# Patient Record
Sex: MALE | Race: WHITE | NOT HISPANIC OR LATINO | Employment: OTHER | URBAN - METROPOLITAN AREA
[De-identification: names, ages, dates, MRNs, and addresses within clinical notes are randomized per-mention and may not be internally consistent; named-entity substitution may affect disease eponyms.]

---

## 2017-06-20 ENCOUNTER — PROCEDURE VISIT (OUTPATIENT)
Dept: CARDIOLOGY | Facility: HOSPITAL | Age: 77
End: 2017-06-20

## 2017-06-20 ENCOUNTER — OFFICE VISIT (OUTPATIENT)
Dept: CARDIOLOGY | Facility: HOSPITAL | Age: 77
End: 2017-06-20

## 2017-06-20 VITALS
TEMPERATURE: 97.6 F | OXYGEN SATURATION: 98 % | SYSTOLIC BLOOD PRESSURE: 142 MMHG | BODY MASS INDEX: 30.62 KG/M2 | WEIGHT: 231 LBS | HEART RATE: 57 BPM | DIASTOLIC BLOOD PRESSURE: 78 MMHG | HEIGHT: 73 IN | RESPIRATION RATE: 23 BRPM

## 2017-06-20 DIAGNOSIS — I48.0 PAF (PAROXYSMAL ATRIAL FIBRILLATION) (HCC): Primary | ICD-10-CM

## 2017-06-20 DIAGNOSIS — E78.5 DYSLIPIDEMIA: ICD-10-CM

## 2017-06-20 DIAGNOSIS — I48.0 PAF (PAROXYSMAL ATRIAL FIBRILLATION) (HCC): ICD-10-CM

## 2017-06-20 DIAGNOSIS — Z79.01 INADEQUATE ANTICOAGULATION: ICD-10-CM

## 2017-06-20 DIAGNOSIS — Z51.81 INADEQUATE ANTICOAGULATION: ICD-10-CM

## 2017-06-20 PROCEDURE — 99204 OFFICE O/P NEW MOD 45 MIN: CPT | Performed by: NURSE PRACTITIONER

## 2017-06-20 PROCEDURE — 93005 ELECTROCARDIOGRAM TRACING: CPT

## 2017-06-20 PROCEDURE — 93010 ELECTROCARDIOGRAM REPORT: CPT | Performed by: INTERNAL MEDICINE

## 2017-06-20 RX ORDER — METFORMIN HYDROCHLORIDE 500 MG/1
1000 TABLET, EXTENDED RELEASE ORAL NIGHTLY
COMMUNITY
Start: 2017-06-05

## 2017-06-20 RX ORDER — TAMSULOSIN HYDROCHLORIDE 0.4 MG/1
0.4 CAPSULE ORAL DAILY
COMMUNITY
Start: 2017-05-30 | End: 2018-08-23

## 2017-06-20 NOTE — PROGRESS NOTES
Encounter Date:06/20/2017      Patient ID: Stefan Pedraza is a 77 y.o. male.        Subjective:     Chief Complaint: Establish Care and Atrial Fibrillation     History of Present Illness patient presents to the [facility] today for ongoing evaluation of his atrial fibrillation.  Patient underwent a pulmonary vein ablation per Dr. Solis in October 2011 and maintained normal sinus rhythm until February 2012.  Patient underwent a cardioversion in February 2012 which restored normal sinus rhythm.  Patient reports remaining in normal sinus rhythm from February 2012 until recently.  He notes roughly one week ago he started experiencing significant fatigue and an irregular heartbeat.  Patient was evaluated by his primary care provider last week and was noted to be in atrial fibrillation.  Patient is currently rate controlled and is on aspirin only.  Patient is a chads VASC of 2 due to age. Patient denies chest pain, chest pressure, tachycardia, presyncope, syncope, orthopnea, PND, abdominal fullness, early satiety, claudication, cough or edema.    Patient Active Problem List   Diagnosis   • Atrial fibrillation  a. History of atrial fibrillation, initial diagnosis 2007.   b. Status-post successful external cardioversion, Dr. Dyer, 2007.  c. Initiation of amiodarone therapy with recent tapering dose, Dr. Lowe.   d. Echocardiogram, 02/02/2011, revealing normal LV function, diastolic dysfunction noted, mild MR, no pericardial effusion.   e. Holter monitor, 01/25/2011, revealing sinus rhythm with PAC/PVC/episodes of sinus bradycardia.   f. CHADS score = 1.   g. 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   h. Pulmonary vein isolation procedure, 10/05/2011.  i. BRISSA with left ventricular ejection fraction of 55%, mild TR and MR.   j. Event recorder with intermittent episodes of flutter but the majority of the time in  sinus rhythm.   k. External cardioversion to normal sinus rhythm, 02/17/2012.  l. Atypical chest pain.  m. History of left heart catheterization, 1990s in [place], reported as normal coronaries.   n. Stress test, 08/23/2007, revealing no significant ischemia, EF estimated at 46%.   • History of cardioversion   • Diabetes mellitus   • GERD (gastroesophageal reflux disease)   • Dyslipidemia   • Osteoarthritis       Past Surgical History:   Procedure Laterality Date   • CHOLECYSTECTOMY     • KNEE ARTHROSCOPY W/ PARTIAL MEDIAL MENISCECTOMY Left    • ORCHIECTOMY Right    • SHOULDER ARTHROSCOPY W/ LABRAL REPAIR Right        Allergies   Allergen Reactions   • Penicillins Rash         Current Outpatient Prescriptions:   •  atorvastatin (LIPITOR) 10 MG tablet, Take 10 mg by mouth Daily., Disp: , Rfl:   •  metFORMIN ER (GLUCOPHAGE-XR) 500 MG 24 hr tablet, Take 500 mg by mouth Daily., Disp: , Rfl:   •  naproxen (NAPROSYN) 500 MG tablet, Take 500 mg by mouth 2 (Two) Times a Day With Meals., Disp: , Rfl:   •  omeprazole (priLOSEC) 40 MG capsule, Take 40 mg by mouth Daily., Disp: , Rfl:   •  tamsulosin (FLOMAX) 0.4 MG capsule 24 hr capsule, Take 0.4 mg by mouth Daily., Disp: , Rfl:   •  ASA 81 mg qd    The following portions of the chart were reviewed and updated as appropriate: Allergies, current medications, past family history, social history, past medical history.     Review of Systems   Constitution: Positive for malaise/fatigue. Negative for chills, decreased appetite, diaphoresis, fever, weakness, night sweats, weight gain and weight loss.   HENT: Negative for congestion, headaches, hearing loss, hoarse voice and nosebleeds.    Eyes: Negative for blurred vision, visual disturbance and visual halos.   Cardiovascular: Positive for irregular heartbeat. Negative for chest pain, claudication, cyanosis, dyspnea on exertion, leg swelling, near-syncope, orthopnea, palpitations, paroxysmal nocturnal dyspnea and syncope.   Respiratory:  "Positive for snoring. Negative for cough, hemoptysis, shortness of breath, sleep disturbances due to breathing, sputum production and wheezing.    Hematologic/Lymphatic: Negative for bleeding problem. Does not bruise/bleed easily.   Skin: Negative for dry skin, itching and rash.   Musculoskeletal: Negative for arthritis, joint pain, joint swelling and myalgias.   Gastrointestinal: Positive for heartburn. Negative for bloating, abdominal pain, constipation, diarrhea, flatus, hematemesis, hematochezia, melena, nausea and vomiting.   Genitourinary: Negative for dysuria, frequency, hematuria, nocturia and urgency.   Neurological: Positive for excessive daytime sleepiness. Negative for dizziness, light-headedness and loss of balance.   Psychiatric/Behavioral: Negative for depression. The patient does not have insomnia and is not nervous/anxious.            Objective:     Vitals:    06/20/17 1541 06/20/17 1542 06/20/17 1543   BP: 136/89 142/91 142/78   BP Location: Right arm Left arm Left arm   Patient Position: Sitting Sitting Standing   Cuff Size: Adult     Pulse: 79  57   Resp: 23     Temp: 97.6 °F (36.4 °C)     TempSrc: Temporal Artery      SpO2: 98%     Weight: 231 lb (105 kg)     Height: 73\" (185.4 cm)           Physical Exam   Constitutional: He is oriented to person, place, and time. He appears well-developed and well-nourished. He is active and cooperative. No distress.   HENT:   Head: Normocephalic and atraumatic.   Mouth/Throat: Oropharynx is clear and moist.   Eyes: Conjunctivae and EOM are normal. Pupils are equal, round, and reactive to light.   Neck: Normal range of motion. Neck supple. No JVD present. No tracheal deviation present. No thyromegaly present.   Cardiovascular: Normal rate, normal heart sounds and intact distal pulses.  An irregularly irregular rhythm present.   Pulmonary/Chest: Effort normal and breath sounds normal.   Abdominal: Soft. Bowel sounds are normal. He exhibits no distension. There " is no tenderness.   Musculoskeletal: Normal range of motion.   Neurological: He is alert and oriented to person, place, and time.   Skin: Skin is warm, dry and intact.   Psychiatric: He has a normal mood and affect. His behavior is normal.   Nursing note and vitals reviewed.      Lab and Diagnostic Review:    EKG: Atrial fibrillation with premature ventricular contractions at 82 bpm  Assessment and Plan:         1. PAF (paroxysmal atrial fibrillation)  Pulmonary vein isolation procedure per Dr. Solis 2011; external cardioversion 2012  Patient is currently rate controlled  Patient to begin Eliquis 5 mg 1 by mouth twice a day; patient was given a free month trial card  Discussed with Dr. Solis: per his recommendation patient to be anticoagulated for 3-4 weeks and then undergo external cardioversion  - ECG 12 Lead; Future  - Cardioversion External in Cardiology Department; Future  AFIB education provided today including: s/s, use of anticoagulation, treatment of atrial fibrillation, Chads Vasc and the role of the afib center. Discussed stroke prevention and causes of afib, triggers of afib and medication management.   2. Inadequate anticoagulation  Patient to begin Eliquis 5 mg twice a day    3. Dyslipidemia  Currently on statin therapy    It has been a pleasure to participate in the care of this patient.  Patient was instructed to call the [facility] with any questions, concerns, or worsening symptoms.      * Please note that portions of this note were completed with a voice recognition program. Efforts were made to edit the dictation but occasionally words are transcribed.

## 2017-06-29 ENCOUNTER — PREP FOR SURGERY (OUTPATIENT)
Dept: OTHER | Facility: HOSPITAL | Age: 77
End: 2017-06-29

## 2017-06-29 DIAGNOSIS — I48.0 PAF (PAROXYSMAL ATRIAL FIBRILLATION) (HCC): Primary | ICD-10-CM

## 2017-06-29 DIAGNOSIS — I48.19 PERSISTENT ATRIAL FIBRILLATION (HCC): ICD-10-CM

## 2017-06-29 RX ORDER — ACETAMINOPHEN 325 MG/1
650 TABLET ORAL EVERY 4 HOURS PRN
Status: CANCELLED | OUTPATIENT
Start: 2017-06-29

## 2017-06-29 RX ORDER — PROMETHAZINE HYDROCHLORIDE 25 MG/ML
12.5 INJECTION, SOLUTION INTRAMUSCULAR; INTRAVENOUS EVERY 4 HOURS PRN
Status: CANCELLED | OUTPATIENT
Start: 2017-06-29

## 2017-06-29 RX ORDER — NITROGLYCERIN 0.4 MG/1
0.4 TABLET SUBLINGUAL
Status: CANCELLED | OUTPATIENT
Start: 2017-06-29

## 2017-07-13 ENCOUNTER — HOSPITAL ENCOUNTER (OUTPATIENT)
Dept: CARDIOLOGY | Facility: HOSPITAL | Age: 77
Discharge: HOME | End: 2017-07-13
Attending: INTERNAL MEDICINE | Admitting: INTERNAL MEDICINE

## 2017-07-13 VITALS
OXYGEN SATURATION: 96 % | SYSTOLIC BLOOD PRESSURE: 138 MMHG | WEIGHT: 229.06 LBS | HEART RATE: 62 BPM | BODY MASS INDEX: 30.36 KG/M2 | HEIGHT: 73 IN | RESPIRATION RATE: 16 BRPM | TEMPERATURE: 97.4 F | DIASTOLIC BLOOD PRESSURE: 89 MMHG

## 2017-07-13 DIAGNOSIS — I48.0 PAF (PAROXYSMAL ATRIAL FIBRILLATION) (HCC): ICD-10-CM

## 2017-07-13 LAB
ANION GAP SERPL CALCULATED.3IONS-SCNC: 7 MMOL/L (ref 3–11)
BASOPHILS # BLD AUTO: 0.03 10*3/MM3 (ref 0–0.2)
BASOPHILS NFR BLD AUTO: 0.5 % (ref 0–1)
BUN BLD-MCNC: 20 MG/DL (ref 9–23)
BUN/CREAT SERPL: 22.2 (ref 7–25)
CALCIUM SPEC-SCNC: 10 MG/DL (ref 8.7–10.4)
CHLORIDE SERPL-SCNC: 102 MMOL/L (ref 99–109)
CO2 SERPL-SCNC: 25 MMOL/L (ref 20–31)
CREAT BLD-MCNC: 0.9 MG/DL (ref 0.6–1.3)
DEPRECATED RDW RBC AUTO: 45.6 FL (ref 37–54)
EOSINOPHIL # BLD AUTO: 0.19 10*3/MM3 (ref 0–0.3)
EOSINOPHIL NFR BLD AUTO: 3.4 % (ref 0–3)
ERYTHROCYTE [DISTWIDTH] IN BLOOD BY AUTOMATED COUNT: 13 % (ref 11.3–14.5)
GFR SERPL CREATININE-BSD FRML MDRD: 82 ML/MIN/1.73
GLUCOSE BLD-MCNC: 100 MG/DL (ref 70–100)
GLUCOSE BLDC GLUCOMTR-MCNC: 83 MG/DL (ref 70–130)
HBA1C MFR BLD: 5.9 % (ref 4.8–5.6)
HCT VFR BLD AUTO: 46.6 % (ref 38.9–50.9)
HGB BLD-MCNC: 15.6 G/DL (ref 13.1–17.5)
IMM GRANULOCYTES # BLD: 0.01 10*3/MM3 (ref 0–0.03)
IMM GRANULOCYTES NFR BLD: 0.2 % (ref 0–0.6)
INR PPP: 1.06
LYMPHOCYTES # BLD AUTO: 1.62 10*3/MM3 (ref 0.6–4.8)
LYMPHOCYTES NFR BLD AUTO: 28.6 % (ref 24–44)
MCH RBC QN AUTO: 32 PG (ref 27–31)
MCHC RBC AUTO-ENTMCNC: 33.5 G/DL (ref 32–36)
MCV RBC AUTO: 95.5 FL (ref 80–99)
MONOCYTES # BLD AUTO: 0.59 10*3/MM3 (ref 0–1)
MONOCYTES NFR BLD AUTO: 10.4 % (ref 0–12)
NEUTROPHILS # BLD AUTO: 3.22 10*3/MM3 (ref 1.5–8.3)
NEUTROPHILS NFR BLD AUTO: 56.9 % (ref 41–71)
PLATELET # BLD AUTO: 169 10*3/MM3 (ref 150–450)
PMV BLD AUTO: 10.5 FL (ref 6–12)
POTASSIUM BLD-SCNC: 3.8 MMOL/L (ref 3.5–5.5)
PROTHROMBIN TIME: 11.6 SECONDS (ref 9.6–11.5)
RBC # BLD AUTO: 4.88 10*6/MM3 (ref 4.2–5.76)
SODIUM BLD-SCNC: 134 MMOL/L (ref 132–146)
WBC NRBC COR # BLD: 5.66 10*3/MM3 (ref 3.5–10.8)

## 2017-07-13 PROCEDURE — 83036 HEMOGLOBIN GLYCOSYLATED A1C: CPT | Performed by: PHYSICIAN ASSISTANT

## 2017-07-13 PROCEDURE — 80048 BASIC METABOLIC PNL TOTAL CA: CPT | Performed by: INTERNAL MEDICINE

## 2017-07-13 PROCEDURE — 85025 COMPLETE CBC W/AUTO DIFF WBC: CPT | Performed by: INTERNAL MEDICINE

## 2017-07-13 PROCEDURE — 82962 GLUCOSE BLOOD TEST: CPT

## 2017-07-13 PROCEDURE — 93005 ELECTROCARDIOGRAM TRACING: CPT | Performed by: INTERNAL MEDICINE

## 2017-07-13 PROCEDURE — 92960 CARDIOVERSION ELECTRIC EXT: CPT

## 2017-07-13 PROCEDURE — 85610 PROTHROMBIN TIME: CPT | Performed by: INTERNAL MEDICINE

## 2017-07-13 PROCEDURE — 92960 CARDIOVERSION ELECTRIC EXT: CPT | Performed by: INTERNAL MEDICINE

## 2017-07-13 PROCEDURE — 36415 COLL VENOUS BLD VENIPUNCTURE: CPT

## 2017-07-13 PROCEDURE — 25010000002 MIDAZOLAM PER 1 MG: Performed by: INTERNAL MEDICINE

## 2017-07-13 PROCEDURE — 99152 MOD SED SAME PHYS/QHP 5/>YRS: CPT

## 2017-07-13 RX ORDER — NALOXONE HYDROCHLORIDE 0.4 MG/ML
INJECTION, SOLUTION INTRAMUSCULAR; INTRAVENOUS; SUBCUTANEOUS
Status: DISCONTINUED
Start: 2017-07-13 | End: 2017-07-13 | Stop reason: WASHOUT

## 2017-07-13 RX ORDER — MIDAZOLAM HYDROCHLORIDE 1 MG/ML
INJECTION INTRAMUSCULAR; INTRAVENOUS
Status: DISCONTINUED
Start: 2017-07-13 | End: 2017-07-13 | Stop reason: HOSPADM

## 2017-07-13 RX ORDER — PROMETHAZINE HYDROCHLORIDE 25 MG/ML
12.5 INJECTION, SOLUTION INTRAMUSCULAR; INTRAVENOUS EVERY 4 HOURS PRN
Status: DISCONTINUED | OUTPATIENT
Start: 2017-07-13 | End: 2017-07-13 | Stop reason: HOSPADM

## 2017-07-13 RX ORDER — MIDAZOLAM HYDROCHLORIDE 1 MG/ML
INJECTION INTRAMUSCULAR; INTRAVENOUS
Status: COMPLETED | OUTPATIENT
Start: 2017-07-13 | End: 2017-07-13

## 2017-07-13 RX ORDER — NITROGLYCERIN 0.4 MG/1
0.4 TABLET SUBLINGUAL
Status: DISCONTINUED | OUTPATIENT
Start: 2017-07-13 | End: 2017-07-13 | Stop reason: HOSPADM

## 2017-07-13 RX ORDER — CHLORAL HYDRATE 500 MG
1000 CAPSULE ORAL
COMMUNITY

## 2017-07-13 RX ORDER — ACETAMINOPHEN 325 MG/1
650 TABLET ORAL EVERY 4 HOURS PRN
Status: DISCONTINUED | OUTPATIENT
Start: 2017-07-13 | End: 2017-07-13 | Stop reason: HOSPADM

## 2017-07-13 RX ORDER — FLUMAZENIL 0.1 MG/ML
INJECTION INTRAVENOUS
Status: DISCONTINUED
Start: 2017-07-13 | End: 2017-07-13 | Stop reason: WASHOUT

## 2017-07-13 RX ORDER — FERROUS SULFATE TAB EC 324 MG (65 MG FE EQUIVALENT) 324 (65 FE) MG
324 TABLET DELAYED RESPONSE ORAL
COMMUNITY

## 2017-07-13 RX ADMIN — METHOHEXITAL SODIUM 20 MG: 500 INJECTION, POWDER, LYOPHILIZED, FOR SOLUTION INTRAMUSCULAR; INTRAVENOUS; RECTAL at 09:37

## 2017-07-13 RX ADMIN — METHOHEXITAL SODIUM 20 MG: 500 INJECTION, POWDER, LYOPHILIZED, FOR SOLUTION INTRAMUSCULAR; INTRAVENOUS; RECTAL at 09:35

## 2017-07-13 RX ADMIN — MIDAZOLAM HYDROCHLORIDE 1 MG: 1 INJECTION, SOLUTION INTRAMUSCULAR; INTRAVENOUS at 09:35

## 2017-07-13 NOTE — H&P
Cardiology H&P    Stefan Pedraza  1940  [phone]      07/13/17    DATE OF ADMISSION: 7/13/2017  [facility]    Gamaliel Eaton MD  [address] [facility] [place] [address]    CC: Atrial Fibrillation        Problem List:   1. Atrial fibrillation:   a. History of atrial fibrillation, initial diagnosis 2007.   b. Status-post successful external cardioversion, Dr. Dyer, 2007.  c. Initiation of amiodarone therapy with recent tapering dose, Dr. Lowe.   d. Echocardiogram, 02/02/2011, revealing normal LV function, diastolic dysfunction noted, mild MR, no pericardial effusion.   e. Holter monitor, 01/25/2011, revealing sinus rhythm with PAC/PVC/episodes of sinus bradycardia.   f. CHADS score = 1.   g. 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   h. Pulmonary vein isolation procedure, 10/05/2011.  i. BRISSA with left ventricular ejection fraction of 55%, mild TR and MR.   j. Event recorder with intermittent episodes of flutter but the majority of the time in sinus rhythm.   k. External cardioversion to normal sinus rhythm, 02/17/2012.  l. Atypical chest pain.  m. History of left heart catheterization, 1990s in [place], reported as normal coronaries.   n. Stress test, 08/23/2007, revealing no significant ischemia, EF estimated at 46%.  2. Pre-Diabetes mellitus.   3. Gastroesophageal reflux disease.  4. Dyslipidemia.  5. Osteoarthritis.  6. Remote surgical history:  a. Cholecystectomy.   b. Right orchiectomy.  c. Right shoulder labral repair.  d. Left knee arthroscopy for partial medial meniscectomy        History of Present Illness:   Mr Pedraza presents today for ECV of his atrial fibrillation. Patient underwent a pulmonary vein ablation per Dr. Solis in October 2011 and maintained normal sinus rhythm until February 2012. Patient underwent a cardioversion in February 2012 which restored  normal sinus rhythm. Patient reports remaining in normal sinus rhythm from February 2012 until recently. About a month ago, patient was evaluated by his primary care provider  was noted to be in atrial fibrillation. He was not aware that he was in NSR, but he does note that looking bad, he felt more fatigued than normal. He states that before this happened, he had two colds and took Dayquil and Nyquil.   He followed up with the AFIB clinic and was started on Eliquis and planned for ECV 3-4 weeks later.  Patient is currently rate controlled.  Patient denies chest pain, chest pressure, tachycardia, presyncope, syncope, orthopnea, PND, abdominal fullness, early satiety, claudication, cough or edema.    Allergies   Allergen Reactions   • Penicillins Rash       Prior to Admission Medications     Prescriptions Last Dose Informant Patient Reported? Taking?    apixaban (ELIQUIS) 5 MG tablet tablet 7/13/2017  No Yes    Take 1 tablet by mouth 2 (Two) Times a Day.    atorvastatin (LIPITOR) 10 MG tablet 7/12/2017  Yes Yes    Take 10 mg by mouth Daily.    B Complex Vitamins (VITAMIN B COMPLEX PO) 7/13/2017  Yes Yes    Take 1 tablet by mouth Daily.    ferrous sulfate 324 (65 FE) MG tablet delayed-release EC tablet 7/13/2017  Yes Yes    Take 324 mg by mouth Daily With Breakfast.    metFORMIN ER (GLUCOPHAGE-XR) 500 MG 24 hr tablet 7/12/2017  Yes Yes    Take 500 mg by mouth Daily.    naproxen (NAPROSYN) 500 MG tablet 7/13/2017  Yes Yes    Take 500 mg by mouth 2 (Two) Times a Day With Meals.    Omega-3 Fatty Acids (FISH OIL) 1000 MG capsule capsule 7/13/2017  Yes Yes    Take  by mouth Daily With Breakfast.    omeprazole (priLOSEC) 40 MG capsule 7/13/2017  Yes Yes    Take 40 mg by mouth Daily.    tamsulosin (FLOMAX) 0.4 MG capsule 24 hr capsule 7/12/2017  Yes Yes    Take 0.4 mg by mouth Daily.            Current Facility-Administered Medications:   •  acetaminophen (TYLENOL) tablet 650 mg, 650 mg, Oral, Q4H PRN, SHIRA Berry  •   flumazenil (ROMAZICON) 0.5 MG/5ML injection  - ADS Override Pull, , , ,   •  midazolam (VERSED) 2 MG/2ML injection  - ADS Override Pull, , , ,   •  naloxone (NARCAN) 0.4 MG/ML injection  - ADS Override Pull, , , ,   •  nitroglycerin (NITROSTAT) SL tablet 0.4 mg, 0.4 mg, Sublingual, Q5 Min PRN, SHIRA Berry  •  promethazine (PHENERGAN) injection 12.5 mg, 12.5 mg, Intravenous, Q4H PRN, SHIRA Berry    Social History     Social History   • Marital status:      Spouse name: N/A   • Number of children: N/A   • Years of education: N/A     Occupational History   • Retired      Social History Main Topics   • Smoking status: Never Smoker   • Smokeless tobacco: Never Used   • Alcohol use No   • Drug use: No   • Sexual activity: Defer     Other Topics Concern   • None     Social History Narrative    Caffeine: 3-4 servings per day    Patient lives at home with his wife       Family History   Problem Relation Age of Onset   • Arthritis Mother    • Pneumonia Mother    • Atrial fibrillation Father    • Hypertension Father    • Diabetes Father    • No Known Problems Sister    • No Known Problems Maternal Grandmother    • Diabetes Maternal Grandfather    • No Known Problems Paternal Grandmother    • No Known Problems Paternal Grandfather        REVIEW OF SYSTEMS:   CONST:  No weight loss, fever, chills, weakness + fatigue.   HEENT:  No visual loss, blurred vision, double vision, yellow sclerae.                   No hearing loss, congestion, sore throat.   SKIN:      No rashes, urticaria, ulcers, sores.     RESP:     No shortness of breath, hemoptysis, cough, sputum.   GI:           No anorexia, nausea, vomiting, diarrhea. No abdominal pain, melena.   :         No burning on urination, hematuria or increased frequency.  ENDO:    No diaphoresis, cold or heat intolerance. No polyuria or polydipsia.   NEURO:  No headache, dizziness, syncope, paralysis, ataxia, or parasthesias.                  No change in bowel or  "bladder control. No history of CVA/TIA  MUSC:    No muscle, back pain, joint pain or stiffness.   HEME:    No anemia, bleeding, bruising. No history of DVT/PE.  PSYCH:  No history of depression, anxiety    Vitals:    07/13/17 0830 07/13/17 0834   BP: 124/93 131/97   BP Location: Right arm Left arm   Patient Position: Lying Lying   Pulse: 89    Resp: 16    Temp: 97.4 °F (36.3 °C)    TempSrc: Temporal Artery     SpO2: 94%    Weight: 229 lb 0.9 oz (104 kg)    Height: 73\" (185.4 cm)          Vital Sign Min/Max for last 24 hours  Temp  Min: 97.4 °F (36.3 °C)  Max: 97.4 °F (36.3 °C)   BP  Min: 124/93  Max: 131/97   Pulse  Min: 89  Max: 89   Resp  Min: 16  Max: 16   SpO2  Min: 94 %  Max: 94 %   No Data Recorded    No intake or output data in the 24 hours ending 07/13/17 0905          Physical Exam:  GEN: Well nourished, Well- developed  No acute distress  HEENT: Normocephalic, Atraumatic, PERRLA, moist mucous membranes  NECK: supple, NO JVD, no thyromegaly, no lymphadenopathy  CARD: S1S2  irr irr no murmur, gallop, rub  LUNGS: Clear to auscultataion, normal respiratory effort  ABDOMEN: Soft, nontender, normal bowel sounds  EXTREMITIES:No gross deformities,  No clubbing, cyanosis, or edema  SKIN: Warm, dry  NEURO: No focal deficits  PSYCHIATRIC: Normal affect and mood      Data:     Results from last 7 days  Lab Units 07/13/17  0839   WBC 10*3/mm3 5.66   HEMOGLOBIN g/dL 15.6   HEMATOCRIT % 46.6   PLATELETS 10*3/mm3 169                                      Telemetry: atrial fibrillation    Assessment and Plan:   1. Persistent recurrent atrial fibrillation- probably related to taking cold medications. The patient has been on Eliquis for 3-4 weeks now and will undergo ECV today with Dr. Solis. The risks, benefits, and alternatives of the procedure have been reviewed and the patient wishes to proceed.   CHADSVasc = 3 on Eliquis. Was previously on ASA. Will probably need to continue Eliquis as he was not aware he was in " afib. If he has recurrent atrial fibrillation, he will probably need antiarrythmic.         Scribed for Prabhakar Solis MD by Trinidad Cano PA-C. 7/13/2017  9:05 AM      I, Prabhakar Solis MD, personally performed the services face to face as described and documented by the above named individual. I have made any necessary edits and it is both accurate and complete 7/13/2017  9:41 AM

## 2017-07-13 NOTE — DISCHARGE INSTR - LAB
DR. NAQVI WANTS YOU TO HAVE AN EKG ON Monday, July 17TH, 2017. YOU HAVE BEEN GIVEN AN ORDER FOR THE EKG. YOU MAY GO TO DR. NAQVI'S OFFICE OR YOUR PRIMARY CARE PROVIDER TO HAVE THE EKG DONE.       DR. NAQVI WANTS TO SEE YOU FOR A FOLLOW UP IN 6 WEEKS. YOU HAVE BEEN PLACED ON A WAIT LIST AND THE OFFICE WILL CALL YOU WHEN AN APPOINTMENT BECOMES AVAILABLE.

## 2017-07-13 NOTE — PLAN OF CARE
Problem: Patient Care Overview (Adult)  Goal: Plan of Care Review  Outcome: Ongoing (interventions implemented as appropriate)    07/13/17 0830   Patient Care Overview   Progress no change   Outcome Evaluation   Outcome Summary/Follow up Plan PT ARRIVED IN A FIB, VERBALIZES UNDERSTANDING OF PROCEDURE. NO COMPLAINTS OR ISSUES AT THIS TIME.    Coping/Psychosocial Response Interventions   Plan Of Care Reviewed With patient;spouse

## 2017-07-13 NOTE — PLAN OF CARE
Problem: Arrhythmia/Dysrhythmia (Symptomatic) (Adult)  Goal: Signs and Symptoms of Listed Potential Problems Will be Absent or Manageable (Arrhythmia/Dysrhythmia)  Outcome: Outcome(s) achieved Date Met:  07/13/17 07/13/17 1106   Arrhythmia/Dysrhythmia (Symptomatic)   Problems Assessed (Arrhythmia/Dysrhythmia) all   Problems Present (Arrhythmia/Dysrhythmia) electrophysiological conduction defect     PT ARRIVED IN A FIB

## 2017-07-17 ENCOUNTER — CLINICAL SUPPORT (OUTPATIENT)
Dept: CARDIOLOGY | Facility: CLINIC | Age: 77
End: 2017-07-17

## 2017-07-17 DIAGNOSIS — I48.0 PAF (PAROXYSMAL ATRIAL FIBRILLATION) (HCC): ICD-10-CM

## 2017-07-17 DIAGNOSIS — I48.91 ATRIAL FIBRILLATION, UNSPECIFIED TYPE (HCC): Primary | ICD-10-CM

## 2017-07-17 PROCEDURE — 93000 ELECTROCARDIOGRAM COMPLETE: CPT | Performed by: INTERNAL MEDICINE

## 2017-07-21 ENCOUNTER — TELEPHONE (OUTPATIENT)
Dept: CARDIOLOGY | Facility: CLINIC | Age: 77
End: 2017-07-21

## 2017-07-21 DIAGNOSIS — I48.0 PAROXYSMAL ATRIAL FIBRILLATION (HCC): Primary | ICD-10-CM

## 2017-07-28 ENCOUNTER — HOSPITAL ENCOUNTER (OUTPATIENT)
Dept: CARDIOLOGY | Facility: HOSPITAL | Age: 77
Discharge: HOME | End: 2017-07-28
Attending: INTERNAL MEDICINE | Admitting: INTERNAL MEDICINE

## 2017-07-28 LAB
BH CV ECHO MEAS - AI DEC SLOPE: 95.9 CM/SEC^2
BH CV ECHO MEAS - AI MAX PG: 83.2 MMHG
BH CV ECHO MEAS - AI MAX VEL: 456.1 CM/SEC
BH CV ECHO MEAS - AI P1/2T: 1393 MSEC
BH CV ECHO MEAS - AO ROOT AREA (BSA CORRECTED): 1.5
BH CV ECHO MEAS - AO ROOT AREA: 9.7 CM^2
BH CV ECHO MEAS - AO ROOT DIAM: 3.5 CM
BH CV ECHO MEAS - BSA(HAYCOCK): 2.3 M^2
BH CV ECHO MEAS - BSA: 2.3 M^2
BH CV ECHO MEAS - BZI_BMI: 30.2 KILOGRAMS/M^2
BH CV ECHO MEAS - BZI_METRIC_HEIGHT: 185.4 CM
BH CV ECHO MEAS - BZI_METRIC_WEIGHT: 103.9 KG
BH CV ECHO MEAS - CONTRAST EF (2CH): 63.7 ML/M^2
BH CV ECHO MEAS - CONTRAST EF 4CH: 51.9 ML/M^2
BH CV ECHO MEAS - EDV(CUBED): 97.3 ML
BH CV ECHO MEAS - EDV(MOD-SP2): 135 ML
BH CV ECHO MEAS - EDV(MOD-SP4): 133 ML
BH CV ECHO MEAS - EDV(TEICH): 97.3 ML
BH CV ECHO MEAS - EF(CUBED): 68.2 %
BH CV ECHO MEAS - EF(MOD-SP2): 63.7 %
BH CV ECHO MEAS - EF(MOD-SP4): 51.9 %
BH CV ECHO MEAS - EF(TEICH): 59.8 %
BH CV ECHO MEAS - ESV(CUBED): 31 ML
BH CV ECHO MEAS - ESV(MOD-SP2): 49 ML
BH CV ECHO MEAS - ESV(MOD-SP4): 64 ML
BH CV ECHO MEAS - ESV(TEICH): 39.1 ML
BH CV ECHO MEAS - FS: 31.7 %
BH CV ECHO MEAS - IVS/LVPW: 1
BH CV ECHO MEAS - IVSD: 1.3 CM
BH CV ECHO MEAS - LA DIMENSION: 5.3 CM
BH CV ECHO MEAS - LA/AO: 1.5
BH CV ECHO MEAS - LV DIASTOLIC VOL/BSA (35-75): 58.4 ML/M^2
BH CV ECHO MEAS - LV IVRT: 0.09 SEC
BH CV ECHO MEAS - LV MASS(C)D: 219.9 GRAMS
BH CV ECHO MEAS - LV MASS(C)DI: 96.5 GRAMS/M^2
BH CV ECHO MEAS - LV SYSTOLIC VOL/BSA (12-30): 28.1 ML/M^2
BH CV ECHO MEAS - LVIDD: 4.6 CM
BH CV ECHO MEAS - LVIDS: 3.1 CM
BH CV ECHO MEAS - LVLD AP2: 8.7 CM
BH CV ECHO MEAS - LVLD AP4: 8.4 CM
BH CV ECHO MEAS - LVLS AP2: 7.1 CM
BH CV ECHO MEAS - LVLS AP4: 7.6 CM
BH CV ECHO MEAS - LVOT AREA (M): 3.1 CM^2
BH CV ECHO MEAS - LVOT AREA: 3.2 CM^2
BH CV ECHO MEAS - LVOT DIAM: 2 CM
BH CV ECHO MEAS - LVPWD: 1.3 CM
BH CV ECHO MEAS - MV DEC TIME: 0.27 SEC
BH CV ECHO MEAS - MV E MAX VEL: 126.1 CM/SEC
BH CV ECHO MEAS - PA ACC SLOPE: 759.7 CM/SEC^2
BH CV ECHO MEAS - PA ACC TIME: 0.11 SEC
BH CV ECHO MEAS - PA PR(ACCEL): 27.5 MMHG
BH CV ECHO MEAS - PI END-D VEL: 98.1 CM/SEC
BH CV ECHO MEAS - RAP SYSTOLE: 3 MMHG
BH CV ECHO MEAS - RVSP: 20 MMHG
BH CV ECHO MEAS - SI(CUBED): 29.1 ML/M^2
BH CV ECHO MEAS - SI(MOD-SP2): 37.7 ML/M^2
BH CV ECHO MEAS - SI(MOD-SP4): 30.3 ML/M^2
BH CV ECHO MEAS - SI(TEICH): 25.5 ML/M^2
BH CV ECHO MEAS - SV(CUBED): 66.4 ML
BH CV ECHO MEAS - SV(MOD-SP2): 86 ML
BH CV ECHO MEAS - SV(MOD-SP4): 69 ML
BH CV ECHO MEAS - SV(TEICH): 58.2 ML
BH CV ECHO MEAS - TAPSE (>1.6): 1.9 CM2
BH CV ECHO MEAS - TR MAX VEL: 204 CM/SEC
BH CV VAS BP LEFT ARM: NORMAL MMHG
BH CV XLRA - RV BASE: 5.8 CM
BH CV XLRA - RV LENGTH: 8.3 CM
BH CV XLRA - RV MID: 4.3 CM
BH CV XLRA - TDI S': 10.7 CM/SEC
LEFT ATRIUM VOLUME INDEX: 54.4 ML/M2
LV EF 2D ECHO EST: 57 %

## 2017-07-28 PROCEDURE — 93306 TTE W/DOPPLER COMPLETE: CPT

## 2017-07-28 PROCEDURE — 93306 TTE W/DOPPLER COMPLETE: CPT | Performed by: INTERNAL MEDICINE

## 2017-08-23 ENCOUNTER — TELEPHONE (OUTPATIENT)
Dept: CARDIOLOGY | Facility: CLINIC | Age: 77
End: 2017-08-23

## 2017-08-23 DIAGNOSIS — I48.19 PERSISTENT ATRIAL FIBRILLATION (HCC): Primary | ICD-10-CM

## 2017-08-23 RX ORDER — FLECAINIDE ACETATE 100 MG/1
100 TABLET ORAL 2 TIMES DAILY
Qty: 60 TABLET | Refills: 6 | Status: SHIPPED | OUTPATIENT
Start: 2017-08-23 | End: 2017-09-12 | Stop reason: SDUPTHER

## 2017-08-23 NOTE — TELEPHONE ENCOUNTER
----- Message from Prabhakar Solis MD sent at 8/22/2017  7:27 PM EDT -----  I talked w pt. He needs flecainide 100 mg po BID started 48 hrs prior to external CV. Please arrange. Thanks    GT  ----- Message -----     From: Alcides Azevedo III, MD     Sent: 7/28/2017   3:24 PM       To: Prabhakar oSlis MD        ECV ordered. Prescription for Flecainide sent to [facility].

## 2017-08-29 ENCOUNTER — PREP FOR SURGERY (OUTPATIENT)
Dept: OTHER | Facility: HOSPITAL | Age: 77
End: 2017-08-29

## 2017-08-29 DIAGNOSIS — I48.19 PERSISTENT ATRIAL FIBRILLATION (HCC): Primary | ICD-10-CM

## 2017-08-29 RX ORDER — SODIUM CHLORIDE 0.9 % (FLUSH) 0.9 %
1-10 SYRINGE (ML) INJECTION AS NEEDED
Status: CANCELLED | OUTPATIENT
Start: 2017-08-29

## 2017-08-30 ENCOUNTER — OFFICE VISIT (OUTPATIENT)
Dept: CARDIOLOGY | Facility: CLINIC | Age: 77
End: 2017-08-30

## 2017-08-30 VITALS
SYSTOLIC BLOOD PRESSURE: 110 MMHG | WEIGHT: 233.2 LBS | HEART RATE: 77 BPM | HEIGHT: 73 IN | BODY MASS INDEX: 30.91 KG/M2 | DIASTOLIC BLOOD PRESSURE: 72 MMHG

## 2017-08-30 DIAGNOSIS — I48.19 PERSISTENT ATRIAL FIBRILLATION (HCC): Primary | ICD-10-CM

## 2017-08-30 PROCEDURE — 99213 OFFICE O/P EST LOW 20 MIN: CPT | Performed by: INTERNAL MEDICINE

## 2017-08-30 PROCEDURE — 93000 ELECTROCARDIOGRAM COMPLETE: CPT | Performed by: INTERNAL MEDICINE

## 2017-08-30 NOTE — PROGRESS NOTES
Stefan Pedraza  1940  [phone]      08/30/2017    [facility] CARDIOLOGY     Gamaliel Eaton MD  [address] [place] [address]    Chief Complaint   Patient presents with   • Atrial Fibrillation       Problem List:      Problem List:   1. Atrial fibrillation:   a. History of atrial fibrillation, initial diagnosis 2007.   b. Status-post successful external cardioversion, Dr. Dyer, 2007.  c. Initiation of amiodarone therapy with recent tapering dose, Dr. Lowe.   d. Echocardiogram, 02/02/2011, revealing normal LV function, diastolic dysfunction noted, mild MR, no pericardial effusion.   e. Holter monitor, 01/25/2011, revealing sinus rhythm with PAC/PVC/episodes of sinus bradycardia.   f. CHADS score = 1.   g. 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   h. Pulmonary vein isolation procedure, 10/05/2011.  i. BRISSA with left ventricular ejection fraction of 55%, mild TR and MR.   j. Event recorder with intermittent episodes of flutter but the majority of the time in sinus rhythm.   k. External cardioversion to normal sinus rhythm, 02/17/2012.  l. Atypical chest pain.  m. History of left heart catheterization, 1990s in [place], reported as normal coronaries.   n. Stress test, 08/23/2007, revealing no significant ischemia, EF estimated at 46%.  o. Echo 7/28/17 LV Estimated EF = 57%., mild concentric hypertrophy, Left atrial cavity size is severely dilated. Normal estimated pulmonary artery systolic pressure  p. External CV 7/13/17; NSR with recurrent AF three days later  2. Pre-Diabetes mellitus.   3. Gastroesophageal reflux disease.  4. Dyslipidemia.  5. Osteoarthritis.  6. Remote surgical history:  a. Cholecystectomy.   b. Right orchiectomy.  c. Right shoulder labral repair.  d. Left knee arthroscopy for partial medial meniscectomy.  Allergies  Allergies   Allergen  "Reactions   • Penicillins Rash       Current Medications    Current Outpatient Prescriptions:   •  apixaban (ELIQUIS) 5 MG tablet tablet, Take 1 tablet by mouth 2 (Two) Times a Day., Disp: 180 tablet, Rfl: 3  •  atorvastatin (LIPITOR) 10 MG tablet, Take 10 mg by mouth Daily., Disp: , Rfl:   •  B Complex Vitamins (VITAMIN B COMPLEX PO), Take 1 tablet by mouth Daily., Disp: , Rfl:   •  ferrous sulfate 324 (65 FE) MG tablet delayed-release EC tablet, Take 324 mg by mouth Daily With Breakfast., Disp: , Rfl:   •  flecainide (TAMBOCOR) 100 MG tablet, Take 1 tablet by mouth 2 (Two) Times a Day., Disp: 60 tablet, Rfl: 6  •  metFORMIN ER (GLUCOPHAGE-XR) 500 MG 24 hr tablet, Take 500 mg by mouth Daily., Disp: , Rfl:   •  naproxen (NAPROSYN) 500 MG tablet, Take 500 mg by mouth 2 (Two) Times a Day With Meals., Disp: , Rfl:   •  Omega-3 Fatty Acids (FISH OIL) 1000 MG capsule capsule, Take  by mouth Daily With Breakfast., Disp: , Rfl:   •  omeprazole (priLOSEC) 40 MG capsule, Take 40 mg by mouth Daily., Disp: , Rfl:   •  tamsulosin (FLOMAX) 0.4 MG capsule 24 hr capsule, Take 0.4 mg by mouth Daily., Disp: , Rfl:     History of Present Illness   HPI    Pt presents for follow up of AF. Since we last saw the pt, pt with recurrent AF approx three days following external CV, No energy and easily fatigued.  Denies any hospitalizations, ER visits, bleeding, or TIA/CVA symptoms. Overall feels tired. Has been scheduled for external CV tomorrow. Has started tambacor yesterday in preparation for external CV.    ROS:  General: + fatigue,  No weight gain or loss  Cardiovascular:  Denies CP, PND, syncope, near syncope, edema or palpitations.  Pulmonary:  + CROW,  No cough, or wheezing      Vitals:    08/30/17 1607   BP: 110/72   BP Location: Left arm   Patient Position: Sitting   Pulse: 77   Weight: 233 lb 3.2 oz (106 kg)   Height: 73\" (185.4 cm)     PE:  General: NAD  Neck: no JVD, no carotid bruits, no TM  Heart irreg irreg NL S1, S2, no rubs, " murmurs  Lungs: CTA, no wheezes, rhonchi, or rales  Abd: soft, non-tender, NL BS  Ext: No musculoskeletal deformities, no edema, cyanosis, or clubbing  Psych: normal mood and affect    Diagnostic Data:        ECG 12 Lead  Date/Time: 8/30/2017 4:23 PM  Performed by: HONG NAQVI  Authorized by: HONG NAQVI   Rhythm: atrial fibrillation  BPM: 77              1. Persistent atrial fibrillation          Plan:  1) Persistent AF: for external CV tomorrow on flecainide  Continue present medications.   2) Anticoagulation  Continue NOAC      F/up in 6 months

## 2017-08-31 ENCOUNTER — HOSPITAL ENCOUNTER (OUTPATIENT)
Dept: CARDIOLOGY | Facility: HOSPITAL | Age: 77
Discharge: HOME | End: 2017-08-31
Attending: INTERNAL MEDICINE | Admitting: INTERNAL MEDICINE

## 2017-08-31 VITALS
HEIGHT: 73 IN | WEIGHT: 231.92 LBS | RESPIRATION RATE: 16 BRPM | SYSTOLIC BLOOD PRESSURE: 150 MMHG | TEMPERATURE: 98 F | BODY MASS INDEX: 30.74 KG/M2 | OXYGEN SATURATION: 96 % | DIASTOLIC BLOOD PRESSURE: 100 MMHG | HEART RATE: 66 BPM

## 2017-08-31 DIAGNOSIS — I48.19 PERSISTENT ATRIAL FIBRILLATION (HCC): ICD-10-CM

## 2017-08-31 LAB
ANION GAP SERPL CALCULATED.3IONS-SCNC: 5 MMOL/L (ref 3–11)
BASOPHILS # BLD AUTO: 0.03 10*3/MM3 (ref 0–0.2)
BASOPHILS NFR BLD AUTO: 0.5 % (ref 0–1)
BUN BLD-MCNC: 19 MG/DL (ref 9–23)
BUN/CREAT SERPL: 23.8 (ref 7–25)
CALCIUM SPEC-SCNC: 9.1 MG/DL (ref 8.7–10.4)
CHLORIDE SERPL-SCNC: 105 MMOL/L (ref 99–109)
CO2 SERPL-SCNC: 26 MMOL/L (ref 20–31)
CREAT BLD-MCNC: 0.8 MG/DL (ref 0.6–1.3)
DEPRECATED RDW RBC AUTO: 44.6 FL (ref 37–54)
EOSINOPHIL # BLD AUTO: 0.21 10*3/MM3 (ref 0–0.3)
EOSINOPHIL NFR BLD AUTO: 3.3 % (ref 0–3)
ERYTHROCYTE [DISTWIDTH] IN BLOOD BY AUTOMATED COUNT: 13 % (ref 11.3–14.5)
GFR SERPL CREATININE-BSD FRML MDRD: 94 ML/MIN/1.73
GLUCOSE BLD-MCNC: 104 MG/DL (ref 70–100)
HCT VFR BLD AUTO: 44.9 % (ref 38.9–50.9)
HGB BLD-MCNC: 15.3 G/DL (ref 13.1–17.5)
IMM GRANULOCYTES # BLD: 0.02 10*3/MM3 (ref 0–0.03)
IMM GRANULOCYTES NFR BLD: 0.3 % (ref 0–0.6)
INR PPP: 1.09
LYMPHOCYTES # BLD AUTO: 1.87 10*3/MM3 (ref 0.6–4.8)
LYMPHOCYTES NFR BLD AUTO: 29.6 % (ref 24–44)
MCH RBC QN AUTO: 31.9 PG (ref 27–31)
MCHC RBC AUTO-ENTMCNC: 34.1 G/DL (ref 32–36)
MCV RBC AUTO: 93.5 FL (ref 80–99)
MONOCYTES # BLD AUTO: 0.5 10*3/MM3 (ref 0–1)
MONOCYTES NFR BLD AUTO: 7.9 % (ref 0–12)
NEUTROPHILS # BLD AUTO: 3.69 10*3/MM3 (ref 1.5–8.3)
NEUTROPHILS NFR BLD AUTO: 58.4 % (ref 41–71)
PLATELET # BLD AUTO: 163 10*3/MM3 (ref 150–450)
PMV BLD AUTO: 10.2 FL (ref 6–12)
POTASSIUM BLD-SCNC: 4 MMOL/L (ref 3.5–5.5)
PROTHROMBIN TIME: 11.9 SECONDS (ref 9.6–11.5)
RBC # BLD AUTO: 4.8 10*6/MM3 (ref 4.2–5.76)
SODIUM BLD-SCNC: 136 MMOL/L (ref 132–146)
WBC NRBC COR # BLD: 6.32 10*3/MM3 (ref 3.5–10.8)

## 2017-08-31 PROCEDURE — 80048 BASIC METABOLIC PNL TOTAL CA: CPT | Performed by: INTERNAL MEDICINE

## 2017-08-31 PROCEDURE — 85610 PROTHROMBIN TIME: CPT | Performed by: INTERNAL MEDICINE

## 2017-08-31 PROCEDURE — 93005 ELECTROCARDIOGRAM TRACING: CPT | Performed by: PHYSICIAN ASSISTANT

## 2017-08-31 PROCEDURE — 25010000002 MIDAZOLAM PER 1 MG: Performed by: INTERNAL MEDICINE

## 2017-08-31 PROCEDURE — 92960 CARDIOVERSION ELECTRIC EXT: CPT

## 2017-08-31 PROCEDURE — 93010 ELECTROCARDIOGRAM REPORT: CPT | Performed by: INTERNAL MEDICINE

## 2017-08-31 PROCEDURE — 36415 COLL VENOUS BLD VENIPUNCTURE: CPT

## 2017-08-31 PROCEDURE — 92960 CARDIOVERSION ELECTRIC EXT: CPT | Performed by: INTERNAL MEDICINE

## 2017-08-31 PROCEDURE — 93005 ELECTROCARDIOGRAM TRACING: CPT | Performed by: INTERNAL MEDICINE

## 2017-08-31 PROCEDURE — 85025 COMPLETE CBC W/AUTO DIFF WBC: CPT | Performed by: INTERNAL MEDICINE

## 2017-08-31 RX ORDER — MIDAZOLAM HYDROCHLORIDE 1 MG/ML
INJECTION INTRAMUSCULAR; INTRAVENOUS
Status: DISCONTINUED
Start: 2017-08-31 | End: 2017-08-31 | Stop reason: HOSPADM

## 2017-08-31 RX ORDER — NALOXONE HYDROCHLORIDE 0.4 MG/ML
INJECTION, SOLUTION INTRAMUSCULAR; INTRAVENOUS; SUBCUTANEOUS
Status: DISCONTINUED
Start: 2017-08-31 | End: 2017-08-31 | Stop reason: WASHOUT

## 2017-08-31 RX ORDER — SODIUM CHLORIDE 0.9 % (FLUSH) 0.9 %
1-10 SYRINGE (ML) INJECTION AS NEEDED
Status: DISCONTINUED | OUTPATIENT
Start: 2017-08-31 | End: 2017-08-31 | Stop reason: HOSPADM

## 2017-08-31 RX ORDER — FLUMAZENIL 0.1 MG/ML
INJECTION INTRAVENOUS
Status: DISCONTINUED
Start: 2017-08-31 | End: 2017-08-31 | Stop reason: WASHOUT

## 2017-08-31 RX ORDER — MIDAZOLAM HYDROCHLORIDE 1 MG/ML
INJECTION INTRAMUSCULAR; INTRAVENOUS
Status: COMPLETED | OUTPATIENT
Start: 2017-08-31 | End: 2017-08-31

## 2017-08-31 RX ADMIN — MIDAZOLAM HYDROCHLORIDE 1 MG: 1 INJECTION, SOLUTION INTRAMUSCULAR; INTRAVENOUS at 10:06

## 2017-08-31 RX ADMIN — METHOHEXITAL SODIUM 20 MG: 500 INJECTION, POWDER, LYOPHILIZED, FOR SOLUTION INTRAMUSCULAR; INTRAVENOUS; RECTAL at 10:06

## 2017-09-12 RX ORDER — FLECAINIDE ACETATE 100 MG/1
100 TABLET ORAL 2 TIMES DAILY
Qty: 180 TABLET | Refills: 2 | Status: SHIPPED | OUTPATIENT
Start: 2017-09-12 | End: 2018-05-22 | Stop reason: SDUPTHER

## 2018-02-09 ENCOUNTER — TELEPHONE (OUTPATIENT)
Dept: CARDIOLOGY | Facility: HOSPITAL | Age: 78
End: 2018-02-09

## 2018-02-09 NOTE — TELEPHONE ENCOUNTER
Mr. Pedraza left a voicemail requesting a prescription for Lomotil be sent to [facility] on [email or web address]. Pt stated that he had previously discussed this with Shabana yesterday 2/8. Discussed with Shabana Morales and she did not speak with Mr. Pedraza yesterday and has no idea about prescription? Forwarding on in hopes he spoke with someone in your office? Thank you.      Victoriano Correa, PharmD  Pharmacy Resident  2/9/2018  10:24 AM

## 2018-02-12 NOTE — TELEPHONE ENCOUNTER
Patient called and asked what he could take for diarrhea since he takes Flecainide. I told him that he could not take Imodium d/t it's possible interactions with Flecainide. I instructed him to call his PCP and see what is causing his diarrhea. I also let them know that Lomotil is an option. I told him that he would need to get this from his PCP.

## 2018-03-07 ENCOUNTER — OFFICE VISIT (OUTPATIENT)
Dept: CARDIOLOGY | Facility: CLINIC | Age: 78
End: 2018-03-07

## 2018-03-07 VITALS
HEIGHT: 73 IN | HEART RATE: 57 BPM | SYSTOLIC BLOOD PRESSURE: 150 MMHG | DIASTOLIC BLOOD PRESSURE: 84 MMHG | WEIGHT: 237 LBS | BODY MASS INDEX: 31.41 KG/M2

## 2018-03-07 DIAGNOSIS — I48.19 PERSISTENT ATRIAL FIBRILLATION (HCC): Primary | ICD-10-CM

## 2018-03-07 PROCEDURE — 99213 OFFICE O/P EST LOW 20 MIN: CPT | Performed by: NURSE PRACTITIONER

## 2018-03-07 PROCEDURE — 93000 ELECTROCARDIOGRAM COMPLETE: CPT | Performed by: NURSE PRACTITIONER

## 2018-03-07 NOTE — PROGRESS NOTES
Stefan Pedraza  1940  [phone]      03/07/2018    [facility] CARDIOLOGY     Gamaliel Eaton MD  [address] [place] [address]    Chief Complaint   Patient presents with   • Atrial Fibrillation       Problem List:   1. Atrial fibrillation:   a. History of atrial fibrillation, initial diagnosis 2007.   b. Status-post successful external cardioversion, Dr. Dyer, 2007.  c. Initiation of amiodarone therapy with recent tapering dose, Dr. Lowe.   d. Echocardiogram, 02/02/2011, revealing normal LV function, diastolic dysfunction noted, mild MR, no pericardial effusion.   e. Holter monitor, 01/25/2011, revealing sinus rhythm with PAC/PVC/episodes of sinus bradycardia.   f. CHADS score = 1.   g. 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   h. Pulmonary vein isolation procedure, 10/05/2011.  i. BRISSA with left ventricular ejection fraction of 55%, mild TR and MR.   j. Event recorder with intermittent episodes of flutter but the majority of the time in sinus rhythm.   k. External cardioversion to normal sinus rhythm, 02/17/2012.  l. Atypical chest pain.  m. History of left heart catheterization, 1990s in [place], reported as normal coronaries.   n. Stress test, 08/23/2007, revealing no significant ischemia, EF estimated at 46%.  o. Echo 7/28/17 LV Estimated EF = 57%., mild concentric hypertrophy, Left atrial cavity size is severely dilated. Normal estimated pulmonary artery systolic pressure  p. External CV 7/13/17; NSR with recurrent AF three days later  q. Echocardiogram 7/28/17: EF 57%, mild LVH, LA severely dilated  r. ECV to NSR 8/31/17  2. Pre-Diabetes mellitus.   3. Gastroesophageal reflux disease.  4. Dyslipidemia.  5. Osteoarthritis.  6. Remote surgical history:  a. Cholecystectomy.   b. Right orchiectomy.  c. Right shoulder labral repair.  d. Left knee arthroscopy for  partial medial meniscectomy    Allergies  Allergies   Allergen Reactions   • Penicillins Rash       Current Medications    Current Outpatient Prescriptions:   •  apixaban (ELIQUIS) 5 MG tablet tablet, Take 1 tablet by mouth 2 (Two) Times a Day., Disp: 180 tablet, Rfl: 3  •  atorvastatin (LIPITOR) 10 MG tablet, Take 10 mg by mouth Daily., Disp: , Rfl:   •  B Complex Vitamins (VITAMIN B COMPLEX PO), Take 1 tablet by mouth Daily., Disp: , Rfl:   •  ferrous sulfate 324 (65 FE) MG tablet delayed-release EC tablet, Take 324 mg by mouth Daily With Breakfast., Disp: , Rfl:   •  flecainide (TAMBOCOR) 100 MG tablet, Take 1 tablet by mouth 2 (Two) Times a Day., Disp: 180 tablet, Rfl: 2  •  metFORMIN ER (GLUCOPHAGE-XR) 500 MG 24 hr tablet, Take 500 mg by mouth Daily., Disp: , Rfl:   •  naproxen (NAPROSYN) 500 MG tablet, Take 500 mg by mouth 2 (Two) Times a Day With Meals., Disp: , Rfl:   •  Omega-3 Fatty Acids (FISH OIL) 1000 MG capsule capsule, Take  by mouth Daily With Breakfast., Disp: , Rfl:   •  omeprazole (priLOSEC) 40 MG capsule, Take 40 mg by mouth Daily., Disp: , Rfl:   •  tamsulosin (FLOMAX) 0.4 MG capsule 24 hr capsule, Take 0.4 mg by mouth Daily., Disp: , Rfl:     History of Present Illness   HPI    Pt presents for follow up of persistent atrial fibrillation.  He is s/p successful ECV back in August due to recurrent atrial fibrillation.  He denies any recurrence of his atrial fibrillation that he knows of but states he is generally asymptomatic.  He only notes shortness of breath with exertion if he is out of rhythm which he has not had.  He denies any c/o CP, LH, and dizziness.  Denies any hospitalizations, ER visits, bleeding issues on Eliquis, or TIA/CVA symptoms. Overall feels well.  Blood pressures running 130's-140's/80's at home.      ROS:  General:  Denies fatigue, weight gain or loss  Cardiovascular:  Denies CP, PND, syncope, near syncope, edema or palpitations.  Pulmonary:  Denies CROW, cough, or  "wheezing      Vitals:    03/07/18 0835   BP: 150/84   BP Location: Right arm   Patient Position: Sitting   Pulse: 57   Weight: 108 kg (237 lb)   Height: 185.4 cm (73\")     PE:  General: NAD  Neck: no JVD, no carotid bruits, no TM  Heart RRR, NL S1, S2, S4 present, no rubs, murmurs  Lungs: CTA, no wheezes, rhonchi, or rales  Abd: soft, non-tender, NL BS  Ext: No musculoskeletal deformities, no edema, cyanosis, or clubbing  Psych: normal mood and affect    Diagnostic Data:        ECG 12 Lead  Date/Time: 3/7/2018 8:47 AM  Performed by: NATALIE GONZALEZ  Authorized by: NATALIE GONZALEZ   Comparison: compared with previous ECG from 8/31/2017  Comparison to previous ECG: First degree AVB no longer present  Rhythm: sinus bradycardia  BPM: 57              1. Persistent atrial fibrillation          Plan:  1) Persistent atrial fibrillation:  - S/p ECV to NSR back in August, in NSR today  - Continue flecainide 100 mg BID, Eliquis 5 mg BID    F/up in 6 months    [facility]  3/7/2018  8:56 AM              "

## 2018-05-22 RX ORDER — FLECAINIDE ACETATE 100 MG/1
TABLET ORAL
Qty: 180 TABLET | Refills: 2 | Status: SHIPPED | OUTPATIENT
Start: 2018-05-22 | End: 2019-02-18 | Stop reason: SDUPTHER

## 2018-06-04 RX ORDER — APIXABAN 5 MG/1
TABLET, FILM COATED ORAL
Qty: 180 TABLET | Refills: 3 | OUTPATIENT
Start: 2018-06-04

## 2018-07-23 ENCOUNTER — TELEPHONE (OUTPATIENT)
Dept: RADIATION ONCOLOGY | Facility: HOSPITAL | Age: 78
End: 2018-07-23

## 2018-07-23 NOTE — TELEPHONE ENCOUNTER
Pt left message asking about labs for consult on 8/23.  I returned call.  No answer.   Left message explaining that Dr. hCao's office would send over all records that are necessary for consult.

## 2018-08-21 ENCOUNTER — TELEPHONE (OUTPATIENT)
Dept: RADIATION ONCOLOGY | Facility: HOSPITAL | Age: 78
End: 2018-08-21

## 2018-08-21 NOTE — TELEPHONE ENCOUNTER
Pt called and asking if cyberknife treatment was appropriate treatment for cancer outside the prostate.  I explained that I could not answer that question having never met him or knowing his medical history and that is the purpose of the consultation so that he and Dr. Bunn can discuss which treatment is best for him.  Pt wanted to reschedule his consultation appointment because he has a bladder scope on Tuesday.  I explained he was welcome to reschedule his appt but we are very busy and it may be several weeks before he can get another appt.  Pt chose to keep his appt on Thurs 8/23/18.

## 2018-08-23 ENCOUNTER — OFFICE VISIT (OUTPATIENT)
Dept: RADIATION ONCOLOGY | Facility: HOSPITAL | Age: 78
End: 2018-08-23

## 2018-08-23 ENCOUNTER — HOSPITAL ENCOUNTER (OUTPATIENT)
Dept: RADIATION ONCOLOGY | Facility: HOSPITAL | Age: 78
Setting detail: RADIATION/ONCOLOGY SERIES
Discharge: HOME | End: 2018-08-23

## 2018-08-23 VITALS
TEMPERATURE: 98 F | WEIGHT: 232.3 LBS | OXYGEN SATURATION: 96 % | SYSTOLIC BLOOD PRESSURE: 159 MMHG | DIASTOLIC BLOOD PRESSURE: 83 MMHG | HEART RATE: 55 BPM | RESPIRATION RATE: 20 BRPM | BODY MASS INDEX: 30.65 KG/M2

## 2018-08-23 DIAGNOSIS — C61 PROSTATE CANCER (HCC): Primary | ICD-10-CM

## 2018-08-23 PROCEDURE — G0463 HOSPITAL OUTPT CLINIC VISIT: HCPCS

## 2018-08-23 RX ORDER — CYCLOBENZAPRINE HCL 10 MG
10 TABLET ORAL AS NEEDED
COMMUNITY

## 2018-08-23 RX ORDER — LATANOPROST 50 UG/ML
SOLUTION/ DROPS OPHTHALMIC
COMMUNITY
Start: 2018-07-07 | End: 2018-08-23

## 2018-08-23 NOTE — PROGRESS NOTES
CONSULTATION NOTE      :                                                          1940  DATE OF CONSULTATION:                       2018   REQUESTING PHYSICIAN:                   Dereck Chao MD  REASON FOR CONSULTATION:          Prostate Cancer  Cancer Staging  Stage IIC (cT2b, cN0, cM0, PSA: 5.3, Grade Group: 3)       BRIEF HISTORY:  The patient is a very pleasant 78 y.o. male  with recently diagnosed prostate cancer.  He presented with elevated PSA value of 5.3 ng/ml on 2017.  Examination revealed a slightly enlarged prostate with a nodule on the left.  Gland measured 36 cc volume.  Biopsy performed 2018 revealed prostatic adenocarcinoma, [place]'s score 4+3= 7 involving 3 out of 4 cores from the left lobe with maximum involved tumor measuring 5 mm in greatest linear extent.  5 cores from the right lobe were benign.  He's having no significant change in severity of urinary symptoms.   Nuclear medicine bone scan showed degenerative changes, confirmed with conventional radiographs of the thoracic and lumbar spine.  No evidence of bony metastatic disease.  MRI pelvis performed at [facility] 2018 confirmed a nodular moderately enlarged, 49 g prostate.  A 16 mm suspicious area was seen at the right lateral apex which abuts the capsule but does not clearly extend beyond the margins of the capsule.  The lesion appeared to abut the urethra and there appeared to be invasion into muscularis propria of the urethra at about 7 o'clock position.  Seminal vesicles were asymmetric and slightly more atrophic on the left.  There was no evidence of skeletal or lymphatic metastasis.  Patient is interested in definitive treatment and is seeking information regarding therapy treatment options with special interest in stereotactic body radiotherapy/CyberKnife treatment.    Allergies   Allergen Reactions   • Penicillins Rash       Social History     Social History   • Marital status:       Occupational History   • Retired      Social History Main Topics   • Smoking status: Never Smoker   • Smokeless tobacco: Never Used   • Alcohol use No   • Drug use: No   • Sexual activity: Defer     Other Topics Concern   • Not on file     Social History Narrative    Caffeine: 3-4 servings per day    Patient lives at home with his wife       Past Medical History:   Diagnosis Date   • Arthritis    • Atrial fibrillation (CMS/HCC)    • Diabetes mellitus (CMS/HCC)    • Dyslipidemia    • GERD (gastroesophageal reflux disease)    • H/O cardiovascular stress test 08/23/2007    Revealing no significant ischemia, EF estimated at 46%   • H/O echocardiogram 02/02/2011    Revealing normal LV function, diastolic dysfunction noted, mild MR, nopericardial effusion.   • H/O transesophageal echocardiography (BRISSA) for monitoring     left ventricular ejection fraction of 55%, mild TR and MR   • History of cardioversion 2007    STATUS-POST SUCCESSFUL EXTERNAL CARDIOVERSION   • History of cardioversion 02/17/2012    external cardioversion to normal sinus rhythm   • History of Holter monitoring 01/25/2011    Revealing sinus rhythm with PAC/PVC/episodes of sinus bradycardia.   • Hyperlipidemia    • Osteoarthritis    • Pre-diabetes    • Prostate cancer (CMS/HCC)        family history includes Arthritis in his mother; Atrial fibrillation in his father; Diabetes in his father and maternal grandfather; Heart disease in his father; Hypertension in his father; No Known Problems in his maternal grandmother, paternal grandfather, paternal grandmother, and sister; Pneumonia in his mother.     Past Surgical History:   Procedure Laterality Date   • CARDIAC ABLATION  2011   • CATARACT EXTRACTION     • CHOLECYSTECTOMY     • KNEE ARTHROSCOPY W/ PARTIAL MEDIAL MENISCECTOMY Left    • ORCHIECTOMY Right    • SHOULDER ARTHROSCOPY W/ LABRAL REPAIR Right         Review of Systems   Constitutional: Negative.    HENT:   Positive for hearing loss.     Eyes: Negative.    Respiratory: Negative.    Cardiovascular: Positive for leg swelling.   Gastrointestinal: Negative.    Endocrine: Negative.    Genitourinary: Positive for nocturia.    Musculoskeletal: Positive for back pain.   Skin: Negative.    Neurological: Positive for dizziness and light-headedness.   Hematological: Negative.    Psychiatric/Behavioral: Negative.               IPSS Questionnaire (AUA-7):  Over the past month…    1)  Incomplete Emptying  How often have you had a sensation of not emptying your bladder?  0 - Not at all   2)  Frequency  How often have you had to urinate less than every two hours? 3 - About half the time   3)  Intermittency  How often have you found you stopped and started again several times when you urinated?  5 - Almost always   4) Urgency  How often have you found it difficult to postpone urination?  3 - About half the time   5) Weak Stream  How often have you had a weak urinary stream?  4 - More than half the time   6) Straining  How often have you had to push or strain to begin urination?  0 - Not at all   7) Nocturia  How many times did you typically get up at night to urinate?  3 - 3 times   Total Score:  18       Quality of life due to urinary symptoms:  If you were to spend the rest of your life with your urinary condition the way it is now, how would you feel about that? 2-Mostly Satisfied   Urine Leakage (Incontinence) 1-Mild (A few drops a day, no pad use)     Sexual Health Inventory  Current Status    1)  How do you rate your confidence that you could achieve and keep an erection? 1-Very Low   2) When you had erections with sexual stimulation, how often were your erections hard enough for penetration (entering your partner)? 1-Almost never or never   3)  During sexual intercourse, how often were you able to maintain your erection after you had penetrated (entered) into your partner? 0-Did not attempt intercourse   4) During sexual intercourse, how difficult was it to  maintain your erection to completion of intercourse? 0-Did not attempt intercourse   5) When you attempted sexual intercourse, how often was it satisfactory to you? 0-No sexual activity   Total Score: 2       Bowel Health Inventory  Current Status: 0-No problems, no rectal bleeding, no discharge, less then 5 bowel movements a day       Objective   VITAL SIGNS:   Vitals:    08/23/18 0839   BP: 159/83   Pulse: 55   Resp: 20   Temp: 98 °F (36.7 °C)   TempSrc: Temporal Artery    SpO2: 96%   Weight: 105 kg (232 lb 4.8 oz)   PainSc: 0-No pain        Karnofsky score: 90      Physical Exam   Constitutional: He is oriented to person, place, and time. He appears well-developed and well-nourished.   HENT:   Head: Normocephalic and atraumatic.   Neck: Normal range of motion. Neck supple.   Cardiovascular: Normal rate, regular rhythm and normal heart sounds.    No murmur heard.  Pulmonary/Chest: Effort normal and breath sounds normal. He has no wheezes. He has no rales.   Abdominal: Soft. Bowel sounds are normal. He exhibits no distension. There is no hepatosplenomegaly. There is no tenderness.   Musculoskeletal: Normal range of motion. He exhibits no edema or tenderness.   Lymphadenopathy:     He has no cervical adenopathy.     He has no axillary adenopathy.        Right: No supraclavicular adenopathy present.        Left: No supraclavicular adenopathy present.   Neurological: He is alert and oriented to person, place, and time. He has normal strength. No sensory deficit.   Skin: Skin is warm and dry.   Psychiatric: He has a normal mood and affect. His behavior is normal. Judgment and thought content normal.   Nursing note and vitals reviewed.           The following portions of the patient's history were reviewed and updated as appropriate: allergies, current medications, past family history, past medical history, past social history, past surgical history and problem list.    Assessment      Prostate cancer, [place] score  4+3=7, clinical stage IIC (T2b, N0, M0) with PSA 5.3 ng/ml.  3 core samples from the left lobe contained neoplasm.  He is scheduled for cystoscopy next week to evaluate MRI findings of possible right apical periurethral abnormality on MRI suspicious for neoplasm.  This is contralateral to the reported nodule and positive biopsy cores.  Disease still appears to be confined to the capsule prostate gland.  He appears to have localized but intermediate risk disease.  Prognosis should still be excellent.  He has decided to pursue definitive treatment, which would be reasonable given his overall good health, tumor grade, and number of involved cores.  Today we reviewed the radiation treatment options of IMRT (with or without androgen ablation), brachytherapy and stereotactic body radiotherapy.  He is most interested in stereotactic body radiotherapy.  The CyberKnife treatment procedure has been reviewed in detail with patient today and questions were answered.    RECOMMENDATIONS:  After cystoscopy he plans to have consultation in [place], [place] to learn more about HIFU as a treatment option for his prostate cancer.  If he chooses to undergo stereotactic body radiotherapy I would be happy to see him back for re-evaluation, OBDULIO and consent.  He would bring in discs with all prior imaging including nuclear medicine bone scan and MRI.    I would also like results of cystoscopy for any additional clinical information.  He would require placement of 4-6 gold seed fiducials followed by treatment planning CT and MRI pelvis for SBRT planning.  Prostate gland and proximal seminal vesicles with 3-5 mm margin would receive 5 fractions of 7 Gray each on the CyberKnife.     Return If he chooses to return for treatment, for Office Visit.  Stefan was seen today for prostate cancer.    Diagnoses and all orders for this visit:    Prostate cancer (CMS/[facility])         Shon Bunn MD

## 2018-08-29 ENCOUNTER — TELEPHONE (OUTPATIENT)
Dept: RADIATION ONCOLOGY | Facility: HOSPITAL | Age: 78
End: 2018-08-29

## 2018-08-30 ENCOUNTER — TELEPHONE (OUTPATIENT)
Dept: RADIATION ONCOLOGY | Facility: HOSPITAL | Age: 78
End: 2018-08-30

## 2018-09-04 DIAGNOSIS — C61 PROSTATE CANCER (HCC): Primary | ICD-10-CM

## 2018-09-05 ENCOUNTER — TELEPHONE (OUTPATIENT)
Dept: RADIATION ONCOLOGY | Facility: HOSPITAL | Age: 78
End: 2018-09-05

## 2018-09-05 NOTE — TELEPHONE ENCOUNTER
Pt called asking if marker placement could be moved up.  I explained that I would give him Dr. Chao's number and he is welcome to get markers placed sooner but that did not mean that Dr. Bunn's schedule would allow for him to be moved up.  I explained that we had several pts scheduled ahead of him and all things considered from him deciding on 8/29/18 that he wanted to proceed with cyberknife that he has been scheduled quickly.  I assured pt that if there are any cancellations that I will contact him.  Pt verbalized understanding.

## 2018-09-06 ENCOUNTER — TELEPHONE (OUTPATIENT)
Dept: CARDIOLOGY | Facility: CLINIC | Age: 78
End: 2018-09-06

## 2018-09-06 NOTE — TELEPHONE ENCOUNTER
Pt having cyberknife for prostate cancer and they need to place markers and want him to hold his blood thinner Eliquis. How long do you recommend holding?

## 2018-09-11 ENCOUNTER — TELEPHONE (OUTPATIENT)
Dept: RADIATION ONCOLOGY | Facility: HOSPITAL | Age: 78
End: 2018-09-11

## 2018-09-12 ENCOUNTER — OFFICE VISIT (OUTPATIENT)
Dept: CARDIOLOGY | Facility: CLINIC | Age: 78
End: 2018-09-12

## 2018-09-12 VITALS
SYSTOLIC BLOOD PRESSURE: 134 MMHG | HEIGHT: 73 IN | DIASTOLIC BLOOD PRESSURE: 74 MMHG | WEIGHT: 236 LBS | HEART RATE: 55 BPM | BODY MASS INDEX: 31.28 KG/M2

## 2018-09-12 DIAGNOSIS — I48.19 PERSISTENT ATRIAL FIBRILLATION (HCC): Primary | ICD-10-CM

## 2018-09-12 PROCEDURE — 99213 OFFICE O/P EST LOW 20 MIN: CPT | Performed by: INTERNAL MEDICINE

## 2018-09-12 PROCEDURE — 93000 ELECTROCARDIOGRAM COMPLETE: CPT | Performed by: INTERNAL MEDICINE

## 2018-09-12 NOTE — PROGRESS NOTES
Stefan BUSTILLO Keila  1940    There is no work phone number on file.    09/12/2018    [facility] CARDIOLOGY     Gamaliel Eaton MD  [address] [place] [address]    Chief Complaint   Patient presents with   • Atrial Fibrillation       Problem List:   1. Atrial fibrillation:   a. History of atrial fibrillation, initial diagnosis 2007.   b. Status-post successful external cardioversion, Dr. Dyer, 2007.  c. Initiation of amiodarone therapy with recent tapering dose, Dr. Lowe.   d. Echocardiogram, 02/02/2011, revealing normal LV function, diastolic dysfunction noted, mild MR, no pericardial effusion.   e. Holter monitor, 01/25/2011, revealing sinus rhythm with PAC/PVC/episodes of sinus bradycardia.   f. CHADS score = 1.   g. 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   h. Pulmonary vein isolation procedure, 10/05/2011.  i. BRISSA with left ventricular ejection fraction of 55%, mild TR and MR.   j. Event recorder with intermittent episodes of flutter but the majority of the time in sinus rhythm.   k. External cardioversion to normal sinus rhythm, 02/17/2012.  l. Atypical chest pain.  m. History of left heart catheterization, 1990s in [place], reported as normal coronaries.   n. Stress test, 08/23/2007, revealing no significant ischemia, EF estimated at 46%.  o. Echo 7/28/17 LV Estimated EF = 57%., mild concentric hypertrophy, Left atrial cavity size is severely dilated. Normal estimated pulmonary artery systolic pressure  p. External CV 7/13/17; NSR with recurrent AF three days later  q. Echocardiogram 7/28/17: EF 57%, mild LVH, LA severely dilated  r. ECV to NSR 8/31/17  2. Diabetes mellitus.   3. Gastroesophageal reflux disease.  4. Dyslipidemia.  5. Osteoarthritis.  6. Remote surgical history:  a. Cholecystectomy.   b. Right orchiectomy.  c. Right shoulder labral  "repair.  d. Left knee arthroscopy for partial medial meniscectomy  Allergies  Allergies   Allergen Reactions   • Penicillins Rash       Current Medications    Current Outpatient Prescriptions:   •  apixaban (ELIQUIS) 5 MG tablet tablet, Take 1 tablet by mouth Every 12 (Twelve) Hours., Disp: 180 tablet, Rfl: 3  •  atorvastatin (LIPITOR) 10 MG tablet, Take 10 mg by mouth Daily., Disp: , Rfl:   •  B Complex Vitamins (VITAMIN B COMPLEX PO), Take 1 tablet by mouth Daily., Disp: , Rfl:   •  cyclobenzaprine (FLEXERIL) 10 MG tablet, cyclobenzaprine 10 mg tablet  one po Every night at bedtime prn, Disp: , Rfl:   •  ferrous sulfate 324 (65 FE) MG tablet delayed-release EC tablet, Take 324 mg by mouth Daily With Breakfast., Disp: , Rfl:   •  flecainide (TAMBOCOR) 100 MG tablet, TAKE 1 TABLET TWICE A DAY, Disp: 180 tablet, Rfl: 2  •  metFORMIN ER (GLUCOPHAGE-XR) 500 MG 24 hr tablet, Take 500 mg by mouth Daily., Disp: , Rfl:   •  naproxen (NAPROSYN) 500 MG tablet, Take 500 mg by mouth 2 (Two) Times a Day With Meals., Disp: , Rfl:   •  Omega-3 Fatty Acids (FISH OIL) 1000 MG capsule capsule, Take  by mouth Daily With Breakfast., Disp: , Rfl:   •  omeprazole (priLOSEC) 40 MG capsule, Take 40 mg by mouth Daily., Disp: , Rfl:     History of Present Illness   HPI    Pt presents for follow up of AF. Since we last saw the pt, pt denies any AF episodes, SOB, CP, LH, and dizziness. Denies any hospitalizations, ER visits, bleeding, or TIA/CVA symptoms. Overall feels well. Recently diagnosed with prostate cancer and undergoing cyberknife therapy    ROS:  General:  + fatigue, No weight gain or loss  Cardiovascular:  Denies CP, PND, syncope, near syncope, edema or palpitations.  Pulmonary:  Denies CROW, cough, or wheezing      Vitals:    09/12/18 1432   BP: 134/74   BP Location: Right arm   Patient Position: Sitting   Pulse: 55   Weight: 107 kg (236 lb)   Height: 185.4 cm (73\")     Body mass index is 31.14 kg/m².  PE:  General: NAD  Neck: no " JVD, no carotid bruits, no TM  Heart RRR, NL S1, S2, S4 present, no rubs, murmurs  Lungs: CTA, no wheezes, rhonchi, or rales  Abd: soft, non-tender, NL BS  Ext: No musculoskeletal deformities, no edema, cyanosis, or clubbing  Psych: normal mood and affect    Diagnostic Data:        ECG 12 Lead  Date/Time: 9/12/2018 2:46 PM  Performed by: HONG NAQVI  Authorized by: HONG NAQVI   Comparison: compared with previous ECG from 3/7/2018  Similar to previous ECG  Rhythm: sinus rhythm  BPM: 55              1. Persistent atrial fibrillation (CMS/HCC)          Plan:  1) Persistent AF in nsr on flecainide: EKG stable  Continue present medications.   2) Anticoagulation CHADVASC 3 Pt would like to come off of eliquis due to fall risk from DJD  Continue NOAC      F/up in 6 months

## 2018-09-14 ENCOUNTER — TELEPHONE (OUTPATIENT)
Dept: RADIATION ONCOLOGY | Facility: HOSPITAL | Age: 78
End: 2018-09-14

## 2018-09-14 NOTE — TELEPHONE ENCOUNTER
Notified pt that insurance has denied coverage for cyberknife radiation treatment for prostate cancer.  I gave him Brenda Flores's number.  I explained that he doesn't have to cancel anything yet.  I will contact him when I know any more information regarding insurance coverage.  Pt verbalized understanding.

## 2018-09-17 ENCOUNTER — OFFICE VISIT (OUTPATIENT)
Dept: RADIATION ONCOLOGY | Facility: HOSPITAL | Age: 78
End: 2018-09-17

## 2018-09-17 ENCOUNTER — HOSPITAL ENCOUNTER (OUTPATIENT)
Dept: RADIATION ONCOLOGY | Facility: HOSPITAL | Age: 78
Setting detail: RADIATION/ONCOLOGY SERIES
Discharge: HOME | End: 2018-09-17

## 2018-09-17 ENCOUNTER — TELEPHONE (OUTPATIENT)
Dept: NUTRITION | Facility: HOSPITAL | Age: 78
End: 2018-09-17

## 2018-09-17 VITALS
BODY MASS INDEX: 31.33 KG/M2 | WEIGHT: 237.5 LBS | HEART RATE: 57 BPM | TEMPERATURE: 97.5 F | OXYGEN SATURATION: 97 % | RESPIRATION RATE: 20 BRPM | SYSTOLIC BLOOD PRESSURE: 148 MMHG | DIASTOLIC BLOOD PRESSURE: 74 MMHG

## 2018-09-17 DIAGNOSIS — C61 PROSTATE CANCER (HCC): Primary | ICD-10-CM

## 2018-09-17 PROCEDURE — G0463 HOSPITAL OUTPT CLINIC VISIT: HCPCS

## 2018-09-17 NOTE — PROGRESS NOTES
RE-EVALUATION    PATIENT:                                                      Stefan Pedraza  :                                                          1940  DATE:                          2018   DIAGNOSIS:     Prostate Cancer  Cancer Staging  Stage IIC (cT2b, cN0, cM0, PSA: 5.3, Grade Group: 3)       BRIEF HISTORY:  The patient is a very pleasant 78 y.o. male  with intermediate risk prostate cancer diagnosed in 2018.  Since he was last seen in consultation and underwent cystoscopy which showed no urethral abnormalities and therefore no correlation with MRI which showed suspicious change at the right apex periurethral tissue.  He brought in his Prolaris score which was high, 8.4%.  He has decided to pursue definitive treatment with stereotactic body radiotherapy.  Initial insurance decision was not favorable; however, appeal is pending.  He is scheduled for placement of gold seed fiducials later this week.  Treatment planning CT and MRI are scheduled to be performed the following week.    Allergies   Allergen Reactions   • Penicillins Rash       Review of Systems   Constitutional: Negative.    HENT:  Negative.    Eyes: Negative.    Respiratory: Negative.    Cardiovascular: Positive for leg swelling.   Gastrointestinal: Negative.    Endocrine: Negative.    Genitourinary: Negative.     Musculoskeletal: Positive for back pain.   Skin: Negative.    Neurological: Negative.    Hematological: Negative.    Psychiatric/Behavioral: Negative.                IPSS Questionnaire (AUA-7):  Over the past month…     1)  Incomplete Emptying  How often have you had a sensation of not emptying your bladder?  0 - Not at all   2)  Frequency  How often have you had to urinate less than every two hours? 3 - About half the time   3)  Intermittency  How often have you found you stopped and started again several times when you urinated?  5 - Almost always   4) Urgency  How often have you found it difficult to postpone  urination?  3 - About half the time   5) Weak Stream  How often have you had a weak urinary stream?  4 - More than half the time   6) Straining  How often have you had to push or strain to begin urination?  0 - Not at all   7) Nocturia  How many times did you typically get up at night to urinate?  3 - 3 times   Total Score:  18         Quality of life due to urinary symptoms:  If you were to spend the rest of your life with your urinary condition the way it is now, how would you feel about that? 2-Mostly Satisfied   Urine Leakage (Incontinence) 1-Mild (A few drops a day, no pad use)      Sexual Health Inventory  Current Status     1)  How do you rate your confidence that you could achieve and keep an erection? 1-Very Low   2) When you had erections with sexual stimulation, how often were your erections hard enough for penetration (entering your partner)? 1-Almost never or never   3)  During sexual intercourse, how often were you able to maintain your erection after you had penetrated (entered) into your partner? 0-Did not attempt intercourse   4) During sexual intercourse, how difficult was it to maintain your erection to completion of intercourse? 0-Did not attempt intercourse   5) When you attempted sexual intercourse, how often was it satisfactory to you? 0-No sexual activity   Total Score: 2         Bowel Health Inventory  Current Status: 0-No problems, no rectal bleeding, no discharge, less then 5 bowel movements a day               Objective   VITAL SIGNS:   Vitals:    09/17/18 0804   BP: 148/74   Pulse: 57   Resp: 20   Temp: 97.5 °F (36.4 °C)   TempSrc: Temporal Artery    SpO2: 97%   Weight: 108 kg (237 lb 8 oz)   PainSc: 0-No pain        KPS 90%    Physical Exam         The following portions of the patient's history were reviewed and updated as appropriate: allergies, current medications, past family history, past medical history, past social history, past surgical history and problem list.    Diagnoses and  all orders for this visit:    Prostate cancer (CMS/[facility])      IMPRESSION:  Prostate cancer, [place] score 4+3=7, clinical stage IIC (T2b, N0, M0) with PSA 5.3 ng/ml.  He has decided to pursue definitive treatment with stereotactic body radiotherapy.  We again reviewed the CyberKnife treatment procedure with patient and wife present.  Insurance authorization is pending, but decision should be favorable since SBRT is a very acceptable treatment option for this gentleman and supported by NCCN guidelines.  We have considerable long-term experience with this modality and prognosis should be excellent.    RECOMMENDATIONS:  He is currently scheduled for gold seed fiducial placement the end of this week.  If final insurance decision is not made prior to that he, will postpone fiducial placement.  This would delay treatment some and he is quite anxious to proceed with definitive treatment.       Shon Bunn MD

## 2018-09-18 NOTE — PROGRESS NOTES
Onc Nutrition     Patient Name:  Stefan Pedraza  YOB: 1940    Diagnosis: Prostate cancer  Treatment: CyberKnife stereotactic body radiotherapy - start date TBD due to insurance auth    Weight: 237#; no recent weight changes noted  Nutrition Symptoms: none    Received phone call from patient with questions about the diet prep for his planning and treatment process for his prostate cancer.  Patient received written diet material in the mail regarding a gas elimination diet with nutritional tips and foods to avoid.  Reviewed the material with the patient (his wife was also present at the time of the call) and provided options of fruits and vegetables that he can choose while following these diet guidelines.  Answered their questions and both voiced understanding of information discussed.  Encouraged him to call RD if further questions arise.  Will follow up as indicated.    Electronically signed by:  Maria C Pickett RD  09/18/18 2:27 PM

## 2018-09-19 ENCOUNTER — TELEPHONE (OUTPATIENT)
Dept: RADIATION ONCOLOGY | Facility: HOSPITAL | Age: 78
End: 2018-09-19

## 2018-09-19 ENCOUNTER — TELEPHONE (OUTPATIENT)
Dept: CARDIOLOGY | Facility: CLINIC | Age: 78
End: 2018-09-19

## 2018-09-19 NOTE — TELEPHONE ENCOUNTER
Pt called stating that he had spoken to Brenda Flores and she has expedited an appeal for ck prostate treatment and may get approval by 9/21/18.  Discussed with Dr. Bunn and called pt back.  Instructed him to not cancel anything yet and I asked if he had talked to Dr. Chao's office and they state he doesn't have to cancel until Thursday at 5pm.  I explained if I heard anything I would contact him.  Pt verbalized understanding.

## 2018-09-21 ENCOUNTER — TELEPHONE (OUTPATIENT)
Dept: RADIATION ONCOLOGY | Facility: HOSPITAL | Age: 78
End: 2018-09-21

## 2018-09-21 NOTE — TELEPHONE ENCOUNTER
Pt called on 9/20/18 and notified me that insurance had approved cyberknife treatment for his prostate cancer.

## 2018-09-26 DIAGNOSIS — C61 PROSTATE CANCER (HCC): Primary | ICD-10-CM

## 2018-09-26 RX ORDER — TAMSULOSIN HYDROCHLORIDE 0.4 MG/1
1 CAPSULE ORAL NIGHTLY
Qty: 30 CAPSULE | Refills: 11 | Status: SHIPPED | OUTPATIENT
Start: 2018-09-26 | End: 2019-11-27

## 2018-09-28 ENCOUNTER — OFFICE VISIT (OUTPATIENT)
Dept: RADIATION ONCOLOGY | Facility: HOSPITAL | Age: 78
End: 2018-09-28

## 2018-09-28 ENCOUNTER — HOSPITAL ENCOUNTER (OUTPATIENT)
Dept: RADIATION ONCOLOGY | Facility: HOSPITAL | Age: 78
Discharge: HOME | End: 2018-09-28

## 2018-09-28 ENCOUNTER — HOSPITAL ENCOUNTER (OUTPATIENT)
Dept: MRI IMAGING | Facility: HOSPITAL | Age: 78
Discharge: HOME | End: 2018-09-28
Attending: RADIOLOGY | Admitting: RADIOLOGY

## 2018-09-28 VITALS
SYSTOLIC BLOOD PRESSURE: 146 MMHG | HEIGHT: 73 IN | HEART RATE: 53 BPM | WEIGHT: 233.7 LBS | RESPIRATION RATE: 16 BRPM | BODY MASS INDEX: 30.97 KG/M2 | DIASTOLIC BLOOD PRESSURE: 79 MMHG | TEMPERATURE: 97.7 F

## 2018-09-28 DIAGNOSIS — C61 PROSTATE CANCER (HCC): Primary | ICD-10-CM

## 2018-09-28 DIAGNOSIS — C61 PROSTATE CANCER (HCC): ICD-10-CM

## 2018-09-28 PROCEDURE — 77470 SPECIAL RADIATION TREATMENT: CPT | Performed by: RADIOLOGY

## 2018-09-28 PROCEDURE — 77290 THER RAD SIMULAJ FIELD CPLX: CPT | Performed by: RADIOLOGY

## 2018-09-28 PROCEDURE — G0463 HOSPITAL OUTPT CLINIC VISIT: HCPCS

## 2018-09-28 PROCEDURE — 72195 MRI PELVIS W/O DYE: CPT

## 2018-09-28 NOTE — PROGRESS NOTES
RE-EVALUATION NOTE      :                                                          1940  DATE OF RE-EVALUATION:                       2018   REQUESTING PHYSICIAN:                   Dereck Chao MD  REASON FOR RE-EVALUATION:             1. Prostate cancer (CMS/HCC)     Cancer Staging  Stage IIC (cT2b, cN0, cM0, PSA: 5.3, Grade Group: 3)         BRIEF HISTORY:  The patient is a very pleasant 78 y.o. male  with intermediate risk prostate cancer.  He chose to undergo definitive treatment with stereotactic body radiotherapy.  He tolerated fiducial placement with no difficulties.  He's had a subsequent PSA drawn in Dr. Chao's office last week with value 7.89 ng/ml.  He's here today for simulation.    Allergies   Allergen Reactions   • Penicillins Rash       Social History     Social History   • Marital status:      Occupational History   • Retired      Social History Main Topics   • Smoking status: Never Smoker   • Smokeless tobacco: Never Used   • Alcohol use No   • Drug use: No   • Sexual activity: Defer     Other Topics Concern   • Not on file     Social History Narrative    Caffeine: 3-4 servings per day    Patient lives at home with his wife       Past Medical History:   Diagnosis Date   • Arthritis    • Atrial fibrillation (CMS/HCC)    • Diabetes mellitus (CMS/HCC)    • Dyslipidemia    • GERD (gastroesophageal reflux disease)    • H/O cardiovascular stress test 2007    Revealing no significant ischemia, EF estimated at 46%   • H/O echocardiogram 2011    Revealing normal LV function, diastolic dysfunction noted, mild MR, nopericardial effusion.   • H/O transesophageal echocardiography (BRISSA) for monitoring     left ventricular ejection fraction of 55%, mild TR and MR   • History of cardioversion     STATUS-POST SUCCESSFUL EXTERNAL CARDIOVERSION   • History of cardioversion 2012    external cardioversion to normal sinus rhythm   • History of Holter monitoring 2011  "   Revealing sinus rhythm with PAC/PVC/episodes of sinus bradycardia.   • Hyperlipidemia    • Osteoarthritis    • Pre-diabetes    • Prostate cancer (CMS/HCC)    • Status post cystoscopy 08/28/2018       family history includes Arthritis in his mother; Atrial fibrillation in his father; Diabetes in his father and maternal grandfather; Heart disease in his father; Hypertension in his father; No Known Problems in his maternal grandmother, paternal grandfather, paternal grandmother, and sister; Pneumonia in his mother.     Past Surgical History:   Procedure Laterality Date   • CARDIAC ABLATION  2011   • CATARACT EXTRACTION     • CHOLECYSTECTOMY     • COLONOSCOPY     • KNEE ARTHROSCOPY W/ PARTIAL MEDIAL MENISCECTOMY Left    • ORCHIECTOMY Right    • SHOULDER ARTHROSCOPY W/ LABRAL REPAIR Right         Review of Systems   All other systems reviewed and are negative.          Objective   VITAL SIGNS:   Vitals:    09/28/18 0928   BP: 146/79   Pulse: 53   Resp: 16   Temp: 97.7 °F (36.5 °C)   Weight: 106 kg (233 lb 11.2 oz)   Height: 185.4 cm (73\")   PainSc: 0-No pain        Karnofsky score: 90      Physical Exam      IPSS Questionnaire (AUA-7):  Over the past month…     1)  Incomplete Emptying  How often have you had a sensation of not emptying your bladder?  0 - Not at all   2)  Frequency  How often have you had to urinate less than every two hours? 3 - About half the time   3)  Intermittency  How often have you found you stopped and started again several times when you urinated?  5 - Almost always   4) Urgency  How often have you found it difficult to postpone urination?  3 - About half the time   5) Weak Stream  How often have you had a weak urinary stream?  4 - More than half the time   6) Straining  How often have you had to push or strain to begin urination?  0 - Not at all   7) Nocturia  How many times did you typically get up at night to urinate?  3 - 3 times   Total Score:  18         Quality of life due to urinary " symptoms:  If you were to spend the rest of your life with your urinary condition the way it is now, how would you feel about that? 2-Mostly Satisfied   Urine Leakage (Incontinence) 1-Mild (A few drops a day, no pad use)      Sexual Health Inventory  Current Status     1)  How do you rate your confidence that you could achieve and keep an erection? 1-Very Low   2) When you had erections with sexual stimulation, how often were your erections hard enough for penetration (entering your partner)? 1-Almost never or never   3)  During sexual intercourse, how often were you able to maintain your erection after you had penetrated (entered) into your partner? 0-Did not attempt intercourse   4) During sexual intercourse, how difficult was it to maintain your erection to completion of intercourse? 0-Did not attempt intercourse   5) When you attempted sexual intercourse, how often was it satisfactory to you? 0-No sexual activity   Total Score: 2         Bowel Health Inventory  Current Status: 0-No problems, no rectal bleeding, no discharge, less then 5 bowel movements a day                  The following portions of the patient's history were reviewed and updated as appropriate: allergies, current medications, past family history, past medical history, past social history, past surgical history and problem list.    Assessment      Prostate cancer, [place] score 4+3=7, clinical stage IIC (T2b, N0, M0) with PSA 7.89 ng/ml.  He remains a good candidate for SBRT.    RECOMMENDATIONS:  Treatment planning CT and MRI pelvis will be performed today.  The prostate gland and proximal seminal vesicles will receive 5 fractions of 7 Gray each on the CyberKnife.  He will likely be treated the week of October 8, 2018.    Return in about 10 days (around 10/8/2018) for CyberKnife treatment.  Stefan was seen today for prostate cancer.    Diagnoses and all orders for this visit:    Prostate cancer (CMS/[facility])         Shon Bunn MD

## 2018-10-01 ENCOUNTER — HOSPITAL ENCOUNTER (OUTPATIENT)
Dept: RADIATION ONCOLOGY | Facility: HOSPITAL | Age: 78
Setting detail: RADIATION/ONCOLOGY SERIES
Discharge: HOME | End: 2018-10-01

## 2018-10-03 PROCEDURE — 77334 RADIATION TREATMENT AID(S): CPT | Performed by: RADIOLOGY

## 2018-10-03 PROCEDURE — 77370 RADIATION PHYSICS CONSULT: CPT | Performed by: RADIOLOGY

## 2018-10-03 PROCEDURE — 77295 3-D RADIOTHERAPY PLAN: CPT | Performed by: RADIOLOGY

## 2018-10-03 PROCEDURE — 77300 RADIATION THERAPY DOSE PLAN: CPT | Performed by: RADIOLOGY

## 2018-10-22 ENCOUNTER — HOSPITAL ENCOUNTER (OUTPATIENT)
Dept: RADIATION ONCOLOGY | Facility: HOSPITAL | Age: 78
Discharge: HOME | End: 2018-10-22

## 2018-10-22 PROCEDURE — 77280 THER RAD SIMULAJ FIELD SMPL: CPT | Performed by: RADIOLOGY

## 2018-10-22 PROCEDURE — 77373 STRTCTC BDY RAD THER TX DLVR: CPT | Performed by: RADIOLOGY

## 2018-10-23 ENCOUNTER — HOSPITAL ENCOUNTER (OUTPATIENT)
Dept: RADIATION ONCOLOGY | Facility: HOSPITAL | Age: 78
Discharge: HOME | End: 2018-10-23

## 2018-10-23 PROCEDURE — 77373 STRTCTC BDY RAD THER TX DLVR: CPT | Performed by: RADIOLOGY

## 2018-10-24 ENCOUNTER — HOSPITAL ENCOUNTER (OUTPATIENT)
Dept: RADIATION ONCOLOGY | Facility: HOSPITAL | Age: 78
Discharge: HOME | End: 2018-10-24

## 2018-10-24 PROCEDURE — 77373 STRTCTC BDY RAD THER TX DLVR: CPT | Performed by: RADIOLOGY

## 2018-10-25 ENCOUNTER — HOSPITAL ENCOUNTER (OUTPATIENT)
Dept: RADIATION ONCOLOGY | Facility: HOSPITAL | Age: 78
Discharge: HOME | End: 2018-10-25

## 2018-10-25 PROCEDURE — 77373 STRTCTC BDY RAD THER TX DLVR: CPT | Performed by: RADIOLOGY

## 2018-10-26 ENCOUNTER — HOSPITAL ENCOUNTER (OUTPATIENT)
Dept: RADIATION ONCOLOGY | Facility: HOSPITAL | Age: 78
Discharge: HOME | End: 2018-10-26

## 2018-10-26 PROCEDURE — 77373 STRTCTC BDY RAD THER TX DLVR: CPT | Performed by: RADIOLOGY

## 2018-10-26 PROCEDURE — 77336 RADIATION PHYSICS CONSULT: CPT | Performed by: RADIOLOGY

## 2018-11-05 ENCOUNTER — TELEPHONE (OUTPATIENT)
Dept: RADIATION ONCOLOGY | Facility: HOSPITAL | Age: 78
End: 2018-11-05

## 2018-11-05 NOTE — TELEPHONE ENCOUNTER
Pt called with c/o diarrhea with mucous.  Pt denies any urinary symptoms.  Instructed pt to take half imodium tab in couple of hours if still have diarrhea to take one tab as directed on box.  Instructed pt to eat bland diet, toast, potatoes, bananas, until bowels calm down.  Instructed to call back tomorrow if not improved.  Pt verbalized understanding.

## 2018-11-06 ENCOUNTER — TELEPHONE (OUTPATIENT)
Dept: RADIATION ONCOLOGY | Facility: HOSPITAL | Age: 78
End: 2018-11-06

## 2018-11-06 DIAGNOSIS — C61 PROSTATE CANCER (HCC): Primary | ICD-10-CM

## 2018-11-06 NOTE — TELEPHONE ENCOUNTER
Called to check on pt.  He states that he is still having diarrhea with mucous, he states less than 10 times but more than 5 times.  He states that he has only taken imodium 2 times today. Discussed with Dr. Bunn and called pt back.  Instructed to increase imodium as directed on the package and I explained that I have sent Rx to pharmacy for Hydrocortisone cream for his rectum and hemorrhoids.  Pt verbalized understanding.

## 2018-11-27 ENCOUNTER — TELEPHONE (OUTPATIENT)
Dept: CARDIOLOGY | Facility: CLINIC | Age: 78
End: 2018-11-27

## 2018-11-27 NOTE — TELEPHONE ENCOUNTER
Spoke with patient to follow up on office conversation concerning left atrial appendage closure device.  States he is still interested in pursuing device, either Watchman or Amulet, as he has just finished his prostate cancer treatment.  His wife needs to have hip replacement surgery, which is scheduled for Jan.  Would like to put off UMU closure until after January.

## 2019-01-31 ENCOUNTER — HOSPITAL ENCOUNTER (OUTPATIENT)
Dept: RADIATION ONCOLOGY | Facility: HOSPITAL | Age: 79
Setting detail: RADIATION/ONCOLOGY SERIES
Discharge: HOME | End: 2019-01-31

## 2019-01-31 ENCOUNTER — OFFICE VISIT (OUTPATIENT)
Dept: RADIATION ONCOLOGY | Facility: HOSPITAL | Age: 79
End: 2019-01-31

## 2019-01-31 VITALS
DIASTOLIC BLOOD PRESSURE: 76 MMHG | SYSTOLIC BLOOD PRESSURE: 149 MMHG | HEART RATE: 56 BPM | WEIGHT: 229.3 LBS | RESPIRATION RATE: 20 BRPM | OXYGEN SATURATION: 95 % | TEMPERATURE: 97.9 F | BODY MASS INDEX: 30.25 KG/M2

## 2019-01-31 DIAGNOSIS — C61 PROSTATE CANCER (HCC): Primary | ICD-10-CM

## 2019-01-31 PROCEDURE — G0463 HOSPITAL OUTPT CLINIC VISIT: HCPCS

## 2019-01-31 NOTE — PROGRESS NOTES
FOLLOW UP NOTE    PATIENT:                                                      Stefan Pedraza  MEDICAL RECORD #:                        [id number]  :                                                          1940  COMPLETION DATE:   10/26/19  DIAGNOSIS:     Prostate cancer (CMS/[facility])  Staging form: Prostate, AJCC 8th Edition  - Clinical stage from 2018: Stage IIC (cT2b, cN0, cM0, PSA: 5.3, Grade Group: 3) - Signed by Shon Bunn MD on 2018      BRIEF HISTORY:    Initial follow-up visit after CyberKnife SBRT for intermediate risk prostate cancer.  PSA drawn approximately 1 month ago in Dr. Chao's office already decreased to a value 2.27 ng/ml.  He experienced minimal fatigue.  Acute bowel disturbance with diarrhea lasted approximately 3 weeks and now has improved.  He still has slightly irregular bowel movements.  Experienced urinary frequency without dysuria.  Nocturia is now 2-3 times nightly, improved compared to pretreatment baseline.      MEDICATIONS: Medication reconciliation for the patient was reviewed and confirmed in the electronic medical record.    Review of Systems   Constitutional: Negative.    HENT:  Negative.    Eyes: Negative.    Respiratory: Negative.    Cardiovascular: Negative.    Gastrointestinal: Negative.    Endocrine: Negative.    Genitourinary: Positive for nocturia.    Musculoskeletal: Negative.    Skin: Negative.    Neurological: Positive for dizziness and light-headedness.   Hematological: Negative.    Psychiatric/Behavioral: Negative.          IPSS Questionnaire (AUA-7):  Over the past month…     1)  Incomplete Emptying  How often have you had a sensation of not emptying your bladder?  0 - Not at all   2)  Frequency  How often have you had to urinate less than every two hours? 3 - About half the time   3)  Intermittency  How often have you found you stopped and started again several times when you urinated?  5 - Almost always   4) Urgency  How often have you found  it difficult to postpone urination?  3 - About half the time   5) Weak Stream  How often have you had a weak urinary stream?  4 - More than half the time   6) Straining  How often have you had to push or strain to begin urination?  0 - Not at all   7) Nocturia  How many times did you typically get up at night to urinate?  3 - 3 times   Total Score:  18         Quality of life due to urinary symptoms:  If you were to spend the rest of your life with your urinary condition the way it is now, how would you feel about that? 2-Mostly Satisfied   Urine Leakage (Incontinence) 1-Mild (A few drops a day, no pad use)      Sexual Health Inventory  Current Status     1)  How do you rate your confidence that you could achieve and keep an erection? 1-Very Low   2) When you had erections with sexual stimulation, how often were your erections hard enough for penetration (entering your partner)? 1-Almost never or never   3)  During sexual intercourse, how often were you able to maintain your erection after you had penetrated (entered) into your partner? 0-Did not attempt intercourse   4) During sexual intercourse, how difficult was it to maintain your erection to completion of intercourse? 0-Did not attempt intercourse   5) When you attempted sexual intercourse, how often was it satisfactory to you? 0-No sexual activity   Total Score: 2         Bowel Health Inventory  Current Status: 0-No problems, no rectal bleeding, no discharge, less then 5 bowel movements a day                 KPS 90%    Physical Exam   Constitutional: He is oriented to person, place, and time. He appears well-developed and well-nourished.   HENT:   Head: Normocephalic and atraumatic.   Neck: Normal range of motion. Neck supple.   Cardiovascular: Normal rate, regular rhythm and normal heart sounds.   No murmur heard.  Pulmonary/Chest: Effort normal and breath sounds normal. He has no wheezes. He has no rales.   Abdominal: Soft. Bowel sounds are normal. He  exhibits no distension. There is no hepatosplenomegaly. There is no tenderness.   Musculoskeletal: Normal range of motion. He exhibits no edema or tenderness.   Lymphadenopathy:     He has no cervical adenopathy.     He has no axillary adenopathy.        Right: No supraclavicular adenopathy present.        Left: No supraclavicular adenopathy present.   Neurological: He is alert and oriented to person, place, and time. He has normal strength. No sensory deficit.   Skin: Skin is warm and dry.   Psychiatric: He has a normal mood and affect. His behavior is normal. Judgment and thought content normal.   Nursing note and vitals reviewed.      VITAL SIGNS:   Vitals:    01/31/19 1421   BP: 149/76   Pulse: 56   Resp: 20   Temp: 97.9 °F (36.6 °C)   TempSrc: Temporal   SpO2: 95%   Weight: 104 kg (229 lb 4.8 oz)   PainSc: 0-No pain       The following portions of the patient's history were reviewed and updated as appropriate: allergies, current medications, past family history, past medical history, past social history, past surgical history and problem list.         Stefan was seen today for prostate cancer.    Diagnoses and all orders for this visit:    Prostate cancer (CMS/[facility])         IMPRESSION:  Prostate cancer, [place] score 4+3=7, clinical stage IIC (T2b, N0, M0) with PSA 7.89 ng/ml.  He tolerated SBRT very well.  He has already had an appropriate early PSA response with value 2.27 ng/ml.  Prognosis remains very good.    RECOMMENDATIONS:  He plans to continue urologic surveillance under the care of Dr. Chao.    Return in about 6 months (around 7/31/2019) for Office Visit.    Shon Bunn MD    Errors in dictation may reflect use of voice recognition software and not all errors in transcription may have been detected prior to signing.

## 2019-02-18 RX ORDER — FLECAINIDE ACETATE 100 MG/1
TABLET ORAL
Qty: 180 TABLET | Refills: 3 | Status: SHIPPED | OUTPATIENT
Start: 2019-02-18 | End: 2020-02-13

## 2019-04-16 ENCOUNTER — TRANSCRIBE ORDERS (OUTPATIENT)
Dept: DIABETES SERVICES | Facility: HOSPITAL | Age: 79
End: 2019-04-16

## 2019-04-16 DIAGNOSIS — R73.03 PREDIABETES: Primary | ICD-10-CM

## 2019-04-24 ENCOUNTER — OFFICE VISIT (OUTPATIENT)
Dept: CARDIOLOGY | Facility: CLINIC | Age: 79
End: 2019-04-24

## 2019-04-24 VITALS
DIASTOLIC BLOOD PRESSURE: 70 MMHG | BODY MASS INDEX: 30.35 KG/M2 | WEIGHT: 229 LBS | HEIGHT: 73 IN | HEART RATE: 48 BPM | SYSTOLIC BLOOD PRESSURE: 138 MMHG | OXYGEN SATURATION: 99 %

## 2019-04-24 DIAGNOSIS — I48.19 PERSISTENT ATRIAL FIBRILLATION (HCC): Primary | ICD-10-CM

## 2019-04-24 PROCEDURE — 99213 OFFICE O/P EST LOW 20 MIN: CPT | Performed by: PHYSICIAN ASSISTANT

## 2019-04-24 PROCEDURE — 93000 ELECTROCARDIOGRAM COMPLETE: CPT | Performed by: PHYSICIAN ASSISTANT

## 2019-05-07 ENCOUNTER — HOSPITAL ENCOUNTER (OUTPATIENT)
Dept: DIABETES SERVICES | Facility: HOSPITAL | Age: 79
Setting detail: RECURRING SERIES
Discharge: HOME | End: 2019-05-07

## 2019-06-03 RX ORDER — APIXABAN 5 MG/1
TABLET, FILM COATED ORAL
Qty: 180 TABLET | Refills: 3 | Status: SHIPPED | OUTPATIENT
Start: 2019-06-03 | End: 2019-07-15

## 2019-06-05 ENCOUNTER — TELEPHONE (OUTPATIENT)
Dept: CARDIOLOGY | Facility: CLINIC | Age: 79
End: 2019-06-05

## 2019-06-05 NOTE — TELEPHONE ENCOUNTER
Called pt to follow up on possible [employer] enrollment. He would like me to call back week on June 24 th following his SDM visit.   Possible July implant.

## 2019-06-13 ENCOUNTER — HOSPITAL ENCOUNTER (OUTPATIENT)
Dept: DIABETES SERVICES | Facility: HOSPITAL | Age: 79
Setting detail: RECURRING SERIES
End: 2019-06-13

## 2019-06-27 DIAGNOSIS — I48.19 PERSISTENT ATRIAL FIBRILLATION (HCC): Primary | ICD-10-CM

## 2019-06-28 PROBLEM — I48.19 PERSISTENT ATRIAL FIBRILLATION (HCC): Status: ACTIVE | Noted: 2019-06-28

## 2019-07-01 ENCOUNTER — PREP FOR SURGERY (OUTPATIENT)
Dept: OTHER | Facility: HOSPITAL | Age: 79
End: 2019-07-01

## 2019-07-01 DIAGNOSIS — I48.19 ATRIAL FIBRILLATION, PERSISTENT (HCC): Primary | ICD-10-CM

## 2019-07-01 RX ORDER — SODIUM CHLORIDE 0.9 % (FLUSH) 0.9 %
3 SYRINGE (ML) INJECTION EVERY 12 HOURS SCHEDULED
Status: CANCELLED | OUTPATIENT
Start: 2019-07-01

## 2019-07-01 RX ORDER — SODIUM CHLORIDE 0.9 % (FLUSH) 0.9 %
1-10 SYRINGE (ML) INJECTION AS NEEDED
Status: CANCELLED | OUTPATIENT
Start: 2019-07-01

## 2019-07-01 RX ORDER — NITROGLYCERIN 0.4 MG/1
0.4 TABLET SUBLINGUAL
Status: CANCELLED | OUTPATIENT
Start: 2019-07-01

## 2019-07-01 RX ORDER — PROMETHAZINE HYDROCHLORIDE 25 MG/ML
12.5 INJECTION, SOLUTION INTRAMUSCULAR; INTRAVENOUS EVERY 4 HOURS PRN
Status: CANCELLED | OUTPATIENT
Start: 2019-07-01

## 2019-07-01 RX ORDER — SODIUM CHLORIDE 9 MG/ML
1 INJECTION, SOLUTION INTRAVENOUS CONTINUOUS
Status: CANCELLED | OUTPATIENT
Start: 2019-07-01 | End: 2019-07-01

## 2019-07-01 RX ORDER — ONDANSETRON 2 MG/ML
4 INJECTION INTRAMUSCULAR; INTRAVENOUS EVERY 6 HOURS PRN
Status: CANCELLED | OUTPATIENT
Start: 2019-07-01

## 2019-07-01 RX ORDER — ACETAMINOPHEN 325 MG/1
650 TABLET ORAL EVERY 4 HOURS PRN
Status: CANCELLED | OUTPATIENT
Start: 2019-07-01

## 2019-07-02 ENCOUNTER — TELEPHONE (OUTPATIENT)
Dept: CARDIOLOGY | Facility: CLINIC | Age: 79
End: 2019-07-02

## 2019-07-02 ENCOUNTER — HOSPITAL ENCOUNTER (OUTPATIENT)
Dept: CARDIOLOGY | Facility: HOSPITAL | Age: 79
Discharge: HOME | End: 2019-07-02
Admitting: INTERNAL MEDICINE

## 2019-07-02 VITALS
OXYGEN SATURATION: 94 % | WEIGHT: 229 LBS | HEART RATE: 48 BPM | HEIGHT: 73 IN | BODY MASS INDEX: 30.35 KG/M2 | SYSTOLIC BLOOD PRESSURE: 133 MMHG | DIASTOLIC BLOOD PRESSURE: 81 MMHG

## 2019-07-02 DIAGNOSIS — I48.19 PERSISTENT ATRIAL FIBRILLATION (HCC): ICD-10-CM

## 2019-07-02 LAB — LV EF 2D ECHO EST: 60 %

## 2019-07-02 PROCEDURE — 93325 DOPPLER ECHO COLOR FLOW MAPG: CPT | Performed by: INTERNAL MEDICINE

## 2019-07-02 PROCEDURE — 93312 ECHO TRANSESOPHAGEAL: CPT

## 2019-07-02 PROCEDURE — 93321 DOPPLER ECHO F-UP/LMTD STD: CPT

## 2019-07-02 PROCEDURE — 99152 MOD SED SAME PHYS/QHP 5/>YRS: CPT | Performed by: INTERNAL MEDICINE

## 2019-07-02 PROCEDURE — 25010000002 FENTANYL CITRATE (PF) 100 MCG/2ML SOLUTION: Performed by: INTERNAL MEDICINE

## 2019-07-02 PROCEDURE — 93312 ECHO TRANSESOPHAGEAL: CPT | Performed by: INTERNAL MEDICINE

## 2019-07-02 PROCEDURE — 25010000002 MIDAZOLAM PER 1 MG: Performed by: INTERNAL MEDICINE

## 2019-07-02 PROCEDURE — 93321 DOPPLER ECHO F-UP/LMTD STD: CPT | Performed by: INTERNAL MEDICINE

## 2019-07-02 PROCEDURE — 99152 MOD SED SAME PHYS/QHP 5/>YRS: CPT

## 2019-07-02 PROCEDURE — 99153 MOD SED SAME PHYS/QHP EA: CPT

## 2019-07-02 PROCEDURE — 93325 DOPPLER ECHO COLOR FLOW MAPG: CPT

## 2019-07-02 RX ORDER — MIDAZOLAM HYDROCHLORIDE 1 MG/ML
INJECTION INTRAMUSCULAR; INTRAVENOUS
Status: COMPLETED | OUTPATIENT
Start: 2019-07-02 | End: 2019-07-02

## 2019-07-02 RX ORDER — FENTANYL CITRATE 50 UG/ML
INJECTION, SOLUTION INTRAMUSCULAR; INTRAVENOUS
Status: COMPLETED | OUTPATIENT
Start: 2019-07-02 | End: 2019-07-02

## 2019-07-02 RX ADMIN — MIDAZOLAM HYDROCHLORIDE 2 MG: 1 INJECTION, SOLUTION INTRAMUSCULAR; INTRAVENOUS at 08:16

## 2019-07-02 RX ADMIN — FENTANYL CITRATE 50 MCG: 50 INJECTION, SOLUTION INTRAMUSCULAR; INTRAVENOUS at 08:16

## 2019-07-02 RX ADMIN — FENTANYL CITRATE 50 MCG: 50 INJECTION, SOLUTION INTRAMUSCULAR; INTRAVENOUS at 08:20

## 2019-07-02 RX ADMIN — MIDAZOLAM HYDROCHLORIDE 2 MG: 1 INJECTION, SOLUTION INTRAMUSCULAR; INTRAVENOUS at 08:20

## 2019-07-02 NOTE — H&P
[facility] Consult Note      Referring Provider: Prabhakar Solis MD  Primary Provider:  Gamaliel Eaton MD  Reason for Consultation: Atrial fibrillation/ Wavecrest candidate    Patient Care Team:  Gamaliel Eaton MD as PCP - [facility]  Dereck Chao MD as Referring Physician (Urology)  Prabhakar Solis MD as Consulting Physician (Cardiac Electrophysiology)  Rakesh Ortiz MD as Consulting Physician (Urology)  Shon Bunn MD as Consulting Physician (Radiation Oncology)    Chief complaint:  Atrial fibrillation/  Wavecrest candidate    Identification:  79 year old male    Problem list:    1. Atrial fibrillation:   a. History of atrial fibrillation, initial diagnosis 2007.   b. Status-post successful external cardioversion, Dr. Dyer, 2007.  c. Initiation of amiodarone therapy with recent tapering dose, Dr. Lwoe.   d. Echocardiogram, 02/02/2011, revealing normal LV function, diastolic dysfunction noted, mild MR, no pericardial effusion.   e. Holter monitor, 01/25/2011, revealing sinus rhythm with PAC/PVC/episodes of sinus bradycardia.   f. Chadsvasc=3.   g. 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   h. Pulmonary vein isolation procedure, 10/05/2011.  i. BRISSA with left ventricular ejection fraction of 55%, mild TR and MR.   j. Event recorder with intermittent episodes of flutter but the majority of the time in sinus rhythm.   k. External cardioversion to normal sinus rhythm, 02/17/2012.  l. Atypical chest pain.  m. History of left heart catheterization, 1990s in [place], reported as normal coronaries.   n. Stress test, 08/23/2007, revealing no significant ischemia, EF estimated at 46%.  o. Echo 7/28/17 LV Estimated EF = 57%., mild concentric hypertrophy, Left atrial cavity size is severely dilated. Normal estimated pulmonary artery systolic pressure  p. External CV 7/13/17; NSR with  recurrent AF three days later  q. Echocardiogram 7/28/17: EF 57%, mild LVH, LA severely dilated  r. ECV to NSR 8/31/17  2. Diabetes mellitus.   3. Gastroesophageal reflux disease.  4. Dyslipidemia.  5. Osteoarthritis.  6. Remote surgical history:  a. Cholecystectomy.   b. Right orchiectomy.  c. Right shoulder labral repair.  d. Left knee arthroscopy for partial medial meniscectomy         Allergies:  Penicillins    Home/Current Medications:    •  apixaban (ELIQUIS) 5 MG tablet tablet, Take 1 tablet by mouth Every 12 (Twelve) Hours., Disp: 180 tablet, Rfl: 3  •  atorvastatin (LIPITOR) 10 MG tablet, Take 10 mg by mouth Daily., Disp: , Rfl:   •  B Complex Vitamins (VITAMIN B COMPLEX PO), Take 1 tablet by mouth Daily., Disp: , Rfl:   •  cyclobenzaprine (FLEXERIL) 10 MG tablet, cyclobenzaprine 10 mg tablet  one po Every night at bedtime prn, Disp: , Rfl:   •  ferrous sulfate 324 (65 FE) MG tablet delayed-release EC tablet, Take 324 mg by mouth Daily With Breakfast., Disp: , Rfl:   •  flecainide (TAMBOCOR) 100 MG tablet, TAKE 1 TABLET TWICE A DAY, Disp: 180 tablet, Rfl: 3  •  metFORMIN ER (GLUCOPHAGE-XR) 500 MG 24 hr tablet, Take 500 mg by mouth Daily., Disp: , Rfl:   •  naproxen (NAPROSYN) 500 MG tablet, Take 500 mg by mouth 2 (Two) Times a Day With Meals., Disp: , Rfl:   •  Omega-3 Fatty Acids (FISH OIL) 1000 MG capsule capsule, Take  by mouth Daily With Breakfast., Disp: , Rfl:   •  omeprazole (priLOSEC) 40 MG capsule, Take 40 mg by mouth Daily., Disp: , Rfl:   •  tamsulosin (FLOMAX) 0.4 MG capsule 24 hr capsule, Take 1 capsule by mouth Every Night., Disp: 30 capsule, Rfl: 11        History of present illness:    Patient is a pleasant 79-year-old male with the above-noted medical history presents today for BRISSA for further evaluation of left atrial appendage occlusive device.  He is being enrolled in the Wavecrest study.  He has had a long-standing history of atrial fibrillation that is controlled with Tambocor therapy.  He is always had a bradycardic rate but states that he is starting to run lower than usual.  He feels that this is a source of his fatigue.  He admits to remaining active and does not have any problems with elevating his heart rate during activity.  He denies having any chest pain or shortness of breath.  He also states that he denies having any palpitations that caused him to be symptomatic.  He wishes to be off anticoagulation therapy.          Cardiac Risk Factors:  Hyperlipidemia, diabetes      Social History:  Social History     Socioeconomic History   • Marital status:      Spouse name: Not on file   • Number of children: Not on file   • Years of education: Not on file   • Highest education level: Not on file   Occupational History   • Occupation: Retired   Tobacco Use   • Smoking status: Never Smoker   • Smokeless tobacco: Never Used   Substance and Sexual Activity   • Alcohol use: No   • Drug use: No   • Sexual activity: Defer   Social History Narrative    Caffeine: 3-4 servings per day    Patient lives at home with his wife     Family History:  Family History   Problem Relation Age of Onset   • Arthritis Mother    • Pneumonia Mother    • Atrial fibrillation Father    • Hypertension Father    • Diabetes Father    • Heart disease Father    • No Known Problems Sister    • No Known Problems Maternal Grandmother    • Diabetes Maternal Grandfather    • No Known Problems Paternal Grandmother    • No Known Problems Paternal Grandfather      Review of Systems  Pertinent positives are listed in the HPI.  All other systems reviewed are negative.         Objective     Vital Sign Min/Max for last 24 hours  No Data Recorded   BP  Min: 145/87  Max: 145/87   Pulse  Min: 48  Max: 48   No Data Recorded   SpO2  Min: 95 %  Max: 95 %   No Data Recorded   No Data Recorded         Physical Exam:    GENERAL: well-developed, well-nourished; in no acute distress.   HEENT:  normocephalic and atraumatic  NECK:  Carotid  upstrokes are 2+ and  symmetrical without bruits.   LUNGS: Clear to auscultation bilaterally without wheezing, rhonchi, or rales noted.   CARDIOVASCULAR: The heart has a bradycardic regular rate with a normal S1 and S2. There is no murmur, gallop, rub, or click appreciated. The PMI is nondisplaced.   ABDOMEN: Soft and nontender  NEUROLOGICAL: Nonfocal; Alert and oriented  PERIPHERAL VASCULAR:  Posterior tibial pulses are 2+ and symmetrical. There is no peripheral edema.   SKIN:  Warm and dry  PSYCHIATRIC: normal mood and affect; behavior appropriate       EKG:  Sinus rafaela @ 48    Echo:  · Left ventricular systolic function is normal. Estimated EF = 57%.  · Left ventricular wall thickness is consistent with mild concentric hypertrophy.  · Left atrial cavity size is severely dilated.  · Normal estimated pulmonary artery systolic pressure    Labs:          Invalid input(s): LABALBU, PROT  @LABRCNTIP(wbc:3,hgb:3,hct:3,PLT:3,NEUTOPHILPCT:3;LYMPHOPCT:3,MONOPCT  )  Lab Results (last 24 hours)     ** No results found for the last 24 hours. **             Lab Results   Component Value Date    INR 1.09 08/31/2017    INR 1.06 07/13/2017    PROTIME 11.9 (H) 08/31/2017    PROTIME 11.6 (H) 07/13/2017       Ejection Fraction:  57% per echo 2017      Results Review:  I reviewed the patients new clinical results.      Assessment:  1. Persistent atrial fibrillation  1. Status post remote PVA 2011  2. ECV 2017  3. Tambocor therapy  4. CHADS VASC 3; on Eliquis  2. Bradycardia; chronic  3. Hyperlipidemia on statin therapy  4. Diabetes mellitus type 2      Plan:  BRISSA today with Dr. Calli Ritchie.  The risk, benefits, potential complications of all been discussed with the patient and he is agreeable to proceed.  Research is present for Wavecrest study    I discussed the patients findings and my recommendations with patient.    Scribed for Calli Ritchie MD by AYAAN Corcoran on 07/02/2019 at 7:50 AM      Carolin Sanchez  APRN  07/02/19  8:07 AM      I Calli Ritchie MD personally performed the services described in this documentation as scribed by the above individual in my presence, and it is both accurate and complete.    Calli Ritchie MD, FACC

## 2019-07-02 NOTE — TELEPHONE ENCOUNTER
I spoke with patients's wife regarding his Wavecrest- UMU closure device randomization. She was instructed to stop Eliquis on July 14 th and start 81 mg ASA on July 15 th . A letter was mailed also to the pt. today with these instructions.

## 2019-07-15 ENCOUNTER — APPOINTMENT (OUTPATIENT)
Dept: PREADMISSION TESTING | Facility: HOSPITAL | Age: 79
End: 2019-07-15

## 2019-07-15 DIAGNOSIS — I48.19 ATRIAL FIBRILLATION, PERSISTENT (HCC): ICD-10-CM

## 2019-07-15 LAB
ANION GAP SERPL CALCULATED.3IONS-SCNC: 10 MMOL/L (ref 5–15)
BUN BLD-MCNC: 15 MG/DL (ref 8–23)
BUN/CREAT SERPL: 13.6 (ref 7–25)
CALCIUM SPEC-SCNC: 9.3 MG/DL (ref 8.6–10.5)
CHLORIDE SERPL-SCNC: 103 MMOL/L (ref 98–107)
CO2 SERPL-SCNC: 27 MMOL/L (ref 22–29)
CREAT BLD-MCNC: 1.1 MG/DL (ref 0.76–1.27)
DEPRECATED RDW RBC AUTO: 44.4 FL (ref 37–54)
ERYTHROCYTE [DISTWIDTH] IN BLOOD BY AUTOMATED COUNT: 12.7 % (ref 12.3–15.4)
GFR SERPL CREATININE-BSD FRML MDRD: 65 ML/MIN/1.73
GLUCOSE BLD-MCNC: 97 MG/DL (ref 65–99)
HBA1C MFR BLD: 5.5 % (ref 4.8–5.6)
HCT VFR BLD AUTO: 41.1 % (ref 37.5–51)
HGB BLD-MCNC: 13.9 G/DL (ref 13–17.7)
INR PPP: 1.1 (ref 0.85–1.16)
MCH RBC QN AUTO: 32 PG (ref 26.6–33)
MCHC RBC AUTO-ENTMCNC: 33.8 G/DL (ref 31.5–35.7)
MCV RBC AUTO: 94.7 FL (ref 79–97)
PLATELET # BLD AUTO: 171 10*3/MM3 (ref 140–450)
PMV BLD AUTO: 10.3 FL (ref 6–12)
POTASSIUM BLD-SCNC: 4.2 MMOL/L (ref 3.5–5.2)
PROTHROMBIN TIME: 13.7 SECONDS (ref 11.2–14.3)
RBC # BLD AUTO: 4.34 10*6/MM3 (ref 4.14–5.8)
SODIUM BLD-SCNC: 140 MMOL/L (ref 136–145)
WBC NRBC COR # BLD: 5.97 10*3/MM3 (ref 3.4–10.8)

## 2019-07-15 PROCEDURE — 85027 COMPLETE CBC AUTOMATED: CPT | Performed by: PHYSICIAN ASSISTANT

## 2019-07-15 PROCEDURE — 85610 PROTHROMBIN TIME: CPT | Performed by: PHYSICIAN ASSISTANT

## 2019-07-15 PROCEDURE — 36415 COLL VENOUS BLD VENIPUNCTURE: CPT

## 2019-07-15 PROCEDURE — 83036 HEMOGLOBIN GLYCOSYLATED A1C: CPT | Performed by: PHYSICIAN ASSISTANT

## 2019-07-15 PROCEDURE — 80048 BASIC METABOLIC PNL TOTAL CA: CPT | Performed by: PHYSICIAN ASSISTANT

## 2019-07-15 RX ORDER — ASPIRIN 81 MG/1
81 TABLET ORAL DAILY
Status: ON HOLD | COMMUNITY
End: 2019-11-16 | Stop reason: SDUPTHER

## 2019-07-15 NOTE — DISCHARGE INSTRUCTIONS
"Dear Patient,    Do NOT eat, drink, or smoke after midnight the night before your procedure.   Take your medications as instructed by your doctor.    Glasses and jewelry may be worn, but dentures must be removed prior to your procedure.    Leave any items you consider valuable at home.      MORNING of your Procedure, please bring the following:     -Photo ID and insurance card(s)    -ALL medications in their ORIGINAL CONTAINERS    -Co-pay and/or deductible required by your insurance   -Copy of living will or power of  document (if not brought to    Pre-Admission Testing department)   -CPAP mask and tubing, not your machine (if applicable)    -Relaxation aids (music, books, magazines)   -Skin Prep Instruction Sheet (if applicable)   -Relaxation Aids    Check in on the 2nd floor in the [facility].  Your procedure will be performed in the cath lab or EP lab.  During your procedure, your family will wait in the cath lab waiting area where you checked in.      Need to make arrangements for transportation prior to discharge.    A handout regarding \"Heart Healthy Eating\" was provided today to encourage healthy eating habits.    Booklet published by [employer] was given in Pre-Admission testing.  This booklet is for informational purposes only.  If you have any questions about your procedure, please speak with your physician.      Please note:  If you are scheduled to have one of the following procedures: Pulmonary Vein Ablation, Lead Extraction, MitraClip, Cerebral Coilings or Embolization, please let your family know that after your procedure you will be going to recovery unit on the 2nd floor of the [facility].  When the physician is finished speaking with your family after your procedure is completed, your family will be directed or escorted to the surgery waiting area in the [facility].  This is where your family will wait until you are given a room assignment and then your family will be directed to the " appropriate unit.

## 2019-07-16 ENCOUNTER — HOSPITAL ENCOUNTER (INPATIENT)
Facility: HOSPITAL | Age: 79
LOS: 1 days | Discharge: HOME | End: 2019-07-17
Attending: INTERNAL MEDICINE | Admitting: INTERNAL MEDICINE

## 2019-07-16 ENCOUNTER — HOSPITAL ENCOUNTER (OUTPATIENT)
Dept: CARDIOLOGY | Facility: HOSPITAL | Age: 79
Discharge: HOME | End: 2019-07-16

## 2019-07-16 DIAGNOSIS — I48.19 PERSISTENT ATRIAL FIBRILLATION (HCC): ICD-10-CM

## 2019-07-16 LAB
GLUCOSE BLDC GLUCOMTR-MCNC: 103 MG/DL (ref 70–130)
GLUCOSE BLDC GLUCOMTR-MCNC: 122 MG/DL (ref 70–130)

## 2019-07-16 PROCEDURE — 02L73DK OCCLUSION OF LEFT ATRIAL APPENDAGE WITH INTRALUMINAL DEVICE, PERCUTANEOUS APPROACH: ICD-10-PCS | Performed by: INTERNAL MEDICINE

## 2019-07-16 PROCEDURE — 25010000003 LIDOCAINE 1 % SOLUTION: Performed by: INTERNAL MEDICINE

## 2019-07-16 PROCEDURE — 0 IOPAMIDOL PER 1 ML: Performed by: INTERNAL MEDICINE

## 2019-07-16 PROCEDURE — C1889 IMPLANT/INSERT DEVICE, NOC: HCPCS | Performed by: INTERNAL MEDICINE

## 2019-07-16 PROCEDURE — 85347 COAGULATION TIME ACTIVATED: CPT

## 2019-07-16 PROCEDURE — C1894 INTRO/SHEATH, NON-LASER: HCPCS | Performed by: INTERNAL MEDICINE

## 2019-07-16 PROCEDURE — C1769 GUIDE WIRE: HCPCS | Performed by: INTERNAL MEDICINE

## 2019-07-16 PROCEDURE — 33340 PERQ CLSR TCAT L ATR APNDGE: CPT | Performed by: INTERNAL MEDICINE

## 2019-07-16 PROCEDURE — 93355 ECHO TRANSESOPHAGEAL (TEE): CPT

## 2019-07-16 PROCEDURE — 94660 CPAP INITIATION&MGMT: CPT

## 2019-07-16 PROCEDURE — 94799 UNLISTED PULMONARY SVC/PX: CPT

## 2019-07-16 PROCEDURE — 25010000002 FENTANYL CITRATE (PF) 100 MCG/2ML SOLUTION: Performed by: INTERNAL MEDICINE

## 2019-07-16 PROCEDURE — 99153 MOD SED SAME PHYS/QHP EA: CPT | Performed by: INTERNAL MEDICINE

## 2019-07-16 PROCEDURE — C1893 INTRO/SHEATH, FIXED,NON-PEEL: HCPCS | Performed by: INTERNAL MEDICINE

## 2019-07-16 PROCEDURE — 99152 MOD SED SAME PHYS/QHP 5/>YRS: CPT | Performed by: INTERNAL MEDICINE

## 2019-07-16 PROCEDURE — 25010000002 PROTAMINE SULFATE PER 10 MG: Performed by: INTERNAL MEDICINE

## 2019-07-16 PROCEDURE — C1759 CATH, INTRA ECHOCARDIOGRAPHY: HCPCS | Performed by: INTERNAL MEDICINE

## 2019-07-16 PROCEDURE — 25010000002 ONDANSETRON PER 1 MG: Performed by: INTERNAL MEDICINE

## 2019-07-16 PROCEDURE — 93799 UNLISTED CV SVC/PROCEDURE: CPT | Performed by: INTERNAL MEDICINE

## 2019-07-16 PROCEDURE — 25010000002 VANCOMYCIN: Performed by: PHYSICIAN ASSISTANT

## 2019-07-16 PROCEDURE — 93355 ECHO TRANSESOPHAGEAL (TEE): CPT | Performed by: INTERNAL MEDICINE

## 2019-07-16 PROCEDURE — 25010000002 HEPARIN (PORCINE) PER 1000 UNITS: Performed by: INTERNAL MEDICINE

## 2019-07-16 PROCEDURE — 25010000002 MIDAZOLAM PER 1 MG: Performed by: INTERNAL MEDICINE

## 2019-07-16 PROCEDURE — 82962 GLUCOSE BLOOD TEST: CPT

## 2019-07-16 DEVICE — IMPLANTABLE DEVICE: Type: IMPLANTABLE DEVICE | Status: FUNCTIONAL

## 2019-07-16 RX ORDER — ASPIRIN 81 MG/1
81 TABLET ORAL DAILY
Status: DISCONTINUED | OUTPATIENT
Start: 2019-07-16 | End: 2019-07-17 | Stop reason: HOSPADM

## 2019-07-16 RX ORDER — PROTAMINE SULFATE 10 MG/ML
INJECTION, SOLUTION INTRAVENOUS AS NEEDED
Status: DISCONTINUED | OUTPATIENT
Start: 2019-07-16 | End: 2019-07-16 | Stop reason: HOSPADM

## 2019-07-16 RX ORDER — ONDANSETRON 2 MG/ML
4 INJECTION INTRAMUSCULAR; INTRAVENOUS EVERY 6 HOURS PRN
Status: DISCONTINUED | OUTPATIENT
Start: 2019-07-16 | End: 2019-07-17 | Stop reason: HOSPADM

## 2019-07-16 RX ORDER — HYDROCODONE BITARTRATE AND ACETAMINOPHEN 5; 325 MG/1; MG/1
1 TABLET ORAL EVERY 4 HOURS PRN
Status: DISCONTINUED | OUTPATIENT
Start: 2019-07-16 | End: 2019-07-17 | Stop reason: HOSPADM

## 2019-07-16 RX ORDER — ACETAMINOPHEN 325 MG/1
650 TABLET ORAL EVERY 4 HOURS PRN
Status: DISCONTINUED | OUTPATIENT
Start: 2019-07-16 | End: 2019-07-16 | Stop reason: HOSPADM

## 2019-07-16 RX ORDER — FLECAINIDE ACETATE 50 MG/1
100 TABLET ORAL 2 TIMES DAILY
Status: DISCONTINUED | OUTPATIENT
Start: 2019-07-16 | End: 2019-07-17 | Stop reason: HOSPADM

## 2019-07-16 RX ORDER — NAPROXEN 250 MG/1
500 TABLET ORAL 2 TIMES DAILY WITH MEALS
Status: DISCONTINUED | OUTPATIENT
Start: 2019-07-16 | End: 2019-07-17 | Stop reason: HOSPADM

## 2019-07-16 RX ORDER — SODIUM CHLORIDE 0.9 % (FLUSH) 0.9 %
1-10 SYRINGE (ML) INJECTION AS NEEDED
Status: DISCONTINUED | OUTPATIENT
Start: 2019-07-16 | End: 2019-07-16 | Stop reason: HOSPADM

## 2019-07-16 RX ORDER — SODIUM CHLORIDE 9 MG/ML
1 INJECTION, SOLUTION INTRAVENOUS CONTINUOUS
Status: ACTIVE | OUTPATIENT
Start: 2019-07-16 | End: 2019-07-16

## 2019-07-16 RX ORDER — PROMETHAZINE HYDROCHLORIDE 25 MG/ML
12.5 INJECTION, SOLUTION INTRAMUSCULAR; INTRAVENOUS EVERY 4 HOURS PRN
Status: DISCONTINUED | OUTPATIENT
Start: 2019-07-16 | End: 2019-07-16 | Stop reason: HOSPADM

## 2019-07-16 RX ORDER — ONDANSETRON 2 MG/ML
4 INJECTION INTRAMUSCULAR; INTRAVENOUS EVERY 6 HOURS PRN
Status: DISCONTINUED | OUTPATIENT
Start: 2019-07-16 | End: 2019-07-16 | Stop reason: HOSPADM

## 2019-07-16 RX ORDER — ONDANSETRON 2 MG/ML
INJECTION INTRAMUSCULAR; INTRAVENOUS AS NEEDED
Status: DISCONTINUED | OUTPATIENT
Start: 2019-07-16 | End: 2019-07-16 | Stop reason: HOSPADM

## 2019-07-16 RX ORDER — MIDAZOLAM HYDROCHLORIDE 1 MG/ML
INJECTION INTRAMUSCULAR; INTRAVENOUS AS NEEDED
Status: DISCONTINUED | OUTPATIENT
Start: 2019-07-16 | End: 2019-07-16 | Stop reason: HOSPADM

## 2019-07-16 RX ORDER — ATORVASTATIN CALCIUM 10 MG/1
10 TABLET, FILM COATED ORAL NIGHTLY
Status: DISCONTINUED | OUTPATIENT
Start: 2019-07-16 | End: 2019-07-17 | Stop reason: HOSPADM

## 2019-07-16 RX ORDER — HEPARIN SODIUM 1000 [USP'U]/ML
INJECTION, SOLUTION INTRAVENOUS; SUBCUTANEOUS AS NEEDED
Status: DISCONTINUED | OUTPATIENT
Start: 2019-07-16 | End: 2019-07-16 | Stop reason: HOSPADM

## 2019-07-16 RX ORDER — SODIUM CHLORIDE 0.9 % (FLUSH) 0.9 %
3 SYRINGE (ML) INJECTION EVERY 12 HOURS SCHEDULED
Status: DISCONTINUED | OUTPATIENT
Start: 2019-07-16 | End: 2019-07-16 | Stop reason: HOSPADM

## 2019-07-16 RX ORDER — NITROGLYCERIN 0.4 MG/1
0.4 TABLET SUBLINGUAL
Status: DISCONTINUED | OUTPATIENT
Start: 2019-07-16 | End: 2019-07-16 | Stop reason: HOSPADM

## 2019-07-16 RX ORDER — FERROUS SULFATE 325(65) MG
325 TABLET ORAL
Status: DISCONTINUED | OUTPATIENT
Start: 2019-07-17 | End: 2019-07-17 | Stop reason: HOSPADM

## 2019-07-16 RX ORDER — TAMSULOSIN HYDROCHLORIDE 0.4 MG/1
0.4 CAPSULE ORAL NIGHTLY
Status: DISCONTINUED | OUTPATIENT
Start: 2019-07-16 | End: 2019-07-17 | Stop reason: HOSPADM

## 2019-07-16 RX ORDER — PANTOPRAZOLE SODIUM 40 MG/1
40 TABLET, DELAYED RELEASE ORAL EVERY MORNING
Status: DISCONTINUED | OUTPATIENT
Start: 2019-07-17 | End: 2019-07-17 | Stop reason: HOSPADM

## 2019-07-16 RX ORDER — CLOPIDOGREL BISULFATE 75 MG/1
75 TABLET ORAL DAILY
Status: DISCONTINUED | OUTPATIENT
Start: 2019-07-17 | End: 2019-07-17 | Stop reason: HOSPADM

## 2019-07-16 RX ORDER — FENTANYL CITRATE 50 UG/ML
INJECTION, SOLUTION INTRAMUSCULAR; INTRAVENOUS AS NEEDED
Status: DISCONTINUED | OUTPATIENT
Start: 2019-07-16 | End: 2019-07-16 | Stop reason: HOSPADM

## 2019-07-16 RX ORDER — LIDOCAINE HYDROCHLORIDE 10 MG/ML
INJECTION, SOLUTION INFILTRATION; PERINEURAL AS NEEDED
Status: DISCONTINUED | OUTPATIENT
Start: 2019-07-16 | End: 2019-07-16 | Stop reason: HOSPADM

## 2019-07-16 RX ADMIN — ATORVASTATIN CALCIUM 10 MG: 10 TABLET, FILM COATED ORAL at 21:05

## 2019-07-16 RX ADMIN — VANCOMYCIN HYDROCHLORIDE 2000 MG: 10 INJECTION, POWDER, LYOPHILIZED, FOR SOLUTION INTRAVENOUS at 08:39

## 2019-07-16 RX ADMIN — FLECAINIDE ACETATE 100 MG: 50 TABLET ORAL at 21:09

## 2019-07-16 RX ADMIN — NAPROXEN 500 MG: 500 TABLET ORAL at 21:05

## 2019-07-16 RX ADMIN — TAMSULOSIN HYDROCHLORIDE 0.4 MG: 0.4 CAPSULE ORAL at 21:05

## 2019-07-16 RX ADMIN — SODIUM CHLORIDE 1 ML/KG/HR: 9 INJECTION, SOLUTION INTRAVENOUS at 07:02

## 2019-07-16 NOTE — PLAN OF CARE
Problem: Patient Care Overview  Goal: Plan of Care Review  Outcome: Ongoing (interventions implemented as appropriate)   07/16/19 0414   Coping/Psychosocial   Plan of Care Reviewed With patient;spouse   OTHER   Outcome Summary Patient to CVOU 17 from EP lab s/p watchmen device implantation. VSS, patient is drowsy but arouses to voice, oriented x 3. Right fem site clean/dry/intact with purse string suture intact. Neurovasc check on affected extremity WDL. Bedrest x 4 hours. Patient oxygen level <90% on 6L NC, respiratory called and Bipap applied as ordered at 28% to maintain sat. Will continue to monitor.

## 2019-07-16 NOTE — PLAN OF CARE
Problem: Patient Care Overview  Goal: Individualization and Mutuality  Outcome: Ongoing (interventions implemented as appropriate)  Patient arrived to the floor approximately 1600, groin site intact and dry. Patient ambulating within room and has no complaints. Brett on the monitor.   Goal: Discharge Needs Assessment  Outcome: Ongoing (interventions implemented as appropriate)    Goal: Interprofessional Rounds/Family Conf  Outcome: Ongoing (interventions implemented as appropriate)      Problem: Cardiac Surgery (Adult)  Goal: Signs and Symptoms of Listed Potential Problems Will be Absent, Minimized or Managed (Cardiac Surgery)  Outcome: Ongoing (interventions implemented as appropriate)    Goal: Anesthesia/Sedation Recovery  Outcome: Ongoing (interventions implemented as appropriate)

## 2019-07-16 NOTE — NURSING NOTE
Report called to 6th floor nurse. Patient to be transported via stretcher to new room in 620. VSS and afebrile. Right fem site clean, dry, intact with purse string suture in place. No hematoma or bleeding appreciated. Patient recovered well but did require CPAP during sleep due to desaturation. Wife states she brought patient's own device and we do have permission to use that so 6A nurse advised of this as well. No further c/o's voiced. No distress noted. Nurse escort to new room.

## 2019-07-16 NOTE — H&P
[facility] at [facility]   H&P     Stefan Pedraza  1940    There is no work phone number on file.      07/16/19    DATE OF ADMISSION: 7/16/2019  [facility] Gamaliel Wheat MD  [address] / [facility] [id number]    Chief Complaint: Persistent atrial fibrillation    Problem List:  1. Atrial fibrillation:   a. History of atrial fibrillation, initial diagnosis 2007.   b. Status-post successful external cardioversion, Dr. Dyer, 2007.  c. Initiation of amiodarone therapy with recent tapering dose, Dr. Lowe.   d. Echocardiogram, 02/02/2011, revealing normal LV function, diastolic dysfunction noted, mild MR, no pericardial effusion.   e. Holter monitor, 01/25/2011, revealing sinus rhythm with PAC/PVC/episodes of sinus bradycardia.   f. Chadsvasc=3.   g. 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   h. Pulmonary vein isolation procedure, 10/05/2011.  i. BRISSA with left ventricular ejection fraction of 55%, mild TR and MR.   j. Event recorder with intermittent episodes of flutter but the majority of the time in sinus rhythm.   k. External cardioversion to normal sinus rhythm, 02/17/2012.  l. Atypical chest pain.  m. History of left heart catheterization, 1990s in [place], reported as normal coronaries.   n. Stress test, 08/23/2007, revealing no significant ischemia, EF estimated at 46%.  o. Echo 7/28/17 LV Estimated EF = 57%., mild concentric hypertrophy, Left atrial cavity size is severely dilated. Normal estimated pulmonary artery systolic pressure  p. External CV 7/13/17; NSR with recurrent AF three days later  q. Echocardiogram 7/28/17: EF 57%, mild LVH, LA severely dilated  r. ECV to NSR 8/31/17  s. BRISSA 7/2/2019: EF 60%, mild to moderate MR, mild TR  2. Diabetes mellitus.   3. Gastroesophageal reflux disease.  4. Dyslipidemia.  5. Osteoarthritis.  6. Remote  surgical history:  a. Cholecystectomy.   b. Right orchiectomy.  c. Right shoulder labral repair.  d. Left knee arthroscopy for partial medial meniscectomy    History of Present Illness:   Patient is a 79-year-old  male with above-noted past medical history who presents today to undergo left atrial appendage occlusion device with Dr. Solis.  He has a history of persistent atrial fibrillation with a history of a PVA in 2011.  He is currently maintaining normal sinus rhythm on medical therapy with flecainide.  He has a CHADSVASc score of 3 and  has been anticoagulated with Eliquis.  He was felt not to be a good long-term candidate for anticoagulation due to high falls risk secondary to degenerative joint disease but has been approved for short-term anticoagulation.  He was enrolled in the wave crest study and has randomized to the wave crest device.  He was started on aspirin yesterday and has been off Eliquis for the last 48 hours.  BRISSA completed on 7/2/2019 showing normal EF and mild to moderate MR, mild TR. overall, he feels well from a cardiac standpoint and continues to stay active playing [employer] daily and exercises daily.  He does note some fatigue which she attributes to his bradycardia.  Notes occasional dizziness with position changes but no near syncope or syncope.  No shortness of breath or chest pain.    Allergies   Allergen Reactions   • Penicillins Rash       Prior to Admission Medications     Prescriptions Last Dose Informant Patient Reported? Taking?    aspirin 81 MG EC tablet 7/16/2019 Self Yes Yes    Take 81 mg by mouth Daily.    atorvastatin (LIPITOR) 10 MG tablet 7/15/2019 Self Yes Yes    Take 10 mg by mouth Every Night.    B Complex Vitamins (VITAMIN B COMPLEX PO) 7/16/2019 Self Yes Yes    Take 1 tablet by mouth Daily.    cyclobenzaprine (FLEXERIL) 10 MG tablet  Self Yes Yes    cyclobenzaprine 10 mg tablet   one po Every night at bedtime prn    ferrous sulfate 324 (65 FE) MG  tablet delayed-release EC tablet 7/16/2019 Self Yes Yes    Take 324 mg by mouth Daily With Breakfast.    flecainide (TAMBOCOR) 100 MG tablet 7/16/2019 Self No Yes    TAKE 1 TABLET TWICE A DAY    metFORMIN ER (GLUCOPHAGE-XR) 500 MG 24 hr tablet 7/15/2019 Self Yes Yes    Take 500 mg by mouth Every Night.    naproxen (NAPROSYN) 500 MG tablet 7/16/2019 Self Yes Yes    Take 500 mg by mouth 2 (Two) Times a Day With Meals.    Omega-3 Fatty Acids (FISH OIL) 1000 MG capsule capsule 7/16/2019 Self Yes Yes    Take  by mouth Daily With Breakfast.    omeprazole (priLOSEC) 40 MG capsule 7/16/2019 Self Yes Yes    Take 40 mg by mouth Daily.    tamsulosin (FLOMAX) 0.4 MG capsule 24 hr capsule 7/15/2019  No Yes    Take 1 capsule by mouth Every Night.            Current Facility-Administered Medications:   •  acetaminophen (TYLENOL) tablet 650 mg, 650 mg, Oral, Q4H PRN, Trinidad Cano PA  •  nitroglycerin (NITROSTAT) SL tablet 0.4 mg, 0.4 mg, Sublingual, Q5 Min PRN, Trinidad Cano PA  •  ondansetron (ZOFRAN) injection 4 mg, 4 mg, Intravenous, Q6H PRN, Trinidad Cano PA  •  promethazine (PHENERGAN) injection 12.5 mg, 12.5 mg, Intravenous, Q4H PRN, Trinidad Cano PA  •  sodium chloride 0.9 % flush 1-10 mL, 1-10 mL, Intravenous, PRN, Trinidad Cano PA  •  sodium chloride 0.9 % flush 3 mL, 3 mL, Intravenous, Q12H, Trinidad Cano PA  •  sodium chloride 0.9 % infusion, 1 mL/kg/hr (Order-Specific), Intravenous, Continuous, Trinidad Cano PA, Last Rate: 104 mL/hr at 07/16/19 0702, 1 mL/kg/hr at 07/16/19 0702  •  vancomycin 2000 mg/500 mL 0.9% NS IVPB ([facility]), 20 mg/kg (Order-Specific), Intravenous, Once, Trinidad Cano PA    Social History     Socioeconomic History   • Marital status:      Spouse name: Not on file   • Number of children: Not on file   • Years of education: Not on file   • Highest education level: Not on file   Occupational History   • Occupation: Retired   Tobacco Use   • Smoking status: Never Smoker   •  "Smokeless tobacco: Never Used   Substance and Sexual Activity   • Alcohol use: No   • Drug use: No   • Sexual activity: Defer   Social History Narrative    Caffeine: 3-4 servings per day    Patient lives at home with his wife       Family History   Problem Relation Age of Onset   • Arthritis Mother    • Pneumonia Mother    • Atrial fibrillation Father    • Hypertension Father    • Diabetes Father    • Heart disease Father    • No Known Problems Sister    • No Known Problems Maternal Grandmother    • Diabetes Maternal Grandfather    • No Known Problems Paternal Grandmother    • No Known Problems Paternal Grandfather        REVIEW OF SYSTEMS:   CONST:  No weight loss, fever, chills, weakness + fatigue.   HEENT:  No visual loss, blurred vision, double vision, yellow sclerae.                   No hearing loss, congestion, sore throat.   SKIN:      No rashes, urticaria, ulcers, sores.     RESP:     No shortness of breath, hemoptysis, cough, sputum.   GI:           No anorexia, nausea, vomiting, diarrhea. No abdominal pain, melena.   :         No burning on urination, hematuria or increased frequency.  ENDO:    No diaphoresis, cold or heat intolerance. No polyuria or polydipsia.   NEURO:  No headache, + dizziness, no syncope, paralysis, ataxia, or parasthesias.                  No change in bowel or bladder control. No history of CVA/TIA  MUSC:    No muscle, back pain, joint pain or stiffness.   HEME:    No anemia, bleeding, bruising. No history of DVT/PE.  PSYCH:  No history of depression, anxiety    OBJECTIVE:    Vitals:    07/16/19 0647 07/16/19 0649   BP: 141/78 145/80   BP Location: Left arm Right arm   Patient Position: Lying Lying   Pulse: (!) 49    Temp: 97.5 °F (36.4 °C)    TempSrc: Temporal    SpO2: 96%    Weight: 101 kg (222 lb 7.1 oz)    Height: 185.4 cm (73\")          Vital Sign Min/Max for last 24 hours  Temp  Min: 97.5 °F (36.4 °C)  Max: 97.5 °F (36.4 °C)   BP  Min: 141/78  Max: 145/80   Pulse  Min: 49  " Max: 49   No Data Recorded   SpO2  Min: 96 %  Max: 96 %   No Data Recorded    No intake or output data in the 24 hours ending 07/16/19 0728          Physical Exam:  GEN: Well nourished, well-developed, no acute distress  HEENT: Normocephalic, atraumatic, PERRLA, moist mucous membranes  NECK: Supple, No JVD, no thyromegaly, no lymphadenopathy  CARD: S1S2, regular rhythm, bradycardic, no murmur, gallop, or rub  LUNGS: Clear to auscultation, normal respiratory effort  ABDOMEN: Soft, nontender, normal bowel sounds  EXTREMITIES: No gross deformities, no clubbing, cyanosis, or edema  SKIN: Warm, dry  NEURO: No focal deficits, alert and oriented x 3  PSYCHIATRIC: Normal affect and mood      Data:   Results from last 7 days   Lab Units 07/15/19  1441   WBC 10*3/mm3 5.97   HEMOGLOBIN g/dL 13.9   HEMATOCRIT % 41.1   PLATELETS 10*3/mm3 171     Results from last 7 days   Lab Units 07/15/19  1441   SODIUM mmol/L 140   POTASSIUM mmol/L 4.2   CHLORIDE mmol/L 103   CO2 mmol/L 27.0   BUN mg/dL 15   CREATININE mg/dL 1.10   GLUCOSE mg/dL 97      Results from last 7 days   Lab Units 07/15/19  1441   HEMOGLOBIN A1C % 5.50             Results from last 7 days   Lab Units 07/15/19  1441   PROTIME Seconds 13.7   INR  1.10                 No intake or output data in the 24 hours ending 07/16/19 0728    Chest X-Ray:  Imaging Results (last 24 hours)     ** No results found for the last 24 hours. **          Telemetry: Sinus bradycardia, HR 49 bpm    Assessment and Plan:   1.  Persistent atrial fibrillation:  -Patient with a long-standing history of persistent atrial fibrillation s/p PVA in the past, now currently maintaining normal sinus rhythm on Flecainide therapy.  He has a CHADSVASc score of 3 and is felt not to be a good long-term candidate for anticoagulation secondary to high falls risk secondary to degenerative joint disease.  He has been approved for short-term anticoagulation.  He has randomized to the Wavecrest device.  We will  proceed today with left atrial appendage occlusion device with Dr. Solis.  The risks, benefits, and alternatives of the procedure have been reviewed and the patient wishes to proceed.   -Continue 81 mg aspirin daily.  Start Plavix 75 mg daily tomorrow morning per Wavecrest protocol.      Electronically signed by AYAAN Croft, 07/16/19, 7:28 AM.      I, Prabhakar Solis MD, personally performed the services face to face as described and documented by the above named individual. I have made any necessary edits and it is both accurate and complete 7/16/2019  10:18 AM

## 2019-07-17 VITALS
RESPIRATION RATE: 18 BRPM | TEMPERATURE: 98.2 F | OXYGEN SATURATION: 97 % | DIASTOLIC BLOOD PRESSURE: 63 MMHG | SYSTOLIC BLOOD PRESSURE: 121 MMHG | WEIGHT: 222.44 LBS | HEART RATE: 52 BPM | HEIGHT: 73 IN | BODY MASS INDEX: 29.48 KG/M2

## 2019-07-17 LAB
ANION GAP SERPL CALCULATED.3IONS-SCNC: 9 MMOL/L (ref 5–15)
BH CV VAS BP LEFT ARM: NORMAL MMHG
BUN BLD-MCNC: 16 MG/DL (ref 8–23)
BUN/CREAT SERPL: 17.4 (ref 7–25)
CALCIUM SPEC-SCNC: 8.1 MG/DL (ref 8.6–10.5)
CHLORIDE SERPL-SCNC: 105 MMOL/L (ref 98–107)
CO2 SERPL-SCNC: 26 MMOL/L (ref 22–29)
CREAT BLD-MCNC: 0.92 MG/DL (ref 0.76–1.27)
GFR SERPL CREATININE-BSD FRML MDRD: 79 ML/MIN/1.73
GLUCOSE BLD-MCNC: 120 MG/DL (ref 65–99)
LV EF 2D ECHO EST: 55 %
POTASSIUM BLD-SCNC: 4.1 MMOL/L (ref 3.5–5.2)
SODIUM BLD-SCNC: 140 MMOL/L (ref 136–145)

## 2019-07-17 PROCEDURE — 99232 SBSQ HOSP IP/OBS MODERATE 35: CPT | Performed by: INTERNAL MEDICINE

## 2019-07-17 PROCEDURE — 80048 BASIC METABOLIC PNL TOTAL CA: CPT | Performed by: INTERNAL MEDICINE

## 2019-07-17 RX ORDER — CLOPIDOGREL BISULFATE 75 MG/1
75 TABLET ORAL DAILY
Qty: 30 TABLET | Refills: 6 | Status: SHIPPED | OUTPATIENT
Start: 2019-07-18 | End: 2019-11-05

## 2019-07-17 RX ADMIN — NAPROXEN 500 MG: 500 TABLET ORAL at 08:26

## 2019-07-17 RX ADMIN — ASPIRIN 81 MG: 81 TABLET, COATED ORAL at 08:26

## 2019-07-17 RX ADMIN — PANTOPRAZOLE SODIUM 40 MG: 40 TABLET, DELAYED RELEASE ORAL at 06:20

## 2019-07-17 RX ADMIN — FLECAINIDE ACETATE 100 MG: 50 TABLET ORAL at 08:26

## 2019-07-17 RX ADMIN — FERROUS SULFATE TAB 325 MG (65 MG ELEMENTAL FE) 325 MG: 325 (65 FE) TAB at 08:26

## 2019-07-17 RX ADMIN — CLOPIDOGREL BISULFATE 75 MG: 75 TABLET ORAL at 08:26

## 2019-07-17 NOTE — PROGRESS NOTES
Discharge Planning Assessment  [facility]     Patient Name: Stefan Pedraza  MRN: [id number]  Today's Date: 7/17/2019    Admit Date: 7/16/2019    Discharge Needs Assessment     Row Name 07/17/19 0911       Living Environment    Lives With  spouse    Current Living Arrangements  home/apartment/condo    Primary Care Provided by  self    Provides Primary Care For  no one    Family Caregiver if Needed  spouse    Quality of Family Relationships  involved;helpful    Able to Return to Prior Arrangements  yes       Transition Planning    Patient/Family Anticipates Transition to  home    Patient/Family Anticipated Services at Transition  none    Transportation Anticipated  family or friend will provide       Discharge Needs Assessment    Readmission Within the Last 30 Days  no previous admission in last 30 days    Concerns to be Addressed  no discharge needs identified;denies needs/concerns at this time    Equipment Currently Used at Home  none    Anticipated Changes Related to Illness  none    Equipment Needed After Discharge  none        Discharge Plan     Row Name 07/17/19 0912       Plan    Plan  home    Patient/Family in Agreement with Plan  yes    Plan Comments  Pt lives with his wife in [facility]. He reports he is independent with all ADLs and denies use of any DME/HH. Pt is followed by his PCP and his medications are covered by insurance. He reports his plan for discharge is to return home and he denies all discharge needs.    Final Discharge Disposition Code  01 - home or self-care    Final Note  home; no discharge needs        Destination      No service coordination in this encounter.      Durable Medical Equipment      No service coordination in this encounter.      Dialysis/Infusion      No service coordination in this encounter.      Home Medical Care      No service coordination in this encounter.      Therapy      No service coordination in this encounter.      Community Resources      No service  coordination in this encounter.        Expected Discharge Date and Time     Expected Discharge Date Expected Discharge Time    Jul 17, 2019         Demographic Summary     Row Name 07/17/19 0911       General Information    Admission Type  inpatient    Referral Source  physician    Reason for Consult  discharge planning    General Information Comments  PCP Gamaliel Eaton        Contact Information    Permission Granted to Share Info With  ;family/designee    Contact Information Comments  Kendrick Pedraza [phone]        Functional Status     Row Name 07/17/19 0911       Functional Status, IADL    Medications  independent    Meal Preparation  independent    Housekeeping  independent    Laundry  independent    Shopping  independent       Mental Status    General Appearance WDL  WDL       Mental Status Summary    Recent Changes in Mental Status/Cognitive Functioning  no changes        Psychosocial    No documentation.       Abuse/Neglect    No documentation.       Legal    No documentation.       Substance Abuse    No documentation.       Patient Forms    No documentation.           Zara Vieyra RN

## 2019-07-17 NOTE — PROGRESS NOTES
[facility]  Progress Note       LOS: 1 day   Patient Care Team:  Gamaliel Eaton MD as PCP - General  Dereck Chao MD as Referring Physician (Urology)  Prabhakar Solis MD as Consulting Physician (Cardiac Electrophysiology)  Rakesh Ortiz MD as Consulting Physician (Urology)  Shon Bunn MD as Consulting Physician (Radiation Oncology)    Chief Complaint:  Follow up afib     Subjective     Patient has done well overnight.  No complaints of chest pain or shortness of breath.  Feels well today.  No new complaints.  Wants to go home.        Review of Systems:   Pertinent positives in HPI, all others reviewed and negative.      Objective       Current Facility-Administered Medications:   •  aspirin EC tablet 81 mg, 81 mg, Oral, Daily, Nathaly Soto, APRN  •  atorvastatin (LIPITOR) tablet 10 mg, 10 mg, Oral, Nightly, BrittanyNathaly paez, APRN, 10 mg at 07/16/19 2105  •  clopidogrel (PLAVIX) tablet 75 mg, 75 mg, Oral, Daily, BrittanyNathaly paezle, APRN  •  ferrous sulfate tablet 325 mg, 325 mg, Oral, Daily With Breakfast, BrittanyNathaly paez, APRN  •  flecainide (TAMBOCOR) tablet 100 mg, 100 mg, Oral, BID, BrittanyNathaly paezle, APRN, 100 mg at 07/16/19 2109  •  HYDROcodone-acetaminophen (NORCO) 5-325 MG per tablet 1 tablet, 1 tablet, Oral, Q4H PRN, Prabhakar Solis MD  •  naproxen (NAPROSYN) tablet 500 mg, 500 mg, Oral, BID With Meals, Nathaly Soto, APRN, 500 mg at 07/16/19 2105  •  ondansetron (ZOFRAN) injection 4 mg, 4 mg, Intravenous, Q6H PRN, Prabhakar Solis MD  •  pantoprazole (PROTONIX) EC tablet 40 mg, 40 mg, Oral, QAM, Nathaly Soto, APRN, 40 mg at 07/17/19 0620  •  tamsulosin (FLOMAX) 24 hr capsule 0.4 mg, 0.4 mg, Oral, Nightly, Nathaly Soto, AYAAN, 0.4 mg at 07/16/19 2105    Vital Sign Min/Max for last 24 hours  Temp  Min: 97.6 °F (36.4 °C)  Max: 98.2 °F (36.8 °C)   BP  Min:  "108/56  Max: 169/86   Pulse  Min: 45  Max: 52   Resp  Min: 14  Max: 18   SpO2  Min: 87 %  Max: 98 %   Flow (L/min)  Min: 4  Max: 6   No Data Recorded     Flowsheet Rows      First Filed Value   Admission Height  185.4 cm (73\") Documented at 07/16/2019 0647   Admission Weight  101 kg (222 lb 7.1 oz) Documented at 07/16/2019 0647            Intake/Output Summary (Last 24 hours) at 7/17/2019 0810  Last data filed at 7/16/2019 1430  Gross per 24 hour   Intake 660 ml   Output 300 ml   Net 360 ml       Physical Exam:     General Appearance:    Alert, cooperative, in no acute distress   Lungs:    Clear to auscultation bilaterally.  Respiratory effort normal.    Heart:   Regular rate rhythm normal S1-S2.  There is an S4.  PMI normal.   Chest Wall:    No abnormalities observed   Abdomen:     Normal bowel sounds, no masses, no organomegaly, soft        non-tender, non-distended, no guarding, no rebound                tenderness   Extremities:   Moves all extremities well, no edema, no cyanosis, no             Redness.  Right groin stitch removed without complication.  No presence of oozing, hematoma, ecchymosis.  Normal venous access site.   Pulses:   Pulses palpable and equal bilaterally   Skin:   No bleeding, bruising or rash        Results Review:   Results from last 7 days   Lab Units 07/15/19  1441   WBC 10*3/mm3 5.97   HEMOGLOBIN g/dL 13.9   HEMATOCRIT % 41.1   PLATELETS 10*3/mm3 171     Results from last 7 days   Lab Units 07/17/19  0416 07/15/19  1441   SODIUM mmol/L 140 140   POTASSIUM mmol/L 4.1 4.2   CHLORIDE mmol/L 105 103   CO2 mmol/L 26.0 27.0   BUN mg/dL 16 15   CREATININE mg/dL 0.92 1.10   GLUCOSE mg/dL 120* 97      Results from last 7 days   Lab Units 07/15/19  1441   HEMOGLOBIN A1C % 5.50                 Results from last 7 days   Lab Units 07/15/19  1441   PROTIME Seconds 13.7   INR  1.10           Intake/Output Summary (Last 24 hours) at 7/17/2019 0810  Last data filed at 7/16/2019 1430  Gross per 24 hour "   Intake 660 ml   Output 300 ml   Net 360 ml       I personally viewed and interpreted the patient's EKG/Telemetry data    EKG: none for review     Telemetry: NSR 35 to 52 bpm, no atrial fibrillation.    Ejection Fraction  No results found for: EF    Echo EF Estimated  Lab Results   Component Value Date    ECHOEFEST 60 07/02/2019         Present on Admission:  **None**    Assessment/Plan   1. Persistent Atrial Fibrillation: Currently stable.  - Patient with a long-standing history of persistent atrial fibrillation s/p PVA in the past, now currently maintaining normal sinus rhythm on Flecainide therapy.  He has a CHADSVASc score of 3 and is felt not to be a good long-term candidate for anticoagulation secondary to high falls risk secondary to degenerative joint disease  - now s/p Wavecrest Device 7/16/19. On ASA 81 mg daily and Plavix 75 mg daily.  Doing well.  Postprocedure restrictions discussed with patient and wife in detail.  - BRISSA in 45 days to be scheduled.  In normal sinus rhythm and doing well.        Plan for disposition: The patient is stable and ready for discharge to home.  He will have a BRISSA in 45 days.  Follow-up with Dr. Solis in 10 to 12 weeks.    Electronically signed by SHIRA Gaona, 07/17/19, 8:10 AM.      I, Prabhakar Solis MD, personally performed the services face to face as described and documented by the above named individual. I have made any necessary edits and it is both accurate and complete 7/17/2019  8:53 AM

## 2019-07-18 LAB
ACT BLD: 114 SECONDS (ref 82–152)
ACT BLD: 257 SECONDS (ref 82–152)
ACT BLD: 274 SECONDS (ref 82–152)

## 2019-08-02 ENCOUNTER — HOSPITAL ENCOUNTER (OUTPATIENT)
Dept: RADIATION ONCOLOGY | Facility: HOSPITAL | Age: 79
Setting detail: RADIATION/ONCOLOGY SERIES
Discharge: HOME | End: 2019-08-02

## 2019-08-02 ENCOUNTER — OFFICE VISIT (OUTPATIENT)
Dept: RADIATION ONCOLOGY | Facility: HOSPITAL | Age: 79
End: 2019-08-02

## 2019-08-02 VITALS
HEART RATE: 51 BPM | RESPIRATION RATE: 15 BRPM | HEIGHT: 73 IN | WEIGHT: 223.7 LBS | TEMPERATURE: 98.2 F | DIASTOLIC BLOOD PRESSURE: 75 MMHG | BODY MASS INDEX: 29.65 KG/M2 | OXYGEN SATURATION: 97 % | SYSTOLIC BLOOD PRESSURE: 158 MMHG

## 2019-08-02 DIAGNOSIS — C61 PROSTATE CANCER (HCC): Primary | ICD-10-CM

## 2019-08-02 PROCEDURE — G0463 HOSPITAL OUTPT CLINIC VISIT: HCPCS

## 2019-08-02 RX ORDER — RANITIDINE HCL 75 MG
75 TABLET ORAL 2 TIMES DAILY
COMMUNITY
End: 2019-10-14

## 2019-08-02 NOTE — PROGRESS NOTES
FOLLOW UP NOTE    PATIENT:                                                      Stefan Pedraza  MEDICAL RECORD #:                        [id number]  :                                                          1940  COMPLETION DATE:   10/26/18  DIAGNOSIS:     Prostate cancer (CMS/[facility])  Staging form: Prostate, AJCC 8th Edition  - Clinical stage from 2018: Stage IIC (cT2b, cN0, cM0, PSA: 5.3, Grade Group: 3) - Signed by Shon Bunn MD on 2018    BRIEF HISTORY:    Routine follow-up visit.  He has a history of intermediate risk prostate cancer treated with CyberKnife SBRT.  Urinary function is stable with fairly good stream but he is still experiencing nocturia x3.  No dysuria.  No hematuria.  No incontinence.  Bowel function is irregular but stable.  He has hemorrhoidal problems for which he occasionally uses steroid cream but does not experience hematochezia.   He is on a clinical trial, status post left atrial appendage procedure to try to get him off anticoagulation.  He is otherwise doing well.  PSA has declined further to a value 1.13 ng/ml on 2019.    MEDICATIONS: Medication reconciliation for the patient was reviewed and confirmed in the electronic medical record.    Review of Systems   Constitutional: Negative.    HENT:  Negative.    Eyes: Negative.    Respiratory: Negative.    Cardiovascular: Positive for leg swelling.   Gastrointestinal: Positive for diarrhea.   Endocrine: Negative.    Genitourinary: Positive for nocturia.    Musculoskeletal: Positive for neck pain and neck stiffness.   Skin: Negative.    Neurological: Positive for dizziness and light-headedness.   Hematological: Bruises/bleeds easily.   Psychiatric/Behavioral: Negative.        IPSS Questionnaire (AUA-7):  Over the past month…     1)  Incomplete Emptying  How often have you had a sensation of not emptying your bladder?  0 - Not at all   2)  Frequency  How often have you had to urinate less than every two hours? 3  - About half the time   3)  Intermittency  How often have you found you stopped and started again several times when you urinated?  5 - Almost always   4) Urgency  How often have you found it difficult to postpone urination?  3 - About half the time   5) Weak Stream  How often have you had a weak urinary stream?  4 - More than half the time   6) Straining  How often have you had to push or strain to begin urination?  0 - Not at all   7) Nocturia  How many times did you typically get up at night to urinate?  3 - 3 times   Total Score:  18         Quality of life due to urinary symptoms:  If you were to spend the rest of your life with your urinary condition the way it is now, how would you feel about that? 2-Mostly Satisfied   Urine Leakage (Incontinence) 1-Mild (A few drops a day, no pad use)      Sexual Health Inventory  Current Status     1)  How do you rate your confidence that you could achieve and keep an erection? 1-Very Low   2) When you had erections with sexual stimulation, how often were your erections hard enough for penetration (entering your partner)? 1-Almost never or never   3)  During sexual intercourse, how often were you able to maintain your erection after you had penetrated (entered) into your partner? 0-Did not attempt intercourse   4) During sexual intercourse, how difficult was it to maintain your erection to completion of intercourse? 0-Did not attempt intercourse   5) When you attempted sexual intercourse, how often was it satisfactory to you? 0-No sexual activity   Total Score: 2         Bowel Health Inventory  Current Status: 0-No problems, no rectal bleeding, no discharge, less then 5 bowel movements a day                        KPS 80%    Physical Exam   Constitutional: He is oriented to person, place, and time. He appears well-developed and well-nourished.   HENT:   Head: Normocephalic and atraumatic.   Neck: Normal range of motion. Neck supple.   Cardiovascular: Normal rate, regular  "rhythm and normal heart sounds.   No murmur heard.  Pulmonary/Chest: Effort normal and breath sounds normal. He has no wheezes. He has no rales.   Abdominal: Soft. Bowel sounds are normal. He exhibits no distension. There is no hepatosplenomegaly. There is no tenderness.   Genitourinary: Rectal exam shows external hemorrhoid. Rectal exam shows no internal hemorrhoid, no fissure, no mass, no tenderness and anal tone normal. Prostate is not enlarged ( Smooth, round but diffusely indurated, no nodules, estimated 20 cc volume.  Seminal vesicles are nonpalpable.) and not tender.   Musculoskeletal: Normal range of motion. He exhibits no edema or tenderness.   Lymphadenopathy:     He has no cervical adenopathy.     He has no axillary adenopathy.        Right: No supraclavicular adenopathy present.        Left: No supraclavicular adenopathy present.   Neurological: He is alert and oriented to person, place, and time. He has normal strength. No sensory deficit.   Skin: Skin is warm and dry.   Psychiatric: He has a normal mood and affect. His behavior is normal. Judgment and thought content normal.   Nursing note and vitals reviewed.      VITAL SIGNS:   Vitals:    08/02/19 1000   BP: 158/75   Pulse: 51   Resp: 15   Temp: 98.2 °F (36.8 °C)   TempSrc: Temporal   SpO2: 97%  Comment: RA   Weight: 101 kg (223 lb 11.2 oz)   Height: 185.4 cm (73\")   PainSc: 0-No pain       The following portions of the patient's history were reviewed and updated as appropriate: allergies, current medications, past family history, past medical history, past social history, past surgical history and problem list.         Stefan was seen today for prostate cancer.    Diagnoses and all orders for this visit:    Prostate cancer (CMS/[facility])         IMPRESSION:  Prostate cancer, Alvaro score 4+3=7, clinical stage IIC (T2b, N0, M0) with PSA 7.89 ng/ml.  He has so far had a very good early biochemical/clinical response following stereotactic body radiotherapy 9 " months ago.  Prognosis remains very good.  Residual irritative bowel and bladder symptoms should also continue to improve.    RECOMMENDATIONS: He will continue urologic surveillance under the care of Dr. Chao.  He will try to taper/discontinue tamsulosin, as tolerated.  He will try adding probiotics to see if this helps regulate his bowels.    Return in about 1 year (around 8/2/2020) for Office Visit.    Shon Bunn MD    Errors in dictation may reflect use of voice recognition software and not all errors in transcription may have been detected prior to signing.

## 2019-08-26 NOTE — TELEPHONE ENCOUNTER
Met with patient in Dr. Solis's office on 9/12/18 to discuss watchman implant versus participation in Amulet trial.  Message left to follow up on conversation.

## 2019-08-30 ENCOUNTER — HOSPITAL ENCOUNTER (OUTPATIENT)
Dept: CARDIOLOGY | Facility: HOSPITAL | Age: 79
Discharge: HOME | End: 2019-08-30
Admitting: PHYSICIAN ASSISTANT

## 2019-08-30 VITALS
OXYGEN SATURATION: 91 % | HEIGHT: 73 IN | BODY MASS INDEX: 29.55 KG/M2 | RESPIRATION RATE: 14 BRPM | WEIGHT: 223 LBS | SYSTOLIC BLOOD PRESSURE: 128 MMHG | HEART RATE: 44 BPM | DIASTOLIC BLOOD PRESSURE: 74 MMHG

## 2019-08-30 DIAGNOSIS — I48.19 PERSISTENT ATRIAL FIBRILLATION (HCC): ICD-10-CM

## 2019-08-30 LAB
BH CV ECHO MEAS - BSA(HAYCOCK): 2.3 M^2
BH CV ECHO MEAS - BSA: 2.3 M^2
BH CV ECHO MEAS - BZI_BMI: 29.6 KILOGRAMS/M^2
BH CV ECHO MEAS - BZI_METRIC_HEIGHT: 185 CM
BH CV ECHO MEAS - BZI_METRIC_WEIGHT: 101.2 KG
BH CV VAS BP RIGHT ARM: NORMAL MMHG
LV EF 2D ECHO EST: 60 %

## 2019-08-30 PROCEDURE — 93321 DOPPLER ECHO F-UP/LMTD STD: CPT

## 2019-08-30 PROCEDURE — 93325 DOPPLER ECHO COLOR FLOW MAPG: CPT | Performed by: INTERNAL MEDICINE

## 2019-08-30 PROCEDURE — 93312 ECHO TRANSESOPHAGEAL: CPT

## 2019-08-30 PROCEDURE — 25010000002 MIDAZOLAM PER 1 MG: Performed by: INTERNAL MEDICINE

## 2019-08-30 PROCEDURE — 93320 DOPPLER ECHO COMPLETE: CPT

## 2019-08-30 PROCEDURE — 99153 MOD SED SAME PHYS/QHP EA: CPT

## 2019-08-30 PROCEDURE — 99152 MOD SED SAME PHYS/QHP 5/>YRS: CPT

## 2019-08-30 PROCEDURE — 93312 ECHO TRANSESOPHAGEAL: CPT | Performed by: INTERNAL MEDICINE

## 2019-08-30 PROCEDURE — 93320 DOPPLER ECHO COMPLETE: CPT | Performed by: INTERNAL MEDICINE

## 2019-08-30 PROCEDURE — 25010000002 FENTANYL CITRATE (PF) 100 MCG/2ML SOLUTION: Performed by: INTERNAL MEDICINE

## 2019-08-30 PROCEDURE — 93325 DOPPLER ECHO COLOR FLOW MAPG: CPT

## 2019-08-30 PROCEDURE — 99152 MOD SED SAME PHYS/QHP 5/>YRS: CPT | Performed by: INTERNAL MEDICINE

## 2019-08-30 RX ORDER — FENTANYL CITRATE 50 UG/ML
INJECTION, SOLUTION INTRAMUSCULAR; INTRAVENOUS
Status: COMPLETED | OUTPATIENT
Start: 2019-08-30 | End: 2019-08-30

## 2019-08-30 RX ORDER — MIDAZOLAM HYDROCHLORIDE 1 MG/ML
INJECTION INTRAMUSCULAR; INTRAVENOUS
Status: COMPLETED | OUTPATIENT
Start: 2019-08-30 | End: 2019-08-30

## 2019-08-30 RX ADMIN — METHOHEXITAL SODIUM 20 MG: 500 INJECTION, POWDER, LYOPHILIZED, FOR SOLUTION INTRAMUSCULAR; INTRAVENOUS; RECTAL at 10:23

## 2019-08-30 RX ADMIN — FENTANYL CITRATE 50 MCG: 50 INJECTION, SOLUTION INTRAMUSCULAR; INTRAVENOUS at 10:18

## 2019-08-30 RX ADMIN — MIDAZOLAM HYDROCHLORIDE 2 MG: 1 INJECTION, SOLUTION INTRAMUSCULAR; INTRAVENOUS at 10:17

## 2019-08-30 RX ADMIN — FENTANYL CITRATE 50 MCG: 50 INJECTION, SOLUTION INTRAMUSCULAR; INTRAVENOUS at 10:14

## 2019-08-30 RX ADMIN — METHOHEXITAL SODIUM 20 MG: 500 INJECTION, POWDER, LYOPHILIZED, FOR SOLUTION INTRAMUSCULAR; INTRAVENOUS; RECTAL at 10:20

## 2019-08-30 RX ADMIN — MIDAZOLAM HYDROCHLORIDE 2 MG: 1 INJECTION, SOLUTION INTRAMUSCULAR; INTRAVENOUS at 10:13

## 2019-08-30 NOTE — H&P
[facility]  Cardiology Consult Note  [facility] CARDIOLOGY          Patient Identification:  Stefan Pedraza      [id number]  79 y.o.        male  1940       Date of Consultation:  08/30/19    Reason for Consultation:  Persistent Atrial fibrillation, s/p Wavecrest.     PCP: Gamaliel Eaton MD  Primary cardiologist:  Dr. Prabhakar Solis    Problem List:  1. Atrial fibrillation:   a. History of atrial fibrillation, initial diagnosis 2007.   b. Status-post successful external cardioversion, Dr. Dyer, 2007.  c. Initiation of amiodarone therapy with recent tapering dose, Dr. Lowe.   d. Echocardiogram, 02/02/2011, revealing normal LV function, diastolic dysfunction noted, mild MR, no pericardial effusion.   e. Holter monitor, 01/25/2011, revealing sinus rhythm with PAC/PVC/episodes of sinus bradycardia.   f. Chadsvasc=3.   g. 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   h. Pulmonary vein isolation procedure, 10/05/2011.  i. BRISSA with left ventricular ejection fraction of 55%, mild TR and MR.   j. Event recorder with intermittent episodes of flutter but the majority of the time in sinus rhythm.   k. External cardioversion to normal sinus rhythm, 02/17/2012.  l. Atypical chest pain.  m. History of left heart catheterization, 1990s in [place], reported as normal coronaries.   n. Stress test, 08/23/2007, revealing no significant ischemia, EF estimated at 46%.  o. Echo 7/28/17 LV Estimated EF = 57%., mild concentric hypertrophy, Left atrial cavity size is severely dilated. Normal estimated pulmonary artery systolic pressure  p. External CV 7/13/17; NSR with recurrent AF three days later  q. Echocardiogram 7/28/17: EF 57%, mild LVH, LA severely dilated  r. ECV to NSR 8/31/17  s. BRISSA 7/2/2019: EF 60%, mild to moderate MR, mild TR  t. 7/16/19: s/p Wavecrest  insertion. GFT.  2. Diabetes mellitus.   3. Gastroesophageal reflux disease.  4. Dyslipidemia.  5. Osteoarthritis.  6. Remote surgical history:  a. Cholecystectomy.   b. Right orchiectomy.  c. Right shoulder labral repair.  d. Left knee arthroscopy for partial medial meniscectomy      History of Present Illness:     Mr. Pedraza is a 80 y/o male with a pmhx of persistent atrial fibrillation and as above. Regarding Afib, he is s/p PVA in the past and has been maintaining NSR on Flecainide therapy. He has a CHADSVASC score of 3 but was felt to not be a good long-term candidate for anticoagulation s/t being high falls risk with DJD. He therefore underwent placement of Wavecrest Device on 7/16/19 with Dr. Solis. He was placed on aspirin and Plavix post procedure. He presents today for 45-day post BRISSA. He denies any recent issues. Continues to maintain NSR on Flecainide. He has had several moderate sized bruises on Plavix. No bleeding issues.     Past History:  Past Medical History:   Diagnosis Date   • Arthritis    • Atrial fibrillation (CMS/HCC)    • Cataract     both   • Diabetes mellitus (CMS/HCC)     prediabetic   • Dyslipidemia    • GERD (gastroesophageal reflux disease)    • H/O cardiovascular stress test 08/23/2007    Revealing no significant ischemia, EF estimated at 46%   • H/O echocardiogram 02/02/2011    Revealing normal LV function, diastolic dysfunction noted, mild MR, nopericardial effusion.   • H/O transesophageal echocardiography (BRISSA) for monitoring     left ventricular ejection fraction of 55%, mild TR and MR   • History of cardioversion 2007    STATUS-POST SUCCESSFUL EXTERNAL CARDIOVERSION   • History of cardioversion 02/17/2012    external cardioversion to normal sinus rhythm   • History of Holter monitoring 01/25/2011    Revealing sinus rhythm with PAC/PVC/episodes of sinus bradycardia.   • [place]     has hearing aides   • Hyperlipidemia    • Osteoarthritis    • PONV (postoperative  nausea and vomiting)     only with ether   • Pre-diabetes    • Prostate cancer (CMS/HCC)    • Sleep apnea with use of continuous positive airway pressure (CPAP)    • Status post cystoscopy 08/28/2018   • Wears glasses      Past Surgical History:   Procedure Laterality Date   • ATRIAL APPENDAGE EXCLUSION LEFT WITH TRANSESOPHAGEAL ECHOCARDIOGRAM N/A 7/16/2019    Procedure: Atrial Appendage Occlusion (Wavecrest), anticoagulation will be determined after pre-BRISSA;  Surgeon: Prabhakar Solis MD;  Location: [facility] INVASIVE LOCATION;  Service: Cardiology   • CARDIAC ABLATION  2011   • CATARACT EXTRACTION     • CHOLECYSTECTOMY     • COLONOSCOPY      2018   • CYST REMOVAL      on back and back of neck   • KNEE ARTHROSCOPY W/ PARTIAL MEDIAL MENISCECTOMY Left    • ORCHIECTOMY Right    • SHOULDER ARTHROSCOPY W/ LABRAL REPAIR Right    • TONSILLECTOMY       Allergies   Allergen Reactions   • Penicillins Rash     Social History     Socioeconomic History   • Marital status:      Spouse name: Not on file   • Number of children: Not on file   • Years of education: Not on file   • Highest education level: Not on file   Occupational History   • Occupation: Retired   Tobacco Use   • Smoking status: Never Smoker   • Smokeless tobacco: Never Used   Substance and Sexual Activity   • Alcohol use: No   • Drug use: No   • Sexual activity: Defer   Social History Narrative    Caffeine: 3-4 servings per day    Patient lives at home with his wife     Family History   Problem Relation Age of Onset   • Arthritis Mother    • Pneumonia Mother    • Atrial fibrillation Father    • Hypertension Father    • Diabetes Father    • Heart disease Father    • No Known Problems Sister    • No Known Problems Maternal Grandmother    • Diabetes Maternal Grandfather    • No Known Problems Paternal Grandmother    • No Known Problems Paternal Grandfather      Medications:    (Not in a hospital admission)  Current medications:    Current IV drips:    No  current facility-administered medications for this encounter.     Review of Systems:    Constitutional no fever,  no weight loss   Skin no rash   Otolaryngeal no difficulty swallowing   Cardiovascular See HPI   Pulmonary no cough, no sputum production   Gastrointestinal no constipation, no diarrhea   Genitourinary no dysuria, no hematuria   Hematologic no easy bruisability, no abnormal bleeding   Musculoskeletal no muscle pain   Neurologic no dizziness, no falls     Physical exam:    There were no vitals taken for this visit. There is no height or weight on file to calculate BMI.   Oxygen saturation   No Data Recorded    General Appearance:   · well developed  · well nourished  HENT:   · oropharynx moist  · lips not cyanotic  Neck:  · thyroid not enlarged  · supple  Respiratory:  · no respiratory distress  · normal breath sounds  · no rales  Cardiovascular:  · no jugular venous distention  · regular rhythm  · apical impulse normal  · S1 normal, S2 normal  · no S3, no S4   · no murmur  · no rub, no thrill  · carotid pulses normal; no bruit  · pedal pulses normal  · lower extremity edema: none    Gastrointestinal:   · bowel sounds normal  · non-tender  · no hepatomegaly, no splenomegaly  Musculoskeletal:  · no clubbing of fingers.   · normocephalic, head atraumatic  Skin:   · warm, dry  Psychiatric:  · judgment and insight appropriate  · normal mood and affect  Lab Review:           Invalid input(s): PLATELETCT             Invalid input(s): POTASSIUM   CrCl cannot be calculated (Patient's most recent lab result is older than the maximum 30 days allowed.).   No results found for: CHOL, CHLPL, TRIG, HDL, LDL, LDLDIRECT        No results found for: TSH     Lab Results   Component Value Date    HGBA1C 5.50 07/15/2019           No results found for: DIGOXIN   No results found for: DDIMERQUANT     Imaging:  All pertinent imaging studies were personally reviewed.    Assessment:      Atrial fibrillation (CMS/[facility])    H/O  transesophageal echocardiography (BRISSA) for monitoring    DJD (degenerative joint disease)    Assessment and Recommendations:  1. Persistent atrial fibrillation  - s/p remote PVA, now maintaining NSR on Flecainide.   - CHADSVASC = 3, not a good candidate for NOAC given falls risk due to DJD. Now s/p Wavecrest insertion on 7/16/19 and started on ASA, Plavix.   - Patient presents today for 45-day BRISSA. The risks, benefits, and alternatives of the procedure have been reviewed and the patient wishes to proceed.   - Proceed as planned.   - Recs regarding antiplatelet therapy to follow BRISSA.       Electronically signed by AYAAN Beatty, 08/30/19, 9:10 AM.    Calli Ritchie MD, [facility]

## 2019-09-27 ENCOUNTER — TELEPHONE (OUTPATIENT)
Dept: CARDIOLOGY | Facility: CLINIC | Age: 79
End: 2019-09-27

## 2019-09-27 NOTE — TELEPHONE ENCOUNTER
I spoke with patient regarding the current voluntary enrollment suspension of Wavecrest 2. We discussed the slightly higher rate of pericardial effusion in the Wavecrest patients and that all of these have occurred within 45 days of implant. He was informed that no action was required at this time and to contact the study doctor if he experiences any chest pain,fatigue,dizziness or SOB. He verbalized understanding and stated he is feeling well. Plans were made for his next scheduled follow up.

## 2019-10-14 ENCOUNTER — OFFICE VISIT (OUTPATIENT)
Dept: ORTHOPEDIC SURGERY | Facility: CLINIC | Age: 79
End: 2019-10-14

## 2019-10-14 VITALS — BODY MASS INDEX: 28.36 KG/M2 | WEIGHT: 214 LBS | HEIGHT: 73 IN | OXYGEN SATURATION: 98 % | HEART RATE: 54 BPM

## 2019-10-14 DIAGNOSIS — M17.12 PRIMARY OSTEOARTHRITIS OF LEFT KNEE: Primary | ICD-10-CM

## 2019-10-14 PROCEDURE — 99203 OFFICE O/P NEW LOW 30 MIN: CPT | Performed by: ORTHOPAEDIC SURGERY

## 2019-10-14 RX ORDER — PREGABALIN 150 MG/1
150 CAPSULE ORAL ONCE
Status: CANCELLED | OUTPATIENT
Start: 2019-10-14 | End: 2019-10-14

## 2019-10-14 RX ORDER — ACETAMINOPHEN 325 MG/1
1000 TABLET ORAL ONCE
Status: CANCELLED | OUTPATIENT
Start: 2019-10-14 | End: 2019-10-14

## 2019-10-14 RX ORDER — MELOXICAM 7.5 MG/1
15 TABLET ORAL ONCE
Status: CANCELLED | OUTPATIENT
Start: 2019-10-14 | End: 2019-10-14

## 2019-10-14 RX ORDER — OMEPRAZOLE 40 MG/1
CAPSULE, DELAYED RELEASE ORAL
COMMUNITY
Start: 2019-09-18 | End: 2019-10-14

## 2019-10-14 NOTE — PROGRESS NOTES
[facility] [place] [facility] Note    Subjective     Chief Complaint   Patient presents with   • Left Knee - Pain        HPI    Stefan Pedraza is a 79 y.o. male.  He presents today for evaluation of left knee pain.  He has had pain for several years, which has been worsening, to the point where he would like to consider knee replacement surgery.  He is had previous injections without long-term benefit.  Pain is 7 out of 10, stabbing in quality, located along the medial aspect of the knee, worse with walking, climbing stairs as well as with work and leisure activities.  Pain is associated with giving way.  He has tried bracing, anti-inflammatories, and weight loss.  No history of clots nor clotting disorders.  Previous knee arthroscopy with Dr. Jose Manuel Queen in 2010.      Patient Active Problem List   Diagnosis   • Atrial fibrillation (CMS/HCC)   • History of cardioversion   • H/O echocardiogram   • History of Holter monitoring   • H/O transesophageal echocardiography (BRISSA) for monitoring   • History of cardioversion   • H/O cardiovascular stress test   • Prediabetes   • GERD (gastroesophageal reflux disease)   • Dyslipidemia   • DJD (degenerative joint disease)   • Prostate cancer (CMS/HCC)   • Persistent atrial fibrillation     Past Medical History:   Diagnosis Date   • Arthritis    • Atrial fibrillation (CMS/HCC)    • Cataract     both   • Diabetes mellitus (CMS/HCC)     prediabetic   • Dyslipidemia    • GERD (gastroesophageal reflux disease)    • H/O cardiovascular stress test 08/23/2007    Revealing no significant ischemia, EF estimated at 46%   • H/O echocardiogram 02/02/2011    Revealing normal LV function, diastolic dysfunction noted, mild MR, nopericardial effusion.   • H/O transesophageal echocardiography (BRISSA) for monitoring     left ventricular ejection fraction of 55%, mild TR and MR   • History of cardioversion 2007    STATUS-POST SUCCESSFUL EXTERNAL CARDIOVERSION   • History of cardioversion  02/17/2012    external cardioversion to normal sinus rhythm   • History of Holter monitoring 01/25/2011    Revealing sinus rhythm with PAC/PVC/episodes of sinus bradycardia.   • [place] (hard of hearing)     has hearing aides   • Hyperlipidemia    • Osteoarthritis    • PONV (postoperative nausea and vomiting)     only with ether   • Pre-diabetes    • Prostate cancer (CMS/HCC)    • Sleep apnea with use of continuous positive airway pressure (CPAP)    • Status post cystoscopy 08/28/2018   • Wears glasses       Past Surgical History:   Procedure Laterality Date   • ATRIAL APPENDAGE EXCLUSION LEFT WITH TRANSESOPHAGEAL ECHOCARDIOGRAM N/A 7/16/2019    Procedure: Atrial Appendage Occlusion (Wavecrest), anticoagulation will be determined after pre-BRISSA;  Surgeon: Prabhakar Solis MD;  Location: [facility] INVASIVE LOCATION;  Service: Cardiology   • CARDIAC ABLATION  2011   • CATARACT EXTRACTION     • CHOLECYSTECTOMY     • COLONOSCOPY      2018   • CYST REMOVAL      on back and back of neck   • KNEE ARTHROSCOPY W/ PARTIAL MEDIAL MENISCECTOMY Left    • ORCHIECTOMY Right    • SHOULDER ARTHROSCOPY W/ LABRAL REPAIR Right    • TONSILLECTOMY        Family History   Problem Relation Age of Onset   • Arthritis Mother    • Pneumonia Mother    • Atrial fibrillation Father    • Hypertension Father    • Diabetes Father    • Heart disease Father    • No Known Problems Sister    • No Known Problems Maternal Grandmother    • Diabetes Maternal Grandfather    • No Known Problems Paternal Grandmother    • No Known Problems Paternal Grandfather      Social History     Socioeconomic History   • Marital status:      Spouse name: Not on file   • Number of children: Not on file   • Years of education: Not on file   • Highest education level: Not on file   Occupational History   • Occupation: Retired   Tobacco Use   • Smoking status: Never Smoker   • Smokeless tobacco: Never Used   Substance and Sexual Activity   • Alcohol use: No   • Drug use:  No   • Sexual activity: Defer   Social History Narrative    Caffeine: 3-4 servings per day    Patient lives at home with his wife      Current Outpatient Medications on File Prior to Visit   Medication Sig Dispense Refill   • aspirin 81 MG EC tablet Take 81 mg by mouth Daily.     • atorvastatin (LIPITOR) 10 MG tablet Take 10 mg by mouth Every Night.     • clopidogrel (PLAVIX) 75 MG tablet Take 1 tablet by mouth Daily. 30 tablet 6   • cyclobenzaprine (FLEXERIL) 10 MG tablet cyclobenzaprine 10 mg tablet   one po Every night at bedtime prn     • ferrous sulfate 324 (65 FE) MG tablet delayed-release EC tablet Take 324 mg by mouth Daily With Breakfast.     • flecainide (TAMBOCOR) 100 MG tablet TAKE 1 TABLET TWICE A  tablet 3   • metFORMIN ER (GLUCOPHAGE-XR) 500 MG 24 hr tablet Take 500 mg by mouth Every Night.     • naproxen (NAPROSYN) 500 MG tablet Take 500 mg by mouth 2 (Two) Times a Day With Meals.     • Omega-3 Fatty Acids (FISH OIL) 1000 MG capsule capsule Take  by mouth Daily With Breakfast.     • tamsulosin (FLOMAX) 0.4 MG capsule 24 hr capsule Take 1 capsule by mouth Every Night. 30 capsule 11   • [DISCONTINUED] B Complex Vitamins (VITAMIN B COMPLEX PO) Take 1 tablet by mouth Daily.     • [DISCONTINUED] omeprazole (priLOSEC) 40 MG capsule      • [DISCONTINUED] raNITIdine (ZANTAC) 75 MG tablet Take 75 mg by mouth 2 (Two) Times a Day.       No current facility-administered medications on file prior to visit.       Allergies   Allergen Reactions   • Penicillins Rash        Review of Systems   Constitutional: Negative.    HENT: Negative.    Eyes: Negative.    Respiratory: Positive for apnea.    Cardiovascular: Negative.    Gastrointestinal: Negative.    Endocrine: Negative.    Genitourinary: Negative.    Musculoskeletal: Positive for arthralgias.   Skin: Negative.    Allergic/Immunologic: Negative.    Neurological: Negative.    Hematological: Negative.    Psychiatric/Behavioral: Negative.         Objective   "    Physical Exam  Pulse 54   Ht 185.4 cm (72.99\")   Wt 97.1 kg (214 lb)   SpO2 98%   BMI 28.24 kg/m²     Body mass index is 28.24 kg/m².    General:   Mental Status:  Alert   Appearance: Cooperative, in no acute distress   Build and Nutrition: Well-nourished well-developed male   Orientation: Alert and oriented to person, place and time   Posture: Normal   Gait: Varus thrust    Integument:   Left knee: No skin lesions, no rash, no ecchymosis    Neurologic:   Sensation:    Left foot: Intact to light touch on the dorsal and plantar aspect   Motor:  Left lower extremity: 5/5 quadriceps, hamstrings, ankle dorsiflexors, and ankle plantar flexors  Vascular:   Left lower extremity: 2+ dorsalis pedis pulse, prompt capillary refill    Lower Extremities:   Left Knee:    Tenderness:  Medial joint line tenderness    Effusion:  1+    Swelling:  None    Crepitus:  Positive    Atrophy:  None    Range of motion:  Extension: 0°       Flexion: 120°  Instability:  No varus laxity, no valgus laxity, negative anterior drawer  Deformities:  Varus      Imaging/Studies      Imaging Results (last 24 hours)     Procedure Component Value Units Date/Time    XR Knee 4+ View Left [[id number]] Resulted:  10/14/19 1537     Updated:  10/14/19 1538    Narrative:       Left Knee Radiographs  Indication: left knee pain  Views: Standing AP's and skiers of both knees, with lateral and sunrise   views of the left knee    Comparison: no prior studies available    Findings:   Bone-on-bone contact medial compartment, patellofemoral spurring, varus   alignment, no acute bony abnormalities.    Impression: Advanced left knee osteoarthritis.          Assessment and Plan     Stefan was seen today for pain.    Diagnoses and all orders for this visit:    Primary osteoarthritis of left knee  -     XR Knee 4+ View Left  -     Case Request; Standing  -     Instructions on coughing, deep breathing, and incentive spirometry.; Future  -     CBC and Differential; " Future  -     Basic metabolic panel; Future  -     Protime-INR; Future  -     APTT; Future  -     Sedimentation rate; Future  -     C-reactive protein; Future  -     Tranexamic Acid 1,000 mg in sodium chloride 0.9 % 100 mL  -     Tranexamic Acid 1,000 mg in sodium chloride 0.9 % 100 mL  -     ceFAZolin (ANCEF) 2 g in sodium chloride 0.9 % 100 mL IVPB  -     acetaminophen (TYLENOL) tablet 975 mg  -     meloxicam (MOBIC) tablet 15 mg  -     pregabalin (LYRICA) capsule 150 mg  -     mupirocin (BACTROBAN) 2 % nasal ointment 1 application  -     Case Request    Other orders  -     Outpatient In A Bed; Standing  -     Follow Anesthesia Guidelines / Standing Orders; Future  -     Obtain informed consent  -     Provide instructions to patient regarding NPO status  -     Follow Anesthesia Guidelines / Standing Orders; Standing  -     Nerve Block; Standing  -     Verify NPO Status; Standing  -     SCD (sequential compression device)- to be placed on patient in Pre-op; Standing  -     Clip operative site; Standing  -     Obtain informed consent (if not collected inpatient or PAT); Standing  -     Notify Physician - Standard; Standing  -     NPO After Midnight  -     mupirocin (BACTROBAN NASAL) 2 % nasal ointment; into the nostril(s) as directed by provider 2 (Two) Times a Day.  -     chlorhexidine (HIBICLENS) 4 % external liquid; Apply  topically to the appropriate area as directed Daily. Shower with hibiclens solution as directed for 5 days prior to surgery        1. Primary osteoarthritis of left knee        I reviewed my findings with the patient today.  He has advanced left knee osteoarthritis, and has exhausted conservative treatment options.  He would like to proceed with knee replacement surgery, after discussion the risk, benefits, and alternatives.  Please see my counseling note for details.  We will schedule surgery at a mutually convenient time.    Surgical Counseling     I have informed the patient of the diagnosis  and the prognosis.  Exhaustive conservative treatment modalities have not resulted in long term pain relief.  The symptoms have progressed to the point of daily pain and inability to perform activities of daily living without significant pain. The patient has reached the point of desiring to proceed with total knee arthroplasty after discussing the risks, benefits and alternatives to the procedure.  The surgical procedure itself was discussed in detail. Risks of the procedure were discussed, which included but are not limited to, bleeding, infection, damage to blood vessels and nerves, incomplete pain relief, loosening of the prosthesis, deep infection, need for further surgery, loss of limb, deep venous thrombosis, pulmonary embolus, death, heart attack, stroke, kidney failure, liver failure, and anesthetic complications.  In addition, the potential for deep infection developing in the future was discussed, which could require further surgery.  The knee would have to be re-opened, debrided, and potentially remove the prosthesis, which may or may not be replaced in the future.  Also, the possibility for loosening of the prosthesis has been mentioned.  If the prosthesis loosened, a revision arthroplasty could be performed, with results that are not as predictable compared to the original procedure.  The typical rehabilitative course has also been discussed, and full recovery may take up to a year to see the maximum benefit.  The importance of patient cooperation in the rehabilitative efforts has also been discussed.  No guarantees whatsoever were given.  The patient understands the potential risks versus the benefits and desires to proceed with total knee arthroplasty at a mutually convenient time.    Return for For surgery as planned.      Medical Decision Making  Management Options : major surgery with risk factors  Data/Risk: radiology tests and independent visualization of imaging, lab tests, or  EMG/NCV      Gamaliel Polanco MD  10/14/19  5:48 PM

## 2019-10-15 PROBLEM — M17.12 PRIMARY OSTEOARTHRITIS OF LEFT KNEE: Status: ACTIVE | Noted: 2019-10-15

## 2019-10-17 ENCOUNTER — TELEPHONE (OUTPATIENT)
Dept: OTHER | Facility: OTHER | Age: 79
End: 2019-10-17

## 2019-10-17 NOTE — RESEARCH
I spoke with pt for 3 month follow up Wavecrest device implant. He states he will discontinue Plavix at this time. He is feeling well with no stroke like symptoms. He verbalized understanding to continue his ASA..

## 2019-11-05 ENCOUNTER — TELEPHONE (OUTPATIENT)
Dept: CARDIOLOGY | Facility: CLINIC | Age: 79
End: 2019-11-05

## 2019-11-05 ENCOUNTER — APPOINTMENT (OUTPATIENT)
Dept: PREADMISSION TESTING | Facility: HOSPITAL | Age: 79
End: 2019-11-05

## 2019-11-05 VITALS — WEIGHT: 222.88 LBS | BODY MASS INDEX: 29.54 KG/M2 | HEIGHT: 73 IN

## 2019-11-05 DIAGNOSIS — M17.12 PRIMARY OSTEOARTHRITIS OF LEFT KNEE: ICD-10-CM

## 2019-11-05 LAB
ANION GAP SERPL CALCULATED.3IONS-SCNC: 10 MMOL/L (ref 5–15)
APTT PPP: 30.3 SECONDS (ref 24–37)
BASOPHILS # BLD AUTO: 0.04 10*3/MM3 (ref 0–0.2)
BASOPHILS NFR BLD AUTO: 1 % (ref 0–1.5)
BUN BLD-MCNC: 15 MG/DL (ref 8–23)
BUN/CREAT SERPL: 18.3 (ref 7–25)
CALCIUM SPEC-SCNC: 9.1 MG/DL (ref 8.6–10.5)
CHLORIDE SERPL-SCNC: 103 MMOL/L (ref 98–107)
CO2 SERPL-SCNC: 27 MMOL/L (ref 22–29)
CREAT BLD-MCNC: 0.82 MG/DL (ref 0.76–1.27)
CRP SERPL-MCNC: 0.05 MG/DL (ref 0–0.5)
DEPRECATED RDW RBC AUTO: 43.8 FL (ref 37–54)
EOSINOPHIL # BLD AUTO: 0.2 10*3/MM3 (ref 0–0.4)
EOSINOPHIL NFR BLD AUTO: 4.9 % (ref 0.3–6.2)
ERYTHROCYTE [DISTWIDTH] IN BLOOD BY AUTOMATED COUNT: 12.7 % (ref 12.3–15.4)
ERYTHROCYTE [SEDIMENTATION RATE] IN BLOOD: 10 MM/HR (ref 0–20)
GFR SERPL CREATININE-BSD FRML MDRD: 91 ML/MIN/1.73
GLUCOSE BLD-MCNC: 117 MG/DL (ref 65–99)
HBA1C MFR BLD: 5.4 % (ref 4.8–5.6)
HCT VFR BLD AUTO: 41.6 % (ref 37.5–51)
HGB BLD-MCNC: 13.8 G/DL (ref 13–17.7)
IMM GRANULOCYTES # BLD AUTO: 0.01 10*3/MM3 (ref 0–0.05)
IMM GRANULOCYTES NFR BLD AUTO: 0.2 % (ref 0–0.5)
INR PPP: 1.12 (ref 0.85–1.16)
LYMPHOCYTES # BLD AUTO: 1.52 10*3/MM3 (ref 0.7–3.1)
LYMPHOCYTES NFR BLD AUTO: 37.1 % (ref 19.6–45.3)
MCH RBC QN AUTO: 31.4 PG (ref 26.6–33)
MCHC RBC AUTO-ENTMCNC: 33.2 G/DL (ref 31.5–35.7)
MCV RBC AUTO: 94.5 FL (ref 79–97)
MONOCYTES # BLD AUTO: 0.41 10*3/MM3 (ref 0.1–0.9)
MONOCYTES NFR BLD AUTO: 10 % (ref 5–12)
NEUTROPHILS # BLD AUTO: 1.92 10*3/MM3 (ref 1.7–7)
NEUTROPHILS NFR BLD AUTO: 46.8 % (ref 42.7–76)
NRBC BLD AUTO-RTO: 0 /100 WBC (ref 0–0.2)
PLATELET # BLD AUTO: 152 10*3/MM3 (ref 140–450)
PMV BLD AUTO: 10.3 FL (ref 6–12)
POTASSIUM BLD-SCNC: 3.9 MMOL/L (ref 3.5–5.2)
PROTHROMBIN TIME: 13.9 SECONDS (ref 11.2–14.3)
RBC # BLD AUTO: 4.4 10*6/MM3 (ref 4.14–5.8)
SODIUM BLD-SCNC: 140 MMOL/L (ref 136–145)
WBC NRBC COR # BLD: 4.1 10*3/MM3 (ref 3.4–10.8)

## 2019-11-05 PROCEDURE — 85652 RBC SED RATE AUTOMATED: CPT | Performed by: ORTHOPAEDIC SURGERY

## 2019-11-05 PROCEDURE — 93010 ELECTROCARDIOGRAM REPORT: CPT | Performed by: INTERNAL MEDICINE

## 2019-11-05 PROCEDURE — 93005 ELECTROCARDIOGRAM TRACING: CPT

## 2019-11-05 PROCEDURE — 85730 THROMBOPLASTIN TIME PARTIAL: CPT | Performed by: ORTHOPAEDIC SURGERY

## 2019-11-05 PROCEDURE — 86140 C-REACTIVE PROTEIN: CPT | Performed by: ORTHOPAEDIC SURGERY

## 2019-11-05 PROCEDURE — 36415 COLL VENOUS BLD VENIPUNCTURE: CPT

## 2019-11-05 PROCEDURE — 85610 PROTHROMBIN TIME: CPT | Performed by: ORTHOPAEDIC SURGERY

## 2019-11-05 PROCEDURE — 80048 BASIC METABOLIC PNL TOTAL CA: CPT | Performed by: ORTHOPAEDIC SURGERY

## 2019-11-05 PROCEDURE — 85025 COMPLETE CBC W/AUTO DIFF WBC: CPT | Performed by: ORTHOPAEDIC SURGERY

## 2019-11-05 PROCEDURE — 83036 HEMOGLOBIN GLYCOSYLATED A1C: CPT | Performed by: ANESTHESIOLOGY

## 2019-11-05 ASSESSMENT — KOOS JR
KOOS JR SCORE: 14
KOOS JR SCORE: 52.465

## 2019-11-05 NOTE — TELEPHONE ENCOUNTER
Zuleika in [facility] is requesting clearance for the patient to have a left knee replacement.

## 2019-11-05 NOTE — PAT
Patient instructed to drink 20 ounces (or until full) of Gatorade and it needs to be completed 1 hour before given arrival time for procedure (NO RED Gatorade)    Patient verbalized understanding.      Verified with patient that they received a prescription for Bactroban and Chlorhexidine shower from the office.  Reinforced with them to fill the prescription if they haven't already.  Verbal and written instructions given regarding proper use of the Bactroban and Chlorhexidine to patient and/or famlily during PAT visit. Patient/family also instructed to complete checklist and return it to Pre-op on the day of surgery.  Patient and/or family verbalized understanding.      Patient attended joint replacement class today during PAT visit.      Pt going to see dr dorman tomorrow for regular check up and message left for April at dr dorman office to add note to epic for cardiac clearance letter for upcoming surgery.  Pat to await letter at this time.

## 2019-11-06 ENCOUNTER — OFFICE VISIT (OUTPATIENT)
Dept: CARDIOLOGY | Facility: CLINIC | Age: 79
End: 2019-11-06

## 2019-11-06 VITALS
BODY MASS INDEX: 29.42 KG/M2 | HEIGHT: 73 IN | SYSTOLIC BLOOD PRESSURE: 130 MMHG | WEIGHT: 222 LBS | OXYGEN SATURATION: 95 % | HEART RATE: 52 BPM | DIASTOLIC BLOOD PRESSURE: 68 MMHG

## 2019-11-06 DIAGNOSIS — I48.19 PERSISTENT ATRIAL FIBRILLATION (HCC): Primary | ICD-10-CM

## 2019-11-06 PROCEDURE — 99213 OFFICE O/P EST LOW 20 MIN: CPT | Performed by: INTERNAL MEDICINE

## 2019-11-06 NOTE — PROGRESS NOTES
Stefan Pedraza  1940  [phone]    11/06/2019    [facility] CARDIOLOGY     Gamaliel Eaton MD  [address] [place] [address]    Chief Complaint   Patient presents with   • Atrial Fibrillation     Problem List:  1. Atrial fibrillation:   a. History of atrial fibrillation, initial diagnosis 2007.   b. Status-post successful external cardioversion, Dr. Dyer, 2007.  c. Initiation of amiodarone therapy with recent tapering dose, Dr. Lowe.   d. Echocardiogram, 02/02/2011, revealing normal LV function, diastolic dysfunction noted, mild MR, no pericardial effusion.   e. Holter monitor, 01/25/2011, revealing sinus rhythm with PAC/PVC/episodes of sinus bradycardia.   f. Chadsvasc=3.   g. 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   h. Pulmonary vein isolation procedure, 10/05/2011.  i. BRISSA with left ventricular ejection fraction of 55%, mild TR and MR.   j. Event recorder with intermittent episodes of flutter but the majority of the time in sinus rhythm.   k. External cardioversion to normal sinus rhythm, 02/17/2012.  l. Atypical chest pain.  m. History of left heart catheterization, 1990s in [place], reported as normal coronaries.   n. Stress test, 08/23/2007, revealing no significant ischemia, EF estimated at 46%.  o. Echo 7/28/17 LV Estimated EF = 57%., mild concentric hypertrophy, Left atrial cavity size is severely dilated. Normal estimated pulmonary artery systolic pressure  p. External CV 7/13/17; NSR with recurrent AF three days later  q. Echocardiogram 7/28/17: EF 57%, mild LVH, LA severely dilated  r. ECV to NSR 8/31/17  s. BRISSA 7/2/2019: EF 60%, mild to moderate MR, mild TR  t. 7/16/19: s/p Wavecrest insertion. GFT.  u. BRISSA 8/30/19: EF 60%, mild to moderate MR, Wavecrest well seated, less than 0.3 cm leak.   v. Plavix stopped 10/17/19 per protocol.    2. Diabetes mellitus.   3. Gastroesophageal reflux disease.  4. Dyslipidemia.  5. Osteoarthritis.  6. Remote surgical history:  a. Cholecystectomy.   b. Right orchiectomy.  c. Right shoulder labral repair.  d. Left knee arthroscopy for partial medial meniscectomy    Allergies  Allergies   Allergen Reactions   • Penicillins Hives and Rash       Current Medications    Current Outpatient Medications:   •  aspirin 81 MG EC tablet, Take 81 mg by mouth Daily., Disp: , Rfl:   •  atorvastatin (LIPITOR) 10 MG tablet, Take 10 mg by mouth Every Night., Disp: , Rfl:   •  Chlorhexidine Gluconate 4 % solution, Shower with solution as directed for 5 days prior to surgery, Disp: 237 mL, Rfl: 0  •  cyclobenzaprine (FLEXERIL) 10 MG tablet, cyclobenzaprine 10 mg tablet  one po Every night at bedtime prn, Disp: , Rfl:   •  ferrous sulfate 324 (65 FE) MG tablet delayed-release EC tablet, Take 324 mg by mouth Daily With Breakfast., Disp: , Rfl:   •  flecainide (TAMBOCOR) 100 MG tablet, TAKE 1 TABLET TWICE A DAY, Disp: 180 tablet, Rfl: 3  •  metFORMIN ER (GLUCOPHAGE-XR) 500 MG 24 hr tablet, Take 500 mg by mouth Every Night., Disp: , Rfl:   •  naproxen (NAPROSYN) 500 MG tablet, Take 500 mg by mouth 2 (Two) Times a Day With Meals., Disp: , Rfl:   •  Omega-3 Fatty Acids (FISH OIL) 1000 MG capsule capsule, Take 1,000 mg by mouth Daily With Breakfast., Disp: , Rfl:   •  tamsulosin (FLOMAX) 0.4 MG capsule 24 hr capsule, Take 1 capsule by mouth Every Night., Disp: 30 capsule, Rfl: 11    History of Present Illness     Pt presents for follow up of PAF, s/p Wavecrest implant. Since we last saw the pt, pt denies any AF episodes, SOB, CP, LH, and dizziness. Denies any hospitalizations, ER visits, bleeding, or TIA/CVA symptoms. Overall feels well.He is supposed to have knee surgery next Friday and needs clearance.     ROS:  General:  Denies fatigue, weight gain or loss  Cardiovascular:  Denies CP, PND, syncope, near syncope, edema or  "palpitations.  Pulmonary:  Denies CROW, cough, or wheezing      Vitals:    11/06/19 1341   BP: 130/68   BP Location: Right arm   Patient Position: Sitting   Pulse: 52   SpO2: 95%   Weight: 101 kg (222 lb)   Height: 185.4 cm (73\")     Body mass index is 29.29 kg/m².  PE:  General: NAD  Neck: no JVD, no carotid bruits, no TM  Heart RRR, NL S1, S2, S4 present, no rubs, murmurs  Lungs: CTA, no wheezes, rhonchi, or rales  Abd: soft, non-tender, NL BS  Ext: No musculoskeletal deformities, no edema, cyanosis, or clubbing  Psych: normal mood and affect    Diagnostic Data:    EKG reviewed from 11/5/19: SB 48 bpm.   Procedures    1. Persistent atrial fibrillation          Plan:  1) Persistent AF:  - no episodes. Maintaining NSR on Flecainide.   Continue present medications.   2) Anticoagulation  Continue ASA. S/p WaveCrest 7/2019      F/up in 3 months    Scribed for Prabhakar Solis MD by Trinidad Cano PA-C. 11/6/2019  1:58 PM     IPrabhakar MD, personally performed the services described in this documentation as scribed by the above named individual in my presence, and it is both accurate and complete.  11/6/2019  2:04 PM    "

## 2019-11-14 ENCOUNTER — ANESTHESIA EVENT (OUTPATIENT)
Dept: PERIOP | Facility: HOSPITAL | Age: 79
End: 2019-11-14

## 2019-11-14 RX ORDER — FAMOTIDINE 10 MG/ML
20 INJECTION, SOLUTION INTRAVENOUS ONCE
Status: CANCELLED | OUTPATIENT
Start: 2019-11-14 | End: 2019-11-14

## 2019-11-15 ENCOUNTER — APPOINTMENT (OUTPATIENT)
Dept: GENERAL RADIOLOGY | Facility: HOSPITAL | Age: 79
End: 2019-11-15

## 2019-11-15 ENCOUNTER — ANESTHESIA (OUTPATIENT)
Dept: PERIOP | Facility: HOSPITAL | Age: 79
End: 2019-11-15

## 2019-11-15 ENCOUNTER — HOSPITAL ENCOUNTER (OUTPATIENT)
Facility: HOSPITAL | Age: 79
Discharge: HOME | End: 2019-11-16
Attending: ORTHOPAEDIC SURGERY | Admitting: ORTHOPAEDIC SURGERY

## 2019-11-15 DIAGNOSIS — Z96.652 STATUS POST TOTAL LEFT KNEE REPLACEMENT: Primary | ICD-10-CM

## 2019-11-15 DIAGNOSIS — M17.12 PRIMARY OSTEOARTHRITIS OF LEFT KNEE: ICD-10-CM

## 2019-11-15 PROBLEM — E11.9 DM (DIABETES MELLITUS) (HCC): Status: ACTIVE | Noted: 2019-11-15

## 2019-11-15 LAB — GLUCOSE BLDC GLUCOMTR-MCNC: 95 MG/DL (ref 70–130)

## 2019-11-15 PROCEDURE — 63710000001 PREGABALIN 150 MG CAPSULE: Performed by: ORTHOPAEDIC SURGERY

## 2019-11-15 PROCEDURE — 97116 GAIT TRAINING THERAPY: CPT

## 2019-11-15 PROCEDURE — A9270 NON-COVERED ITEM OR SERVICE: HCPCS | Performed by: ORTHOPAEDIC SURGERY

## 2019-11-15 PROCEDURE — 25010000002 ROPIVACAINE PER 1 MG: Performed by: ORTHOPAEDIC SURGERY

## 2019-11-15 PROCEDURE — 25010000002 ONDANSETRON PER 1 MG: Performed by: NURSE ANESTHETIST, CERTIFIED REGISTERED

## 2019-11-15 PROCEDURE — 27447 TOTAL KNEE ARTHROPLASTY: CPT | Performed by: ORTHOPAEDIC SURGERY

## 2019-11-15 PROCEDURE — 25010000002 DEXAMETHASONE PER 1 MG: Performed by: NURSE ANESTHETIST, CERTIFIED REGISTERED

## 2019-11-15 PROCEDURE — 63710000001 FLECAINIDE 50 MG TABLET: Performed by: NURSE PRACTITIONER

## 2019-11-15 PROCEDURE — 63710000001 TAMSULOSIN 0.4 MG CAPSULE: Performed by: NURSE PRACTITIONER

## 2019-11-15 PROCEDURE — 82962 GLUCOSE BLOOD TEST: CPT

## 2019-11-15 PROCEDURE — A9270 NON-COVERED ITEM OR SERVICE: HCPCS | Performed by: NURSE PRACTITIONER

## 2019-11-15 PROCEDURE — 63710000001 MUPIROCIN 2 % OINTMENT: Performed by: ORTHOPAEDIC SURGERY

## 2019-11-15 PROCEDURE — 63710000001 FAMOTIDINE 20 MG TABLET: Performed by: ANESTHESIOLOGY

## 2019-11-15 PROCEDURE — 63710000001 ATORVASTATIN 10 MG TABLET: Performed by: NURSE PRACTITIONER

## 2019-11-15 PROCEDURE — A9270 NON-COVERED ITEM OR SERVICE: HCPCS | Performed by: ANESTHESIOLOGY

## 2019-11-15 PROCEDURE — 63710000001 ACETAMINOPHEN 500 MG TABLET: Performed by: ORTHOPAEDIC SURGERY

## 2019-11-15 PROCEDURE — 97161 PT EVAL LOW COMPLEX 20 MIN: CPT

## 2019-11-15 PROCEDURE — 25010000002 ROPIVACAINE PER 1 MG: Performed by: NURSE ANESTHETIST, CERTIFIED REGISTERED

## 2019-11-15 PROCEDURE — 25010000003 CEFAZOLIN IN DEXTROSE 2-4 GM/100ML-% SOLUTION: Performed by: ORTHOPAEDIC SURGERY

## 2019-11-15 PROCEDURE — 63710000001 MELOXICAM 15 MG TABLET: Performed by: ORTHOPAEDIC SURGERY

## 2019-11-15 PROCEDURE — C1776 JOINT DEVICE (IMPLANTABLE): HCPCS | Performed by: ORTHOPAEDIC SURGERY

## 2019-11-15 PROCEDURE — 73560 X-RAY EXAM OF KNEE 1 OR 2: CPT

## 2019-11-15 PROCEDURE — C1713 ANCHOR/SCREW BN/BN,TIS/BN: HCPCS | Performed by: ORTHOPAEDIC SURGERY

## 2019-11-15 PROCEDURE — 25010000002 PROPOFOL 10 MG/ML EMULSION: Performed by: NURSE ANESTHETIST, CERTIFIED REGISTERED

## 2019-11-15 DEVICE — CMT BONE SIMPLEX/P FULL DOSE 10/PK: Type: IMPLANTABLE DEVICE | Status: FUNCTIONAL

## 2019-11-15 DEVICE — LEGION PS OXINIUM FEMORAL SZ 7 LEFT
Type: IMPLANTABLE DEVICE | Status: FUNCTIONAL
Brand: LEGION

## 2019-11-15 DEVICE — GENESIS II NON-POROUS TIBIAL                                    BASEPLATE SIZE 6 LT
Type: IMPLANTABLE DEVICE | Status: FUNCTIONAL
Brand: GENESIS II

## 2019-11-15 DEVICE — LEGION POSTERIOR STABILIZED HIGH                                    FLEX HIGHLY CROSS LINKED                                    POLYETHYLENE SIZE 5-6 11MM
Type: IMPLANTABLE DEVICE | Status: FUNCTIONAL
Brand: LEGION

## 2019-11-15 DEVICE — IMPLANTABLE DEVICE: Type: IMPLANTABLE DEVICE | Status: FUNCTIONAL

## 2019-11-15 DEVICE — GENESIS II RESURFACING PATELLAR 35MM
Type: IMPLANTABLE DEVICE | Status: FUNCTIONAL
Brand: GENESIS II

## 2019-11-15 RX ORDER — MELOXICAM 15 MG/1
15 TABLET ORAL ONCE
Status: COMPLETED | OUTPATIENT
Start: 2019-11-15 | End: 2019-11-15

## 2019-11-15 RX ORDER — SODIUM CHLORIDE 0.9 % (FLUSH) 0.9 %
3-10 SYRINGE (ML) INJECTION AS NEEDED
Status: DISCONTINUED | OUTPATIENT
Start: 2019-11-15 | End: 2019-11-15 | Stop reason: HOSPADM

## 2019-11-15 RX ORDER — TAMSULOSIN HYDROCHLORIDE 0.4 MG/1
0.4 CAPSULE ORAL NIGHTLY
Status: DISCONTINUED | OUTPATIENT
Start: 2019-11-15 | End: 2019-11-16 | Stop reason: HOSPADM

## 2019-11-15 RX ORDER — BUPIVACAINE HYDROCHLORIDE 2.5 MG/ML
INJECTION, SOLUTION EPIDURAL; INFILTRATION; INTRACAUDAL
Status: DISCONTINUED | OUTPATIENT
Start: 2019-11-15 | End: 2019-11-15 | Stop reason: SURG

## 2019-11-15 RX ORDER — FAMOTIDINE 20 MG/1
20 TABLET, FILM COATED ORAL ONCE
Status: COMPLETED | OUTPATIENT
Start: 2019-11-15 | End: 2019-11-15

## 2019-11-15 RX ORDER — ACETAMINOPHEN 500 MG
1000 TABLET ORAL ONCE
Status: COMPLETED | OUTPATIENT
Start: 2019-11-15 | End: 2019-11-15

## 2019-11-15 RX ORDER — DEXAMETHASONE SODIUM PHOSPHATE 4 MG/ML
INJECTION, SOLUTION INTRA-ARTICULAR; INTRALESIONAL; INTRAMUSCULAR; INTRAVENOUS; SOFT TISSUE AS NEEDED
Status: DISCONTINUED | OUTPATIENT
Start: 2019-11-15 | End: 2019-11-15 | Stop reason: SURG

## 2019-11-15 RX ORDER — ONDANSETRON 2 MG/ML
INJECTION INTRAMUSCULAR; INTRAVENOUS AS NEEDED
Status: DISCONTINUED | OUTPATIENT
Start: 2019-11-15 | End: 2019-11-15 | Stop reason: SURG

## 2019-11-15 RX ORDER — PROMETHAZINE HYDROCHLORIDE 25 MG/ML
6.25 INJECTION, SOLUTION INTRAMUSCULAR; INTRAVENOUS ONCE AS NEEDED
Status: DISCONTINUED | OUTPATIENT
Start: 2019-11-15 | End: 2019-11-15 | Stop reason: HOSPADM

## 2019-11-15 RX ORDER — CYCLOBENZAPRINE HCL 10 MG
10 TABLET ORAL NIGHTLY PRN
Status: DISCONTINUED | OUTPATIENT
Start: 2019-11-15 | End: 2019-11-16 | Stop reason: HOSPADM

## 2019-11-15 RX ORDER — ATORVASTATIN CALCIUM 10 MG/1
10 TABLET, FILM COATED ORAL NIGHTLY
Status: DISCONTINUED | OUTPATIENT
Start: 2019-11-15 | End: 2019-11-16 | Stop reason: HOSPADM

## 2019-11-15 RX ORDER — CEFAZOLIN SODIUM 2 G/100ML
2 INJECTION, SOLUTION INTRAVENOUS ONCE
Status: COMPLETED | OUTPATIENT
Start: 2019-11-15 | End: 2019-11-15

## 2019-11-15 RX ORDER — MAGNESIUM HYDROXIDE 1200 MG/15ML
LIQUID ORAL AS NEEDED
Status: DISCONTINUED | OUTPATIENT
Start: 2019-11-15 | End: 2019-11-15 | Stop reason: HOSPADM

## 2019-11-15 RX ORDER — BUPIVACAINE HYDROCHLORIDE 5 MG/ML
INJECTION, SOLUTION PERINEURAL
Status: COMPLETED | OUTPATIENT
Start: 2019-11-15 | End: 2019-11-15

## 2019-11-15 RX ORDER — NALOXONE HCL 0.4 MG/ML
0.1 VIAL (ML) INJECTION
Status: DISCONTINUED | OUTPATIENT
Start: 2019-11-15 | End: 2019-11-16 | Stop reason: HOSPADM

## 2019-11-15 RX ORDER — HYDROMORPHONE HYDROCHLORIDE 1 MG/ML
0.5 INJECTION, SOLUTION INTRAMUSCULAR; INTRAVENOUS; SUBCUTANEOUS
Status: DISCONTINUED | OUTPATIENT
Start: 2019-11-15 | End: 2019-11-16 | Stop reason: HOSPADM

## 2019-11-15 RX ORDER — ASPIRIN 325 MG
325 TABLET, DELAYED RELEASE (ENTERIC COATED) ORAL DAILY
Status: DISCONTINUED | OUTPATIENT
Start: 2019-11-16 | End: 2019-11-16 | Stop reason: HOSPADM

## 2019-11-15 RX ORDER — PROPOFOL 10 MG/ML
VIAL (ML) INTRAVENOUS AS NEEDED
Status: DISCONTINUED | OUTPATIENT
Start: 2019-11-15 | End: 2019-11-15 | Stop reason: SURG

## 2019-11-15 RX ORDER — MORPHINE SULFATE 4 MG/ML
4 INJECTION, SOLUTION INTRAMUSCULAR; INTRAVENOUS
Status: DISCONTINUED | OUTPATIENT
Start: 2019-11-15 | End: 2019-11-16 | Stop reason: HOSPADM

## 2019-11-15 RX ORDER — PROMETHAZINE HYDROCHLORIDE 25 MG/1
25 TABLET ORAL ONCE AS NEEDED
Status: DISCONTINUED | OUTPATIENT
Start: 2019-11-15 | End: 2019-11-15 | Stop reason: HOSPADM

## 2019-11-15 RX ORDER — SODIUM CHLORIDE, SODIUM LACTATE, POTASSIUM CHLORIDE, CALCIUM CHLORIDE 600; 310; 30; 20 MG/100ML; MG/100ML; MG/100ML; MG/100ML
9 INJECTION, SOLUTION INTRAVENOUS CONTINUOUS
Status: DISCONTINUED | OUTPATIENT
Start: 2019-11-15 | End: 2019-11-15

## 2019-11-15 RX ORDER — SODIUM CHLORIDE 0.9 % (FLUSH) 0.9 %
3 SYRINGE (ML) INJECTION EVERY 12 HOURS SCHEDULED
Status: DISCONTINUED | OUTPATIENT
Start: 2019-11-15 | End: 2019-11-16 | Stop reason: HOSPADM

## 2019-11-15 RX ORDER — LIDOCAINE HYDROCHLORIDE 10 MG/ML
0.5 INJECTION, SOLUTION EPIDURAL; INFILTRATION; INTRACAUDAL; PERINEURAL ONCE AS NEEDED
Status: COMPLETED | OUTPATIENT
Start: 2019-11-15 | End: 2019-11-15

## 2019-11-15 RX ORDER — ACETAMINOPHEN 650 MG/1
650 SUPPOSITORY RECTAL EVERY 4 HOURS PRN
Status: DISCONTINUED | OUTPATIENT
Start: 2019-11-15 | End: 2019-11-16 | Stop reason: HOSPADM

## 2019-11-15 RX ORDER — ONDANSETRON 2 MG/ML
4 INJECTION INTRAMUSCULAR; INTRAVENOUS EVERY 6 HOURS PRN
Status: DISCONTINUED | OUTPATIENT
Start: 2019-11-15 | End: 2019-11-16 | Stop reason: HOSPADM

## 2019-11-15 RX ORDER — DOCUSATE SODIUM 100 MG/1
100 CAPSULE, LIQUID FILLED ORAL 2 TIMES DAILY PRN
Status: DISCONTINUED | OUTPATIENT
Start: 2019-11-15 | End: 2019-11-16 | Stop reason: HOSPADM

## 2019-11-15 RX ORDER — ONDANSETRON 4 MG/1
4 TABLET, FILM COATED ORAL EVERY 6 HOURS PRN
Status: DISCONTINUED | OUTPATIENT
Start: 2019-11-15 | End: 2019-11-16 | Stop reason: HOSPADM

## 2019-11-15 RX ORDER — BISACODYL 5 MG/1
10 TABLET, DELAYED RELEASE ORAL DAILY PRN
Status: DISCONTINUED | OUTPATIENT
Start: 2019-11-15 | End: 2019-11-16 | Stop reason: HOSPADM

## 2019-11-15 RX ORDER — MELOXICAM 7.5 MG/1
15 TABLET ORAL DAILY
Status: DISCONTINUED | OUTPATIENT
Start: 2019-11-16 | End: 2019-11-16 | Stop reason: HOSPADM

## 2019-11-15 RX ORDER — ACETAMINOPHEN 325 MG/1
650 TABLET ORAL EVERY 4 HOURS PRN
Status: DISCONTINUED | OUTPATIENT
Start: 2019-11-15 | End: 2019-11-16 | Stop reason: HOSPADM

## 2019-11-15 RX ORDER — FLECAINIDE ACETATE 50 MG/1
100 TABLET ORAL 2 TIMES DAILY
Status: DISCONTINUED | OUTPATIENT
Start: 2019-11-15 | End: 2019-11-16 | Stop reason: HOSPADM

## 2019-11-15 RX ORDER — PROMETHAZINE HYDROCHLORIDE 25 MG/1
25 SUPPOSITORY RECTAL ONCE AS NEEDED
Status: DISCONTINUED | OUTPATIENT
Start: 2019-11-15 | End: 2019-11-15 | Stop reason: HOSPADM

## 2019-11-15 RX ORDER — CEFAZOLIN SODIUM 2 G/100ML
2 INJECTION, SOLUTION INTRAVENOUS EVERY 8 HOURS
Status: COMPLETED | OUTPATIENT
Start: 2019-11-15 | End: 2019-11-16

## 2019-11-15 RX ORDER — BISACODYL 10 MG
10 SUPPOSITORY, RECTAL RECTAL DAILY PRN
Status: DISCONTINUED | OUTPATIENT
Start: 2019-11-15 | End: 2019-11-16 | Stop reason: HOSPADM

## 2019-11-15 RX ORDER — SODIUM CHLORIDE 9 MG/ML
120 INJECTION, SOLUTION INTRAVENOUS CONTINUOUS
Status: DISCONTINUED | OUTPATIENT
Start: 2019-11-15 | End: 2019-11-16 | Stop reason: HOSPADM

## 2019-11-15 RX ORDER — HYDROCODONE BITARTRATE AND ACETAMINOPHEN 5; 325 MG/1; MG/1
1 TABLET ORAL EVERY 4 HOURS PRN
Status: DISCONTINUED | OUTPATIENT
Start: 2019-11-15 | End: 2019-11-16 | Stop reason: HOSPADM

## 2019-11-15 RX ORDER — PREGABALIN 150 MG/1
150 CAPSULE ORAL ONCE
Status: COMPLETED | OUTPATIENT
Start: 2019-11-15 | End: 2019-11-15

## 2019-11-15 RX ORDER — SODIUM CHLORIDE 0.9 % (FLUSH) 0.9 %
1-10 SYRINGE (ML) INJECTION AS NEEDED
Status: DISCONTINUED | OUTPATIENT
Start: 2019-11-15 | End: 2019-11-16 | Stop reason: HOSPADM

## 2019-11-15 RX ORDER — ONDANSETRON 2 MG/ML
4 INJECTION INTRAMUSCULAR; INTRAVENOUS ONCE AS NEEDED
Status: DISCONTINUED | OUTPATIENT
Start: 2019-11-15 | End: 2019-11-15 | Stop reason: HOSPADM

## 2019-11-15 RX ORDER — LABETALOL HYDROCHLORIDE 5 MG/ML
10 INJECTION, SOLUTION INTRAVENOUS EVERY 4 HOURS PRN
Status: DISCONTINUED | OUTPATIENT
Start: 2019-11-15 | End: 2019-11-16 | Stop reason: HOSPADM

## 2019-11-15 RX ORDER — NALOXONE HCL 0.4 MG/ML
0.4 VIAL (ML) INJECTION
Status: DISCONTINUED | OUTPATIENT
Start: 2019-11-15 | End: 2019-11-16 | Stop reason: HOSPADM

## 2019-11-15 RX ORDER — ROPIVACAINE HYDROCHLORIDE 5 MG/ML
INJECTION, SOLUTION EPIDURAL; INFILTRATION; PERINEURAL AS NEEDED
Status: DISCONTINUED | OUTPATIENT
Start: 2019-11-15 | End: 2019-11-15 | Stop reason: HOSPADM

## 2019-11-15 RX ORDER — SODIUM CHLORIDE 0.9 % (FLUSH) 0.9 %
3 SYRINGE (ML) INJECTION EVERY 12 HOURS SCHEDULED
Status: DISCONTINUED | OUTPATIENT
Start: 2019-11-15 | End: 2019-11-15 | Stop reason: HOSPADM

## 2019-11-15 RX ADMIN — SODIUM CHLORIDE 120 ML/HR: 9 INJECTION, SOLUTION INTRAVENOUS at 16:58

## 2019-11-15 RX ADMIN — PROPOFOL 20 MG: 10 INJECTION, EMULSION INTRAVENOUS at 09:49

## 2019-11-15 RX ADMIN — DEXAMETHASONE SODIUM PHOSPHATE 8 MG: 4 INJECTION, SOLUTION INTRAMUSCULAR; INTRAVENOUS at 10:07

## 2019-11-15 RX ADMIN — TRANEXAMIC ACID 1000 MG: 100 INJECTION, SOLUTION INTRAVENOUS at 10:07

## 2019-11-15 RX ADMIN — PROPOFOL 20 MG: 10 INJECTION, EMULSION INTRAVENOUS at 09:53

## 2019-11-15 RX ADMIN — BUPIVACAINE HYDROCHLORIDE 20 ML: 2.5 INJECTION, SOLUTION EPIDURAL; INFILTRATION; INTRACAUDAL; PERINEURAL at 12:07

## 2019-11-15 RX ADMIN — FLECAINIDE ACETATE 100 MG: 50 TABLET ORAL at 22:27

## 2019-11-15 RX ADMIN — PROPOFOL 100 MCG/KG/MIN: 10 INJECTION, EMULSION INTRAVENOUS at 10:06

## 2019-11-15 RX ADMIN — MUPIROCIN 1 APPLICATION: 20 OINTMENT TOPICAL at 08:25

## 2019-11-15 RX ADMIN — FAMOTIDINE 20 MG: 20 TABLET ORAL at 08:25

## 2019-11-15 RX ADMIN — LIDOCAINE HYDROCHLORIDE 0.5 ML: 10 INJECTION, SOLUTION EPIDURAL; INFILTRATION; INTRACAUDAL; PERINEURAL at 08:25

## 2019-11-15 RX ADMIN — ATORVASTATIN CALCIUM 10 MG: 10 TABLET, FILM COATED ORAL at 22:27

## 2019-11-15 RX ADMIN — ROPIVACAINE HYDROCHLORIDE 10 ML/HR: 5 INJECTION, SOLUTION EPIDURAL; INFILTRATION; PERINEURAL at 12:07

## 2019-11-15 RX ADMIN — ROPIVACAINE HYDROCHLORIDE 10 ML/HR: 5 INJECTION, SOLUTION EPIDURAL; INFILTRATION; PERINEURAL at 12:09

## 2019-11-15 RX ADMIN — BUPIVACAINE HYDROCHLORIDE 2 ML: 5 INJECTION, SOLUTION PERINEURAL at 10:01

## 2019-11-15 RX ADMIN — MELOXICAM 15 MG: 15 TABLET ORAL at 08:25

## 2019-11-15 RX ADMIN — ACETAMINOPHEN 1000 MG: 500 TABLET ORAL at 08:25

## 2019-11-15 RX ADMIN — PREGABALIN 150 MG: 150 CAPSULE ORAL at 08:25

## 2019-11-15 RX ADMIN — CEFAZOLIN SODIUM 2 G: 2 INJECTION, SOLUTION INTRAVENOUS at 09:35

## 2019-11-15 RX ADMIN — SODIUM CHLORIDE, PRESERVATIVE FREE 3 ML: 5 INJECTION INTRAVENOUS at 22:27

## 2019-11-15 RX ADMIN — ONDANSETRON 4 MG: 2 INJECTION INTRAMUSCULAR; INTRAVENOUS at 11:17

## 2019-11-15 RX ADMIN — CEFAZOLIN SODIUM 2 G: 2 INJECTION, SOLUTION INTRAVENOUS at 18:08

## 2019-11-15 RX ADMIN — TAMSULOSIN HYDROCHLORIDE 0.4 MG: 0.4 CAPSULE ORAL at 22:27

## 2019-11-15 RX ADMIN — TRANEXAMIC ACID 1000 MG: 100 INJECTION, SOLUTION INTRAVENOUS at 10:56

## 2019-11-15 RX ADMIN — SODIUM CHLORIDE, POTASSIUM CHLORIDE, SODIUM LACTATE AND CALCIUM CHLORIDE 9 ML/HR: 600; 310; 30; 20 INJECTION, SOLUTION INTRAVENOUS at 08:24

## 2019-11-15 NOTE — OP NOTE
DATE OF PROCEDURE: 11/15/19    PREOPERATIVE DIAGNOSIS: left knee arthritis      POSTOPERATIVE DIAGNOSIS:  left knee arthritis    PROCEDURES PERFORMED:   left total knee arthroplasty with [employer] components (#7 posterior stabilized femur, #6 tibia, 11 mm polyethylene, with 35 three peg patella).     SURGEON: Gamaliel Polanco MD    ASSISTANT: Olinda Marcelino PA-C     SPECIMENS: None    IMPLANTS:   Implants     Implant              Cmt Bone Simplex/P Full Dose 10/Pk - [id number] - Implanted   (Left)    Inventory item: Cmt Bone Simplex/P Full Dose 10/Pk Model/Cat number: [id number]    : [employer] Lot number: [id number]    As of 11/15/2019     Status: Implanted                  Pat Gen2 Resrf 35mm - [id number] - Implanted   (Left)    Inventory item: Pat Gen2 Resrf 35mm Model/Cat number: [id number]    : [employer] Lot number: [id number]    As of 11/15/2019     Status: Implanted                  Comp Fem Legion Oxinium Ps Sz7 Lt - [id number] - Implanted   (Left)    Inventory item: Comp Fem Legion Oxinium Ps Sz7 Lt Model/Cat number: [id number]    : [employer] Lot number: [id number]    As of 11/15/2019     Status: Implanted                  Base Tib/Kn Gen2 Nonpor Ti Sz6 Lt - [id number] - Implanted   (Left)    Inventory item: Base Tib/Kn Gen2 Nonpor Ti Sz6 Lt Model/Cat number: [id number]    : [employer] Lot number: [id number]    As of 11/15/2019     Status: Implanted                  Insrt Art Legion Ps Hf Xlpe Sz5to6 11mm - [id number] - Implanted   (Left)    Inventory item: Insrt Art Legion Ps Hf Xlpe Sz5to6 11mm Model/Cat number: [id number]    : [employer] Lot number: [id number]    As of 11/15/2019     Status: Implanted                         ANESTHESIA:  Spinal    STAFF:  Circulator: Loretta Flower RN; Jeniffer Mccoy RN  Scrub Person: Baltazar Watts  Nursing Assistant: Daniela Giles  Assistant: Olinda Marcelino PA-C    TOURNIQUET TIME:  17 minutes    ESTIMATED BLOOD LOSS: 100ml     COMPLICATIONS: None    PREOPERATIVE ANTIBIOTICS: Ancef 2 g    INDICATIONS: The patient is a 79 y.o. male with debilitating left knee pain secondary to advanced osteoarthritis that failed to improve in spite of conservative treatment .  Options have been discussed at length with the patient and the patient has had an extended course of conservative treatment without long-term benefit. The patient has reached the point where the patient desires total knee arthroplasty surgery and understands the risks, benefits, and alternatives. Consent was obtained. Please see my office notes for details with regard to preoperative counseling and operative rationale.     DESCRIPTION OF PROCEDURE: The patient was positively identified in the preoperative holding area and brought to the operating suite and placed in a supine position. After adequate spinal anesthetic had been achieved, the left lower extremity was prepped and draped in the usual sterile fashion.  After application of a tourniquet to the left upper thigh, which was used during the procedure for a total 17 minutes during the cementation process only. Landmarks of the knee were identified and timeout procedure was performed to confirm the operative site, as well as other parameters. Following the sterile prep and drape, a skin incision was made just off the medial aspect of midline for a medial parapatellar approach. Following a sharp skin incision, dissection was carried down to the level of the extensor mechanism and a medial parapatellar arthrotomy was made and the patella was tucked into the lateral gutter. Description of arthritis: Bone-on-bone contact the medial compartment, tricompartment osteophytes, varus alignment.  The knee was adequately exposed and a distal femoral cut was made with an intramedullary guide. This was subsequently sized for a #7 implant and anterior, posterior chamfer cuts made. The menisci removed  both medially and laterally.  The ACL was transected and the tibia was subluxed anteriorly. Proximal tibia cut was made with the external alignment guide. The cut was noted to be perpendicular to the tibial axis, with symmetric flexion and extension gaps. Therefore, final tibial preparations to accommodate a #6 tibia were made, followed by final femoral preparations for the box region. With a trial 11 insert in place, full flexion and extension was noted with no instability. The patella was then prepared for a 35 three peg patella which had excellent tracking. All trial components were removed and final components were cemented in place with namely a #6 tibia, #11 posterior stabilized femur and a 35 three peg patella with a trial 12 insert for cement compression. All the excess cement was removed from the bone implant interface and allowed to harden. Tourniquet was deflated. Hemostasis was obtained with electrocautery. There was no brisk bleeding noted in the popliteal fossa in particular. Therefore, the knee was copiously irrigated as it was between major steps, and the final 11 insert was placed as this was deemed appropriate for the patient's anatomy with full flexion and extension and no instability and attention was then directed towards closure. The medial parapatellar arthrotomy was closed with #1 Vicryl in an interrupted figure-of-eight fashion in 4 strategic locations followed by oversewing this from proximal to distal with a #1 StrataFix symmetric, which nicely sealed the joint, followed by closure of the deep fascial layer with #1 Vicryl in a buried interrupted fashion, followed by closure of the subcutaneous layer with 2-0 Vicryl and the skin with 3-0 Stratafix in a running subcuticular fashion. Prineo wound closure dressing was applied followed by a sterile dressing with 4 x 4's, abdominal pad, soft roll and Ace wrap. The patient tolerated the procedure well and was brought to the recovery room in good  condition.     PLAN:  1.  The patient will begin early range of motion and weight-bearing per the post total knee arthroplasty protocol.   2.  I anticipate brief hospitalization for initial rehabilitation and pain control followed by continued rehabilitation in either home health or supervised setting.   3.  Postoperative medical management with Dr. Maya.  4.  Aspirin will be utilized for DVT prophylaxis unless there is a contraindication.       Gamaliel Polanco MD  11/15/19  11:33 AM

## 2019-11-15 NOTE — ANESTHESIA PREPROCEDURE EVALUATION
Anesthesia Evaluation     Patient summary reviewed and Nursing notes reviewed   history of anesthetic complications (with ether as child):  NPO Solid Status: > 8 hours  NPO Liquid Status: > 2 hours           Airway   Mallampati: III  TM distance: >3 FB  Neck ROM: full  Possible difficult intubation  Dental - normal exam     Pulmonary - normal exam   (+) sleep apnea,   Cardiovascular - normal exam    ECG reviewed    (+) dysrhythmias Atrial Fib, hyperlipidemia,   (-) angina, CHF    ROS comment: 08/30/19 Adult Transesophageal Echo ([facility]) W/ Cont if Necessary Per Protocol    Addendum:     · Estimated EF = 60%.  · Left ventricular systolic function is normal.  · Mild-to-moderate mitral valve regurgitation is present  · The Wavecrest device appears to have shifted slightly since implant but remains well seated.  · The velocities within the proximal portion of the Wavecrest device are higher than those down in the distal portion of the left atrial appendage.  · A small amount of flow was seen laterally around the device. This is unchanged from implant and less than 0.3 cm  · No fibrinous strands or thrombus appear to be in association with the device.  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    Plan discussed with CRNA.

## 2019-11-15 NOTE — PLAN OF CARE
Problem: Patient Care Overview  Goal: Plan of Care Review  Outcome: Ongoing (interventions implemented as appropriate)   11/15/19 1723   OTHER   Outcome Summary Pt. has not complained of having pain since coming up from PACU. His arrow pump is c/d/i and going at 10mL/hr He worked with therapy and is sitting up in the chair. He has been incontinent twice. VSS, on room air. Will continue to monitor.    Coping/Psychosocial   Plan of Care Reviewed With patient;spouse   Plan of Care Review   Progress improving       Problem: Pain, Chronic (Adult)  Goal: Identify Related Risk Factors and Signs and Symptoms  Outcome: Ongoing (interventions implemented as appropriate)      Problem: Fall Risk (Adult)  Goal: Identify Related Risk Factors and Signs and Symptoms  Outcome: Ongoing (interventions implemented as appropriate)   11/15/19 1723   Fall Risk (Adult)   Related Risk Factors (Fall Risk) age-related changes;gait/mobility problems;environment unfamiliar   Signs and Symptoms (Fall Risk) presence of risk factors       Problem: Knee Arthroplasty (Total, Partial) (Adult)  Goal: Signs and Symptoms of Listed Potential Problems Will be Absent, Minimized or Managed (Knee Arthroplasty)  Outcome: Ongoing (interventions implemented as appropriate)

## 2019-11-15 NOTE — PLAN OF CARE
Problem: Patient Care Overview  Goal: Plan of Care Review  Outcome: Ongoing (interventions implemented as appropriate)   11/15/19 4059   OTHER   Outcome Summary Pt ambulated 320 feet with CGA x2 and front-wheeled walker. Bed mobility, STS and toilet transfer requiring CGA x2. Will measure L knee AROM POD#1. PADD score = 8. Anticipate pt d/c home with assist and HHPT.    Coping/Psychosocial   Plan of Care Reviewed With patient;spouse

## 2019-11-15 NOTE — ANESTHESIA PROCEDURE NOTES
Spinal Block    Pre-sedation assessment completed: 11/15/2019 9:44 AM    Patient reassessed immediately prior to procedure    Patient location during procedure: OR  Stop Time: 11/15/2019 10:01 AM  Indication:at surgeon's request  Performed By  Anesthesiologist: Rob Fischer Jr., MD  CRNA: Lori Cruz CRNA  Preanesthetic Checklist  Completed: patient identified, site marked, surgical consent, pre-op evaluation, timeout performed, IV checked, risks and benefits discussed and monitors and equipment checked  Spinal Block Prep:  Patient Position:sitting  Sterile Tech:cap, gloves, sterile barriers and mask  Prep:Chloraprep  Patient Monitoring:blood pressure monitoring, continuous pulse oximetry and EKG  Spinal Block Procedure  Approach:left paramedian  Guidance:landmark technique and palpation technique  Location:L4-L5  Needle Type:Sprotte  Needle Gauge:25 G  Placement of Spinal needle event:cerebrospinal fluid aspirated  Paresthesia: no  Fluid Appearance:clear  Medications: bupivacaine (MARCAINE) 0.5 % injection, 2 mL  Med Administered at 11/15/2019 10:01 AM   Post Assessment  Patient Tolerance:patient tolerated the procedure well with no apparent complications  Complications no  Additional Notes  Procedure:  Pt assisted to sitting position, with legs in position of comfort over side of bed.  Pt. instructed in optimal spine presentation, the spine was prepped/ Draped and the skin at insertion site was anesthetized with 1% Lidocaine 2 ml.  The spinal needle was then advanced until CSF flow was obtained and LA was injected:

## 2019-11-15 NOTE — THERAPY EVALUATION
Patient Name: Stefan Pedraza  : 1940    MRN: [id number]                              Today's Date: 11/15/2019       Admit Date: 11/15/2019    Visit Dx:     ICD-10-CM ICD-9-CM   1. Primary osteoarthritis of left knee M17.12 715.16     Patient Active Problem List   Diagnosis   • Atrial fibrillation (CMS/HCC)   • History of cardioversion   • H/O echocardiogram   • History of Holter monitoring   • H/O transesophageal echocardiography (BRISSA) for monitoring   • History of cardioversion   • H/O cardiovascular stress test   • Prediabetes   • GERD (gastroesophageal reflux disease)   • Dyslipidemia   • DJD (degenerative joint disease)   • Prostate cancer (CMS/HCC)   • Persistent atrial fibrillation   • Primary osteoarthritis of left knee     Past Medical History:   Diagnosis Date   • Arthritis    • Atrial fibrillation (CMS/HCC)    • Cataract     both- surgery done    • Diabetes mellitus (CMS/HCC)     prediabetic- checks sugar every other day    • Dyslipidemia    • GERD (gastroesophageal reflux disease)    • H/O cardiovascular stress test 2007    Revealing no significant ischemia, EF estimated at 46%   • H/O echocardiogram 2011    Revealing normal LV function, diastolic dysfunction noted, mild MR, nopericardial effusion.   • H/O transesophageal echocardiography (BRISSA) for monitoring     left ventricular ejection fraction of 55%, mild TR and MR   • History of cardioversion 2007    STATUS-POST SUCCESSFUL EXTERNAL CARDIOVERSION   • History of cardioversion 2012    external cardioversion to normal sinus rhythm   • History of Holter monitoring 2011    Revealing sinus rhythm with PAC/PVC/episodes of sinus bradycardia.   • History of shingles     8-10 years ago    • [place] (hard of hearing)     has hearing aides   • Hyperlipidemia    • Osteoarthritis    • PONV (postoperative nausea and vomiting)     only with ether   • Pre-diabetes    • Prostate cancer (CMS/HCC)    • Sleep apnea with use of continuous  positive airway pressure (CPAP)    • Sleep apnea with use of continuous positive airway pressure (CPAP)     compliant with machine    • Status post cystoscopy 08/28/2018   • Wears glasses    • Wears glasses      Past Surgical History:   Procedure Laterality Date   • ATRIAL APPENDAGE EXCLUSION LEFT WITH TRANSESOPHAGEAL ECHOCARDIOGRAM N/A 7/16/2019    Procedure: Atrial Appendage Occlusion ([place]), anticoagulation will be determined after pre-BRISSA;  Surgeon: Prabhakar Solis MD;  Location: [facility] INVASIVE LOCATION;  Service: Cardiology   • BELPHAROPTOSIS REPAIR      bilat    • CARDIAC ABLATION  2011   • CATARACT EXTRACTION      bilat    • CHOLECYSTECTOMY     • COLONOSCOPY      2018   • CYST REMOVAL      on back and back of neck   • KNEE ARTHROSCOPY W/ PARTIAL MEDIAL MENISCECTOMY Left    • ORCHIECTOMY Right    • SHOULDER ARTHROSCOPY W/ LABRAL REPAIR Right    • TONSILLECTOMY AND ADENOIDECTOMY     • WISDOM TOOTH EXTRACTION      all 4 removed      General Information     Row Name 11/15/19 1545          PT Evaluation Time/Intention    Document Type  evaluation  -ERIKA     Mode of Treatment  individual therapy;physical therapy  -     Row Name 11/15/19 1541          General Information    Patient Profile Reviewed?  yes  -ERIKA     Prior Level of Function  min assist:;ADL's;home management;driving;shopping;using stairs;community mobility  -ERIKA     Existing Precautions/Restrictions  fall L adductor nerve catheter  -ERIKA     Barriers to Rehab  previous functional deficit  -ERIKA     Row Name 11/15/19 1548          Relationship/Environment    Lives With  spouse  -     Row Name 11/15/19 1546          Resource/Environmental Concerns    Current Living Arrangements  home/apartment/condo  -     Row Name 11/15/19 1545          Home Main Entrance    Number of Stairs, Main Entrance  three  -ERIKA     Stair Railings, Main Entrance  railing on right side (ascending)  -     Row Name 11/15/19 1546          Stairs Within Home, Primary     Number of Stairs, Within Home, Primary  none  -ERIKA     Row Name 11/15/19 1545          Cognitive Assessment/Intervention- PT/OT    Orientation Status (Cognition)  oriented x 4  -ERIKA     Row Name 11/15/19 1545          Safety Issues, Functional Mobility    Safety Issues Affecting Function (Mobility)  insight into deficits/self awareness;safety precaution awareness;safety precautions follow-through/compliance  -     Impairments Affecting Function (Mobility)  range of motion (ROM);endurance/activity tolerance;strength  -       User Key  (r) = Recorded By, (t) = Taken By, (c) = Cosigned By    Initials Name Provider Type    ERIKA Justin Garcia, PT Physical Therapist        Mobility     Row Name 11/15/19 1545          Bed Mobility Assessment/Treatment    Bed Mobility Assessment/Treatment  supine-sit  -EIRKA     Supine-Sit [place] (Bed Mobility)  contact guard;verbal cues;1 person assist  -ERIKA     Assistive Device (Bed Mobility)  bed rails;head of bed elevated  -     Comment (Bed Mobility)  Verbal cues for LE sequencing and use of bed rails to pull up to sit  -     Row Name 11/15/19 1545          Transfer Assessment/Treatment    Comment (Transfers)  Verbal cues for use of UEs to push up from bed to stand, reach back for chair to sit, and move L LE out for comfort prior to sitting. Pt also assisted to bathroom, where pt required CGA x1 for toilet transfer.   -     Row Name 11/15/19 1545          Sit-Stand Transfer    Sit-Stand [place] (Transfers)  verbal cues;contact guard;2 person assist  -ERIKA     Assistive Device (Sit-Stand Transfers)  walker, front-wheeled  -     Row Name 11/15/19 1541          Gait/Stairs Assessment/Training    Gait/Stairs Assessment/Training  gait/ambulation independence;gait/ambulation assistive device  -     [place] Level (Gait)  verbal cues;contact guard;2 person assist  -ERIKA     Assistive Device (Gait)  walker, front-wheeled  -     Distance in Feet (Gait)  320 feet  -     Pattern  (Gait)  step-through  -ERIKA     Deviations/Abnormal Patterns (Gait)  bilateral deviations;gerber decreased;gait speed decreased;stride length decreased  -ERIKA     Bilateral Gait Deviations  weight shift ability decreased  -ERIKA     Left Sided Gait Deviations  weight shift ability decreased  -ERIKA     [place] Level (Stairs)  not tested  -ERIKA     Comment (Gait/Stairs)  Pt ambulated with step-through pattern and decreased speed. Verbal cues for maintaining upright posture, body within walker, and increase WB on L LE in order to improve stride length. Gait limited by fatigue.   -     Row Name 11/15/19 1544          Mobility Assessment/Intervention    Extremity Weight-bearing Status  left lower extremity  -ERIKA     Left Lower Extremity (Weight-bearing Status)  weight-bearing as tolerated (WBAT)  -       User Key  (r) = Recorded By, (t) = Taken By, (c) = Cosigned By    Initials Name Provider Type    Justin Arroyo, PT Physical Therapist        Obj/Interventions     Row Name 11/15/19 1544          General ROM    GENERAL ROM COMMENTS  R LE WFL; L LE impaired 25%, will measure knee AROM POD#1  -     Row Name 11/15/19 1543          MMT (Manual Muscle Testing)    General MMT Comments  R LE functionally 4+/5; L LE functionally 4-/5, able to independently perform SLR and actively DF foot  -     Row Name 11/15/19 1548          Therapeutic Exercise    Lower Extremity (Therapeutic Exercise)  gluteal sets;quad sets, bilateral  -ERIKA     Lower Extremity Range of Motion (Therapeutic Exercise)  ankle dorsiflexion/plantar flexion, bilateral  -ERIKA     Exercise Type (Therapeutic Exercise)  AROM (active range of motion);isometric contraction, static  -ERIKA     Position (Therapeutic Exercise)  seated  -ERIKA     Sets/Reps (Therapeutic Exercise)  10x each  -ERIKA     Comment (Therapeutic Exercise)  Cues for technique  -     Row Name 11/15/19 1544          Sensory Assessment/Intervention    Sensory General Assessment  no sensation deficits  identified  -ERIKA       User Key  (r) = Recorded By, (t) = Taken By, (c) = Cosigned By    Initials Name Provider Type    Justin Arroyo, PT Physical Therapist        Goals/Plan     Row Name 11/15/19 1545          Bed Mobility Goal 1 (PT)    Activity/Assistive Device (Bed Mobility Goal 1, PT)  sit to supine/supine to sit  -ERIKA     [place] Level/Cues Needed (Bed Mobility Goal 1, PT)  conditional independence  -ERIKA     Time Frame (Bed Mobility Goal 1, PT)  long term goal (LTG);3 days  -ERIKA     Row Name 11/15/19 1545          Transfer Goal 1 (PT)    Activity/Assistive Device (Transfer Goal 1, PT)  sit-to-stand/stand-to-sit;walker, rolling  -ERIKA     [place] Level/Cues Needed (Transfer Goal 1, PT)  conditional independence  -ERIKA     Time Frame (Transfer Goal 1, PT)  long term goal (LTG);3 days  -ERIKA     Row Name 11/15/19 1545          Gait Training Goal 1 (PT)    Activity/Assistive Device (Gait Training Goal 1, PT)  gait (walking locomotion);walker, rolling  -ERIKA     [place] Level (Gait Training Goal 1, PT)  conditional independence  -ERIKA     Distance (Gait Goal 1, PT)  500 feet  -ERIKA     Time Frame (Gait Training Goal 1, PT)  long term goal (LTG);3 days  -ERIKA     Row Name 11/15/19 1545          ROM Goal 1 (PT)    ROM Goal 1 (PT)  L knee AROM 0-90 degrees  -ERIKA     Time Frame (ROM Goal 1, PT)  long term goal (LTG);3 days  -ERIKA     Row Name 11/15/19 1545          Stairs Goal 1 (PT)    Activity/Assistive Device (Stairs Goal 1, PT)  stairs, all skills;using handrail, right  -ERIKA     [place] Level/Cues Needed (Stairs Goal 1, PT)  contact guard assist  -ERIKA     Number of Stairs (Stairs Goal 1, PT)  3  -ERIKA     Time Frame (Stairs Goal 1, PT)  long term goal (LTG);3 days  -ERIKA       User Key  (r) = Recorded By, (t) = Taken By, (c) = Cosigned By    Initials Name Provider Type    Justin Arroyo, PT Physical Therapist        Clinical Impression     Row Name 11/15/19 1545          Pain Assessment    Additional Documentation  Pain  Scale: Numbers Pre/Post-Treatment (Group)  -ERIKA     Row Name 11/15/19 1545          Pain Scale: Numbers Pre/Post-Treatment    Pain Scale: Numbers, Pretreatment  0/10 - no pain  -ERIKA     Pain Scale: Numbers, Post-Treatment  0/10 - no pain  -ERIKA     Pain Intervention(s)  Cold applied;Repositioned;Ambulation/increased activity  -ERIKA     Row Name 11/15/19 1545          Plan of Care Review    Plan of Care Reviewed With  patient;spouse  -     Row Name 11/15/19 1545          Physical Therapy Clinical Impression    Patient/Family Goals Statement (PT Clinical Impression)  Return home  -ERIKA     Criteria for Skilled Interventions Met (PT Clinical Impression)  yes;treatment indicated  -ERIKA     Rehab Potential (PT Clinical Summary)  good, to achieve stated therapy goals  -     Row Name 11/15/19 1545          Positioning and Restraints    Pre-Treatment Position  in bed  -ERIKA     Post Treatment Position  chair  -ERIKA     In Chair  notified nsg;reclined;call light within reach;encouraged to call for assist;exit alarm on;with family/caregiver;legs elevated  -       User Key  (r) = Recorded By, (t) = Taken By, (c) = Cosigned By    Initials Name Provider Type    ERIKA Justin Garcia, PT Physical Therapist        Outcome Measures     Row Name 11/15/19 1545          How much help from another person do you currently need...    Turning from your back to your side while in flat bed without using bedrails?  4  -ERIKA     Moving from lying on back to sitting on the side of a flat bed without bedrails?  4  -ERIKA     Moving to and from a bed to a chair (including a wheelchair)?  3  -ERIKA     Standing up from a chair using your arms (e.g., wheelchair, bedside chair)?  3  -ERIKA     Climbing 3-5 steps with a railing?  2  -ERIKA     To walk in hospital room?  3  -ERIKA     AM-PAC 6 Clicks Score (PT)  19  -ERIKA     Row Name 11/15/19 1545          Functional Assessment    Outcome Measure Options  AM-PAC 6 Clicks Basic Mobility (PT)  -       User Key  (r) = Recorded By, (t)  = Taken By, (c) = Cosigned By    Initials Name Provider Type    Justin Aroryo, PT Physical Therapist        Physical Therapy Education     Title: PT OT SLP Therapies (Done)     Topic: Physical Therapy (Done)     Point: Mobility training (Done)     Learning Progress Summary           Patient Acceptance, E,D, VU by ERIKA at 11/15/2019  3:45 PM    Comment:  Reviewed knee precautions. Educated pt on safe and appropriate sequencing with bed mobility, ambulatory transfers, and gait. Educated on anticipated progression of POC.   Significant Other Acceptance, E,D, VU by ERIKA at 11/15/2019  3:45 PM    Comment:  Reviewed knee precautions. Educated pt on safe and appropriate sequencing with bed mobility, ambulatory transfers, and gait. Educated on anticipated progression of POC.                   Point: Home exercise program (Done)     Learning Progress Summary           Patient Acceptance, E,D, VU by ERIKA at 11/15/2019  3:45 PM    Comment:  Reviewed knee precautions. Educated pt on safe and appropriate sequencing with bed mobility, ambulatory transfers, and gait. Educated on anticipated progression of POC.   Significant Other Acceptance, E,D, VU by ERIKA at 11/15/2019  3:45 PM    Comment:  Reviewed knee precautions. Educated pt on safe and appropriate sequencing with bed mobility, ambulatory transfers, and gait. Educated on anticipated progression of POC.                   Point: Body mechanics (Done)     Learning Progress Summary           Patient Acceptance, E,D, VU by ERIKA at 11/15/2019  3:45 PM    Comment:  Reviewed knee precautions. Educated pt on safe and appropriate sequencing with bed mobility, ambulatory transfers, and gait. Educated on anticipated progression of POC.   Significant Other Acceptance, E,D, VU by ERIKA at 11/15/2019  3:45 PM    Comment:  Reviewed knee precautions. Educated pt on safe and appropriate sequencing with bed mobility, ambulatory transfers, and gait. Educated on anticipated progression of POC.                    Point: Precautions (Done)     Learning Progress Summary           Patient Acceptance, E,D, VU by  at 11/15/2019  3:45 PM    Comment:  Reviewed knee precautions. Educated pt on safe and appropriate sequencing with bed mobility, ambulatory transfers, and gait. Educated on anticipated progression of POC.   Significant Other Acceptance, E,D, VU by  at 11/15/2019  3:45 PM    Comment:  Reviewed knee precautions. Educated pt on safe and appropriate sequencing with bed mobility, ambulatory transfers, and gait. Educated on anticipated progression of POC.                               User Key     Initials Effective Dates Name Provider Type Discipline     09/10/19 -  Justin Garcia, PT Physical Therapist PT              PT Recommendation and Plan  Planned Therapy Interventions (PT Eval): balance training, bed mobility training, gait training, home exercise program, patient/family education, strengthening, stair training, ROM (range of motion), transfer training  Outcome Summary/Treatment Plan (PT)  Anticipated Equipment Needs at Discharge (PT): other (see comments)(none)  Anticipated Discharge Disposition (PT): home with assist, home with home health  Plan of Care Reviewed With: patient, spouse  Outcome Summary: Pt ambulated 320 feet with CGA x2 and front-wheeled walker. Bed mobility, STS and toilet transfer requiring CGA x2. Will measure L knee AROM POD#1. PADD score = 8. Anticipate pt d/c home with assist and HHPT.      Time Calculation:   PT Charges     Row Name 11/15/19 1545             Time Calculation    Start Time  1545  -      PT Received On  11/15/19  -      PT Goal Re-Cert Due Date  11/24/19  -         Time Calculation- PT    Total Timed Code Minutes- PT  11 minute(s)  -         Timed Charges    95133 - PT Therapeutic Exercise Minutes  2  -      88583 - Gait Training Minutes   9  -        User Key  (r) = Recorded By, (t) = Taken By, (c) = Cosigned By    Initials Name Provider Type    ERIKA Justin Garcia,  PT Physical Therapist        Therapy Charges for Today     Code Description Service Date Service Provider Modifiers Qty    [id number]  GAIT TRAINING EA 15 MIN 11/15/2019 Justin Garcia, PT GP 1    [id number]  PT THER SUPP EA 15 MIN 11/15/2019 Justin Garcia, PT GP 2    [id number]  PT EVAL LOW COMPLEXITY 3 11/15/2019 Justin Garcia, PT GP 1          PT G-Codes  Outcome Measure Options: AM-PAC 6 Clicks Basic Mobility (PT)  AM-PAC 6 Clicks Score (PT): 19    Justin Garcia, PT  11/15/2019

## 2019-11-15 NOTE — H&P
Pre-Op H&P  Stefan Pedraza  [id number]  1940    Chief complaint: Left knee pain    HPI:    10/14/19 office visit:  Stefan Pedraza is a 79 y.o. male.  He presents today for evaluation of left knee pain.  He has had pain for several years, which has been worsening, to the point where he would like to consider knee replacement surgery.  He is had previous injections without long-term benefit.  Pain is 7 out of 10, stabbing in quality, located along the medial aspect of the knee, worse with walking, climbing stairs as well as with work and leisure activities.  Pain is associated with giving way.  He has tried bracing, anti-inflammatories, and weight loss.  No history of clots nor clotting disorders.  Previous knee arthroscopy with Dr. Jose Manuel Queen in 2010.    11/15/19:  Here today to undergo left total knee arthroplasty    Review of Systems:  General ROS: negative for chills, fever or skin lesions;  No changes since last office visit  Cardiovascular ROS: no chest pain or dyspnea on exertion.  History of afib s/p UMU exclusion.  Follows with Dr. Solis with last visit 11/6/19 with no new cardiac symptoms  Respiratory ROS: no cough, shortness of breath, or wheezing    Allergies:   Allergies   Allergen Reactions   • Penicillins Hives and Rash       Home Meds:    No current facility-administered medications on file prior to encounter.      Current Outpatient Medications on File Prior to Encounter   Medication Sig Dispense Refill   • aspirin 81 MG EC tablet Take 81 mg by mouth Daily.     • atorvastatin (LIPITOR) 10 MG tablet Take 10 mg by mouth Every Night.     • Chlorhexidine Gluconate 4 % solution Shower with solution as directed for 5 days prior to surgery 237 mL 0   • flecainide (TAMBOCOR) 100 MG tablet TAKE 1 TABLET TWICE A  tablet 3   • metFORMIN ER (GLUCOPHAGE-XR) 500 MG 24 hr tablet Take 500 mg by mouth Every Night.     • naproxen (NAPROSYN) 500 MG tablet Take 500 mg by mouth 2 (Two) Times a  Day With Meals.     • Omega-3 Fatty Acids (FISH OIL) 1000 MG capsule capsule Take 1,000 mg by mouth Daily With Breakfast.     • tamsulosin (FLOMAX) 0.4 MG capsule 24 hr capsule Take 1 capsule by mouth Every Night. 30 capsule 11   • cyclobenzaprine (FLEXERIL) 10 MG tablet cyclobenzaprine 10 mg tablet   one po Every night at bedtime prn     • ferrous sulfate 324 (65 FE) MG tablet delayed-release EC tablet Take 324 mg by mouth Daily With Breakfast.         PMH:   Past Medical History:   Diagnosis Date   • Arthritis    • Atrial fibrillation (CMS/HCC)    • Cataract     both- surgery done    • Diabetes mellitus (CMS/HCC)     prediabetic- checks sugar every other day    • Dyslipidemia    • GERD (gastroesophageal reflux disease)    • H/O cardiovascular stress test 08/23/2007    Revealing no significant ischemia, EF estimated at 46%   • H/O echocardiogram 02/02/2011    Revealing normal LV function, diastolic dysfunction noted, mild MR, nopericardial effusion.   • H/O transesophageal echocardiography (BRISSA) for monitoring     left ventricular ejection fraction of 55%, mild TR and MR   • History of cardioversion 2007    STATUS-POST SUCCESSFUL EXTERNAL CARDIOVERSION   • History of cardioversion 02/17/2012    external cardioversion to normal sinus rhythm   • History of Holter monitoring 01/25/2011    Revealing sinus rhythm with PAC/PVC/episodes of sinus bradycardia.   • History of shingles     8-10 years ago    • [place] (hard of hearing)     has hearing aides   • Hyperlipidemia    • Osteoarthritis    • PONV (postoperative nausea and vomiting)     only with ether   • Pre-diabetes    • Prostate cancer (CMS/HCC)    • Sleep apnea with use of continuous positive airway pressure (CPAP)    • Sleep apnea with use of continuous positive airway pressure (CPAP)     compliant with machine    • Status post cystoscopy 08/28/2018   • Wears glasses    • Wears glasses      PSH:    Past Surgical History:   Procedure Laterality Date   • ATRIAL  "APPENDAGE EXCLUSION LEFT WITH TRANSESOPHAGEAL ECHOCARDIOGRAM N/A 7/16/2019    Procedure: Atrial Appendage Occlusion ([place]), anticoagulation will be determined after pre-BRISSA;  Surgeon: Prabhakar Solis MD;  Location: [facility] INVASIVE LOCATION;  Service: Cardiology   • BELPHAROPTOSIS REPAIR      bilat    • CARDIAC ABLATION  2011   • CATARACT EXTRACTION      bilat    • CHOLECYSTECTOMY     • COLONOSCOPY      2018   • CYST REMOVAL      on back and back of neck   • KNEE ARTHROSCOPY W/ PARTIAL MEDIAL MENISCECTOMY Left    • ORCHIECTOMY Right    • SHOULDER ARTHROSCOPY W/ LABRAL REPAIR Right    • TONSILLECTOMY AND ADENOIDECTOMY     • WISDOM TOOTH EXTRACTION      all 4 removed      Social History:   Tobacco:   Social History     Tobacco Use   Smoking Status Never Smoker   Smokeless Tobacco Never Used      Alcohol:     Social History     Substance and Sexual Activity   Alcohol Use No       Vitals:           /95 (BP Location: Right arm, Patient Position: Lying)   Pulse 50   Temp 98.2 °F (36.8 °C) (Temporal)   Resp 18   Ht 185.4 cm (73\")   Wt 101 kg (222 lb)   SpO2 96%   BMI 29.29 kg/m²     Physical Exam:  General Appearance:    Alert, cooperative, no distress, appears stated age   Head:    Normocephalic, without obvious abnormality, atraumatic   Lungs:     Clear to auscultation bilaterally, respirations unlabored    Heart:   Regular rate and rhythm, S1 and S2 normal, no murmur, rub    or gallop    Abdomen:    Soft, non-tender.  +bowel sounds   Breast Exam:    deferred   Genitalia:    deferred   Extremities:   Extremities normal, atraumatic, no cyanosis or edema   Skin:   Skin color, texture, turgor normal, no rashes or lesions   Neurologic:   Grossly intact   Results Review  LABS:  Lab Results   Component Value Date    WBC 4.10 11/05/2019    HGB 13.8 11/05/2019    HCT 41.6 11/05/2019    MCV 94.5 11/05/2019     11/05/2019    NEUTROABS 1.92 11/05/2019    GLUCOSE 117 (H) 11/05/2019    BUN 15 11/05/2019 "    CREATININE 0.82 11/05/2019    EGFRIFNONA 91 11/05/2019     11/05/2019    K 3.9 11/05/2019     11/05/2019    CO2 27.0 11/05/2019    CALCIUM 9.1 11/05/2019       RADIOLOGY:  Imaging Results (Last 72 Hours)     ** No results found for the last 72 hours. **          I reviewed the patient's new clinical results.    Cancer Staging (if applicable)  Cancer Patient: __ yes _x_no __unknown; If yes, clinical stage T:__ N:__M:__, stage group or __N/A    Impression/Plan:      He has advanced left knee osteoarthritis, and has exhausted conservative treatment options.  He would like to proceed with knee replacement surgery, after discussion the risk, benefits, and alternatives.  Please see my counseling note for details.       Surgical Counseling      I have informed the patient of the diagnosis and the prognosis.  Exhaustive conservative treatment modalities have not resulted in long term pain relief.  The symptoms have progressed to the point of daily pain and inability to perform activities of daily living without significant pain. The patient has reached the point of desiring to proceed with total knee arthroplasty after discussing the risks, benefits and alternatives to the procedure.  The surgical procedure itself was discussed in detail. Risks of the procedure were discussed, which included but are not limited to, bleeding, infection, damage to blood vessels and nerves, incomplete pain relief, loosening of the prosthesis, deep infection, need for further surgery, loss of limb, deep venous thrombosis, pulmonary embolus, death, heart attack, stroke, kidney failure, liver failure, and anesthetic complications.  In addition, the potential for deep infection developing in the future was discussed, which could require further surgery.  The knee would have to be re-opened, debrided, and potentially remove the prosthesis, which may or may not be replaced in the future.  Also, the possibility for loosening of the  prosthesis has been mentioned.  If the prosthesis loosened, a revision arthroplasty could be performed, with results that are not as predictable compared to the original procedure.  The typical rehabilitative course has also been discussed, and full recovery may take up to a year to see the maximum benefit.  The importance of patient cooperation in the rehabilitative efforts has also been discussed.  No guarantees whatsoever were given.  The patient understands the potential risks versus the benefits and desires to proceed with total knee arthroplasty    AYAAN Moura   11/15/2019   8:29 AM     Agree with above - plan for left TKA    Gamaliel Polanco MD  11/15/19  8:32 AM

## 2019-11-15 NOTE — H&P
Patient Name: Stefan Pedraza  MRN: [id number]  : 1940  DOS: 11/15/2019    Attending: Gamaliel Polanco MD    Primary Care Provider: Gamaliel Eaton MD      Chief complaint:  Left knee pain    Subjective   Patient is a 79 y.o. male presented for left total knee arthroplasty by Dr. Polanco.    He underwent surgery under spinal anesthesia. He tolerated surgery well and is admitted for further medical management. His knee has been painful for many years. He denies use of assistive device for ambulation or recent falls.    When seen in PACU he is doing well. His pain is well controlled. He denies nausea, shortness of breath or chest pain. No hx of DVT or PE.    He has hx of afib and is followed by cardiology, Dr. Solis; he had preop cardiac clearance.     (Above is noted, agree.  Seen in his room afterwards.  I observed him ambulate with physical therapy where he did quite well ambulated 320 feet with contact-guard assist of 2 and front-wheeled walker.  When seen afterwards he is resting in his recliner.  Good pain control.  No complaints of nausea, vomiting, or shortness of breath.  He has already voided.)wy    Allergies:  Allergies   Allergen Reactions   • Penicillins Hives and Rash       Meds:  Medications Prior to Admission   Medication Sig Dispense Refill Last Dose   • aspirin 81 MG EC tablet Take 81 mg by mouth Daily.   11/15/2019 at 0500   • atorvastatin (LIPITOR) 10 MG tablet Take 10 mg by mouth Every Night.   2019 at 2100   • Chlorhexidine Gluconate 4 % solution Shower with solution as directed for 5 days prior to surgery 237 mL 0 11/15/2019 at Unknown time   • flecainide (TAMBOCOR) 100 MG tablet TAKE 1 TABLET TWICE A  tablet 3 11/15/2019 at 0500   • metFORMIN ER (GLUCOPHAGE-XR) 500 MG 24 hr tablet Take 500 mg by mouth Every Night.   2019 at 2000   • naproxen (NAPROSYN) 500 MG tablet Take 500 mg by mouth 2 (Two) Times a Day With Meals.   Past Week at Unknown time   • Omega-3  Fatty Acids (FISH OIL) 1000 MG capsule capsule Take 1,000 mg by mouth Daily With Breakfast.   Past Week at Unknown time   • tamsulosin (FLOMAX) 0.4 MG capsule 24 hr capsule Take 1 capsule by mouth Every Night. 30 capsule 11 Past Week at Unknown time   • cyclobenzaprine (FLEXERIL) 10 MG tablet cyclobenzaprine 10 mg tablet   one po Every night at bedtime prn   More than a month at Unknown time   • ferrous sulfate 324 (65 FE) MG tablet delayed-release EC tablet Take 324 mg by mouth Daily With Breakfast.   11/10/2019       History:   Past Medical History:   Diagnosis Date   • Arthritis    • Atrial fibrillation (CMS/HCC)    • Cataract     both- surgery done    • Diabetes mellitus (CMS/HCC)     prediabetic- checks sugar every other day    • Dyslipidemia    • GERD (gastroesophageal reflux disease)    • H/O cardiovascular stress test 08/23/2007    Revealing no significant ischemia, EF estimated at 46%   • H/O echocardiogram 02/02/2011    Revealing normal LV function, diastolic dysfunction noted, mild MR, nopericardial effusion.   • H/O transesophageal echocardiography (BRISSA) for monitoring     left ventricular ejection fraction of 55%, mild TR and MR   • History of cardioversion 2007    STATUS-POST SUCCESSFUL EXTERNAL CARDIOVERSION   • History of cardioversion 02/17/2012    external cardioversion to normal sinus rhythm   • History of Holter monitoring 01/25/2011    Revealing sinus rhythm with PAC/PVC/episodes of sinus bradycardia.   • History of shingles     8-10 years ago    • [place] (hard of hearing)     has hearing aides   • Hyperlipidemia    • Osteoarthritis    • PONV (postoperative nausea and vomiting)     only with ether   • Pre-diabetes    • Prostate cancer (CMS/HCC)    • Sleep apnea with use of continuous positive airway pressure (CPAP)    • Sleep apnea with use of continuous positive airway pressure (CPAP)     compliant with machine    • Status post cystoscopy 08/28/2018   • Wears glasses    • Wears glasses      Past  "Surgical History:   Procedure Laterality Date   • ATRIAL APPENDAGE EXCLUSION LEFT WITH TRANSESOPHAGEAL ECHOCARDIOGRAM N/A 7/16/2019    Procedure: Atrial Appendage Occlusion ([place]), anticoagulation will be determined after pre-BRISSA;  Surgeon: Prabhakar Solis MD;  Location: [facility] INVASIVE LOCATION;  Service: Cardiology   • BELPHAROPTOSIS REPAIR      bilat    • CARDIAC ABLATION  2011   • CATARACT EXTRACTION      bilat    • CHOLECYSTECTOMY     • COLONOSCOPY      2018   • CYST REMOVAL      on back and back of neck   • KNEE ARTHROSCOPY W/ PARTIAL MEDIAL MENISCECTOMY Left    • ORCHIECTOMY Right    • SHOULDER ARTHROSCOPY W/ LABRAL REPAIR Right    • TONSILLECTOMY AND ADENOIDECTOMY     • WISDOM TOOTH EXTRACTION      all 4 removed      Family History   Problem Relation Age of Onset   • Arthritis Mother    • Pneumonia Mother    • Atrial fibrillation Father    • Hypertension Father    • Diabetes Father    • Heart disease Father    • No Known Problems Sister    • No Known Problems Maternal Grandmother    • Diabetes Maternal Grandfather    • No Known Problems Paternal Grandmother    • No Known Problems Paternal Grandfather      Social History     Tobacco Use   • Smoking status: Never Smoker   • Smokeless tobacco: Never Used   Substance Use Topics   • Alcohol use: No   • Drug use: No   He is  with 3 children. He is retired teacher and principle.(and superintendent)    Review of Systems  All systems were reviewed and negative except for:  Gastrointestinal: positive for  constipation and diarrhea    Vital Signs  Visit Vitals  /90 (BP Location: Right arm, Patient Position: Lying)   Pulse 55   Temp 97.9 °F (36.6 °C) (Oral)   Resp 16   Ht 185.4 cm (73\")   Wt 101 kg (222 lb)   SpO2 96%   BMI 29.29 kg/m²       Physical Exam:    General Appearance:    Alert, cooperative, in no acute distress   Head:    Normocephalic, without obvious abnormality, atraumatic   Eyes:            Lids and lashes normal, conjunctivae and " sclerae normal, no   icterus, no pallor, corneas clear   Ears:    Ears appear intact with no abnormalities noted   Neck:   No adenopathy, supple, trachea midline, no thyromegaly   Lungs:     Clear to auscultation, respirations regular, even and                   unlabored    Heart:    Regular rhythm and normal rate, normal S1 and S2, no            murmur, no gallop   Abdomen:     Normal bowel sounds, no masses, no organomegaly, soft        non-tender, non-distended, no guarding, no rebound                 tenderness   Genitalia:    Deferred   Extremities:   Left knee ACE wrap CDI. Nerve block present   Pulses:   Pulses palpable and equal bilaterally   Skin:   No bleeding, bruising or rash   Neurologic:   Cranial nerves 2 - 12 grossly intact, sensation intact. Flexion and dorsiflexion intact bilateral feet.        I reviewed the patient's new clinical results.     Results for KAY RINCON (MRN [id number]) as of 11/15/2019 16:42   Ref. Range 11/5/2019 14:52   Glucose Latest Ref Range: 65 - 99 mg/dL 117 (H)   Sodium Latest Ref Range: 136 - 145 mmol/L 140   Potassium Latest Ref Range: 3.5 - 5.2 mmol/L 3.9   CO2 Latest Ref Range: 22.0 - 29.0 mmol/L 27.0   Chloride Latest Ref Range: 98 - 107 mmol/L 103   Anion Gap Latest Ref Range: 5.0 - 15.0 mmol/L 10.0   Creatinine Latest Ref Range: 0.76 - 1.27 mg/dL 0.82   BUN Latest Ref Range: 8 - 23 mg/dL 15   BUN/Creatinine Ratio Latest Ref Range: 7.0 - 25.0  18.3   Calcium Latest Ref Range: 8.6 - 10.5 mg/dL 9.1   eGFR Non  Am Latest Ref Range: >60 mL/min/1.73 91   Hemoglobin A1C Latest Ref Range: 4.80 - 5.60 % 5.40   C-Reactive Protein Latest Ref Range: 0.00 - 0.50 mg/dL 0.05   Protime Latest Ref Range: 11.2 - 14.3 Seconds 13.9   INR Latest Ref Range: 0.85 - 1.16  1.12   PTT Latest Ref Range: 24.0 - 37.0 seconds 30.3   WBC Latest Ref Range: 3.40 - 10.80 10*3/mm3 4.10   RBC Latest Ref Range: 4.14 - 5.80 10*6/mm3 4.40   Hemoglobin Latest Ref Range: 13.0 - 17.7 g/dL 13.8    Hematocrit Latest Ref Range: 37.5 - 51.0 % 41.6   RDW Latest Ref Range: 12.3 - 15.4 % 12.7   MCV Latest Ref Range: 79.0 - 97.0 fL 94.5   MCH Latest Ref Range: 26.6 - 33.0 pg 31.4   MCHC Latest Ref Range: 31.5 - 35.7 g/dL 33.2   MPV Latest Ref Range: 6.0 - 12.0 fL 10.3   Platelets Latest Ref Range: 140 - 450 10*3/mm3 152     Assessment and Plan:     Status post total left knee replacement    Primary osteoarthritis of left knee    Atrial fibrillation (CMS/HCC)    Dyslipidemia      Plan  1. PT/OT- early ambulation postop  2. Pain control-prns, AC nerve block   3. IS-encourage  4. DVT proph- mechs/ASA  5. Bowel regimen  6. Resume home medications as appropriate  7. Monitor post-op labs  8. Discharge planning     Afib, hyperlipidemia  - continue home tambocor and statin      AYAAN Marinelli  11/15/19  4:42 PM     I have personally performed the evaluation on this patient. My history is consistent  with HPI obtained. My exam findings are listed above. I have personally reviewed and discussed the above formulated treatment plan with pt, wife, and AH.Valerie.

## 2019-11-15 NOTE — ANESTHESIA PROCEDURE NOTES
Peripheral Block    Pre-sedation assessment completed: 11/15/2019 11:58 AM    Patient reassessed immediately prior to procedure    Patient location during procedure: post-op  Stop time: 11/15/2019 12:07 PM  Reason for block: at surgeon's request and post-op pain management  Performed by  CRNA: Lori Cruz, CRNA  Assisted by: Nancy Guadarrama RN  Preanesthetic Checklist  Completed: patient identified, site marked, surgical consent, pre-op evaluation, timeout performed, IV checked, risks and benefits discussed and monitors and equipment checked  Prep:  Pt Position: supine  Sterile barriers:cap, gloves, mask and sterile barriers  Prep: ChloraPrep  Patient monitoring: blood pressure monitoring, continuous pulse oximetry and EKG  Procedure  Performed under: spinal  Guidance:ultrasound guided  Images:still images obtained, printed/placed on chart    Block Type:adductor canal block  Injection Technique:catheter  Needle Type:Tuohy and echogenic  Needle Gauge:18 G  Resistance on Injection: none  Catheter Size:20 G (20g)  Cath Depth at skin: 11 cm    Medications Used: bupivacaine PF (MARCAINE) 0.25 % injection, 20 mL  Med admintered at 11/15/2019 12:07 PM      Post Assessment  Injection Assessment: negative aspiration for heme, incremental injection and no paresthesia on injection  Patient Tolerance:comfortable throughout block  Complications:no  Additional Notes  Procedure:             The pt was placed in the Supine position.  The Insertion site was  prepped and Draped in sterile fashion.  The pt was anesthetized with  IV Sedation( see meds).  Skin and cutaneous tissue was infiltrated and anesthetized with 1% Lidocaine 3 mls via a 25g needle.  A BBraun 4 inch 18g echogenic needle was then  inserted approximately midline, mid-thigh and advanced In-plane with Ultrasound guidance.  Normal Saline PSF was utilized for hydrodissection of tissue.  The Vastus medialis and Sartorius muscle where visualized and the needle  tip was placed in the adductor canal,  lateral to the femoral artery.  LA injection spread was visualized, injection was incremental 1-5ml, injection pressure was normal or little, no intraneural injection, no vascular injection.  LA dose was injected thru the needle(see dose above).  A BBraun 20g wire stylet catheter was placed via the needle with ultrasound visualization and confirmation with NS fluid bolus. The labeled Catheter was then secured to skin at insertion site with skin afix and steristrips to curled catheter and CHG transparent dressing.  Thank you.

## 2019-11-16 VITALS
OXYGEN SATURATION: 95 % | TEMPERATURE: 98.5 F | HEART RATE: 62 BPM | HEIGHT: 73 IN | WEIGHT: 222 LBS | RESPIRATION RATE: 16 BRPM | BODY MASS INDEX: 29.42 KG/M2 | DIASTOLIC BLOOD PRESSURE: 50 MMHG | SYSTOLIC BLOOD PRESSURE: 113 MMHG

## 2019-11-16 PROBLEM — G89.18 ACUTE POSTOPERATIVE PAIN: Status: ACTIVE | Noted: 2019-11-16

## 2019-11-16 PROBLEM — D72.829 LEUKOCYTOSIS: Status: ACTIVE | Noted: 2019-11-16

## 2019-11-16 LAB
ANION GAP SERPL CALCULATED.3IONS-SCNC: 12 MMOL/L (ref 5–15)
BUN BLD-MCNC: 20 MG/DL (ref 8–23)
BUN/CREAT SERPL: 24.1 (ref 7–25)
CALCIUM SPEC-SCNC: 9 MG/DL (ref 8.6–10.5)
CHLORIDE SERPL-SCNC: 103 MMOL/L (ref 98–107)
CO2 SERPL-SCNC: 25 MMOL/L (ref 22–29)
CREAT BLD-MCNC: 0.83 MG/DL (ref 0.76–1.27)
DEPRECATED RDW RBC AUTO: 43.2 FL (ref 37–54)
ERYTHROCYTE [DISTWIDTH] IN BLOOD BY AUTOMATED COUNT: 12.7 % (ref 12.3–15.4)
GFR SERPL CREATININE-BSD FRML MDRD: 89 ML/MIN/1.73
GLUCOSE BLD-MCNC: 137 MG/DL (ref 65–99)
HCT VFR BLD AUTO: 39.3 % (ref 37.5–51)
HGB BLD-MCNC: 13.1 G/DL (ref 13–17.7)
MCH RBC QN AUTO: 31 PG (ref 26.6–33)
MCHC RBC AUTO-ENTMCNC: 33.3 G/DL (ref 31.5–35.7)
MCV RBC AUTO: 93.1 FL (ref 79–97)
PLATELET # BLD AUTO: 171 10*3/MM3 (ref 140–450)
PMV BLD AUTO: 10.5 FL (ref 6–12)
POTASSIUM BLD-SCNC: 4.7 MMOL/L (ref 3.5–5.2)
RBC # BLD AUTO: 4.22 10*6/MM3 (ref 4.14–5.8)
SODIUM BLD-SCNC: 140 MMOL/L (ref 136–145)
WBC NRBC COR # BLD: 11.82 10*3/MM3 (ref 3.4–10.8)

## 2019-11-16 PROCEDURE — 97116 GAIT TRAINING THERAPY: CPT

## 2019-11-16 PROCEDURE — 80048 BASIC METABOLIC PNL TOTAL CA: CPT | Performed by: NURSE PRACTITIONER

## 2019-11-16 PROCEDURE — 97166 OT EVAL MOD COMPLEX 45 MIN: CPT

## 2019-11-16 PROCEDURE — 99024 POSTOP FOLLOW-UP VISIT: CPT | Performed by: ORTHOPAEDIC SURGERY

## 2019-11-16 PROCEDURE — A9270 NON-COVERED ITEM OR SERVICE: HCPCS | Performed by: ORTHOPAEDIC SURGERY

## 2019-11-16 PROCEDURE — 63710000001 MELOXICAM 7.5 MG TABLET: Performed by: ORTHOPAEDIC SURGERY

## 2019-11-16 PROCEDURE — 85027 COMPLETE CBC AUTOMATED: CPT | Performed by: ORTHOPAEDIC SURGERY

## 2019-11-16 PROCEDURE — A9270 NON-COVERED ITEM OR SERVICE: HCPCS | Performed by: NURSE PRACTITIONER

## 2019-11-16 PROCEDURE — 97110 THERAPEUTIC EXERCISES: CPT

## 2019-11-16 PROCEDURE — 25010000003 CEFAZOLIN IN DEXTROSE 2-4 GM/100ML-% SOLUTION: Performed by: ORTHOPAEDIC SURGERY

## 2019-11-16 PROCEDURE — 63710000001 FLECAINIDE 50 MG TABLET: Performed by: NURSE PRACTITIONER

## 2019-11-16 PROCEDURE — 63710000001 ASPIRIN EC 325 MG TABLET DELAYED-RELEASE: Performed by: ORTHOPAEDIC SURGERY

## 2019-11-16 RX ORDER — ASPIRIN 81 MG/1
81 TABLET ORAL DAILY
Start: 2019-11-16 | End: 2020-03-06

## 2019-11-16 RX ORDER — HYDROCODONE BITARTRATE AND ACETAMINOPHEN 5; 325 MG/1; MG/1
1 TABLET ORAL EVERY 4 HOURS PRN
Qty: 40 TABLET | Refills: 0 | Status: SHIPPED | OUTPATIENT
Start: 2019-11-16 | End: 2019-11-25

## 2019-11-16 RX ORDER — DOCUSATE SODIUM 100 MG/1
100 CAPSULE, LIQUID FILLED ORAL 2 TIMES DAILY PRN
Qty: 60 CAPSULE | Refills: 0 | Status: SHIPPED | OUTPATIENT
Start: 2019-11-16 | End: 2020-03-06

## 2019-11-16 RX ADMIN — CEFAZOLIN SODIUM 2 G: 2 INJECTION, SOLUTION INTRAVENOUS at 02:28

## 2019-11-16 RX ADMIN — FLECAINIDE ACETATE 100 MG: 50 TABLET ORAL at 08:50

## 2019-11-16 RX ADMIN — ASPIRIN 325 MG: 325 TABLET, COATED ORAL at 08:50

## 2019-11-16 RX ADMIN — SODIUM CHLORIDE, PRESERVATIVE FREE 3 ML: 5 INJECTION INTRAVENOUS at 08:49

## 2019-11-16 RX ADMIN — MELOXICAM 15 MG: 7.5 TABLET ORAL at 08:50

## 2019-11-16 NOTE — PROGRESS NOTES
Discharge Planning Assessment  [facility]     Patient Name: Stefan Pedraza  MRN: [id number]  Today's Date: 11/16/2019    Admit Date: 11/15/2019    Discharge Needs Assessment     Row Name 11/16/19 1109       Living Environment    Lives With  spouse    Current Living Arrangements  home/apartment/condo    Primary Care Provided by  self    Provides Primary Care For  no one    Family Caregiver if Needed  spouse    Quality of Family Relationships  involved;helpful    Able to Return to Prior Arrangements  yes       Transition Planning    Patient/Family Anticipates Transition to  home with family    Patient/Family Anticipated Services at Transition  none    Transportation Anticipated  family or friend will provide       Discharge Needs Assessment    Readmission Within the Last 30 Days  no previous admission in last 30 days    Concerns to be Addressed  discharge planning    Equipment Currently Used at Home  walker, rolling    Anticipated Changes Related to Illness  none    Equipment Needed After Discharge  none    Outpatient/Agency/Support Group Needs  outpatient therapy    Offered/Gave Vendor List  yes        Discharge Plan     Row Name 11/16/19 1109       Plan    Plan  home with outpt PT    Patient/Family in Agreement with Plan  yes    Plan Comments  Pt lives in [facility] with his wife. He reports he was independent with all ADLs prior to admit and has a rolling walker at home. He is followed by his PCP and his medications are covered by insurance. At this time pt reports his plan for discharge is to return home with outpt PT. A provider list has been given to pt but he has refused to select one stating he wants to get home to discuss it with his friends and family before he decides where to go. A referral will be provided to pt at discharge so that he may set up his own appointment. Pt reported understanding.     Final Discharge Disposition Code  01 - home or self-care        Destination      No service  coordination in this encounter.      Durable Medical Equipment      Service Provider Request Status Selected Services Address Phone Number Fax Number    [facility] [place] Declined N/A [address] [facility] [address] [phone]      Dialysis/Infusion      No service coordination in this encounter.      Home Medical Care      No service coordination in this encounter.      Therapy      No service coordination in this encounter.      Community Resources      No service coordination in this encounter.          Demographic Summary     Row Name 11/16/19 1108       General Information    Admission Type  observation    Referral Source  physician    Reason for Consult  discharge planning    General Information Comments  PCP Gamaliel Eaton        Contact Information    Permission Granted to Share Info With  ;family/designee    Contact Information Comments  Kendrick Pedraza [phone]        Functional Status     Row Name 11/16/19 1109       Functional Status, IADL    Medications  independent    Meal Preparation  independent    Housekeeping  independent    Laundry  independent    Shopping  independent       Mental Status    General Appearance WDL  WDL       Mental Status Summary    Recent Changes in Mental Status/Cognitive Functioning  no changes        Psychosocial    No documentation.       Abuse/Neglect    No documentation.       Legal    No documentation.       Substance Abuse    No documentation.       Patient Forms    No documentation.           Zara Vieyra RN

## 2019-11-16 NOTE — PROGRESS NOTES
[facility]    Acute pain service Inpatient Progress Note    Patient Name: Stefan Pedraza  :  1940  MRN:  [id number]        Acute Pain  Service Inpatient Progress Note:    Analgesia:Good  LOC: alert and awake  Resp Status: room air  Cardiac: VS stable  Side Effects:None  Catheter Site:clean, dressing intact and dry  Cath type: peripheral nerve cath with ON Q  Infusion rate: 10ml/hr  Catheter Plan:Catheter to remain Insitu and Continue catheter infusion rate unchanged

## 2019-11-16 NOTE — THERAPY DISCHARGE NOTE
Patient Name: Stefan Pedraza  : 1940    MRN: [id number]                              Today's Date: 2019       Admit Date: 11/15/2019    Visit Dx:     ICD-10-CM ICD-9-CM   1. Status post total left knee replacement Z96.652 V43.65   2. Primary osteoarthritis of left knee M17.12 715.16     Patient Active Problem List   Diagnosis   • Atrial fibrillation (CMS/HCC)   • History of cardioversion   • H/O echocardiogram   • History of Holter monitoring   • H/O transesophageal echocardiography (BRISSA) for monitoring   • History of cardioversion   • H/O cardiovascular stress test   • Prediabetes   • GERD (gastroesophageal reflux disease)   • Dyslipidemia   • DJD (degenerative joint disease)   • Prostate cancer (CMS/HCC)   • Persistent atrial fibrillation   • Primary osteoarthritis of left knee   • DM (diabetes mellitus) (CMS/HCC)   • Status post total left knee replacement     Past Medical History:   Diagnosis Date   • Arthritis    • Atrial fibrillation (CMS/HCC)    • Cataract     both- surgery done    • Diabetes mellitus (CMS/HCC)     prediabetic- checks sugar every other day    • Dyslipidemia    • GERD (gastroesophageal reflux disease)    • H/O cardiovascular stress test 2007    Revealing no significant ischemia, EF estimated at 46%   • H/O echocardiogram 2011    Revealing normal LV function, diastolic dysfunction noted, mild MR, nopericardial effusion.   • H/O transesophageal echocardiography (BRISSA) for monitoring     left ventricular ejection fraction of 55%, mild TR and MR   • History of cardioversion     STATUS-POST SUCCESSFUL EXTERNAL CARDIOVERSION   • History of cardioversion 2012    external cardioversion to normal sinus rhythm   • History of Holter monitoring 2011    Revealing sinus rhythm with PAC/PVC/episodes of sinus bradycardia.   • History of shingles     8-10 years ago    • [place] (hard of hearing)     has hearing aides   • Hyperlipidemia    • Osteoarthritis    • PONV  (postoperative nausea and vomiting)     only with ether   • Pre-diabetes    • Prostate cancer (CMS/HCC)    • Sleep apnea with use of continuous positive airway pressure (CPAP)    • Sleep apnea with use of continuous positive airway pressure (CPAP)     compliant with machine    • Status post cystoscopy 08/28/2018   • Wears glasses    • Wears glasses      Past Surgical History:   Procedure Laterality Date   • ATRIAL APPENDAGE EXCLUSION LEFT WITH TRANSESOPHAGEAL ECHOCARDIOGRAM N/A 7/16/2019    Procedure: Atrial Appendage Occlusion ([place]), anticoagulation will be determined after pre-BRISSA;  Surgeon: Prabhakar Solis MD;  Location: [facility] INVASIVE LOCATION;  Service: Cardiology   • BELPHAROPTOSIS REPAIR      bilat    • CARDIAC ABLATION  2011   • CATARACT EXTRACTION      bilat    • CHOLECYSTECTOMY     • COLONOSCOPY      2018   • CYST REMOVAL      on back and back of neck   • KNEE ARTHROSCOPY W/ PARTIAL MEDIAL MENISCECTOMY Left    • ORCHIECTOMY Right    • SHOULDER ARTHROSCOPY W/ LABRAL REPAIR Right    • TONSILLECTOMY AND ADENOIDECTOMY     • WISDOM TOOTH EXTRACTION      all 4 removed      General Information     Row Name 11/16/19 0910          PT Evaluation Time/Intention    Document Type  therapy note (daily note);discharge treatment  -LR     Mode of Treatment  physical therapy;individual therapy  -LR     Row Name 11/16/19 0910          General Information    Patient Profile Reviewed?  yes  -LR     Existing Precautions/Restrictions  fall;other (see comments) L adductor canal nerve catheter  -LR     Row Name 11/16/19 0910          Cognitive Assessment/Intervention- PT/OT    Orientation Status (Cognition)  oriented x 4  -LR     Row Name 11/16/19 0910          Safety Issues, Functional Mobility    Safety Issues Affecting Function (Mobility)  insight into deficits/self awareness;positioning of assistive device  -LR       User Key  (r) = Recorded By, (t) = Taken By, (c) = Cosigned By    Initials Name Provider Type     LR Andra Ramirez, PT Physical Therapist        Mobility     Row Name 11/16/19 0910          Bed Mobility Assessment/Treatment    Supine-Sit [place] (Bed Mobility)  not tested  -LR     Comment (Bed Mobility)  [facility] on arrival and at end of treatment.   -LR     Row Name 11/16/19 0910          Transfer Assessment/Treatment    Comment (Transfers)  Verbal cues to push up from chair to stand and to reach back for chair to lower into sitting. Verbal cues to step L LE out before t/f for comfort.   -LR     Row Name 11/16/19 0910          Sit-Stand Transfer    Sit-Stand [place] (Transfers)  verbal cues;contact guard  -LR     Assistive Device (Sit-Stand Transfers)  walker, front-wheeled  -LR     Row Name 11/16/19 0910          Gait/Stairs Assessment/Training    81339 - Gait Training Minutes   15  -LR     [place] Level (Gait)  verbal cues;contact guard  -LR     Assistive Device (Gait)  walker, front-wheeled  -LR     Distance in Feet (Gait)  350  -LR     Pattern (Gait)  step-through  -LR     Deviations/Abnormal Patterns (Gait)  left sided deviations;antalgic;bilateral deviations;gerber decreased;gait speed decreased;stride length decreased  -LR     Bilateral Gait Deviations  forward flexed posture;heel strike decreased  -LR     Left Sided Gait Deviations  weight shift ability decreased  -LR     [place] Level (Stairs)  verbal cues;contact guard  -LR     Handrail Location (Stairs)  right side (ascending)  -LR     Number of Steps (Stairs)  3  -LR     Ascending Technique (Stairs)  step-to-step  -LR     Descending Technique (Stairs)  step-to-step  -LR     Comment (Gait/Stairs)  Patient ambulated with step through gait pattern at slow pace with forward flexed posture. Required repeated cues for upright posture and to stay closer to RW. Verbal cues for increased L LE weight bearing/stance phase, decreased UE weight bearing, and increased L knee flexion during swing phase. Improved with cues for correction.  Gait limited by pain. Verbal cues to take steps one at a time and to step up with strong LE first and down with weak LE first. No LOB or unsteadiness with stairs.   -LR     Row Name 11/16/19 0910          Mobility Assessment/Intervention    Extremity Weight-bearing Status  left lower extremity  -LR     Left Lower Extremity (Weight-bearing Status)  weight-bearing as tolerated (WBAT)  -LR       User Key  (r) = Recorded By, (t) = Taken By, (c) = Cosigned By    Initials Name Provider Type    LR Andra Ramirez, PT Physical Therapist        Obj/Interventions     Row Name 11/16/19 0910          General ROM    GENERAL ROM COMMENTS  15-95 degrees; lacking 15 degrees extension AROM, 95 degrees flexion AAROM in sitting.   -LR     Row Name 11/16/19 0910          Therapeutic Exercise    Lower Extremity (Therapeutic Exercise)  heel slides, left;LAQ (long arc quad), left;quad sets, left;SAQ (short arc quad), left;SLR (straight leg raise), left;other (see comments) standing calf raises, heel slides in sitting and supine  -LR     Lower Extremity Range of Motion (Therapeutic Exercise)  ankle dorsiflexion/plantar flexion, left  -LR     Exercise Type (Therapeutic Exercise)  AROM (active range of motion);isotonic contraction, concentric;isometric contraction, static  -LR     Position (Therapeutic Exercise)  seated;standing  -LR     Sets/Reps (Therapeutic Exercise)  x15 reps each  -LR     Comment (Therapeutic Exercise)  cues for technique; no assist required with ther ex; cues to keep L knee straight with SLR  -LR       User Key  (r) = Recorded By, (t) = Taken By, (c) = Cosigned By    Initials Name Provider Type    LR Andra Ramirez, PT Physical Therapist        Goals/Plan     Row Name 11/16/19 0910          Bed Mobility Goal 1 (PT)    Activity/Assistive Device (Bed Mobility Goal 1, PT)  sit to supine/supine to sit  -LR     [place] Level/Cues Needed (Bed Mobility Goal 1, PT)  conditional independence  -LR     Time  Frame (Bed Mobility Goal 1, PT)  long term goal (LTG);3 days  -LR     Progress/Outcomes (Bed Mobility Goal 1, PT)  goal not met;discharged from facility  -     Row Name 11/16/19 0910          Transfer Goal 1 (PT)    Activity/Assistive Device (Transfer Goal 1, PT)  sit-to-stand/stand-to-sit;walker, rolling  -LR     [place] Level/Cues Needed (Transfer Goal 1, PT)  conditional independence  -LR     Time Frame (Transfer Goal 1, PT)  long term goal (LTG);3 days  -LR     Progress/Outcome (Transfer Goal 1, PT)  goal not met;discharged from facility  -     Row Name 11/16/19 0910          Gait Training Goal 1 (PT)    Activity/Assistive Device (Gait Training Goal 1, PT)  gait (walking locomotion);walker, rolling  -LR     [place] Level (Gait Training Goal 1, PT)  conditional independence  -LR     Distance (Gait Goal 1, PT)  500 feet  -LR     Time Frame (Gait Training Goal 1, PT)  long term goal (LTG);3 days  -LR     Progress/Outcome (Gait Training Goal 1, PT)  goal not met;discharged from [facility]  -     Row Name 11/16/19 0910          ROM Goal 1 (PT)    ROM Goal 1 (PT)  L knee AAROM 0-90 degrees  -LR     Time Frame (ROM Goal 1, PT)  long term goal (LTG);3 days  -LR     Progress/Outcome (ROM Goal 1, PT)  goal partially met;discharged from facility achieved flexion goal  -     Row Name 11/16/19 0910          Stairs Goal 1 (PT)    Activity/Assistive Device (Stairs Goal 1, PT)  stairs, all skills;using handrail, right  -LR     [place] Level/Cues Needed (Stairs Goal 1, PT)  contact guard assist  -LR     Number of Stairs (Stairs Goal 1, PT)  3  -LR     Time Frame (Stairs Goal 1, PT)  long term goal (LTG);3 days  -LR     Progress/Outcome (Stairs Goal 1, PT)  goal met  -LR       User Key  (r) = Recorded By, (t) = Taken By, (c) = Cosigned By    Initials Name Provider Type    LR Andra Ramirez, PT Physical Therapist        Clinical Impression     Row Name 11/16/19 0910          Pain Assessment     Additional Documentation  Pain Scale: Numbers Pre/Post-Treatment (Group)  -LR     Row Name 11/16/19 0910          Pain Scale: Numbers Pre/Post-Treatment    Pain Scale: Numbers, Pretreatment  0/10 - no pain  -LR     Pain Scale: Numbers, Post-Treatment  1/10  -LR     Pain Location - Side  Left  -LR     Pain Location - Orientation  anterior  -LR     Pain Location  knee  -LR     Pain Intervention(s)  Ambulation/increased activity;Repositioned  -LR     Row Name 11/16/19 0910          Plan of Care Review    Plan of Care Reviewed With  patient;spouse  -LR     Row Name 11/16/19 0910          Physical Therapy Clinical Impression    Criteria for Skilled Interventions Met (PT Clinical Impression)  yes;treatment indicated  -LR     Row Name 11/16/19 0910          Positioning and Restraints    Pre-Treatment Position  sitting in chair/recliner  -LR     Post Treatment Position  chair  -LR     In Chair  notified nsg;reclined;sitting;call light within reach;encouraged to call for assist;exit alarm on;with family/caregiver;legs elevated  -LR       User Key  (r) = Recorded By, (t) = Taken By, (c) = Cosigned By    Initials Name Provider Type    LR Andra Ramirez, PT Physical Therapist        Outcome Measures     Row Name 11/16/19 0910          How much help from another person do you currently need...    Turning from your back to your side while in flat bed without using bedrails?  4  -LR     Moving from lying on back to sitting on the side of a flat bed without bedrails?  3  -LR     Moving to and from a bed to a chair (including a wheelchair)?  3  -LR     Standing up from a chair using your arms (e.g., wheelchair, bedside chair)?  3  -LR     Climbing 3-5 steps with a railing?  3  -LR     To walk in hospital room?  3  -LR     AM-PAC 6 Clicks Score (PT)  19  -LR     Row Name 11/16/19 0910          Functional Assessment    Outcome Measure Options  AM-PAC 6 Clicks Basic Mobility (PT)  -LR       User Key  (r) = Recorded By, (t) =  Taken By, (c) = Cosigned By    Initials Name Provider Type    Andra Borrego, PT Physical Therapist        Physical Therapy Education     Title: PT OT SLP Therapies (Done)     Topic: Physical Therapy (Done)     Point: Mobility training (Done)     Learning Progress Summary           Patient Acceptance, E,D,H, VU,DU by EMERSON at 11/16/2019  9:10 AM    Comment:  Issued and reviewed written/illustrated HEP. Educated on precautions, weight bearing status, correct sit<->stand t/f technique, correct gait mechanics, correct stair training technique, and correct car t/f technique.    Acceptance, E,D, VU by ERIKA at 11/15/2019  3:45 PM    Comment:  Reviewed knee precautions. Educated pt on safe and appropriate sequencing with bed mobility, ambulatory transfers, and gait. Educated on anticipated progression of POC.   Significant Other Acceptance, E,D,H, VU,DU by EMERSON at 11/16/2019  9:10 AM    Comment:  Issued and reviewed written/illustrated HEP. Educated on precautions, weight bearing status, correct sit<->stand t/f technique, correct gait mechanics, correct stair training technique, and correct car t/f technique.    Acceptance, E,D, VU by ERIKA at 11/15/2019  3:45 PM    Comment:  Reviewed knee precautions. Educated pt on safe and appropriate sequencing with bed mobility, ambulatory transfers, and gait. Educated on anticipated progression of POC.                   Point: Home exercise program (Done)     Learning Progress Summary           Patient Acceptance, E,D,H, VU,DU by EMERSON at 11/16/2019  9:10 AM    Comment:  Issued and reviewed written/illustrated HEP. Educated on precautions, weight bearing status, correct sit<->stand t/f technique, correct gait mechanics, correct stair training technique, and correct car t/f technique.    Acceptance, E,D, VU by ERIKA at 11/15/2019  3:45 PM    Comment:  Reviewed knee precautions. Educated pt on safe and appropriate sequencing with bed mobility, ambulatory transfers, and gait. Educated on  anticipated progression of POC.   Significant Other Acceptance, E,D,H, VU,DU by EMERSON at 11/16/2019  9:10 AM    Comment:  Issued and reviewed written/illustrated HEP. Educated on precautions, weight bearing status, correct sit<->stand t/f technique, correct gait mechanics, correct stair training technique, and correct car t/f technique.    Acceptance, E,D, VU by ERIKA at 11/15/2019  3:45 PM    Comment:  Reviewed knee precautions. Educated pt on safe and appropriate sequencing with bed mobility, ambulatory transfers, and gait. Educated on anticipated progression of POC.                   Point: Body mechanics (Done)     Learning Progress Summary           Patient Acceptance, E,D,H, VU,DU by EMERSON at 11/16/2019  9:10 AM    Comment:  Issued and reviewed written/illustrated HEP. Educated on precautions, weight bearing status, correct sit<->stand t/f technique, correct gait mechanics, correct stair training technique, and correct car t/f technique.    Acceptance, E,D, VU by ERIKA at 11/15/2019  3:45 PM    Comment:  Reviewed knee precautions. Educated pt on safe and appropriate sequencing with bed mobility, ambulatory transfers, and gait. Educated on anticipated progression of POC.   Significant Other Acceptance, E,D,H, VU,DU by EMERSON at 11/16/2019  9:10 AM    Comment:  Issued and reviewed written/illustrated HEP. Educated on precautions, weight bearing status, correct sit<->stand t/f technique, correct gait mechanics, correct stair training technique, and correct car t/f technique.    Acceptance, E,D, VU by ERIKA at 11/15/2019  3:45 PM    Comment:  Reviewed knee precautions. Educated pt on safe and appropriate sequencing with bed mobility, ambulatory transfers, and gait. Educated on anticipated progression of POC.                   Point: Precautions (Done)     Learning Progress Summary           Patient Acceptance, E,D,H, VU,DU by EMERSON at 11/16/2019  9:10 AM    Comment:  Issued and reviewed written/illustrated HEP. Educated on precautions,  weight bearing status, correct sit<->stand t/f technique, correct gait mechanics, correct stair training technique, and correct car t/f technique.    Acceptance, E,D, VU by ERIKA at 11/15/2019  3:45 PM    Comment:  Reviewed knee precautions. Educated pt on safe and appropriate sequencing with bed mobility, ambulatory transfers, and gait. Educated on anticipated progression of POC.   Significant Other Acceptance, E,D,H, VU,DU by  at 11/16/2019  9:10 AM    Comment:  Issued and reviewed written/illustrated HEP. Educated on precautions, weight bearing status, correct sit<->stand t/f technique, correct gait mechanics, correct stair training technique, and correct car t/f technique.    Acceptance, E,D, VU by ERIKA at 11/15/2019  3:45 PM    Comment:  Reviewed knee precautions. Educated pt on safe and appropriate sequencing with bed mobility, ambulatory transfers, and gait. Educated on anticipated progression of POC.                               User Key     Initials Effective Dates Name Provider Type Discipline     06/19/15 -  Andra Ramirez, PT Physical Therapist PT    ERIKA 09/10/19 -  Justin Garcia, PT Physical Therapist PT              PT Recommendation and Plan     Outcome Summary/Treatment Plan (PT)  Anticipated Discharge Disposition (PT): home with assist, home with OP services  Plan of Care Reviewed With: patient, spouse  Progress: improving  Outcome Summary: Patient ambulated 350 feet with RW and step through gait pattern, limited by pain. Climbed 3 steps with handrail on R ascending, with no LOB or unsteadiness with stairs. ROM progressing well, 15-95 degrees. Patient has been d/c home with family and outpatient PT.      Time Calculation:   PT Charges     Row Name 11/16/19 0910             Time Calculation    Start Time  0910  -      PT Received On  11/16/19  -      PT Goal Re-Cert Due Date  11/25/19  -         Time Calculation- PT    Total Timed Code Minutes- PT  24 minute(s)  -LR         Timed Charges     90717 - PT Therapeutic Exercise Minutes  9  -LR      70106 - Gait Training Minutes   15  -LR        User Key  (r) = Recorded By, (t) = Taken By, (c) = Cosigned By    Initials Name Provider Type    Andra Borrego, PT Physical Therapist        Therapy Charges for Today     Code Description Service Date Service Provider Modifiers Qty    [id number]  PT THER PROC EA 15 MIN 11/16/2019 Andra Ramirez, PT GP 1    [id number] HC GAIT TRAINING EA 15 MIN 11/16/2019 Andra Ramirez, PT GP 1          PT G-Codes  Outcome Measure Options: AM-PAC 6 Clicks Basic Mobility (PT)  AM-PAC 6 Clicks Score (PT): 19  AM-PAC 6 Clicks Score (OT): 23    PT Discharge Summary  Anticipated Discharge Disposition (PT): home with assist, home with OP services  Reason for Discharge: Discharge from facility  Outcomes Achieved: Patient able to partially acheive established goals  Discharge Destination: Home with assist, Home with outpatient services    Andra Ramirez, PT  11/16/2019

## 2019-11-16 NOTE — PROGRESS NOTES
"      [facility] Orthopaedic Surgery Progress Note    Subjective      LOS: 0 days   Patient Care Team:  Gamaliel Eaton MD as PCP - General  Dereck Chao MD as Referring Physician (Urology)  Prabhakar Solis MD as Consulting Physician (Cardiac Electrophysiology)  Rakesh Ortiz MD as Consulting Physician (Urology)  Shon Bunn MD as Consulting Physician (Radiation Oncology)    CC: Left knee pain    Interval History:   Patient sitting in a chair comfortably, he would like to go home today.    Objective      Vital Signs  Temp (24hrs), Av.1 °F (36.7 °C), Min:97.4 °F (36.3 °C), Max:98.5 °F (36.9 °C)      /50   Pulse 62   Temp 98.5 °F (36.9 °C) (Oral)   Resp 16   Ht 185.4 cm (73\")   Wt 101 kg (222 lb)   SpO2 95%   BMI 29.29 kg/m²     Examination:   Examination of the left knee: The wound is clean, dry, and intact.  Ankle dorsiflexion, ankle plantar flexion, and EHL are intact.  Sensation intact in the foot to light touch.  2+ dorsalis pedis pulse.  Straight leg raise is intact.      Labs:  Results from last 7 days   Lab Units 19  0705   WBC 10*3/mm3 11.82*   HEMOGLOBIN g/dL 13.1   HEMATOCRIT % 39.3   MCV fL 93.1   PLATELETS 10*3/mm3 171       Radiology:  Imaging Results (Last 24 Hours)     Procedure Component Value Units Date/Time    XR Knee 1 or 2 View Left [[id number]] Collected:  11/15/19 1241     Updated:  11/15/19 1611    Narrative:       EXAMINATION: XR KNEE 1 OR 2 VW, LEFT-11/15/2019:      INDICATION: KNEE ARTHROPLASTY.      COMPARISON: NONE.     FINDINGS: Two views of the left knee were submitted for review. There is  normal anatomic alignment of the left knee. No acute osseous abnormality  identified. Postsurgical knee arthroplasty changes are identified with  expected anatomic alignment. Subcutaneous and joint space air is  identified consistent with the postoperative state.           Impression:       Expected postoperative appearance of total left knee  arthroplasty.   "   D:  11/15/2019  E:  11/15/2019     This report was finalized on 11/15/2019 4:08 PM by Dr. Enzo Parmar MD.             PT:  Patient ambulated 350 feet with RW and step through gait pattern, limited by pain. Climbed 3 steps with handrail on R ascending, with no LOB or unsteadiness with stairs. ROM progressing well, 15-95 degrees. Patient has been d/c home with family and outpatient PT.      Results Review:     I reviewed the patient's new clinical results.    Assessment and Plan     Assessment:   Doing well status post left total knee arthroplasty      Status post total left knee replacement    Atrial fibrillation (CMS/HCC)    Dyslipidemia    Primary osteoarthritis of left knee      Plan for disposition: Plan for discharge to home today.  Follow-up with me in 3 weeks as planned.     Provider Department Center   12/9/2019 2:30 PM Gamaliel Polanco MD [facility] ORTHOPEDIC SPORTS MEDICINE    Appt Notes: 3 WEEK POSTOP         Gamaliel Polanco MD  11/16/19  11:14 AM

## 2019-11-16 NOTE — PLAN OF CARE
Problem: Patient Care Overview  Goal: Plan of Care Review  Outcome: Ongoing (interventions implemented as appropriate)   11/16/19 7612   OTHER   Outcome Summary pt has not complained of pain at all during shift. his arrow pump remains cdi and at 10ml/hr. he has slept well through the night and vss. will continue to monitor.    Coping/Psychosocial   Plan of Care Reviewed With patient   Plan of Care Review   Progress improving

## 2019-11-16 NOTE — PLAN OF CARE
Problem: Patient Care Overview  Goal: Plan of Care Review  Outcome: Outcome(s) achieved Date Met: 11/16/19 11/16/19 8136   OTHER   Outcome Summary VSS; Pt presents at supervision/independent level with ADLs, supervision for fxl mobility. Pt did not require AE for ADLs. No DME needs identified. No further skilled OT services indicated at this time. Recommend home with assist at discharge.    Coping/Psychosocial   Plan of Care Reviewed With patient   Plan of Care Review   Progress improving

## 2019-11-16 NOTE — PLAN OF CARE
Problem: Patient Care Overview  Goal: Plan of Care Review  Outcome: Ongoing (interventions implemented as appropriate)   11/16/19 0910   OTHER   Outcome Summary Patient ambulated 350 feet with RW and step through gait pattern, limited by pain. Climbed 3 steps with handrail on R ascending, with no LOB or unsteadiness with stairs. ROM progressing well, 15-95 degrees. Patient has been d/c home with family and outpatient PT.    Coping/Psychosocial   Plan of Care Reviewed With patient;spouse   Plan of Care Review   Progress improving       Problem: Knee Arthroplasty (Total, Partial) (Adult)  Goal: Signs and Symptoms of Listed Potential Problems Will be Absent, Minimized or Managed (Knee Arthroplasty)  Outcome: Ongoing (interventions implemented as appropriate)   11/16/19 0910   Goal/Outcome Evaluation   Problems Assessed (Knee Arthroplasty) functional deficit;pain;range of motion decreased   Problems Present (Knee Arthroplasty) functional deficit;pain;range of motion decreased

## 2019-11-16 NOTE — THERAPY DISCHARGE NOTE
Acute Care - Occupational Therapy Initial Eval/Discharge  [facility]     Patient Name: Stefan Pedraza  : 1940  MRN: [id number]  Today's Date: 2019  Onset of Illness/Injury or Date of Surgery: 11/15/19  Date of Referral to OT: 11/15/19         Admit Date: 11/15/2019       ICD-10-CM ICD-9-CM   1. Primary osteoarthritis of left knee M17.12 715.16     Patient Active Problem List   Diagnosis   • Atrial fibrillation (CMS/HCC)   • History of cardioversion   • H/O echocardiogram   • History of Holter monitoring   • H/O transesophageal echocardiography (BRISSA) for monitoring   • History of cardioversion   • H/O cardiovascular stress test   • Prediabetes   • GERD (gastroesophageal reflux disease)   • Dyslipidemia   • DJD (degenerative joint disease)   • Prostate cancer (CMS/HCC)   • Persistent atrial fibrillation   • Primary osteoarthritis of left knee   • DM (diabetes mellitus) (CMS/HCC)   • Status post total left knee replacement     Past Medical History:   Diagnosis Date   • Arthritis    • Atrial fibrillation (CMS/HCC)    • Cataract     both- surgery done    • Diabetes mellitus (CMS/HCC)     prediabetic- checks sugar every other day    • Dyslipidemia    • GERD (gastroesophageal reflux disease)    • H/O cardiovascular stress test 2007    Revealing no significant ischemia, EF estimated at 46%   • H/O echocardiogram 2011    Revealing normal LV function, diastolic dysfunction noted, mild MR, nopericardial effusion.   • H/O transesophageal echocardiography (BRISSA) for monitoring     left ventricular ejection fraction of 55%, mild TR and MR   • History of cardioversion     STATUS-POST SUCCESSFUL EXTERNAL CARDIOVERSION   • History of cardioversion 2012    external cardioversion to normal sinus rhythm   • History of Holter monitoring 2011    Revealing sinus rhythm with PAC/PVC/episodes of sinus bradycardia.   • History of shingles     8-10 years ago    • [place] (hard of hearing)     has  hearing aides   • Hyperlipidemia    • Osteoarthritis    • PONV (postoperative nausea and vomiting)     only with ether   • Pre-diabetes    • Prostate cancer (CMS/HCC)    • Sleep apnea with use of continuous positive airway pressure (CPAP)    • Sleep apnea with use of continuous positive airway pressure (CPAP)     compliant with machine    • Status post cystoscopy 08/28/2018   • Wears glasses    • Wears glasses      Past Surgical History:   Procedure Laterality Date   • ATRIAL APPENDAGE EXCLUSION LEFT WITH TRANSESOPHAGEAL ECHOCARDIOGRAM N/A 7/16/2019    Procedure: Atrial Appendage Occlusion ([place]), anticoagulation will be determined after pre-BRISSA;  Surgeon: Prabhakar Solsi MD;  Location: [facility] INVASIVE LOCATION;  Service: Cardiology   • BELPHAROPTOSIS REPAIR      bilat    • CARDIAC ABLATION  2011   • CATARACT EXTRACTION      bilat    • CHOLECYSTECTOMY     • COLONOSCOPY      2018   • CYST REMOVAL      on back and back of neck   • KNEE ARTHROSCOPY W/ PARTIAL MEDIAL MENISCECTOMY Left    • ORCHIECTOMY Right    • SHOULDER ARTHROSCOPY W/ LABRAL REPAIR Right    • TONSILLECTOMY AND ADENOIDECTOMY     • WISDOM TOOTH EXTRACTION      all 4 removed           OT ASSESSMENT FLOWSHEET (last 12 hours)      Occupational Therapy Evaluation     Row Name 11/16/19 0745                   OT Evaluation Time/Intention    Subjective Information  complains of;pain  -TA        Document Type  discharge evaluation/summary  -TA        Mode of Treatment  occupational therapy  -TA        Patient Effort  excellent  -TA        Symptoms Noted During/After Treatment  none  -TA           General Information    Patient Profile Reviewed?  yes  -TA        Onset of Illness/Injury or Date of Surgery  11/15/19  -TA        Patient Observations  alert;cooperative;agree to therapy  -TA        Patient/Family Observations  No family present in room  -TA        General Observations of Patient  Pt UIC, adductor nerve catheter, RA  -TA        Prior Level  of Function  min assist:;ADL's;home management;gait;transfer;bed mobility  -TA        Equipment Currently Used at Home  cane, straight;walker, standard  -TA        Pertinent History of Current Functional Problem  Pt admitted for live knee pain which has failed conservative mgt approach; Pt now s/p Left TKR.   -TA        Existing Precautions/Restrictions  fall;other (see comments) L adductor nerve catheter  -TA        Barriers to Rehab  none identified  -TA           Relationship/Environment    Primary Source of Support/Comfort  spouse  -TA        Lives With  spouse  -TA        Family Caregiver if Needed  spouse  -TA           Resource/Environmental Concerns    Current Living Arrangements  home/apartment/condo  -TA        Home Accessibility Concerns  stairs to enter home  -TA           Home Main Entrance    Number of Stairs, Main Entrance  two  -TA        Stair Railings, Main Entrance  railing on right side (ascending)  -TA           Cognitive Assessment/Intervention- PT/OT    Orientation Status (Cognition)  oriented x 4  -TA        Follows Commands (Cognition)  WNL  -TA           Safety Issues, Functional Mobility    Safety Issues Affecting Function (Mobility)  safety precaution awareness  -TA        Impairments Affecting Function (Mobility)  range of motion (ROM);endurance/activity tolerance  -TA           Mobility Assessment/Treatment    Extremity Weight-bearing Status  left lower extremity  -TA        Left Lower Extremity (Weight-bearing Status)  weight-bearing as tolerated (WBAT)  -TA           Bed Mobility Assessment/Treatment    Comment (Bed Mobility)  Pt UIC  -TA           Functional Mobility    Functional Mobility- Ind. Level  standby assist  -TA        Functional Mobility- Device  rolling walker  -TA        Functional Mobility-Distance (Feet)  -- To BR, in hallway  -TA           Transfer Assessment/Treatment    Transfer Assessment/Treatment  sit-stand transfer;stand-sit transfer;toilet transfer  -TA         Comment (Transfers)  VCs for safe technique  -TA           Sit-Stand Transfer    Sit-Stand [place] (Transfers)  stand by assist  -TA        Assistive Device (Sit-Stand Transfers)  walker, front-wheeled  -TA           Stand-Sit Transfer    Stand-Sit [place] (Transfers)  stand by assist;verbal cues  -TA        Assistive Device (Stand-Sit Transfers)  walker, front-wheeled  -TA           Toilet Transfer    Type (Toilet Transfer)  sit-stand;stand-sit  -TA        [place] Level (Toilet Transfer)  stand by assist  -TA        Assistive Device (Toilet Transfer)  walker, front-wheeled  -TA           ADL Assessment/Intervention    BADL Assessment/Intervention  lower body dressing;grooming;toileting  -TA           Lower Body Dressing Assessment/Training    Lower Body Dressing [place] Level  doff;don;socks;independent  -TA        Lower Body Dressing Position  supported sitting  -TA           Grooming Assessment/Training    [place] Level (Grooming)  hair care, combing/brushing;oral care regimen;wash face, hands;set up  -TA        Grooming Position  supported standing;sink side  -TA           Toileting Assessment/Training    [place] Level (Toileting)  toileting skills;supervision  -TA        Toileting Position  supported sitting;supported standing  -TA        Comment (Toileting)  No safety concerns  -TA           BADL Safety/Performance    Impairments, BADL Safety/Performance  endurance/activity tolerance  -TA        Skilled BADL Treatment/Intervention  energy conservation;BADL process/adaptation training  -TA        Progress in BADL Status  independence level  -TA           General ROM    GENERAL ROM COMMENTS  BUE AROM WFL  -TA           MMT (Manual Muscle Testing)    General MMT Comments  BUE 5/5  -TA           Motor Assessment/Interventions    Additional Documentation  Balance (Group);Balance Interventions (Group)  -TA           Balance    Balance  dynamic standing balance;dynamic sitting balance   -TA           Dynamic Sitting Balance    Level of [place], Reaches Outside Midline (Sitting, Dynamic Balance)  independent  -TA        Sitting Position, Reaches Outside Midline (Sitting, Dynamic Balance)  sitting in chair  -TA        Comment, Reaches Outside Midline (Sitting, Dynamic Balance)  LBD, toileting  -TA           Dynamic Standing Balance    Level of [place], Reaches Outside Midline (Standing, Dynamic Balance)  supervision  -TA        Time Able to Maintain Position, Reaches Outside Midline (Standing, Dynamic Balance)  more than 5 minutes  -TA        Assistive Device Utilized (Supported Standing, Dynamic Balance)  walker, rolling  -TA           Positioning and Restraints    Pre-Treatment Position  sitting in chair/recliner  -TA        Post Treatment Position  chair  -TA        In Chair  reclined;call light within reach;encouraged to call for assist;exit alarm on;legs elevated  -TA           Pain Assessment    Additional Documentation  Pain Scale: Numbers Pre/Post-Treatment (Group)  -TA           Pain Scale: Numbers Pre/Post-Treatment    Pain Scale: Numbers, Pretreatment  5/10  -TA        Pain Scale: Numbers, Post-Treatment  5/10  -TA        Pain Location - Side  Left  -TA        Pain Location - Orientation  lower  -TA        Pain Location  extremity  -TA        Pre/Post Treatment Pain Comment  Pt stated pan is managed, tolerated  -TA        Pain Intervention(s)  Repositioned;Ambulation/increased activity  -TA           Wound 11/15/19 1107 Left leg Incision    Wound - Properties Group Date first assessed: 11/15/19  -AL Time first assessed: 1107  -AL Side: Left  -AL Location: leg  -AL Primary Wound Type: Incision  -AL       Coping    Observed Emotional State  accepting;cooperative  -TA        Verbalized Emotional State  acceptance  -TA           Plan of Care Review    Plan of Care Reviewed With  patient  -TA           Clinical Impression (OT)    Date of Referral to OT  11/15/19  -TA        Functional  Level at Time of Evaluation (OT Eval)  Pt presents at supervision/independence level with ADLs, fxl mobility for ADLs  -TA        Patient/Family Goals Statement (OT Eval)  Return home  -TA        Criteria for Skilled Therapeutic Interventions Met (OT Eval)  no;no problems identified which require skilled intervention  -TA        Care Plan Review (OT)  evaluation/treatment results reviewed;care plan/treatment goals reviewed;risks/benefits reviewed;patient/other agree to care plan  -TA        Anticipated Discharge Disposition (OT)  home with assist  -TA           Vital Signs    Pre Systolic BP Rehab  -- VSS; RN cleared pt for tx  -TA        O2 Delivery Pre Treatment  room air  -TA        O2 Delivery Post Treatment  room air  -TA        Pre Patient Position  Sitting  -TA        Intra Patient Position  Standing  -TA        Post Patient Position  Sitting  -TA           Patient Education Goal (OT)    Activity (Patient Education Goal, OT)  Pt will verbalize good understanding of EC, pacing with daily occupations  -TA        [place]/Cues/Accuracy (Memory Goal 2, OT)  verbalizes understanding  -TA        Time Frame (Patient Education Goal, OT)  1 day  -TA        Progress/Outcome (Patient Education Goal, OT)  goal met  -TA          User Key  (r) = Recorded By, (t) = Taken By, (c) = Cosigned By    Initials Name Effective Dates    Talha Macias OT 03/14/16 -     Jeniffer Butler RN 03/14/19 -           Occupational Therapy Education     Title: PT OT SLP Therapies (Done)     Topic: Occupational Therapy (Done)     Point: ADL training (Done)     Description: Instruct learner(s) on proper safety adaptation and remediation techniques during self care or transfers.   Instruct in proper use of assistive devices.    Learning Progress Summary           Patient KENY Jaramillo DU by KIP at 11/16/2019  8:42 AM    Comment:  Body mechanics with fxl transfers; pt educated re: EC, pacing with daily occupations; reinforced need for  call for assist with OOB activities.                   Point: Precautions (Done)     Description: Instruct learner(s) on prescribed precautions during self-care and functional transfers.    Learning Progress Summary           Patient Acceptance, E, DU by KIP at 11/16/2019  8:42 AM    Comment:  Body mechanics with fxl transfers; pt educated re: EC, pacing with daily occupations; reinforced need for call for assist with OOB activities.                   Point: Body mechanics (Done)     Description: Instruct learner(s) on proper positioning and spine alignment during self-care, functional mobility activities and/or exercises.    Learning Progress Summary           Patient Acceptance, E, DU by KIP at 11/16/2019  8:42 AM    Comment:  Body mechanics with fxl transfers; pt educated re: EC, pacing with daily occupations; reinforced need for call for assist with OOB activities.                               User Key     Initials Effective Dates Name Provider Type Discipline    KIP 03/14/16 -  Talha Powell, OT Occupational Therapist OT                OT Recommendation and Plan  Outcome Summary/Treatment Plan (OT)  Anticipated Discharge Disposition (OT): home with assist  Plan of Care Review  Plan of Care Reviewed With: patient  Plan of Care Reviewed With: patient  Outcome Summary: VSS; Pt presents at supervision/independent level with ADLs, supervision for fxl mobility. Pt did not require AE for ADLs. No DME needs identified. No further skilled OT services indicated at this time. Recommend home with assist at discharge.      Rehab Goal Summary     Row Name 11/16/19 0745             Patient Education Goal (OT)    Activity (Patient Education Goal, OT)  Pt will verbalize good understanding of EC, pacing with daily occupations  -TA      [place]/Cues/Accuracy (Memory Goal 2, OT)  verbalizes understanding  -TA      Time Frame (Patient Education Goal, OT)  1 day  -TA      Progress/Outcome (Patient Education Goal, OT)  goal  met  -TA        User Key  (r) = Recorded By, (t) = Taken By, (c) = Cosigned By    Initials Name Provider Type Discipline    Talha Macias, OT Occupational Therapist OT          Outcome Measures     Row Name 11/16/19 0745             How much help from another is currently needed...    Putting on and taking off regular lower body clothing?  4  -TA      Bathing (including washing, rinsing, and drying)  3  -TA      Toileting (which includes using toilet bed pan or urinal)  4  -TA      Putting on and taking off regular upper body clothing  4  -TA      Taking care of personal grooming (such as brushing teeth)  4  -TA      Eating meals  4  -TA      AM-PAC 6 Clicks Score (OT)  23  -TA         Functional Assessment    Outcome Measure Options  AM-PAC 6 Clicks Daily Activity (OT)  -TA        User Key  (r) = Recorded By, (t) = Taken By, (c) = Cosigned By    Initials Name Provider Type    Talha Macias, OT Occupational Therapist          Time Calculation:   Time Calculation- OT     Row Name 11/16/19 0846             Time Calculation- OT    OT Start Time  0745 ttc 0 minutes  -TA      OT Received On  11/16/19  -TA        User Key  (r) = Recorded By, (t) = Taken By, (c) = Cosigned By    Initials Name Provider Type    Talha Macias OT Occupational Therapist        Therapy Suggested Charges     Code   Minutes Charges    None           Therapy Charges for Today     Code Description Service Date Service Provider Modifiers Qty    [id number]  OT EVAL MOD COMPLEXITY 4 11/16/2019 Talha Powell OT GO 1               OT Discharge Summary  Anticipated Discharge Disposition (OT): home with assist  Reason for Discharge: Independent, All goals achieved  Discharge Destination: Home with assist    Talha Powell OT  11/16/2019

## 2019-11-16 NOTE — DISCHARGE SUMMARY
Patient Name: Stefan Pedraza  MRN: [id number]  : 1940  DOS: 2019    Attending: No att. providers found    Primary Care Provider: Gamaliel Eaton MD    Date of Admission:.11/15/2019  7:30 AM    Date of Discharge:  2019    Discharge Diagnosis:   Status post total left knee replacement    Primary osteoarthritis of left knee    Atrial fibrillation (CMS/HCC)    Dyslipidemia    Acute postoperative pain    Leukocytosis, mild, likely reactive      Hospital Course  Patient is a 79 y.o. male presented for left total knee arthroplasty by Dr. Polanco.     He underwent surgery under spinal anesthesia. He tolerated surgery well and was admitted for further medical management. His knee has been painful for many years. He denies use of assistive device for ambulation or recent falls.     He has hx of afib and is followed by cardiology, Dr. Solis; he had preop cardiac clearance.     Patient was provided pain medications as needed for pain control, along with adductor canal nerve block infusion of Ropivacaine.    Adjustments were made to pain medications to optimize postop pain management. Risks and benefits of opiate medications discussed with patient.    He was seen by PT and OT and has progressed well over his stay.  He used an IS for atelectasis prophylaxis and aspirin along with mechanicals for DVT prophylaxis.  Home medications were resumed as appropriate, and labs were monitored and remained fairly stable.     With the progress he has made, he is ready for DC home today.    He will have an Arrow pump ( instructed on it during this admit).  Discussed with patient regarding plan and he shows understanding and agreement.    Patient will have HHPT following discharge.'      Procedures Performed  DATE OF PROCEDURE: 11/15/19     PREOPERATIVE DIAGNOSIS: left knee arthritis       POSTOPERATIVE DIAGNOSIS:  left knee arthritis     PROCEDURES PERFORMED:   left total knee arthroplasty with [employer]  "components (#7 posterior stabilized femur, #6 tibia, 11 mm polyethylene, with 35 three peg patella).      SURGEON: Gamaliel Polanco MD    Pertinent Test Results:    I reviewed the patient's new clinical results.   Results from last 7 days   Lab Units 19  0705   WBC 10*3/mm3 11.82*   HEMOGLOBIN g/dL 13.1   HEMATOCRIT % 39.3   PLATELETS 10*3/mm3 171     Results from last 7 days   Lab Units 19  0705   SODIUM mmol/L 140   POTASSIUM mmol/L 4.7   CHLORIDE mmol/L 103   CO2 mmol/L 25.0   BUN mg/dL 20   CREATININE mg/dL 0.83   CALCIUM mg/dL 9.0   GLUCOSE mg/dL 137*     I reviewed the patient's new imaging including images and reports.      Physical therapy: Patient ambulated 350 feet with RW and step through gait pattern, limited by pain. Climbed 3 steps with handrail on R ascending, with no LOB or unsteadiness with stairs. ROM progressing well, 15-95 degrees. Patient has been d/c home with family and outpatient PT.     Discharge Assessment:    Vital Signs  Visit Vitals  /50   Pulse 62   Temp 98.5 °F (36.9 °C) (Oral)   Resp 16   Ht 185.4 cm (73\")   Wt 101 kg (222 lb)   SpO2 95%   BMI 29.29 kg/m²     Temp (24hrs), Av °F (36.7 °C), Min:97.4 °F (36.3 °C), Max:98.5 °F (36.9 °C)      General Appearance:    Alert, cooperative, in no acute distress   Lungs:     Clear to auscultation,respirations regular, even and                   unlabored    Heart:    Regular rhythm and normal rate, normal S1 and S2   Abdomen:     Normal bowel sounds, no masses, no organomegaly, soft        non-tender, non-distended, no guarding, no rebound                 tenderness   Extremities:   Moves all extremities well, no edema, no cyanosis, no              Redness. Left knee stockinet CDI. Nerve block present   Pulses:   Pulses palpable and equal bilaterally   Skin:   No bleeding, bruising or rash   Neurologic:   Cranial nerves 2 - 12 grossly intact, sensation intact. Flexion and dorsiflexion intact bilateral feet.       Discharge " Disposition: Home    Discharge Medications     Discharge Medications      New Medications      Instructions Start Date   HYDROcodone-acetaminophen 5-325 MG per tablet  Commonly known as:  NORCO   1 tablet, Oral, Every 4 Hours PRN      STOOL SOFTENER 100 MG capsule  Generic drug:  docusate sodium   100 mg, Oral, 2 Times Daily PRN         Changes to Medications      Instructions Start Date   aspirin 81 MG EC tablet  What changed:  additional instructions   81 mg, Oral, Daily, Resume in 1 month      aspirin  MG tablet  What changed:  You were already taking a medication with the same name, and this prescription was added. Make sure you understand how and when to take each.   325 mg, Oral, Daily, For 1 month   Start Date:  11/17/2019        Continue These Medications      Instructions Start Date   atorvastatin 10 MG tablet  Commonly known as:  LIPITOR   10 mg, Oral, Nightly      cyclobenzaprine 10 MG tablet  Commonly known as:  FLEXERIL   cyclobenzaprine 10 mg tablet   one po Every night at bedtime prn      ferrous sulfate 324 (65 Fe) MG tablet delayed-release EC tablet   324 mg, Oral, Daily With Breakfast      fish oil 1000 MG capsule capsule   1,000 mg, Oral, Daily With Breakfast      flecainide 100 MG tablet  Commonly known as:  TAMBOCOR   TAKE 1 TABLET TWICE A DAY      metFORMIN  MG 24 hr tablet  Commonly known as:  GLUCOPHAGE-XR   500 mg, Oral, Nightly      tamsulosin 0.4 MG capsule 24 hr capsule  Commonly known as:  FLOMAX   1 capsule, Oral, Nightly         Stop These Medications    naproxen 500 MG tablet  Commonly known as:  NAPROSYN            Discharge Diet: regular diet    Activity at Discharge: WBAT E    Follow-up Appointments  Dr. Polanco per his orders      AYAAN Marinelli  11/16/19  5:28 PM

## 2019-11-16 NOTE — DISCHARGE PLACEMENT REQUEST
"Kay Rincon (79 y.o. Male)     Pt is in room 352 [facility]       Date of Birth Social Security Number Address Home Phone MRN    1940  [address] [address] [phone] [id number]    Zoroastrianism Marital Status          Rastafari        Admission Date Admission Type Admitting Provider Attending Provider Department, Room/Bed    11/15/19 Elective Gamaliel Polanco MD Kirk, Michael E, MD [facility] 3G, S352/1    Discharge Date Discharge Disposition Discharge Destination                       Attending Provider:  Gamaliel Polanco MD    Allergies:  Penicillins    Isolation:  None   Infection:  None   Code Status:  CPR    Ht:  185.4 cm (73\")   Wt:  101 kg (222 lb)    Admission Cmt:  None   Principal Problem:  Status post total left knee replacement [Z96.652]                 Active Insurance as of 11/15/2019     Primary Coverage     Payor Plan Insurance Group Employer/Plan Group    [facility] MEDICARE REPLACEMENT [facility] [id number]     Payor Plan Address Payor Plan Phone Number Payor Plan Fax Number Effective Dates    [address] [address]   2016 - None Entered    [facility] [id number]       Subscriber Name Subscriber Birth Date Member ID       KAY RINCON 1940 [id number]                 Emergency Contacts      (Rel.) Home Phone Work Phone Mobile Phone    Kendrick Rincon (Spouse) [phone] -- [phone]        [address] [address]  Dept. Phone:  [phone]  Dept. Fax:   Date Ordered: 2019         Patient:  Kay Rincon MRN:  [id number]   [address] [address] :  1940  SSN:    Phone: [phone] Sex:  M     Weight: 101 kg (222 lb)         Ht Readings from Last 1 Encounters:   11/15/19 185.4 cm (73\")         Walker               (Order ID: [id number])    Diagnosis:  Primary osteoarthritis of left knee (M17.12 [ICD-10-CM] " 715.16 [ICD-9-CM])  Status post total left knee replacement (Z96.652 [ICD-10-CM] V43.65 [ICD-9-CM])   Quantity:  1     Equipment:  Walker Folding with Wheels  Length of Need (99 Months = Lifetime): 12 Months        Authorizing Provider's Phone: [phone]   Verbal Order Mode: Telephone with readback   Authorizing Provider: Sj Maya MD  Authorizing Provider's NPI: [id number]     Order Entered By: Zara Vieyra RN 2019 10:02 AM            Insurance Information                [facility] MEDICARE REPLACEMENT/[facility] Phone:     Subscriber: Stefan Pedraza Subscriber#: [id number]    Group#: [id number] Precert#:              History & Physical      Sj Maya MD at 11/15/19 1436          Patient Name: Stefan Pedraza  MRN: [id number]  : 1940  DOS: 11/15/2019    Attending: Gamaliel Polanco MD    Primary Care Provider: Gamaliel Eaton MD      Chief complaint:  Left knee pain    Subjective   Patient is a 79 y.o. male presented for left total knee arthroplasty by Dr. Polanco.    He underwent surgery under spinal anesthesia. He tolerated surgery well and is admitted for further medical management. His knee has been painful for many years. He denies use of assistive device for ambulation or recent falls.    When seen in PACU he is doing well. His pain is well controlled. He denies nausea, shortness of breath or chest pain. No hx of DVT or PE.    He has hx of afib and is followed by cardiology, Dr. Solis; he had preop cardiac clearance.     (Above is noted, agree.  Seen in his room afterwards.  I observed him ambulate with physical therapy where he did quite well ambulated 320 feet with contact-guard assist of 2 and front-wheeled walker.  When seen afterwards he is resting in his recliner.  Good pain control.  No complaints of nausea, vomiting, or shortness of breath.  He has already voided.)wy    Allergies:  Allergies   Allergen Reactions   • Penicillins Hives  and Rash       Meds:  Medications Prior to Admission   Medication Sig Dispense Refill Last Dose   • aspirin 81 MG EC tablet Take 81 mg by mouth Daily.   11/15/2019 at 0500   • atorvastatin (LIPITOR) 10 MG tablet Take 10 mg by mouth Every Night.   11/14/2019 at 2100   • Chlorhexidine Gluconate 4 % solution Shower with solution as directed for 5 days prior to surgery 237 mL 0 11/15/2019 at Unknown time   • flecainide (TAMBOCOR) 100 MG tablet TAKE 1 TABLET TWICE A  tablet 3 11/15/2019 at 0500   • metFORMIN ER (GLUCOPHAGE-XR) 500 MG 24 hr tablet Take 500 mg by mouth Every Night.   11/14/2019 at 2000   • naproxen (NAPROSYN) 500 MG tablet Take 500 mg by mouth 2 (Two) Times a Day With Meals.   Past Week at Unknown time   • Omega-3 Fatty Acids (FISH OIL) 1000 MG capsule capsule Take 1,000 mg by mouth Daily With Breakfast.   Past Week at Unknown time   • tamsulosin (FLOMAX) 0.4 MG capsule 24 hr capsule Take 1 capsule by mouth Every Night. 30 capsule 11 Past Week at Unknown time   • cyclobenzaprine (FLEXERIL) 10 MG tablet cyclobenzaprine 10 mg tablet   one po Every night at bedtime prn   More than a month at Unknown time   • ferrous sulfate 324 (65 FE) MG tablet delayed-release EC tablet Take 324 mg by mouth Daily With Breakfast.   11/10/2019       History:   Past Medical History:   Diagnosis Date   • Arthritis    • Atrial fibrillation (CMS/HCC)    • Cataract     both- surgery done    • Diabetes mellitus (CMS/HCC)     prediabetic- checks sugar every other day    • Dyslipidemia    • GERD (gastroesophageal reflux disease)    • H/O cardiovascular stress test 08/23/2007    Revealing no significant ischemia, EF estimated at 46%   • H/O echocardiogram 02/02/2011    Revealing normal LV function, diastolic dysfunction noted, mild MR, nopericardial effusion.   • H/O transesophageal echocardiography (BRISSA) for monitoring     left ventricular ejection fraction of 55%, mild TR and MR   • History of cardioversion 2007    STATUS-POST  SUCCESSFUL EXTERNAL CARDIOVERSION   • History of cardioversion 02/17/2012    external cardioversion to normal sinus rhythm   • History of Holter monitoring 01/25/2011    Revealing sinus rhythm with PAC/PVC/episodes of sinus bradycardia.   • History of shingles     8-10 years ago    • [place] (hard of hearing)     has hearing aides   • Hyperlipidemia    • Osteoarthritis    • PONV (postoperative nausea and vomiting)     only with ether   • Pre-diabetes    • Prostate cancer (CMS/HCC)    • Sleep apnea with use of continuous positive airway pressure (CPAP)    • Sleep apnea with use of continuous positive airway pressure (CPAP)     compliant with machine    • Status post cystoscopy 08/28/2018   • Wears glasses    • Wears glasses      Past Surgical History:   Procedure Laterality Date   • ATRIAL APPENDAGE EXCLUSION LEFT WITH TRANSESOPHAGEAL ECHOCARDIOGRAM N/A 7/16/2019    Procedure: Atrial Appendage Occlusion ([place]), anticoagulation will be determined after pre-BRISSA;  Surgeon: Prabhakar Solis MD;  Location: [facility] INVASIVE LOCATION;  Service: Cardiology   • BELPHAROPTOSIS REPAIR      bilat    • CARDIAC ABLATION  2011   • CATARACT EXTRACTION      bilat    • CHOLECYSTECTOMY     • COLONOSCOPY      2018   • CYST REMOVAL      on back and back of neck   • KNEE ARTHROSCOPY W/ PARTIAL MEDIAL MENISCECTOMY Left    • ORCHIECTOMY Right    • SHOULDER ARTHROSCOPY W/ LABRAL REPAIR Right    • TONSILLECTOMY AND ADENOIDECTOMY     • WISDOM TOOTH EXTRACTION      all 4 removed      Family History   Problem Relation Age of Onset   • Arthritis Mother    • Pneumonia Mother    • Atrial fibrillation Father    • Hypertension Father    • Diabetes Father    • Heart disease Father    • No Known Problems Sister    • No Known Problems Maternal Grandmother    • Diabetes Maternal Grandfather    • No Known Problems Paternal Grandmother    • No Known Problems Paternal Grandfather      Social History     Tobacco Use   • Smoking status: Never Smoker   •  "Smokeless tobacco: Never Used   Substance Use Topics   • Alcohol use: No   • Drug use: No   He is  with 3 children. He is retired teacher and principle.(and superintendent)    Review of Systems  All systems were reviewed and negative except for:  Gastrointestinal: positive for  constipation and diarrhea    Vital Signs  Visit Vitals  /90 (BP Location: Right arm, Patient Position: Lying)   Pulse 55   Temp 97.9 °F (36.6 °C) (Oral)   Resp 16   Ht 185.4 cm (73\")   Wt 101 kg (222 lb)   SpO2 96%   BMI 29.29 kg/m²       Physical Exam:    General Appearance:    Alert, cooperative, in no acute distress   Head:    Normocephalic, without obvious abnormality, atraumatic   Eyes:            Lids and lashes normal, conjunctivae and sclerae normal, no   icterus, no pallor, corneas clear   Ears:    Ears appear intact with no abnormalities noted   Neck:   No adenopathy, supple, trachea midline, no thyromegaly   Lungs:     Clear to auscultation, respirations regular, even and                   unlabored    Heart:    Regular rhythm and normal rate, normal S1 and S2, no            murmur, no gallop   Abdomen:     Normal bowel sounds, no masses, no organomegaly, soft        non-tender, non-distended, no guarding, no rebound                 tenderness   Genitalia:    Deferred   Extremities:   Left knee ACE wrap CDI. Nerve block present   Pulses:   Pulses palpable and equal bilaterally   Skin:   No bleeding, bruising or rash   Neurologic:   Cranial nerves 2 - 12 grossly intact, sensation intact. Flexion and dorsiflexion intact bilateral feet.        I reviewed the patient's new clinical results.     Results for KAY RINCON (MRN [id number]) as of 11/15/2019 16:42   Ref. Range 11/5/2019 14:52   Glucose Latest Ref Range: 65 - 99 mg/dL 117 (H)   Sodium Latest Ref Range: 136 - 145 mmol/L 140   Potassium Latest Ref Range: 3.5 - 5.2 mmol/L 3.9   CO2 Latest Ref Range: 22.0 - 29.0 mmol/L 27.0   Chloride Latest Ref Range: 98 - " 107 mmol/L 103   Anion Gap Latest Ref Range: 5.0 - 15.0 mmol/L 10.0   Creatinine Latest Ref Range: 0.76 - 1.27 mg/dL 0.82   BUN Latest Ref Range: 8 - 23 mg/dL 15   BUN/Creatinine Ratio Latest Ref Range: 7.0 - 25.0  18.3   Calcium Latest Ref Range: 8.6 - 10.5 mg/dL 9.1   eGFR Non  Am Latest Ref Range: >60 mL/min/1.73 91   Hemoglobin A1C Latest Ref Range: 4.80 - 5.60 % 5.40   C-Reactive Protein Latest Ref Range: 0.00 - 0.50 mg/dL 0.05   Protime Latest Ref Range: 11.2 - 14.3 Seconds 13.9   INR Latest Ref Range: 0.85 - 1.16  1.12   PTT Latest Ref Range: 24.0 - 37.0 seconds 30.3   WBC Latest Ref Range: 3.40 - 10.80 10*3/mm3 4.10   RBC Latest Ref Range: 4.14 - 5.80 10*6/mm3 4.40   Hemoglobin Latest Ref Range: 13.0 - 17.7 g/dL 13.8   Hematocrit Latest Ref Range: 37.5 - 51.0 % 41.6   RDW Latest Ref Range: 12.3 - 15.4 % 12.7   MCV Latest Ref Range: 79.0 - 97.0 fL 94.5   MCH Latest Ref Range: 26.6 - 33.0 pg 31.4   MCHC Latest Ref Range: 31.5 - 35.7 g/dL 33.2   MPV Latest Ref Range: 6.0 - 12.0 fL 10.3   Platelets Latest Ref Range: 140 - 450 10*3/mm3 152     Assessment and Plan:     Status post total left knee replacement    Primary osteoarthritis of left knee    Atrial fibrillation (CMS/HCC)    Dyslipidemia      Plan  1. PT/OT- early ambulation postop  2. Pain control-prns, AC nerve block   3. IS-encourage  4. DVT proph- mechs/ASA  5. Bowel regimen  6. Resume home medications as appropriate  7. Monitor post-op labs  8. Discharge planning     Afib, hyperlipidemia  - continue home tambocor and statin      AYAAN Marinelli  11/15/19  4:42 PM     I have personally performed the evaluation on this patient. My history is consistent  with HPI obtained. My exam findings are listed above. I have personally reviewed and discussed the above formulated treatment plan with pt, wife, and AH.[email or web address].[email or web address].      Electronically signed by Sj Maya MD at 11/15/19 8611     Gamaliel Polanco MD at 11/15/19 3035          Pre-Op  H&P  Stefan Pedraza  [id number]  1940    Chief complaint: Left knee pain    HPI:    10/14/19 office visit:  Stefan Pedraza is a 79 y.o. male.  He presents today for evaluation of left knee pain.  He has had pain for several years, which has been worsening, to the point where he would like to consider knee replacement surgery.  He is had previous injections without long-term benefit.  Pain is 7 out of 10, stabbing in quality, located along the medial aspect of the knee, worse with walking, climbing stairs as well as with work and leisure activities.  Pain is associated with giving way.  He has tried bracing, anti-inflammatories, and weight loss.  No history of clots nor clotting disorders.  Previous knee arthroscopy with Dr. Jose Manuel Queen in 2010.    11/15/19:  Here today to undergo left total knee arthroplasty    Review of Systems:  General ROS: negative for chills, fever or skin lesions;  No changes since last office visit  Cardiovascular ROS: no chest pain or dyspnea on exertion.  History of afib s/p UMU exclusion.  Follows with Dr. Solis with last visit 11/6/19 with no new cardiac symptoms  Respiratory ROS: no cough, shortness of breath, or wheezing    Allergies:   Allergies   Allergen Reactions   • Penicillins Hives and Rash       Home Meds:    No current facility-administered medications on file prior to encounter.      Current Outpatient Medications on File Prior to Encounter   Medication Sig Dispense Refill   • aspirin 81 MG EC tablet Take 81 mg by mouth Daily.     • atorvastatin (LIPITOR) 10 MG tablet Take 10 mg by mouth Every Night.     • Chlorhexidine Gluconate 4 % solution Shower with solution as directed for 5 days prior to surgery 237 mL 0   • flecainide (TAMBOCOR) 100 MG tablet TAKE 1 TABLET TWICE A  tablet 3   • metFORMIN ER (GLUCOPHAGE-XR) 500 MG 24 hr tablet Take 500 mg by mouth Every Night.     • naproxen (NAPROSYN) 500 MG tablet Take 500 mg by mouth 2 (Two) Times a Day With  Meals.     • Omega-3 Fatty Acids (FISH OIL) 1000 MG capsule capsule Take 1,000 mg by mouth Daily With Breakfast.     • tamsulosin (FLOMAX) 0.4 MG capsule 24 hr capsule Take 1 capsule by mouth Every Night. 30 capsule 11   • cyclobenzaprine (FLEXERIL) 10 MG tablet cyclobenzaprine 10 mg tablet   one po Every night at bedtime prn     • ferrous sulfate 324 (65 FE) MG tablet delayed-release EC tablet Take 324 mg by mouth Daily With Breakfast.         PMH:   Past Medical History:   Diagnosis Date   • Arthritis    • Atrial fibrillation (CMS/HCC)    • Cataract     both- surgery done    • Diabetes mellitus (CMS/HCC)     prediabetic- checks sugar every other day    • Dyslipidemia    • GERD (gastroesophageal reflux disease)    • H/O cardiovascular stress test 08/23/2007    Revealing no significant ischemia, EF estimated at 46%   • H/O echocardiogram 02/02/2011    Revealing normal LV function, diastolic dysfunction noted, mild MR, nopericardial effusion.   • H/O transesophageal echocardiography (BRISSA) for monitoring     left ventricular ejection fraction of 55%, mild TR and MR   • History of cardioversion 2007    STATUS-POST SUCCESSFUL EXTERNAL CARDIOVERSION   • History of cardioversion 02/17/2012    external cardioversion to normal sinus rhythm   • History of Holter monitoring 01/25/2011    Revealing sinus rhythm with PAC/PVC/episodes of sinus bradycardia.   • History of shingles     8-10 years ago    • [place] (hard of hearing)     has hearing aides   • Hyperlipidemia    • Osteoarthritis    • PONV (postoperative nausea and vomiting)     only with ether   • Pre-diabetes    • Prostate cancer (CMS/HCC)    • Sleep apnea with use of continuous positive airway pressure (CPAP)    • Sleep apnea with use of continuous positive airway pressure (CPAP)     compliant with machine    • Status post cystoscopy 08/28/2018   • Wears glasses    • Wears glasses      PSH:    Past Surgical History:   Procedure Laterality Date   • ATRIAL APPENDAGE  "EXCLUSION LEFT WITH TRANSESOPHAGEAL ECHOCARDIOGRAM N/A 7/16/2019    Procedure: Atrial Appendage Occlusion ([place]), anticoagulation will be determined after pre-BRISSA;  Surgeon: Prabhakar Solis MD;  Location: [facility] INVASIVE LOCATION;  Service: Cardiology   • BELPHAROPTOSIS REPAIR      bilat    • CARDIAC ABLATION  2011   • CATARACT EXTRACTION      bilat    • CHOLECYSTECTOMY     • COLONOSCOPY      2018   • CYST REMOVAL      on back and back of neck   • KNEE ARTHROSCOPY W/ PARTIAL MEDIAL MENISCECTOMY Left    • ORCHIECTOMY Right    • SHOULDER ARTHROSCOPY W/ LABRAL REPAIR Right    • TONSILLECTOMY AND ADENOIDECTOMY     • WISDOM TOOTH EXTRACTION      all 4 removed      Social History:   Tobacco:   Social History     Tobacco Use   Smoking Status Never Smoker   Smokeless Tobacco Never Used      Alcohol:     Social History     Substance and Sexual Activity   Alcohol Use No       Vitals:           /95 (BP Location: Right arm, Patient Position: Lying)   Pulse 50   Temp 98.2 °F (36.8 °C) (Temporal)   Resp 18   Ht 185.4 cm (73\")   Wt 101 kg (222 lb)   SpO2 96%   BMI 29.29 kg/m²      Physical Exam:  General Appearance:    Alert, cooperative, no distress, appears stated age   Head:    Normocephalic, without obvious abnormality, atraumatic   Lungs:     Clear to auscultation bilaterally, respirations unlabored    Heart:   Regular rate and rhythm, S1 and S2 normal, no murmur, rub    or gallop    Abdomen:    Soft, non-tender.  +bowel sounds   Breast Exam:    deferred   Genitalia:    deferred   Extremities:   Extremities normal, atraumatic, no cyanosis or edema   Skin:   Skin color, texture, turgor normal, no rashes or lesions   Neurologic:   Grossly intact   Results Review  LABS:  Lab Results   Component Value Date    WBC 4.10 11/05/2019    HGB 13.8 11/05/2019    HCT 41.6 11/05/2019    MCV 94.5 11/05/2019     11/05/2019    NEUTROABS 1.92 11/05/2019    GLUCOSE 117 (H) 11/05/2019    BUN 15 11/05/2019    " CREATININE 0.82 11/05/2019    EGFRIFNONA 91 11/05/2019     11/05/2019    K 3.9 11/05/2019     11/05/2019    CO2 27.0 11/05/2019    CALCIUM 9.1 11/05/2019       RADIOLOGY:  Imaging Results (Last 72 Hours)     ** No results found for the last 72 hours. **          I reviewed the patient's new clinical results.    Cancer Staging (if applicable)  Cancer Patient: __ yes _x_no __unknown; If yes, clinical stage T:__ N:__M:__, stage group or __N/A    Impression/Plan:      He has advanced left knee osteoarthritis, and has exhausted conservative treatment options.  He would like to proceed with knee replacement surgery, after discussion the risk, benefits, and alternatives.  Please see my counseling note for details.       Surgical Counseling      I have informed the patient of the diagnosis and the prognosis.  Exhaustive conservative treatment modalities have not resulted in long term pain relief.  The symptoms have progressed to the point of daily pain and inability to perform activities of daily living without significant pain. The patient has reached the point of desiring to proceed with total knee arthroplasty after discussing the risks, benefits and alternatives to the procedure.  The surgical procedure itself was discussed in detail. Risks of the procedure were discussed, which included but are not limited to, bleeding, infection, damage to blood vessels and nerves, incomplete pain relief, loosening of the prosthesis, deep infection, need for further surgery, loss of limb, deep venous thrombosis, pulmonary embolus, death, heart attack, stroke, kidney failure, liver failure, and anesthetic complications.  In addition, the potential for deep infection developing in the future was discussed, which could require further surgery.  The knee would have to be re-opened, debrided, and potentially remove the prosthesis, which may or may not be replaced in the future.  Also, the possibility for loosening of the  prosthesis has been mentioned.  If the prosthesis loosened, a revision arthroplasty could be performed, with results that are not as predictable compared to the original procedure.  The typical rehabilitative course has also been discussed, and full recovery may take up to a year to see the maximum benefit.  The importance of patient cooperation in the rehabilitative efforts has also been discussed.  No guarantees whatsoever were given.  The patient understands the potential risks versus the benefits and desires to proceed with total knee arthroplasty    Sharlene Busby, AYAAN   11/15/2019   8:29 AM     Agree with above - plan for left TKA    Gamaliel Polanco MD  11/15/19  8:32 AM      Electronically signed by Gamaliel Polanco MD at 11/15/19 0832       Physician Progress Notes (last 24 hours) (Notes from 11/15/19 1011 through 11/16/19 1011)     No notes of this type exist for this encounter.

## 2019-11-17 NOTE — PROGRESS NOTES
RAMBO Alicia    Nerve Cath Post Op Call    Patient Name: Stefan Pedraza  :  1940  MRN:  [id number]  Date of Discharge: 2019    Nerve Cath Post Op Call:    Analgesia:Good  Side Effects:None  Catheter Site:clean  Patient Controlled ON Q pump infusion rate: 10ml/hr  Catheter Plan:Will continue with plan at home without changes and The patient was instructed to call ON CALL Anesthesia provider for any questions or problems

## 2019-11-19 ENCOUNTER — TREATMENT (OUTPATIENT)
Dept: PHYSICAL THERAPY | Facility: CLINIC | Age: 79
End: 2019-11-19

## 2019-11-19 ENCOUNTER — TRANSCRIBE ORDERS (OUTPATIENT)
Dept: PHYSICAL THERAPY | Facility: CLINIC | Age: 79
End: 2019-11-19

## 2019-11-19 DIAGNOSIS — M25.562 LEFT KNEE PAIN, UNSPECIFIED CHRONICITY: Primary | ICD-10-CM

## 2019-11-19 PROCEDURE — 97161 PT EVAL LOW COMPLEX 20 MIN: CPT | Performed by: PHYSICAL THERAPIST

## 2019-11-19 NOTE — PROGRESS NOTES
RAMBO Alicia    Nerve Cath Post Op Call    Patient Name: Stefan Pedraza  :  1940  MRN:  [id number]  Date of Discharge: 2019    Nerve Cath Post Op Call:    Catheter Plan:Patient/Family member report nerve catheter previously discontinued, tip intact

## 2019-11-19 NOTE — PROGRESS NOTES
Physical Therapy Initial Evaluation and Plan of Care      Patient: Stefan Pedraza   : 1940  Diagnosis/ICD-10 Code:  The encounter diagnosis was Left knee pain, unspecified chronicity.   Referring practitioner: Sj Maya MD  Date of Initial Visit: Type: THERAPY  Noted: 2019  Today's Date: 2019  Patient seen for 1 sessions         Stefan Pedraza reports: L TKA 11-15-19  Subjective Questionnaire: LEFS: 31    Subjective Evaluation    History of Present Illness  Date of surgery: 11/15/2019  Mechanism of injury: Pt had L TKA 4 days ago.  He elected to proceed directly to outpatient rehab.  He presents today to begin evaluation and treatment.      Subjective comment: L TKA on 11-15-19  Patient Occupation: Retired Pain  Current pain ratin  At best pain ratin  At worst pain rating: 10  Location: L lknee  Relieving factors: rest and medications    Social Support  Lives in: multiple-level home  Lives with: spouse    Hand dominance: right    Treatments  Discharged from (in last 30 days): inpatient hospitalization  Patient Goals  Patient goals for therapy: increased strength, increased motion and return to sport/leisure activities  Patient goal: Wants to resume playing pickle ball.         Treatment  Exercise 1  Exercise Name 1: Initial HEP with illustrations provided.  Exercise 2  Exercise Name 2: Pt education regarding use of ice and elevation to reduce swelling.             Objective       Observations   Left Knee   Positive for edema.     Active Range of Motion   Left Knee   Flexion: 104 degrees   Extension: 8 degrees     Additional Active Range of Motion Details  10 degree extensor lag with LAQ    Patellar Mobility   Left Knee Patellar tendons within functional limits include the medial, lateral, superior and inferior.     Strength/Myotome Testing     Left Knee   Flexion: 3+  Extension: 3-    Right Knee   Flexion: 5  Extension: 5    Ambulation     Comments   Pt presents using  straight cane which is too short for him.  Cane was adjusted to correct height.  Pt demonstrates mildly antalgic gait with cane.  He has a walker at home.    General Comments     Knee Comments  Girth largest calf  R 38.7  L 41.3    Malleolar  R 27  L 30         Assessment & Plan     Assessment  Impairments: abnormal gait, abnormal muscle firing, abnormal or restricted ROM, activity intolerance, impaired physical strength, lacks appropriate home exercise program, pain with function and weight-bearing intolerance  Assessment details: Pt is recovering well 4 days post L TKA.  He will benefit from PT intervention to addresspain, swelling, and noted deficits in ROM, strength, and functional mobility.  Prognosis: good  Functional Limitations: sleeping, walking, uncomfortable because of pain, moving in bed and standing  Goals  Plan Goals: Pt. demonstrates independence and compliance with initial HEP.  Pt. reports reduction in pain intensity to no worse than 6/10 on NPRS.  AROM of L knee shows improvement over baseline measures.  Functional outcome measure is improved by 10 points.  LLE girth is decreased by 1 cm compared to baseline measures.    Pt. demonstrates independence in advanced HEP for ongoing improvement.  AROM of L knee  is sufficient for performance of daily activities.  L LE strength is improved to 4+/5 or better to allow participation in desired activities.  Functional outcome measure is improved to 50/80 points.          Plan  Therapy options: will be seen for skilled physical therapy services  Planned modality interventions: cryotherapy, thermotherapy (hydrocollator packs) and electrical stimulation/Russian stimulation  Planned therapy interventions: manual therapy, balance/weight-bearing training, neuromuscular re-education, flexibility, gait training, functional ROM exercises, home exercise program, joint mobilization, therapeutic activities, stretching, strengthening and soft tissue  mobilization  Frequency: 2x week  Duration in visits: 12  Treatment plan discussed with: patient  Plan details: PT per POC.        Timed:  Manual Therapy:         mins  03132;  Therapeutic Exercise:         mins  42060;     Neuromuscular James:        mins  70574;    Therapeutic Activity:          mins  10738;     Gait Training:           mins  33228;     Ultrasound:          mins  07741;    Electrical Stimulation:         mins  79237 ( );    Untimed:  Electrical Stimulation:         mins  65875 ( );  Mechanical Traction:         mins  30214;     Timed Treatment:      mins   Total Treatment:     40   mins    PT SIGNATURE: Cynthia German, RAJWINDER   DATE TREATMENT INITIATED: 11/19/2019    Initial Certification  Certification Period: 2/17/2020  I certify that the therapy services are furnished while this patient is under my care.  The services outlined above are required by this patient, and will be reviewed every 90 days.     PHYSICIAN: Sj Maya MD      DATE:     Please sign and return via fax to  .. Thank you, [facility].

## 2019-11-21 ENCOUNTER — TREATMENT (OUTPATIENT)
Dept: PHYSICAL THERAPY | Facility: CLINIC | Age: 79
End: 2019-11-21

## 2019-11-21 DIAGNOSIS — M25.562 LEFT KNEE PAIN, UNSPECIFIED CHRONICITY: Primary | ICD-10-CM

## 2019-11-21 PROCEDURE — 97110 THERAPEUTIC EXERCISES: CPT | Performed by: PHYSICAL THERAPIST

## 2019-11-21 NOTE — PROGRESS NOTES
Physical Therapy Daily Progress Note  VISIT: 2      Stefan Pedraza reports: minimal discomfort upon arrival today.    Subjective     Treatment  Pre-treatment pain:  2  Post-treatment pain:  2  Exercise 1  Exercise Name 1: Nu-Step  Equipment/Resistance 1: L6  Time: 5'  Exercise 2  Exercise Name 2: glider knee flex/ext in sitting  Exercise 3  Exercise Name 3: LAQ  Exercise 4  Exercise Name 4: seated HS stretch  Exercise 5  Exercise Name 5: seated quad set  Exercise 6  Exercise Name 6: Heel slides with glider    Manual Rx 1  Manual Rx 1 Location: L knee  Manual Rx 1 Type: patellar mobilization, passive knee extension        Objective     Assessment & Plan     Assessment  Assessment details: Pt is recovering very well 6 days post L TKA.  He arrived to treatment today without AD.  Gait is slow and steady, but without significant antalgic compensation.    Plan  Plan details: Continue and progress as tolerated.               Timed:  Manual Therapy:         mins  78180;  Therapeutic Exercise:    20     mins  68245;     Neuromuscular James:        mins  61219;    Therapeutic Activity:          mins  72189;     Gait Training:           mins  09683;     Ultrasound:          mins  84446;    Electrical Stimulation:         mins  07086 ( );    Untimed:  Electrical Stimulation:         mins  63757 ( );  Mechanical Traction:         mins  36374;     Timed Treatment:  20    mins   Total Treatment:   20     mins      Cynthia German, PT  Physical Therapist

## 2019-11-25 ENCOUNTER — TREATMENT (OUTPATIENT)
Dept: PHYSICAL THERAPY | Facility: CLINIC | Age: 79
End: 2019-11-25

## 2019-11-25 DIAGNOSIS — M25.562 LEFT KNEE PAIN, UNSPECIFIED CHRONICITY: Primary | ICD-10-CM

## 2019-11-25 PROCEDURE — 97110 THERAPEUTIC EXERCISES: CPT | Performed by: PHYSICAL THERAPIST

## 2019-11-25 NOTE — PROGRESS NOTES
"   Physical Therapy Daily Progress Note  VISIT: 3      Stefan Pedraza reports: mild discomfort upon arrival today.  He has periods of intense pain and also has times when he does not have much pain.    Subjective     Treatment  Pre-treatment pain:  3  Post-treatment pain:  2  Exercise 1  Exercise Name 1: L1  Sets/Reps 1: 5'  Exercise 2  Exercise Name 2: glider knee flex/ext in sitting  Exercise 3  Exercise Name 3: LAQ  Exercise 4  Exercise Name 4: seated HS stretch with strap  Exercise 5  Exercise Name 5: sidestepping  Exercise 6  Exercise Name 6: high march  Exercise 7  Exercise Name 7: 4\" step up  Exercise 8  Exercise Name 8: 4\" step lunge    Manual Rx 1  Manual Rx 1 Location: L knee  Manual Rx 1 Type: patellar mobilization, passive knee extension        Objective     Assessment & Plan     Assessment  Assessment details: Pt continues to do well in early stages of recovery.    Plan  Plan details: Continue PT.  Progress as tolerated.               Timed:  Manual Therapy:    5     mins  21043;  Therapeutic Exercise:    25     mins  61108;     Neuromuscular James:        mins  99465;    Therapeutic Activity:          mins  63510;     Gait Training:           mins  28501;     Ultrasound:          mins  04619;    Electrical Stimulation:         mins  85674 ( );    Untimed:  Electrical Stimulation:         mins  39835 ( );  Mechanical Traction:         mins  49024;     Timed Treatment:   30   mins   Total Treatment:     30   mins      Cynthia German, PT  Physical Therapist                    "

## 2019-11-27 ENCOUNTER — TELEPHONE (OUTPATIENT)
Dept: ORTHOPEDIC SURGERY | Facility: CLINIC | Age: 79
End: 2019-11-27

## 2019-11-27 RX ORDER — TAMSULOSIN HYDROCHLORIDE 0.4 MG/1
1 CAPSULE ORAL NIGHTLY
Qty: 90 CAPSULE | Refills: 3 | Status: SHIPPED | OUTPATIENT
Start: 2019-11-27 | End: 2020-03-10

## 2019-11-27 NOTE — ANESTHESIA POSTPROCEDURE EVALUATION
Patient: Stefan Pedraza    Procedure Summary     Date:  11/15/19 [facility] / [facility]:   [facility]    Anesthesia Start:  0934 Anesthesia Stop:  1158    Procedure:  TOTAL KNEE ARTHROPLASTY LEFT (Left Knee) Diagnosis:       Primary osteoarthritis of left knee      (Primary osteoarthritis of left knee [M17.12])    Surgeon:  Gamaliel Polanco MD Provider:  Rob Fischer Jr., MD    Anesthesia Type:  spinal, MAC, regional ASA Status:  3          Anesthesia Type: spinal, MAC, regional  Last vitals  BP   113/50 (11/16/19 0849)   Temp   98.5 °F (36.9 °C) (11/16/19 0713)   Pulse   62 (11/16/19 0849)   Resp   16 (11/16/19 0713)     SpO2   95 % (11/16/19 0713)     Post Anesthesia Care and Evaluation    Patient location during evaluation: PACU  Patient participation: complete - patient participated  Level of consciousness: awake and alert  Pain score: 0  Pain management: adequate  Airway patency: patent  Anesthetic complications: No anesthetic complications  PONV Status: none  Cardiovascular status: hemodynamically stable and acceptable  Respiratory status: nonlabored ventilation, acceptable and nasal cannula  Hydration status: acceptable

## 2019-11-27 NOTE — TELEPHONE ENCOUNTER
I spoke with Dr. Polanco  he said it would be fine for Mr. Pedraza to get a flu shot post his surgery. I called Mr. Pedraza and let him know he understood.

## 2019-12-02 ENCOUNTER — TREATMENT (OUTPATIENT)
Dept: PHYSICAL THERAPY | Facility: CLINIC | Age: 79
End: 2019-12-02

## 2019-12-02 DIAGNOSIS — M25.562 LEFT KNEE PAIN, UNSPECIFIED CHRONICITY: Primary | ICD-10-CM

## 2019-12-02 PROCEDURE — 97140 MANUAL THERAPY 1/> REGIONS: CPT | Performed by: PHYSICAL THERAPIST

## 2019-12-02 PROCEDURE — 97110 THERAPEUTIC EXERCISES: CPT | Performed by: PHYSICAL THERAPIST

## 2019-12-02 NOTE — PROGRESS NOTES
"   Physical Therapy Daily Progress Note  VISIT: 4      Stefan Pedraza reports: he is doing well overall.  He has mild discomfort.    Subjective     Treatment  Pre-treatment pain:  2  Post-treatment pain:  2  Exercise 1  Exercise Name 1: L1  Sets/Reps 1: 5'  Exercise 2  Exercise Name 2: incline board calf stretch  Exercise 3  Exercise Name 3: 4\" step up/down  Exercise 4  Exercise Name 4: BOSU lunges  Exercise 5  Exercise Name 5: sidestepping  Exercise 6  Exercise Name 6: high march  Exercise 7  Exercise Name 7: closed chain quad set with ball on wall  Exercise 8  Exercise Name 8: bar squats  Exercise 9  Exercise Name 9: HEP progression    Manual Rx 1  Manual Rx 1 Location: L knee  Manual Rx 1 Type: patellar mobilization, passive/AA knee extension, passive/AA knee flexion        Objective       Active Range of Motion   Left Knee   Flexion: 125 degrees   Extension: 4 degrees        Assessment & Plan     Assessment  Assessment details: Pt continues to show steady progress post L TKA.      Plan  Plan details: Continue PT.  Progress as tolerated, including stair climbing.               Timed:  Manual Therapy:    5     mins  35678;  Therapeutic Exercise:    35     mins  45324;     Neuromuscular James:        mins  86762;    Therapeutic Activity:          mins  26062;     Gait Training:           mins  41497;     Ultrasound:          mins  53874;    Electrical Stimulation:         mins  37233 ( );    Untimed:  Electrical Stimulation:         mins  30095 ( );  Mechanical Traction:         mins  19556;     Timed Treatment:   40   mins   Total Treatment:     40   mins      Cynthia German, PT  Physical Therapist                    "

## 2019-12-05 ENCOUNTER — TREATMENT (OUTPATIENT)
Dept: PHYSICAL THERAPY | Facility: CLINIC | Age: 79
End: 2019-12-05

## 2019-12-05 DIAGNOSIS — M25.562 LEFT KNEE PAIN, UNSPECIFIED CHRONICITY: Primary | ICD-10-CM

## 2019-12-05 PROCEDURE — 97110 THERAPEUTIC EXERCISES: CPT | Performed by: PHYSICAL THERAPIST

## 2019-12-05 NOTE — PROGRESS NOTES
"   Physical Therapy Daily Progress Note  VISIT: 5      Stefan Pedraza reports: minimal L knee aching    Subjective     Treatment  Pre-treatment pain:  2  Post-treatment pain:  2  Exercise 1  Exercise Name 1: Bicycle   Equipment/Resistance 1: L1  Sets/Reps 1: 5'  Exercise 2  Exercise Name 2: seated calf stretch with strap  Exercise 3  Exercise Name 3: 6\" step up/down  Exercise 4  Exercise Name 4: straddle step/tap  Equipment/Resistance 4: 6\" step  Exercise 5  Exercise Name 5: Total Gym squats  Exercise 6  Exercise Name 6: high march  Exercise 7  Exercise Name 7: Closed chain TKE  Equipment/Resistance 7: blue theraband  Exercise 8  Exercise Name 8: bar squats  Exercise 9  Exercise Name 9: standing hip abduction  Exercise 10  Exercise Name 10: standing hip extension  Exercise 11  Exercise Name 11: stairs  Sets/Reps 11: 1 flight  Exercise 12  Exercise Name 12: heel slide with strap assist             Objective       Active Range of Motion   Left Knee   Flexion: 120 degrees   Extension: 1 degrees        Assessment & Plan     Assessment  Assessment details: Pt is progressing well with L knee ROM and functional mobility skills.    Plan  Plan details: Continue PT.  Progress exercise and functional mobility as tolerated.               Timed:  Manual Therapy:    5     mins  10297;  Therapeutic Exercise:    25     mins  17949;     Neuromuscular James:        mins  44679;    Therapeutic Activity:          mins  50511;     Gait Training:           mins  69836;     Ultrasound:          mins  22034;    Electrical Stimulation:         mins  48683 ( );    Untimed:  Electrical Stimulation:         mins  13105 ( );  Mechanical Traction:         mins  57811;     Timed Treatment:   30   mins   Total Treatment:     30   mins      Cynthia German, PT  Physical Therapist                    "

## 2019-12-09 ENCOUNTER — OFFICE VISIT (OUTPATIENT)
Dept: ORTHOPEDIC SURGERY | Facility: CLINIC | Age: 79
End: 2019-12-09

## 2019-12-09 DIAGNOSIS — Z96.652 STATUS POST TOTAL LEFT KNEE REPLACEMENT: Primary | ICD-10-CM

## 2019-12-09 DIAGNOSIS — Z47.89 ORTHOPEDIC AFTERCARE: ICD-10-CM

## 2019-12-09 PROCEDURE — 99024 POSTOP FOLLOW-UP VISIT: CPT | Performed by: ORTHOPAEDIC SURGERY

## 2019-12-09 RX ORDER — NAPROXEN 500 MG/1
500 TABLET ORAL DAILY
COMMUNITY
Start: 2019-11-26

## 2019-12-09 NOTE — PROGRESS NOTES
[facility] Orthopaedic Surgery Clinic Note    Subjective     Chief Complaint   Patient presents with   • Post-op     3 weeks Status post total left knee replacement 11/15/19        HPI    Stefan Pedraza is a 79 y.o. male.  He follows up today for his left total knee arthroplasty.  He is doing well today, with good relief.  75% improvement compared to his preoperative symptoms.  Ambulatory without external aids.      Patient Active Problem List   Diagnosis   • Atrial fibrillation (CMS/HCC)   • History of cardioversion   • H/O echocardiogram   • History of Holter monitoring   • H/O transesophageal echocardiography (BRISSA) for monitoring   • History of cardioversion   • H/O cardiovascular stress test   • Prediabetes   • GERD (gastroesophageal reflux disease)   • Dyslipidemia   • DJD (degenerative joint disease)   • Prostate cancer (CMS/HCC)   • Persistent atrial fibrillation   • Primary osteoarthritis of left knee   • DM (diabetes mellitus) (CMS/HCC)   • Status post total left knee replacement   • Acute postoperative pain   • Leukocytosis, mild, likely reactive     Past Medical History:   Diagnosis Date   • Arthritis    • Atrial fibrillation (CMS/HCC)    • Cataract     both- surgery done    • Diabetes mellitus (CMS/HCC)     prediabetic- checks sugar every other day    • Dyslipidemia    • GERD (gastroesophageal reflux disease)    • H/O cardiovascular stress test 08/23/2007    Revealing no significant ischemia, EF estimated at 46%   • H/O echocardiogram 02/02/2011    Revealing normal LV function, diastolic dysfunction noted, mild MR, nopericardial effusion.   • H/O transesophageal echocardiography (BRISSA) for monitoring     left ventricular ejection fraction of 55%, mild TR and MR   • History of cardioversion 2007    STATUS-POST SUCCESSFUL EXTERNAL CARDIOVERSION   • History of cardioversion 02/17/2012    external cardioversion to normal sinus rhythm   • History of Holter monitoring 01/25/2011    Revealing sinus rhythm with  PAC/PVC/episodes of sinus bradycardia.   • History of shingles     8-10 years ago    • [place] (hard of hearing)     has hearing aides   • Hyperlipidemia    • Osteoarthritis    • PONV (postoperative nausea and vomiting)     only with ether   • Pre-diabetes    • Prostate cancer (CMS/HCC)    • Sleep apnea with use of continuous positive airway pressure (CPAP)    • Sleep apnea with use of continuous positive airway pressure (CPAP)     compliant with machine    • Status post cystoscopy 08/28/2018   • Wears glasses    • Wears glasses       Past Surgical History:   Procedure Laterality Date   • ATRIAL APPENDAGE EXCLUSION LEFT WITH TRANSESOPHAGEAL ECHOCARDIOGRAM N/A 7/16/2019    Procedure: Atrial Appendage Occlusion ([place]), anticoagulation will be determined after pre-BRISSA;  Surgeon: Prabhakar Solis MD;  Location: [facility] INVASIVE LOCATION;  Service: Cardiology   • BELPHAROPTOSIS REPAIR      bilat    • CARDIAC ABLATION  2011   • CATARACT EXTRACTION      bilat    • CHOLECYSTECTOMY     • COLONOSCOPY      2018   • CYST REMOVAL      on back and back of neck   • KNEE ARTHROSCOPY W/ PARTIAL MEDIAL MENISCECTOMY Left    • ORCHIECTOMY Right    • SHOULDER ARTHROSCOPY W/ LABRAL REPAIR Right    • TONSILLECTOMY AND ADENOIDECTOMY     • TOTAL KNEE ARTHROPLASTY Left 11/15/2019    Procedure: TOTAL KNEE ARTHROPLASTY LEFT;  Surgeon: Gamaliel Polanco MD;  Location: [facility] OR;  Service: Orthopedics   • WISDOM TOOTH EXTRACTION      all 4 removed       Family History   Problem Relation Age of Onset   • Arthritis Mother    • Pneumonia Mother    • Atrial fibrillation Father    • Hypertension Father    • Diabetes Father    • Heart disease Father    • No Known Problems Sister    • No Known Problems Maternal Grandmother    • Diabetes Maternal Grandfather    • No Known Problems Paternal Grandmother    • No Known Problems Paternal Grandfather      Social History     Socioeconomic History   • Marital status:      Spouse name: Not on file   •  Number of children: Not on file   • Years of education: Not on file   • Highest education level: Not on file   Occupational History   • Occupation: Retired   Tobacco Use   • Smoking status: Never Smoker   • Smokeless tobacco: Never Used   Substance and Sexual Activity   • Alcohol use: No   • Drug use: No   • Sexual activity: Defer     Comment:    Social History Narrative    Caffeine: 3-4 servings per day    Patient lives at home with his wife      Current Outpatient Medications on File Prior to Visit   Medication Sig Dispense Refill   • aspirin 325 MG EC tablet Take 1 tablet by mouth Daily. For 1 month 30 tablet 0   • aspirin 81 MG EC tablet Take 1 tablet by mouth Daily. Resume in 1 month     • atorvastatin (LIPITOR) 10 MG tablet Take 10 mg by mouth Every Night.     • cyclobenzaprine (FLEXERIL) 10 MG tablet cyclobenzaprine 10 mg tablet   one po Every night at bedtime prn     • docusate sodium (COLACE) 100 MG capsule Take 1 capsule by mouth 2 (Two) Times a Day As Needed for Constipation. 60 capsule 0   • ferrous sulfate 324 (65 FE) MG tablet delayed-release EC tablet Take 324 mg by mouth Daily With Breakfast.     • flecainide (TAMBOCOR) 100 MG tablet TAKE 1 TABLET TWICE A  tablet 3   • metFORMIN ER (GLUCOPHAGE-XR) 500 MG 24 hr tablet Take 500 mg by mouth Every Night.     • naproxen (NAPROSYN) 500 MG tablet      • Omega-3 Fatty Acids (FISH OIL) 1000 MG capsule capsule Take 1,000 mg by mouth Daily With Breakfast.     • tamsulosin (FLOMAX) 0.4 MG capsule 24 hr capsule Take 1 capsule by mouth Every Night. 90 capsule 3     No current facility-administered medications on file prior to visit.       Allergies   Allergen Reactions   • Penicillins Hives and Rash        Review of Systems   Constitutional: Negative.    HENT: Negative.    Eyes: Negative.    Respiratory: Positive for apnea.    Cardiovascular: Negative.    Gastrointestinal: Negative.    Endocrine: Negative.    Genitourinary: Negative.     Musculoskeletal: Positive for arthralgias.   Skin: Negative.    Allergic/Immunologic: Negative.    Neurological: Negative.    Hematological: Negative.    Psychiatric/Behavioral: Negative.         Objective      Physical Exam  There were no vitals taken for this visit.    There is no height or weight on file to calculate BMI.    General:   Mental Status:  Alert   Appearance: Cooperative, in no acute distress   Build and Nutrition: Well-nourished well-developed male   Orientation: Alert and oriented to person, place and time   Posture: Normal   Gait: Normal    Integument:   Left knee: Wound is well-healed with no signs of infection    Lower Extremities:   Left Knee:    Tenderness:  None    Effusion:  None    Swelling: None    Crepitus:  None    Range of motion:  Extension: 0°       Flexion: 120°  Instability:  No varus laxity, no valgus laxity, negative anterior drawer  Deformities:  None      Imaging/Studies      Imaging Results (Last 24 Hours)     Procedure Component Value Units Date/Time    XR Knee 3+ View With [place] Left [[id number]] Resulted:  12/09/19 1432     Updated:  12/09/19 1433    Narrative:       Left Knee Radiographs  Indication: status-post left total knee arthroplasty  Views: AP, lateral, and sunrise views of the left knee    Comparison: no change compared to prior study, 11/15/2019    Findings:   The components are well aligned, with no signs of loosening or failure.          Assessment and Plan     Stefan was seen today for post-op.    Diagnoses and all orders for this visit:    Status post total left knee replacement  -     XR Knee 3+ View With [place] Left    Orthopedic aftercare        1. Status post total left knee replacement    2. Orthopedic aftercare        I reviewed my findings with the patient today.  His left total knee arthroplasty is functioning well so far, and he is pleased with results at this point.  He will continue with rehabilitation, and I will see him back in 6 weeks, no x-rays  are required.  I will see him back sooner for any problems.    Return in about 6 weeks (around 1/20/2020).      Medical Decision Making  Management Options : physical/occupational therapy  Data/Risk: radiology tests and independent visualization of imaging, lab tests, or EMG/NCV      Gamaliel Polanco MD  12/09/19  2:40 PM

## 2019-12-11 ENCOUNTER — TREATMENT (OUTPATIENT)
Dept: PHYSICAL THERAPY | Facility: CLINIC | Age: 79
End: 2019-12-11

## 2019-12-11 DIAGNOSIS — M25.562 LEFT KNEE PAIN, UNSPECIFIED CHRONICITY: Primary | ICD-10-CM

## 2019-12-11 PROCEDURE — 97112 NEUROMUSCULAR REEDUCATION: CPT | Performed by: PHYSICAL THERAPIST

## 2019-12-11 PROCEDURE — 97110 THERAPEUTIC EXERCISES: CPT | Performed by: PHYSICAL THERAPIST

## 2019-12-12 ENCOUNTER — TREATMENT (OUTPATIENT)
Dept: PHYSICAL THERAPY | Facility: CLINIC | Age: 79
End: 2019-12-12

## 2019-12-12 DIAGNOSIS — M25.562 LEFT KNEE PAIN, UNSPECIFIED CHRONICITY: Primary | ICD-10-CM

## 2019-12-12 PROCEDURE — 97110 THERAPEUTIC EXERCISES: CPT | Performed by: PHYSICAL THERAPIST

## 2019-12-12 NOTE — PROGRESS NOTES
Physical Therapy Daily Progress Note  VISIT: 7      Stefan Pedraza reports: soreness after exercise yesterday, but only very mild discomfort today.    Subjective     Treatment  Exercise 1  Exercise Name 1: Bicycle   Equipment/Resistance 1: L1  Sets/Reps 1: 5'  Exercise 2  Exercise Name 2: standing hip abduction-Bilat  Equipment/Resistance 2: red band  Exercise 3  Exercise Name 3: standing hip extension-Bilat  Equipment/Resistance 3: red band  Exercise 4  Exercise Name 4: passive knee extension/hamstring stretch  Exercise 5  Exercise Name 5: Total Gym squats  Time 5: 3'    Manual Rx 1  Manual Rx 1 Location: L knee  Manual Rx 1 Type: patellar mobilization, passive/AA knee extension, passive/AA knee flexion        Objective     Assessment & Plan     Assessment  Assessment details: Pt continues to progress steadily post L TKA.    Plan  Plan details: Continue PT.  Progress exercises as tolerated.               Timed:  Manual Therapy:    5     mins  47934;  Therapeutic Exercise:    25     mins  85909;     Neuromuscular James:        mins  11021;    Therapeutic Activity:          mins  58436;     Gait Training:           mins  34760;     Ultrasound:          mins  74190;    Electrical Stimulation:         mins  95466 ( );    Untimed:  Electrical Stimulation:         mins  26397 ( );  Mechanical Traction:         mins  16451;     Timed Treatment:   30   mins   Total Treatment:     30   mins      Cynthia German, PT  Physical Therapist

## 2019-12-16 ENCOUNTER — TREATMENT (OUTPATIENT)
Dept: PHYSICAL THERAPY | Facility: CLINIC | Age: 79
End: 2019-12-16

## 2019-12-16 DIAGNOSIS — M25.562 LEFT KNEE PAIN, UNSPECIFIED CHRONICITY: Primary | ICD-10-CM

## 2019-12-16 PROCEDURE — 97110 THERAPEUTIC EXERCISES: CPT | Performed by: PHYSICAL THERAPIST

## 2019-12-16 NOTE — PROGRESS NOTES
Physical Therapy Daily Progress Note  VISIT: 8      Stefan Pedraza reports: he has been to an MD appointment earlier today and climbed 3 flights of stairs.    Subjective     Treatment  Pre-treatment pain:  0  Post-treatment pain:  0  Exercise 1  Exercise Name 1: Bicycle   Equipment/Resistance 1: L4  Sets/Reps 1: 5'  Exercise 2  Exercise Name 2: sidestepping  Exercise 3  Exercise Name 3: backward walking  Exercise 4  Exercise Name 4: passive knee extension/hamstring stretch  Exercise 5  Exercise Name 5: Total Gym squats  Time 5: 3'  Exercise 6  Exercise Name 6: braiding  Exercise 7  Exercise Name 7: 1/2 foam roll step over-forward back  Exercise 8  Exercise Name 8: 1/2 foam roll step over side to side  Exercise 9  Exercise Name 9: high march  Exercise 10  Exercise Name 10: EFRAIN kang             Objective     Assessment/Plan           Timed:  Manual Therapy:    5    mins  73348;  Therapeutic Exercise:    25     mins  30160;     Neuromuscular James:        mins  78937;    Therapeutic Activity:          mins  17942;     Gait Training:           mins  82827;     Ultrasound:          mins  58584;    Electrical Stimulation:         mins  51922 ( );    Untimed:  Electrical Stimulation:         mins  42258 ( );  Mechanical Traction:         mins  84635;     Timed Treatment:   30   mins   Total Treatment:     30   mins      Cynthia German PT  Physical Therapist

## 2019-12-19 ENCOUNTER — TREATMENT (OUTPATIENT)
Dept: PHYSICAL THERAPY | Facility: CLINIC | Age: 79
End: 2019-12-19

## 2019-12-19 DIAGNOSIS — M25.562 LEFT KNEE PAIN, UNSPECIFIED CHRONICITY: Primary | ICD-10-CM

## 2019-12-19 PROCEDURE — 97110 THERAPEUTIC EXERCISES: CPT | Performed by: PHYSICAL THERAPIST

## 2019-12-19 NOTE — PROGRESS NOTES
Physical Therapy Daily Progress Note  VISIT: 9      Stefan Pedraza reports: he walked 10 laps at the gym earlier today.  17 laps is 1 mile.  His knee was a little sore for about 5 minutes afterward, but feels ok now.  He reports 1/10 pain upon arrival.    Subjective     Treatment  Pre-treatment pain:  1  Post-treatment pain:  1  Exercise 1  Exercise Name 1: Bicycle   Equipment/Resistance 1: L4  Sets/Reps 1: 5'  Exercise 2  Exercise Name 2: grapevine walk  Exercise 3  Exercise Name 3: BOSU lunges  Exercise 4  Exercise Name 4: passive knee extension/hamstring stretch  Exercise 5  Exercise Name 5: Inverted BOSU balance and squat  Exercise 6  Exercise Name 6: bar squats  Exercise 7  Exercise Name 7: standing hip extension  Equipment/Resistance 7: red band  Exercise 8  Exercise Name 8: standing hip abduction  Equipment/Resistance 8: red band  Exercise 9  Exercise Name 9: high march  Exercise 10  Exercise Name 10: knee flexion with strap assist  Exercise 11  Exercise Name 11: hip flexor/quad stretch off edge of mat             Objective     Assessment & Plan     Assessment  Assessment details: Pt is mildly sore after prolonged walking earlier today.  He continues to do well in his recovery.    Plan  Plan details: Continue and progress as tolerated.  Anticipate DC within the next 3-4 weeks.               Timed:  Manual Therapy:         mins  48571;  Therapeutic Exercise:    30     mins  65585;     Neuromuscular James:        mins  84115;    Therapeutic Activity:          mins  96185;     Gait Training:           mins  04554;     Ultrasound:          mins  17453;    Electrical Stimulation:         mins  78301 ( );    Untimed:  Electrical Stimulation:         mins  98184 ( );  Mechanical Traction:         mins  55497;     Timed Treatment:   30   mins   Total Treatment:     30   mins      Cynthia German PT  Physical Therapist

## 2019-12-23 ENCOUNTER — TREATMENT (OUTPATIENT)
Dept: PHYSICAL THERAPY | Facility: CLINIC | Age: 79
End: 2019-12-23

## 2019-12-23 DIAGNOSIS — M25.562 LEFT KNEE PAIN, UNSPECIFIED CHRONICITY: Primary | ICD-10-CM

## 2019-12-23 PROCEDURE — 97110 THERAPEUTIC EXERCISES: CPT | Performed by: PHYSICAL THERAPIST

## 2019-12-23 NOTE — PROGRESS NOTES
"Re-Assessment / Re-Certification      Patient: Stefan Pedraza   : 1940  Diagnosis/ICD-10 Code:  The encounter diagnosis was Left knee pain, unspecified chronicity.   Referring practitioner: jS Maya MD  Date of Initial Visit: Type: THERAPY  Noted: 2019  Today's Date: 2019  Patient seen for 10 sessions      Subjective:   Stefan Pedraza reports: he walked 3/4 of a mile this morning.  He has no particular complaints.  Subjective Questionnaire: LEFS: 67/80  Clinical Progress: improved  Home Program Compliance: Yes  Treatment has included: therapeutic exercise, manual therapy and gait training    Subjective     Treatment  Exercise 1  Exercise Name 1: Bicycle   Equipment/Resistance 1: L4  Sets/Reps 1: 5'  Exercise 2  Exercise Name 2: grapevine walk  Exercise 3  Exercise Name 3: 8\" side step up/down  Exercise 4  Exercise Name 4: passive knee extension/hamstring stretch  Exercise 5  Exercise Name 5: backward walking  Exercise 6  Exercise Name 6: glider En[employer]  Exercise 7  Exercise Name 7: high march  Exercise 8  Exercise Name 8: Total Gym squats  Equipment/Resistance 8: 10  Time 8: 3'  Exercise 9  Exercise Name 9: Reassessment             Objective       Active Range of Motion   Left Knee   Flexion: 133 degrees   Extension: 4 degrees      Assessment/Plan  Progress toward previous goals: Partially Met-progressing    Pt. demonstrates independence and compliance with initial HEP-met.  Pt. reports reduction in pain intensity to no worse than 6/10 on NPRS-progressing.  AROM of L knee shows improvement over baseline measures-met.  Functional outcome measure is improved by 10 points-met.  LLE girth is decreased by 1 cm compared to baseline measures-met.    Pt. demonstrates independence in advanced HEP for ongoing improvement-progressing.  AROM of L knee  is sufficient for performance of daily activities-met.  L LE strength is improved to 4+/5 or better to allow participation in desired " activities-met.  Functional outcome measure is improved to 50/80 points-met.       Recommendations: Continue as planned  Timeframe: 3 weeks  Prognosis to achieve goals: good    PT Signature: Cynthia German PT      Based upon review of the patient's progress and continued therapy plan, it is my medical opinion that Stefan Pedraza should continue physical therapy treatment at [facility]  [address] [address] [phone].    Signature: __________________________________  Sj Maya MD    Timed:  Manual Therapy:         mins  88328;  Therapeutic Exercise:    30     mins  20192;     Neuromuscular James:        mins  28370;    Therapeutic Activity:          mins  38120;     Gait Training:           mins  15461;     Ultrasound:          mins  43418;    Electrical Stimulation:         mins  35191 ( );    Untimed:  Electrical Stimulation:         mins  72279 ( );  Mechanical Traction:         mins  27631;     Timed Treatment:  30    mins   Total Treatment:     30   mins

## 2019-12-24 ENCOUNTER — TREATMENT (OUTPATIENT)
Dept: PHYSICAL THERAPY | Facility: CLINIC | Age: 79
End: 2019-12-24

## 2019-12-24 DIAGNOSIS — M25.562 LEFT KNEE PAIN, UNSPECIFIED CHRONICITY: Primary | ICD-10-CM

## 2019-12-24 PROCEDURE — 97110 THERAPEUTIC EXERCISES: CPT | Performed by: PHYSICAL THERAPIST

## 2019-12-24 NOTE — PROGRESS NOTES
Physical Therapy Daily Progress Note  VISIT: 11      Stefan Pedraza reports: his knee is a little more sore this morning.    Subjective     Treatment  Pre-treatment pain:  2  Post-treatment pain:  1  Exercise 1  Exercise Name 1: Bicycle   Equipment/Resistance 1: L4  Sets/Reps 1: 5'  Exercise 2  Exercise Name 2: glider knee flex/ext  Exercise 3  Exercise Name 3: hamstring stretch  Exercise 4  Exercise Name 4: passive knee extension/hamstring stretch  Exercise 5  Exercise Name 5: quad stretch  Exercise 6  Exercise Name 6: bridging  Exercise 7  Exercise Name 7: hip abduction             Objective     Assessment & Plan     Assessment  Assessment details: Decreased intensity of exercise today as pt is sore form increased activity yesterday.  Focused on stretching to reduce soreness.  Pt continues to progress well.    Plan  Plan details: Continue PT.  Progress mobility skills as tolerated.               Timed:  Manual Therapy:         mins  42027;  Therapeutic Exercise:    20     mins  21230;     Neuromuscular James:        mins  78094;    Therapeutic Activity:          mins  08190;     Gait Training:           mins  31638;     Ultrasound:          mins  30886;    Electrical Stimulation:         mins  42347 ( );    Untimed:  Electrical Stimulation:         mins  92125 ( );  Mechanical Traction:         mins  34155;     Timed Treatment:   20   mins   Total Treatment:     20   mins      Cynthia German, PT  Physical Therapist

## 2020-01-02 ENCOUNTER — TREATMENT (OUTPATIENT)
Dept: PHYSICAL THERAPY | Facility: CLINIC | Age: 80
End: 2020-01-02

## 2020-01-02 DIAGNOSIS — M25.562 LEFT KNEE PAIN, UNSPECIFIED CHRONICITY: Primary | ICD-10-CM

## 2020-01-02 PROCEDURE — 97110 THERAPEUTIC EXERCISES: CPT | Performed by: PHYSICAL THERAPIST

## 2020-01-02 NOTE — PROGRESS NOTES
Physical Therapy Daily Progress Note  VISIT: 12      Stefan Pedraza reports: no particular concerns.  He still has some swelling in LLE.  He can tell the L leg is not as strong as the right, but overall, he is pleased with his progress.    Subjective     Treatment  Pre-treatment pain:  0  Post-treatment pain:  0  Exercise 1  Exercise Name 1: Bicycle   Equipment/Resistance 1: L4  Sets/Reps 1: 5'  Exercise 2  Exercise Name 2: inverted BOSU balance and squats  Exercise 3  Exercise Name 3: seated hamstring stretch (foot against wall)  Exercise 4  Exercise Name 4: sit <> stand, no hands  Exercise 5  Exercise Name 5: BOSU lunges  Exercise 6  Exercise Name 6: Total Gym squats  Equipment/Resistance 6: L10  Time 6: 3'  Exercise 7  Exercise Name 7: sidestepping  Exercise 8  Exercise Name 8: high march  Exercise 9  Exercise Name 9: braiding  Exercise 10  Exercise Name 10: backward walking  Exercise 11  Exercise Name 11: closed chain quad set against ball on wall  Exercise 12  Exercise Name 12: rebounder ball toss             Objective     Assessment & Plan     Assessment  Assessment details: Pt continues to demonstrate steady progress.  He will benefit from continuing L single leg balance and stability activities to achieve additional improvement.    Plan  Plan details: Continue through next week.  Anticipate DC end of next week.               Timed:  Manual Therapy:         mins  77089;  Therapeutic Exercise:    30     mins  08513;     Neuromuscular James:        mins  01973;    Therapeutic Activity:          mins  93252;     Gait Training:           mins  77619;     Ultrasound:          mins  47180;    Electrical Stimulation:         mins  42235 ( );    Untimed:  Electrical Stimulation:         mins  33691 ( );  Mechanical Traction:         mins  34217;     Timed Treatment:   30   mins   Total Treatment:     30   mins      Cynthia German, PT  Physical Therapist

## 2020-01-06 ENCOUNTER — TREATMENT (OUTPATIENT)
Dept: PHYSICAL THERAPY | Facility: CLINIC | Age: 80
End: 2020-01-06

## 2020-01-06 DIAGNOSIS — M25.562 LEFT KNEE PAIN, UNSPECIFIED CHRONICITY: Primary | ICD-10-CM

## 2020-01-06 PROCEDURE — 97110 THERAPEUTIC EXERCISES: CPT | Performed by: PHYSICAL THERAPIST

## 2020-01-06 NOTE — PROGRESS NOTES
"   Physical Therapy Daily Progress Note  VISIT: 13      Stefan Pedraza reports: he is doing well.  He has no particular complaints.  He thinks the swelling is decreasing.    Subjective     Treatment  Pre-treatment pain:  0  Post-treatment pain:  0  Exercise 1  Exercise Name 1: Bicycle  Sets/Reps 1: 5'  Exercise 2  Exercise Name 2: 8\" step lunges  Exercise 3  Exercise Name 3: 8\" step up/down  Exercise 4  Exercise Name 4: sit<>stand, no hands  Exercise 5  Exercise Name 5: Total Gym squats  Equipment/Resistance 5: L10  Time 5: 3'  Exercise 6  Exercise Name 6: prone quad stretch with strap  Exercise 7  Exercise Name 7: seated hamstring stretch with quad set  Exercise 8  Exercise Name 8: closed chain TKE with ball against wall    Manual Rx 1  Manual Rx 1 Location: L knee  Manual Rx 1 Type: passive extension stretch, posterior femoral glide        Objective     Assessment & Plan     Assessment  Assessment details: Pt is feeling well and indicates steady improvement in tolerance for activity.    Plan  Plan details: Continue for 1 remaining session.  Finalize HEP and address any questions pt may have regarding ongoing recovery.               Timed:  Manual Therapy:    5     mins  92706;  Therapeutic Exercise:    25     mins  21404;     Neuromuscular James:        mins  64758;    Therapeutic Activity:          mins  44930;     Gait Training:           mins  44655;     Ultrasound:          mins  44321;    Electrical Stimulation:         mins  08436 ( );    Untimed:  Electrical Stimulation:         mins  81461 ( );  Mechanical Traction:         mins  44355;     Timed Treatment:   30   mins   Total Treatment:     30   mins      Cynthia German, PT  Physical Therapist                    "

## 2020-01-09 ENCOUNTER — TREATMENT (OUTPATIENT)
Dept: PHYSICAL THERAPY | Facility: CLINIC | Age: 80
End: 2020-01-09

## 2020-01-09 DIAGNOSIS — M25.562 LEFT KNEE PAIN, UNSPECIFIED CHRONICITY: Primary | ICD-10-CM

## 2020-01-09 PROCEDURE — 97110 THERAPEUTIC EXERCISES: CPT | Performed by: PHYSICAL THERAPIST

## 2020-01-09 NOTE — PROGRESS NOTES
Physical Therapy Daily Progress Note  VISIT: 14      Stefan Pedraza reports: he is doing well.  He feels ready to continue with independent HEP.    Subjective     LEFS score 75/80    Treatment  Pre-treatment pain:  0  Post-treatment pain:  0  Exercise 1  Exercise Name 1: Bicycle  Sets/Reps 1: 5'  Exercise 2  Exercise Name 2: stairs  Exercise 3  Exercise Name 3: tandem balance  Exercise 4  Exercise Name 4: seated hamstring stretch  Exercise 5  Exercise Name 5: Total Gym squats  Equipment/Resistance 5: L10  Time 5: 3'  Exercise 6  Exercise Name 6: self-mob knee extension  Exercise 7  Exercise Name 7: grapevine walk  Exercise 8  Exercise Name 8: closed chain TKE with ball against wall  Exercise 9  Exercise Name 9: high march             Objective   L knee AROM 3-135 degrees  Pt has full extension passively.    Assessment & Plan     Assessment  Assessment details: Pt has achieved short term and long term goals.  He is independent in HEP.    Plan  Plan details: Pt will follow up with MD in 2 weeks.  No additional PT is planned at this time.  Will DC if MD is in agreement at follow up.               Timed:  Manual Therapy:    5     mins  48220;  Therapeutic Exercise:    25     mins  55068;     Neuromuscular James:        mins  40414;    Therapeutic Activity:          mins  10930;     Gait Training:           mins  66142;     Ultrasound:          mins  41357;    Electrical Stimulation:         mins  80332 ( );    Untimed:  Electrical Stimulation:         mins  37384 ( );  Mechanical Traction:         mins  53021;     Timed Treatment:   30   mins   Total Treatment:     30   mins      Cynthia German, PT  Physical Therapist

## 2020-01-10 ENCOUNTER — TELEPHONE (OUTPATIENT)
Dept: RADIATION ONCOLOGY | Facility: HOSPITAL | Age: 80
End: 2020-01-10

## 2020-01-10 NOTE — TELEPHONE ENCOUNTER
Pt called to report recent PSA done in Zhanna's office was 4.6.  He sees Dr. Chao again in June.  Will discuss with  and if he wants anything different I will call pt back.  Pt verbalized understanding.

## 2020-01-22 ENCOUNTER — OFFICE VISIT (OUTPATIENT)
Dept: ORTHOPEDIC SURGERY | Facility: CLINIC | Age: 80
End: 2020-01-22

## 2020-01-22 DIAGNOSIS — Z47.89 ORTHOPEDIC AFTERCARE: ICD-10-CM

## 2020-01-22 DIAGNOSIS — Z96.652 STATUS POST TOTAL LEFT KNEE REPLACEMENT: Primary | ICD-10-CM

## 2020-01-22 PROCEDURE — 99024 POSTOP FOLLOW-UP VISIT: CPT | Performed by: ORTHOPAEDIC SURGERY

## 2020-01-22 NOTE — PROGRESS NOTES
[facility] Note    Subjective     Chief Complaint   Patient presents with   • Post-op Follow-up     6 week f/u,  2 month Status post total left knee replacement 11/15/19        HPI    Stefan Pedraza is a 79 y.o. male.  He follows up today for his left total knee arthroplasty.  He is doing well today, with only mild pain.  Ambulatory without external aids.      Patient Active Problem List   Diagnosis   • Atrial fibrillation (CMS/HCC)   • History of cardioversion   • H/O echocardiogram   • History of Holter monitoring   • H/O transesophageal echocardiography (BRISSA) for monitoring   • History of cardioversion   • H/O cardiovascular stress test   • Prediabetes   • GERD (gastroesophageal reflux disease)   • Dyslipidemia   • DJD (degenerative joint disease)   • Prostate cancer (CMS/HCC)   • Persistent atrial fibrillation   • Primary osteoarthritis of left knee   • DM (diabetes mellitus) (CMS/HCC)   • Status post total left knee replacement   • Acute postoperative pain   • Leukocytosis, mild, likely reactive     Past Medical History:   Diagnosis Date   • Arthritis    • Atrial fibrillation (CMS/HCC)    • Cataract     both- surgery done    • Diabetes mellitus (CMS/HCC)     prediabetic- checks sugar every other day    • Dyslipidemia    • GERD (gastroesophageal reflux disease)    • H/O cardiovascular stress test 08/23/2007    Revealing no significant ischemia, EF estimated at 46%   • H/O echocardiogram 02/02/2011    Revealing normal LV function, diastolic dysfunction noted, mild MR, nopericardial effusion.   • H/O transesophageal echocardiography (BRISSA) for monitoring     left ventricular ejection fraction of 55%, mild TR and MR   • History of cardioversion 2007    STATUS-POST SUCCESSFUL EXTERNAL CARDIOVERSION   • History of cardioversion 02/17/2012    external cardioversion to normal sinus rhythm   • History of Holter monitoring 01/25/2011    Revealing sinus rhythm with PAC/PVC/episodes of sinus  bradycardia.   • History of shingles     8-10 years ago    • [place] (hard of hearing)     has hearing aides   • Hyperlipidemia    • Osteoarthritis    • PONV (postoperative nausea and vomiting)     only with ether   • Pre-diabetes    • Prostate cancer (CMS/HCC)    • Sleep apnea with use of continuous positive airway pressure (CPAP)    • Sleep apnea with use of continuous positive airway pressure (CPAP)     compliant with machine    • Status post cystoscopy 08/28/2018   • Wears glasses    • Wears glasses       Past Surgical History:   Procedure Laterality Date   • ATRIAL APPENDAGE EXCLUSION LEFT WITH TRANSESOPHAGEAL ECHOCARDIOGRAM N/A 7/16/2019    Procedure: Atrial Appendage Occlusion ([facility]), anticoagulation will be determined after pre-BRISSA;  Surgeon: Prabhakar Solis MD;  Location: [facility] INVASIVE LOCATION;  Service: Cardiology   • BELPHAROPTOSIS REPAIR      bilat    • CARDIAC ABLATION  2011   • CATARACT EXTRACTION      bilat    • CHOLECYSTECTOMY     • COLONOSCOPY      2018   • CYST REMOVAL      on back and back of neck   • KNEE ARTHROSCOPY W/ PARTIAL MEDIAL MENISCECTOMY Left    • ORCHIECTOMY Right    • SHOULDER ARTHROSCOPY W/ LABRAL REPAIR Right    • TONSILLECTOMY AND ADENOIDECTOMY     • TOTAL KNEE ARTHROPLASTY Left 11/15/2019    Procedure: TOTAL KNEE ARTHROPLASTY LEFT;  Surgeon: Gamaliel Polanco MD;  Location: [facility] OR;  Service: Orthopedics   • WISDOM TOOTH EXTRACTION      all 4 removed       Family History   Problem Relation Age of Onset   • Arthritis Mother    • Pneumonia Mother    • Atrial fibrillation Father    • Hypertension Father    • Diabetes Father    • Heart disease Father    • No Known Problems Sister    • No Known Problems Maternal Grandmother    • Diabetes Maternal Grandfather    • No Known Problems Paternal Grandmother    • No Known Problems Paternal Grandfather      Social History     Socioeconomic History   • Marital status:      Spouse name: Not on file   • Number of children: Not  on file   • Years of education: Not on file   • Highest education level: Not on file   Occupational History   • Occupation: Retired   Tobacco Use   • Smoking status: Never Smoker   • Smokeless tobacco: Never Used   Substance and Sexual Activity   • Alcohol use: No   • Drug use: No   • Sexual activity: Defer     Comment:    Social History Narrative    Caffeine: 3-4 servings per day    Patient lives at home with his wife      Current Outpatient Medications on File Prior to Visit   Medication Sig Dispense Refill   • aspirin 325 MG EC tablet Take 1 tablet by mouth Daily. For 1 month 30 tablet 0   • aspirin 81 MG EC tablet Take 1 tablet by mouth Daily. Resume in 1 month     • atorvastatin (LIPITOR) 10 MG tablet Take 10 mg by mouth Every Night.     • cyclobenzaprine (FLEXERIL) 10 MG tablet cyclobenzaprine 10 mg tablet   one po Every night at bedtime prn     • docusate sodium (COLACE) 100 MG capsule Take 1 capsule by mouth 2 (Two) Times a Day As Needed for Constipation. 60 capsule 0   • ferrous sulfate 324 (65 FE) MG tablet delayed-release EC tablet Take 324 mg by mouth Daily With Breakfast.     • flecainide (TAMBOCOR) 100 MG tablet TAKE 1 TABLET TWICE A  tablet 3   • metFORMIN ER (GLUCOPHAGE-XR) 500 MG 24 hr tablet Take 500 mg by mouth Every Night.     • naproxen (NAPROSYN) 500 MG tablet      • Omega-3 Fatty Acids (FISH OIL) 1000 MG capsule capsule Take 1,000 mg by mouth Daily With Breakfast.     • tamsulosin (FLOMAX) 0.4 MG capsule 24 hr capsule Take 1 capsule by mouth Every Night. 90 capsule 3     No current facility-administered medications on file prior to visit.       Allergies   Allergen Reactions   • Penicillins Hives and Rash        Review of Systems   Constitutional: Negative.  Negative for activity change, appetite change, chills, diaphoresis, fatigue, fever and unexpected weight change.   HENT: Negative.  Negative for congestion, dental problem, drooling, ear discharge, ear pain, facial swelling,  hearing loss, mouth sores, nosebleeds, postnasal drip, rhinorrhea, sinus pressure, sneezing, sore throat, tinnitus, trouble swallowing and voice change.    Eyes: Negative.  Negative for photophobia, pain, discharge, redness, itching and visual disturbance.   Respiratory: Positive for apnea. Negative for cough, choking, chest tightness, shortness of breath, wheezing and stridor.    Cardiovascular: Positive for leg swelling. Negative for chest pain and palpitations.   Gastrointestinal: Negative.  Negative for abdominal distention, abdominal pain, anal bleeding, blood in stool, constipation, diarrhea, nausea, rectal pain and vomiting.   Endocrine: Negative.  Negative for cold intolerance, heat intolerance, polydipsia, polyphagia and polyuria.   Genitourinary: Negative.  Negative for decreased urine volume, difficulty urinating, dysuria, enuresis, flank pain, frequency, genital sores, hematuria and urgency.   Musculoskeletal: Positive for arthralgias. Negative for back pain, gait problem, joint swelling, myalgias, neck pain and neck stiffness.   Skin: Negative.  Negative for color change, pallor, rash and wound.   Allergic/Immunologic: Negative.  Negative for environmental allergies, food allergies and immunocompromised state.   Neurological: Negative.  Negative for dizziness, tremors, seizures, syncope, facial asymmetry, speech difficulty, weakness, light-headedness, numbness and headaches.   Hematological: Negative.  Negative for adenopathy. Does not bruise/bleed easily.   Psychiatric/Behavioral: Negative.  Negative for agitation, behavioral problems, confusion, decreased concentration, dysphoric mood, hallucinations, self-injury, sleep disturbance and suicidal ideas. The patient is not nervous/anxious and is not hyperactive.         Objective      Physical Exam  There were no vitals taken for this visit.    There is no height or weight on file to calculate BMI.    General:   Mental Status:  Alert   Appearance:  Cooperative, in no acute distress   Build and Nutrition: Well-nourished well-developed male   Orientation: Alert and oriented to person, place and time   Posture: Normal   Gait: Normal    Integument:   Left knee: Wound is well-healed with no signs of infection    Lower Extremities:   Left Knee:    Tenderness:  None    Effusion:  None    Swelling: None    Crepitus:  None    Range of motion:  Extension: 0°       Flexion: 130°  Instability:  No varus laxity, no valgus laxity, negative anterior drawer  Deformities:  None        Assessment and Plan     Stefan was seen today for post-op follow-up.    Diagnoses and all orders for this visit:    Status post total left knee replacement    Orthopedic aftercare        1. Status post total left knee replacement    2. Orthopedic aftercare        I reviewed my findings with the patient today.  His left total knee arthroplasty is functioning well, and he is pleased with results.  I will see him back at the one-year anniversary, which will be in approximately 10 months, but sooner for any problems.      Return in about 10 months (around 11/22/2020) for Recheck with X-Rays.          Gamaliel Polanco MD  01/22/20  4:56 PM

## 2020-01-23 ENCOUNTER — DOCUMENTATION (OUTPATIENT)
Dept: PHYSICAL THERAPY | Facility: CLINIC | Age: 80
End: 2020-01-23

## 2020-01-23 NOTE — PROGRESS NOTES
Discharge Summary  Discharge Summary from Physical Therapy Report      Patient: Stefan Pedraza   : 1940  Diagnosis/ICD-10 Code:  There were no encounter diagnoses.   Referring practitioner: No ref. provider found  Date of Initial Visit: No linked episodes  Today's Date: 2020  Date of Last Visit: 2020     Number of Visits: 14    Discharge Status of Patient: See MD Note dated: 2020    Goals: All Met    Discharge Plan: Continue with current home exercise program as instructed      Date of Discharge: 2020        Cynthia German, PT

## 2020-02-13 RX ORDER — FLECAINIDE ACETATE 100 MG/1
TABLET ORAL
Qty: 180 TABLET | Refills: 4 | Status: SHIPPED | OUTPATIENT
Start: 2020-02-13 | End: 2021-05-10

## 2020-02-14 ENCOUNTER — TELEPHONE (OUTPATIENT)
Dept: OTHER | Facility: OTHER | Age: 80
End: 2020-02-14

## 2020-02-14 NOTE — RESEARCH
Pt called to say he was unable to make today's 6 month [facility] follow up appointment due to a family emergency out of town. We discussed the need for him to contact me when he returns to  reschedule the appointment.

## 2020-03-06 ENCOUNTER — OFFICE VISIT (OUTPATIENT)
Dept: RADIATION ONCOLOGY | Facility: HOSPITAL | Age: 80
End: 2020-03-06

## 2020-03-06 ENCOUNTER — HOSPITAL ENCOUNTER (OUTPATIENT)
Dept: RADIATION ONCOLOGY | Facility: HOSPITAL | Age: 80
Setting detail: RADIATION/ONCOLOGY SERIES
Discharge: HOME | End: 2020-03-06

## 2020-03-06 VITALS
WEIGHT: 228.9 LBS | BODY MASS INDEX: 30.34 KG/M2 | HEART RATE: 55 BPM | HEIGHT: 73 IN | SYSTOLIC BLOOD PRESSURE: 180 MMHG | RESPIRATION RATE: 16 BRPM | DIASTOLIC BLOOD PRESSURE: 81 MMHG | OXYGEN SATURATION: 98 % | TEMPERATURE: 97.2 F

## 2020-03-06 DIAGNOSIS — C61 PROSTATE CANCER (HCC): Primary | ICD-10-CM

## 2020-03-06 NOTE — PROGRESS NOTES
FOLLOW UP NOTE    PATIENT:                                                      Stefan Pedraza  MEDICAL RECORD #:                        [id number]  :                                                          1940  COMPLETION DATE:   10/26/2018  DIAGNOSIS:     Prostate cancer (CMS/[facility])  - Stage IIC (cT2b, cN0, cM0, PSA: 5.3, Grade Group: 3)      BRIEF HISTORY:    Routine follow-up visit.  He has a history of intermediate risk prostate cancer treated with CyberKnife SBRT.  He has stable urinary function with fairly good stream during the day but nocturia x3-4.  He continues to use Flomax but is not sure the medication helps his symptoms.  He has no dysuria.  Bowels are irregular with alternating loose stools and constipation.  He has more recent development of mild to moderate low back pain.  He underwent left knee replacement surgery 2019.    PSA reached adi value of 1.13 ng/mL on 2019.  He has had 2 consecutive rises with values 4.67 ng/mL on 2019 and 10.08 ng/mL on 3/2/2020.    He is planned for prostate biopsy by Dr. Chao next Friday, 2020.    MEDICATIONS: Medication reconciliation for the patient was reviewed and confirmed in the electronic medical record.    Review of Systems   Constitutional: Negative.    HENT:  Negative.    Eyes: Negative.    Respiratory: Negative.    Cardiovascular: Positive for leg swelling.   Gastrointestinal: Positive for constipation and diarrhea.   Endocrine: Negative.    Genitourinary: Positive for nocturia.    Musculoskeletal: Positive for back pain.   Skin: Negative.    Neurological: Positive for dizziness.   Hematological: Negative.    Psychiatric/Behavioral: Negative.            IPSS Questionnaire (AUA-7):  Over the past month…     1)  Incomplete Emptying  How often have you had a sensation of not emptying your bladder?  0 - Not at all   2)  Frequency  How often have you had to urinate less than every two hours? 3 - About half the time    3)  Intermittency  How often have you found you stopped and started again several times when you urinated?  5 - Almost always   4) Urgency  How often have you found it difficult to postpone urination?  3 - About half the time   5) Weak Stream  How often have you had a weak urinary stream?  4 - More than half the time   6) Straining  How often have you had to push or strain to begin urination?  0 - Not at all   7) Nocturia  How many times did you typically get up at night to urinate?  3 - 3 times   Total Score:  18         Quality of life due to urinary symptoms:  If you were to spend the rest of your life with your urinary condition the way it is now, how would you feel about that? 2-Mostly Satisfied   Urine Leakage (Incontinence) 1-Mild (A few drops a day, no pad use)      Sexual Health Inventory  Current Status     1)  How do you rate your confidence that you could achieve and keep an erection? 1-Very Low   2) When you had erections with sexual stimulation, how often were your erections hard enough for penetration (entering your partner)? 1-Almost never or never   3)  During sexual intercourse, how often were you able to maintain your erection after you had penetrated (entered) into your partner? 0-Did not attempt intercourse   4) During sexual intercourse, how difficult was it to maintain your erection to completion of intercourse? 0-Did not attempt intercourse   5) When you attempted sexual intercourse, how often was it satisfactory to you? 0-No sexual activity   Total Score: 2         Bowel Health Inventory  Current Status: 0-No problems, no rectal bleeding, no discharge, less then 5 bowel movements a day                KPS 80%    Physical Exam   Constitutional: He is oriented to person, place, and time. He appears well-developed and well-nourished.   HENT:   Head: Normocephalic and atraumatic.   Neck: Normal range of motion. Neck supple.   Cardiovascular: Normal rate, regular rhythm and normal heart sounds.  "  No murmur heard.  Pulmonary/Chest: Effort normal and breath sounds normal. He has no wheezes. He has no rales.   Abdominal: Soft. Bowel sounds are normal. He exhibits no distension. There is no hepatosplenomegaly. There is no tenderness.   Genitourinary: Rectal exam shows external hemorrhoid and internal hemorrhoid ( Noninflamed). Rectal exam shows no fissure, no mass, no tenderness and anal tone normal. Prostate is not enlarged ( Firm diffusely bilateral, approximately 20 cc, more nodular on the left.) and not tender.   Musculoskeletal: Normal range of motion. He exhibits edema ( 1+ bilateral, slightly worse on the left) and deformity ( Healed left anterior knee incision). He exhibits no tenderness.   Lymphadenopathy:     He has no cervical adenopathy.     He has no axillary adenopathy.        Right: No supraclavicular adenopathy present.        Left: No supraclavicular adenopathy present.   Neurological: He is alert and oriented to person, place, and time. He has normal strength. No sensory deficit.   Skin: Skin is warm and dry.   Psychiatric: He has a normal mood and affect. His behavior is normal. Judgment and thought content normal.   Nursing note and vitals reviewed.      VITAL SIGNS:   Vitals:    03/06/20 0930   BP: 180/81   Pulse: 55   Resp: 16   Temp: 97.2 °F (36.2 °C)   TempSrc: Temporal   SpO2: 98%  Comment: RA   Weight: 104 kg (228 lb 14.4 oz)   Height: 185.4 cm (72.99\")   PainSc: 0-No pain       The following portions of the patient's history were reviewed and updated as appropriate: allergies, current medications, past family history, past medical history, past social history, past surgical history and problem list.         Stefan was seen today for prostate cancer.    Diagnoses and all orders for this visit:    Prostate cancer (CMS/[facility])         IMPRESSION:  Prostate cancer, Alvaro score 4+3=7, clinical stage IIC (T2b, N0, M0) with PSA 7.89 ng/ml.  1 1/2 years status post definitive treatment with " CyberKnife SBRT.  After initial partial response he now has evidence of biochemical recurrence.  He is planned for rebiopsy of the prostate gland to determine if there is presence of residual viable neoplasm at the primary site.  I will add restaging imaging with Axumin PET/CT to best determine if he has evidence of extraprostatic disease previously not evident on conventional imaging pretreatment.  The Axumin scan is necessary to evaluate current disease status and direct further treatment recommendations.  The scan may also assist with prostate biopsy targeting, if biopsy is still needed.    RECOMMENDATIONS: Axumin PET/CT restaging for biochemical recurrence of prostate cancer after initial definitive treatment.  Dr. Chao will postpone the prostate needle biopsy until after the imaging study has been performed.  I will see the patient back for reevaluation after the scan to go over the results.  Patient will add fiber to his diet to hopefully help regulate bowel movements.  He is considering discontinuing use of Flomax and I have recommended he try every other day schedule before completely stopping, especially since he may have an upcoming biopsy procedure.    Return in about 2 weeks (around 3/20/2020) for Office Visit.    Shon Bunn MD    Errors in dictation may reflect use of voice recognition software and not all errors in transcription may have been detected prior to signing.

## 2020-03-10 ENCOUNTER — OFFICE VISIT (OUTPATIENT)
Dept: CARDIOLOGY | Facility: CLINIC | Age: 80
End: 2020-03-10

## 2020-03-10 VITALS
WEIGHT: 225 LBS | DIASTOLIC BLOOD PRESSURE: 78 MMHG | HEIGHT: 73 IN | SYSTOLIC BLOOD PRESSURE: 130 MMHG | BODY MASS INDEX: 29.82 KG/M2 | OXYGEN SATURATION: 96 % | HEART RATE: 51 BPM

## 2020-03-10 DIAGNOSIS — I48.0 PAROXYSMAL ATRIAL FIBRILLATION (HCC): Primary | ICD-10-CM

## 2020-03-10 PROCEDURE — 93000 ELECTROCARDIOGRAM COMPLETE: CPT | Performed by: PHYSICIAN ASSISTANT

## 2020-03-10 PROCEDURE — 99214 OFFICE O/P EST MOD 30 MIN: CPT | Performed by: PHYSICIAN ASSISTANT

## 2020-03-10 NOTE — PROGRESS NOTES
[facility]   OFFICE NOTE      Stefan Pedraza  1940  PCP: Gamaliel Eaton MD    SUBJECTIVE:   Stefan Pedraza is a 79 y.o. male seen for a follow up visit regarding the following:     CC:Afib    HPI:   Pleasant 79 year old presents today for follow up Afib, HTN, and Watchman device per [facility].  He reports no episodes of Afib or chest pain.  He denies any stroke like symptoms.  He has no BP issues.  He denies any near syncope or syncope.  He is on asa daily.     Cardiac PMH: (Old records have been reviewed and summarized below)  1. Atrial fibrillation:   a. History of atrial fibrillation, initial diagnosis 2007.   b. Status-post successful external cardioversion, Dr. Dyer, 2007.  c. Initiation of amiodarone therapy with recent tapering dose, Dr. Lowe.   d. Echocardiogram, 02/02/2011, revealing normal LV function, diastolic dysfunction noted, mild MR, no pericardial effusion.   e. Holter monitor, 01/25/2011, revealing sinus rhythm with PAC/PVC/episodes of sinus bradycardia.   f. Chadsvasc=3.   g. 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   h. Pulmonary vein isolation procedure, 10/05/2011.  i. BRISSA with left ventricular ejection fraction of 55%, mild TR and MR.   j. Event recorder with intermittent episodes of flutter but the majority of the time in sinus rhythm.   k. External cardioversion to normal sinus rhythm, 02/17/2012.  l. Atypical chest pain.  m. History of left heart catheterization, 1990s in [place], reported as normal coronaries.   n. Stress test, 08/23/2007, revealing no significant ischemia, EF estimated at 46%.  o. Echo 7/28/17 LV Estimated EF = 57%., mild concentric hypertrophy, Left atrial cavity size is severely dilated. Normal estimated pulmonary artery systolic pressure  p. External CV 7/13/17; NSR with recurrent AF three days  later  q. Echocardiogram 7/28/17: EF 57%, mild LVH, LA severely dilated  r. ECV to NSR 8/31/17  2. Diabetes mellitus.   3. Gastroesophageal reflux disease.  4. Dyslipidemia.  5. Osteoarthritis.  6. Remote surgical history:  a. Cholecystectomy.   b. Right orchiectomy.  c. Right shoulder labral repair.  d. Left knee arthroscopy for partial medial meniscectomy    Past Medical History, Past Surgical History, Family history, Social History, and Medications were all reviewed with the patient today and updated as necessary.       Current Outpatient Medications:   •  aspirin 325 MG EC tablet, Take 1 tablet by mouth Daily. For 1 month, Disp: 30 tablet, Rfl: 0  •  atorvastatin (LIPITOR) 10 MG tablet, Take 10 mg by mouth Every Night., Disp: , Rfl:   •  cyclobenzaprine (FLEXERIL) 10 MG tablet, cyclobenzaprine 10 mg tablet  one po Every night at bedtime prn, Disp: , Rfl:   •  ferrous sulfate 324 (65 FE) MG tablet delayed-release EC tablet, Take 324 mg by mouth Daily With Breakfast., Disp: , Rfl:   •  flecainide (TAMBOCOR) 100 MG tablet, TAKE 1 TABLET TWICE A DAY, Disp: 180 tablet, Rfl: 4  •  metFORMIN ER (GLUCOPHAGE-XR) 500 MG 24 hr tablet, Take 500 mg by mouth Every Night., Disp: , Rfl:   •  naproxen (NAPROSYN) 500 MG tablet, , Disp: , Rfl:   •  Omega-3 Fatty Acids (FISH OIL) 1000 MG capsule capsule, Take 1,000 mg by mouth Daily With Breakfast., Disp: , Rfl:   •  tamsulosin (FLOMAX) 0.4 MG capsule 24 hr capsule, Take 1 capsule by mouth Every Night., Disp: 90 capsule, Rfl: 3      Allergies   Allergen Reactions   • Penicillins Hives and Rash     Patient Active Problem List   Diagnosis   • Atrial fibrillation (CMS/HCC)   • History of cardioversion   • H/O echocardiogram   • History of Holter monitoring   • H/O transesophageal echocardiography (BRISSA) for monitoring   • History of cardioversion   • H/O cardiovascular stress test   • Prediabetes   • GERD (gastroesophageal reflux disease)   • Dyslipidemia   • DJD (degenerative joint  disease)   • Prostate cancer (CMS/HCC)   • Persistent atrial fibrillation   • Primary osteoarthritis of left knee   • DM (diabetes mellitus) (CMS/HCC)   • Status post total left knee replacement   • Acute postoperative pain   • Leukocytosis, mild, likely reactive     Past Medical History:   Diagnosis Date   • Arthritis    • Atrial fibrillation (CMS/HCC)    • Cataract     both- surgery done    • Diabetes mellitus (CMS/HCC)     prediabetic- checks sugar every other day    • Dyslipidemia    • GERD (gastroesophageal reflux disease)    • H/O cardiovascular stress test 08/23/2007    Revealing no significant ischemia, EF estimated at 46%   • H/O echocardiogram 02/02/2011    Revealing normal LV function, diastolic dysfunction noted, mild MR, nopericardial effusion.   • H/O transesophageal echocardiography (BRISSA) for monitoring     left ventricular ejection fraction of 55%, mild TR and MR   • History of cardioversion 2007    STATUS-POST SUCCESSFUL EXTERNAL CARDIOVERSION   • History of cardioversion 02/17/2012    external cardioversion to normal sinus rhythm   • History of Holter monitoring 01/25/2011    Revealing sinus rhythm with PAC/PVC/episodes of sinus bradycardia.   • History of shingles     8-10 years ago    • [place] (hard of hearing)     has hearing aides   • Hyperlipidemia    • Osteoarthritis    • PONV (postoperative nausea and vomiting)     only with ether   • Pre-diabetes    • Prostate cancer (CMS/HCC)    • Sleep apnea with use of continuous positive airway pressure (CPAP)    • Sleep apnea with use of continuous positive airway pressure (CPAP)     compliant with machine    • Status post cystoscopy 08/28/2018   • Wears glasses    • Wears glasses      Past Surgical History:   Procedure Laterality Date   • ATRIAL APPENDAGE EXCLUSION LEFT WITH TRANSESOPHAGEAL ECHOCARDIOGRAM N/A 7/16/2019    Procedure: Atrial Appendage Occlusion ([facility]), anticoagulation will be determined after pre-BRISSA;  Surgeon: Prabhakar Solis MD;   [facility];  Service: Cardiology   • BELPHAROPTOSIS REPAIR      bilat    • CARDIAC ABLATION  2011   • CATARACT EXTRACTION      bilat    • CHOLECYSTECTOMY     • COLONOSCOPY      2018   • CYST REMOVAL      on back and back of neck   • KNEE ARTHROSCOPY W/ PARTIAL MEDIAL MENISCECTOMY Left    • ORCHIECTOMY Right    • SHOULDER ARTHROSCOPY W/ LABRAL REPAIR Right    • TONSILLECTOMY AND ADENOIDECTOMY     • TOTAL KNEE ARTHROPLASTY Left 11/15/2019    Procedure: TOTAL KNEE ARTHROPLASTY LEFT;  Surgeon: Gamaliel Polanco MD;  [facility];  Service: Orthopedics   • WISDOM TOOTH EXTRACTION      all 4 removed      Family History   Problem Relation Age of Onset   • Arthritis Mother    • Pneumonia Mother    • Atrial fibrillation Father    • Hypertension Father    • Diabetes Father    • Heart disease Father    • No Known Problems Sister    • No Known Problems Maternal Grandmother    • Diabetes Maternal Grandfather    • No Known Problems Paternal Grandmother    • No Known Problems Paternal Grandfather      Social History     Tobacco Use   • Smoking status: Never Smoker   • Smokeless tobacco: Never Used   Substance Use Topics   • Alcohol use: No       ROS:  Review of Symptoms:  General: no recent weight loss/gain, weakness or fatigue  Skin: no rashes, lumps, or other skin changes  HEENT: no dizziness, lightheadedness, or vision changes  Respiratory: no cough or hemoptysis  Cardiovascular: no palpitations, and tachycardia  Gastrointestinal: no black/tarry stools or diarrhea  Urinary: no change in frequency or urgency  Peripheral Vascular: no claudication or leg cramps  Musculoskeletal: no muscle or joint pain/stiffness  Psychiatric: no depression or excessive stress  Neurological: no sensory or motor loss, no syncope  Hematologic: no anemia, easy bruising or bleeding  Endocrine: no thyroid problems, nor heat or cold intolerance    PHYSICAL EXAM:    There were no vitals taken for this visit.       Wt  Readings from Last 5 Encounters:   03/06/20 104 kg (228 lb 14.4 oz)   11/15/19 101 kg (222 lb)   11/06/19 101 kg (222 lb)   11/05/19 101 kg (222 lb 14.2 oz)   10/14/19 97.1 kg (214 lb)       BP Readings from Last 5 Encounters:   03/06/20 180/81   11/16/19 113/50   11/06/19 130/68   08/30/19 128/74   08/02/19 158/75       General appearance - Alert, well appearing, and in no distress   Mental status - Affect appropriate to mood.  Eyes - Sclerae anicteric,  ENMT - Hearing grossly normal bilaterally, Dental hygiene good.  Neck - Carotids upstroke normal bilaterally, no bruits, no JVD.  Resp - Clear to auscultation, no wheezes, rales or rhonchi, symmetric air entry.  Heart - Normal rate, regular rhythm, normal S1, S2, no murmurs, rubs, clicks or gallops.  GI - Soft, nontender, nondistended, no masses or organomegaly.  Neurological - Grossly intact - normal speech, no focal findings  Musculoskeletal - No joint tenderness, deformity or swelling, no muscular tenderness noted.  Extremities - Peripheral pulses normal, no pedal edema, no clubbing or cyanosis.  Skin - Normal coloration and turgor.  Psych -  oriented to person, place, and time.    Medical problems and test results were reviewed with the patient today.     No results found for this or any previous visit (from the past 672 hour(s)).      EKG: (EKG has been independently visualized by me and summarized below)  4/24/19    ECG 12 Lead  Date/Time: 3/10/2020 9:15 AM  Performed by: Irineo Burks PA  Authorized by: Irineo Burks PA   Comparison: compared with previous ECG from 4/24/2019  Similar to previous ECG  Rhythm: sinus bradycardia  Rate: bradycardic  Conduction: conduction normal  QRS axis: normal  Other: no other findings    Clinical impression: abnormal EKG              ASSESSMENT   1. Persistent Afib:  Remote PVA 2011, Tambocor therapy  2. DM Type II  3. HLD: Statin  4. Asymptomatic Bradycardia-Stable.   5. Anticoagulation:  Chadsvasc=3, Watchman  device per [facility] trial 7/16/19. Follow up BRISSA 8/30/19 Moderate MR, Less than 0.3cm leakage around device.  Currently on ASA.     PLAN  · Continue Tambocor and monitor for toxicity  · Repeat BRISSA in July.  · Follow up with Dr. Solis as scheduled.     3/10/2020  08:48    Will Kimmie CLARK

## 2020-03-18 ENCOUNTER — HOSPITAL ENCOUNTER (OUTPATIENT)
Dept: PET IMAGING | Facility: HOSPITAL | Age: 80
End: 2020-03-18

## 2020-03-24 ENCOUNTER — TELEPHONE (OUTPATIENT)
Dept: RADIATION ONCOLOGY | Facility: HOSPITAL | Age: 80
End: 2020-03-24

## 2020-03-24 NOTE — TELEPHONE ENCOUNTER
Called pt and explained Dr. Bunn will be calling him the results of his Axumin PET scan rather than have him come to the clinic related to the corona virus.  Pt verbalized understanding.

## 2020-03-31 ENCOUNTER — OFFICE VISIT (OUTPATIENT)
Dept: RADIATION ONCOLOGY | Facility: HOSPITAL | Age: 80
End: 2020-03-31

## 2020-03-31 ENCOUNTER — HOSPITAL ENCOUNTER (OUTPATIENT)
Dept: PET IMAGING | Facility: HOSPITAL | Age: 80
Discharge: HOME | End: 2020-03-31
Admitting: RADIOLOGY

## 2020-03-31 DIAGNOSIS — C61 PROSTATE CANCER (HCC): ICD-10-CM

## 2020-03-31 DIAGNOSIS — C61 PROSTATE CANCER (HCC): Primary | ICD-10-CM

## 2020-03-31 PROCEDURE — 78815 PET IMAGE W/CT SKULL-THIGH: CPT

## 2020-03-31 PROCEDURE — A9588 FLUCICLOVINE F-18: HCPCS | Performed by: RADIOLOGY

## 2020-03-31 PROCEDURE — 0 FLUCICLOVINE: Performed by: RADIOLOGY

## 2020-03-31 RX ADMIN — FLUCICLOVINE F-18 1 DOSE: 221 INJECTION, SOLUTION INTRAVENOUS at 11:36

## 2020-03-31 NOTE — PROGRESS NOTES
RADIATION ONCOLOGY PROGRESS NOTE  03/31/20    Pt notified of the following appts:  4/10/20 @ 9:30 [facility] inj. With Dr. Chao  4/13/20 @ 2:00 Re-eval/sim with Dr. Bunn  Verbalized understanding

## 2020-03-31 NOTE — PROGRESS NOTES
TELEMEDICINE FOLLOW-UP NOTE    03/31/2020    Problem List Items Addressed This Visit        Genitourinary    Prostate cancer (CMS/HCC) - Primary          Time Devoted to Visit: 20 min including time to review his previous imaging and records, with 50% devoted to direct discussion.    Reason for Visit:  I personally contacted Stefan Pedraza  today in an effort to screen for coronavirus symptoms and also to perform their scheduled follow-up visit remotely in light of the coronavirus pandemic.  With respect to their specific risks for coronavirus, their screen is as follows:    COVID-19 RISK SCREEN     1. Has the patient been exposed to a known COVID-19 (+) person or a person under investigation (PUI) for COVID-19 infection?  -- No     2. Has the patient traveled to or are they from a Level III endemic country? --  No    3. Has the patient traveled to or are they from a local community endemic area?   --  No    4. Does the patient have baseline higher exposure risk such as working in healthcare field or currently residing in healthcare facility?  --  No     They also denied any new respiratory symptoms or fever within the past 14 days.    I counseled them regarding safe practices for minimizing risk for infection including hand-washing, self-isolation, limiting contacts, and we also discussed what to do should they develop symptoms including fever, chills, new cough, shortness of breath, or new body aches.    SUBJECTIVE:  With respect to their cancer diagnosis, Mr. Pedraza has a history of a Clinical T2b, [place] 4+3=7 prostate cancer, with a pretreatment PSA 7.89 ng/ml, that was treated with Cyberknife Radiosurgery 1 1/2 years ago.  After an initial partial response, his PSA has unfortunately continued to rise and he now has evidence of biochemical recurrence.  He completed an Axumin PET scan earlier today, and was scheduled to return to our clinic for reevaluation to determine the next step regarding  treatment.    KPS: 90%    EXAM FINDINGS AND/OR PROBLEMS:  He denies any new complaints or symptoms.    IMAGING  I have personally reviewed the relevant imaging studies, as follows:  Nm Pet Skull Base To Mid Thigh    Result Date: 3/31/2020  Abnormal activity left pelvic sidewall and iliac bifurcation lymph nodes measuring up to maximum SUV 6.6 along with increased abnormal activity within the enlarged left periaortic lymph nodes consistent with metastatic involvement. Additionally, there are asymmetrically enlarged left superficial inguinal lymph nodes with low-level but abnormal activity consistent with involvement.   D:  03/31/2020 E:  03/31/2020  This report was finalized on 3/31/2020 12:53 PM by Dr. Guillermo Lozoya.          ASSESSMENT/PLAN: Stefan Pedraza is a 79 y.o. year old male with a biochemical recurrence of a Alvaro 4+3 = 7 prostate adenocarcinoma.  Unfortunately, his Axumin PET scan actually shows that he has multifocal lymph node recurrence primarily involving the left side of the pelvis, there lymph nodes spanning from the left inguinal lymph nodes up to the left common iliacs.  I personally contacted Mr. Pedraza and discussed the results of his PET scan and we also discussed the typical salvage treatment approach which will consist of involved field radiation therapy to the lymph nodes administered with both concurrent and adjuvant androgen deprivation therapy.  We spent more than 20 minutes discussing the risks, benefits, and potential side effects of androgen deprivation therapy, as well as the role of radiation treatments and the potential side effects associated with them.  In light of the current coronavirus pandemic, I have tried to expedite his upcoming appointments and I believe he would best be served by starting androgen deprivation therapy first, and we coordinated for him to be seen by Dr. Chao to initiate ADT next week on April 10th, and shortly thereafter I will have him return to our  clinic so that he can be re-evaluated by Dr. Bunn and we will schedule for him to undergo a radiation set up and simulation session to begin pelvic radiation therapy.      RECOMMENDATIONS:  Return in about 2 weeks (around 4/14/2020) for Office Visit, Brachytherapy Simulation.    Greg Boston MD

## 2020-04-07 ENCOUNTER — DOCUMENTATION (OUTPATIENT)
Dept: RADIATION ONCOLOGY | Facility: HOSPITAL | Age: 80
End: 2020-04-07

## 2020-04-07 NOTE — PROGRESS NOTES
Telephone conversation with patient.  Axumen pet/CT had shown low volume metastastic disease with pathologic lymphadenopathy confined to the left pelvis and lower paraaortic lymph nodes below the renal vessels.   He is still asymptomatic for the adenopathy.  No symptoms of viral infection.  He will see Dr Chao this Friday, 4- to initiate androgen ablation.  He desires to eventually stop androgen ablation and would like to receive involved field radiotherapy to the lymphatics.  I would like to wait about 8 weeks after LHRH initiation before beginning radiotherapy and to get a repeat psa to determine hormone sensitivity of his recurrent prostate cancer.    Plan:    Patient to call my department tomorrow to re-schedule currently planned return visit with me for approximately 5 weeks after LHRH, around May 15, 2020.  My department will place order for PSA to be drawn at [facility] a few days prior to my return visit.  CT simulation week of May 18, 2020.  55 Gy in 20 fractions over 4 weeks to L pelvic/lower PALNs on Radixact.

## 2020-04-13 ENCOUNTER — HOSPITAL ENCOUNTER (OUTPATIENT)
Dept: RADIATION ONCOLOGY | Facility: HOSPITAL | Age: 80
Setting detail: RADIATION/ONCOLOGY SERIES
Discharge: HOME | End: 2020-04-13

## 2020-04-13 DIAGNOSIS — C61 PROSTATE CANCER (HCC): Primary | ICD-10-CM

## 2020-05-20 ENCOUNTER — OFFICE VISIT (OUTPATIENT)
Dept: RADIATION ONCOLOGY | Facility: HOSPITAL | Age: 80
End: 2020-05-20

## 2020-05-20 ENCOUNTER — HOSPITAL ENCOUNTER (OUTPATIENT)
Dept: RADIATION ONCOLOGY | Facility: HOSPITAL | Age: 80
Discharge: HOME | End: 2020-05-20

## 2020-05-20 ENCOUNTER — HOSPITAL ENCOUNTER (OUTPATIENT)
Dept: RADIATION ONCOLOGY | Facility: HOSPITAL | Age: 80
Setting detail: RADIATION/ONCOLOGY SERIES
Discharge: HOME | End: 2020-05-20

## 2020-05-20 VITALS
BODY MASS INDEX: 29.95 KG/M2 | HEIGHT: 73 IN | WEIGHT: 226 LBS | OXYGEN SATURATION: 96 % | HEART RATE: 51 BPM | SYSTOLIC BLOOD PRESSURE: 163 MMHG | DIASTOLIC BLOOD PRESSURE: 87 MMHG | TEMPERATURE: 97.7 F | RESPIRATION RATE: 18 BRPM

## 2020-05-20 DIAGNOSIS — C77.5 PROSTATE CANCER METASTATIC TO INTRAPELVIC LYMPH NODE (HCC): Primary | ICD-10-CM

## 2020-05-20 DIAGNOSIS — C61 PROSTATE CANCER METASTATIC TO INTRAPELVIC LYMPH NODE (HCC): Primary | ICD-10-CM

## 2020-05-20 PROCEDURE — 77301 RADIOTHERAPY DOSE PLAN IMRT: CPT | Performed by: RADIOLOGY

## 2020-05-20 PROCEDURE — 77334 RADIATION TREATMENT AID(S): CPT | Performed by: RADIOLOGY

## 2020-05-20 RX ORDER — OXYBUTYNIN CHLORIDE 5 MG/1
5 TABLET, EXTENDED RELEASE ORAL DAILY
Qty: 30 TABLET | Refills: 11 | Status: SHIPPED | OUTPATIENT
Start: 2020-05-20 | End: 2020-11-02 | Stop reason: SDUPTHER

## 2020-05-20 RX ORDER — TAMSULOSIN HYDROCHLORIDE 0.4 MG/1
1 CAPSULE ORAL DAILY
COMMUNITY
End: 2021-02-11

## 2020-05-20 RX ORDER — VITAMIN B COMPLEX
1 CAPSULE ORAL DAILY
COMMUNITY

## 2020-05-20 NOTE — PROGRESS NOTES
RE-EVALUATION    PATIENT:                                                      Stefan Pedraza  :                                                          1940  DATE:                          2020   DIAGNOSIS:     Lymph node metastasis  Prostate cancer (CMS/HCC)  - Stage IIC (cT2b, cN0, cM0, PSA: 5.3, Grade Group: 3)         BRIEF HISTORY:  The patient is a very pleasant 79 y.o. male  with prostate cancer initially treated with stereotactic body radiotherapy for intermediate risk, clinically localized disease.  PSA did not reach target adi values and breanna to a maximum PSA 14 ng/mL.  Axumin PET/CT demonstrated hypermetabolic pathologic lymphadenopathy along the iliac chain as well as some smaller periaortic lymph nodes.  No evidence of disease recurrence at the primary site the prostate gland or distant metastasis elsewhere.  He initiated androgen ablation but 6-month LHRH agonist on 2020.  He was experiencing hot flashes but otherwise tolerating hormonal therapy well.  PSA drawn 2020 was 2.86 ng/ml.  He is still experiencing urinary frequency, urgency and nocturia x2 for but otherwise has good stream with no dysuria.  He also has some urgency of bowel.    Allergies   Allergen Reactions   • Penicillins Hives and Rash       Review of Systems   Constitutional: Negative.    Genitourinary: Positive for nocturia.          IPSS Questionnaire (AUA-7):  Over the past month…     1)  Incomplete Emptying  How often have you had a sensation of not emptying your bladder?  0 - Not at all   2)  Frequency  How often have you had to urinate less than every two hours? 3 - About half the time   3)  Intermittency  How often have you found you stopped and started again several times when you urinated?  5 - Almost always   4) Urgency  How often have you found it difficult to postpone urination?  3 - About half the time   5) Weak Stream  How often have you had a weak urinary stream?  4 - More than half the time  "  6) Straining  How often have you had to push or strain to begin urination?  0 - Not at all   7) Nocturia  How many times did you typically get up at night to urinate?  3 - 3 times   Total Score:  18         Quality of life due to urinary symptoms:  If you were to spend the rest of your life with your urinary condition the way it is now, how would you feel about that? 2-Mostly Satisfied   Urine Leakage (Incontinence) 1-Mild (A few drops a day, no pad use)      Sexual Health Inventory  Current Status     1)  How do you rate your confidence that you could achieve and keep an erection? 1-Very Low   2) When you had erections with sexual stimulation, how often were your erections hard enough for penetration (entering your partner)? 1-Almost never or never   3)  During sexual intercourse, how often were you able to maintain your erection after you had penetrated (entered) into your partner? 0-Did not attempt intercourse   4) During sexual intercourse, how difficult was it to maintain your erection to completion of intercourse? 0-Did not attempt intercourse   5) When you attempted sexual intercourse, how often was it satisfactory to you? 0-No sexual activity   Total Score: 2         Bowel Health Inventory  Current Status: 0-No problems, no rectal bleeding, no discharge, less then 5 bowel movements a day                       Objective   VITAL SIGNS:   Vitals:    05/20/20 1304   BP: 163/87   Pulse: 51   Resp: 18   Temp: 97.7 °F (36.5 °C)   TempSrc: Temporal   SpO2: 96%  Comment: RA   Weight: 103 kg (226 lb)   Height: 185.4 cm (73\")   PainSc:   2   PainLoc: Knee  Comment: left knee        KPS 80%    Physical Exam   Constitutional: He is oriented to person, place, and time. He appears well-developed and well-nourished.   HENT:   Head: Normocephalic and atraumatic.   Neck: Normal range of motion. Neck supple.   Cardiovascular: Normal rate, regular rhythm and normal heart sounds.   No murmur heard.  Pulmonary/Chest: Effort " normal and breath sounds normal. He has no wheezes. He has no rales.   Abdominal: Soft. Bowel sounds are normal. He exhibits no distension. There is no hepatosplenomegaly. There is no tenderness.   Musculoskeletal: Normal range of motion. He exhibits edema ( 1+ lower leg). He exhibits no tenderness.   Lymphadenopathy:     He has no cervical adenopathy.     He has no axillary adenopathy.        Right: No supraclavicular adenopathy present.        Left: No supraclavicular adenopathy present.   Neurological: He is alert and oriented to person, place, and time. He has normal strength. No sensory deficit.   Skin: Skin is warm and dry.   Psychiatric: He has a normal mood and affect. His behavior is normal. Judgment and thought content normal.   Nursing note and vitals reviewed.           The following portions of the patient's history were reviewed and updated as appropriate: allergies, current medications, past family history, past medical history, past social history, past surgical history and problem list.    Diagnoses and all orders for this visit:    Prostate cancer metastatic to intrapelvic lymph node (CMS/HCC)    Other orders  -     oxybutynin XL (DITROPAN-XL) 5 MG 24 hr tablet; Take 1 tablet by mouth Daily.      IMPRESSION: Prostate cancer, initially intermediate risk, Hallock score 4+3 = 7, stage IIC, 1 1/2 years status post stereotactic body radiotherapy.  Now has oligo metastatic disease limited to lymphatics in the pelvis/lower abdomen.  He has already initiated androgen ablation with very good partial but incomplete biochemical response.  Patient strongly wishes to discontinue androgen ablation and to be considered for salvage involved field radiotherapy.  Conventionally fractionated radiotherapy is indicated for local control of tumor and all Axumin PET avid disease can be treated with reasonable risk using IMRT technique and image guidance.    IMRT is necessary because:    Small bowel is a dose limiting  structures adjacent to, but outside the PTV, and sufficiently close to targets to require IMRT to  assure safety and morbidity reduction.  An immediately adjacent volume has been irradiated in the pelvis previously and abutting portals must be established with high precision.  Gross Tumor Volume (GTV) margins are in close proximity to critical structures that must be protected to avoid unacceptable morbidity.  Only IMRT techniques would decrease the probability of grade 2 or grade 3 radiation toxicity as compared to conventional radiation in greater than 15% of radiated similar cases  Informed consent was obtained today.    RECOMMENDATIONS: CT simulation today.  PET positive lymphatics will receive a dose of 60 Gray in 25 fractions over 5 weeks.  The entire lymphatic chains will concurrently receive a minimum dose of 25 Liz.  He will be treated on the helical Cesar therapy unit.       Shon Bunn MD

## 2020-05-29 PROCEDURE — 77300 RADIATION THERAPY DOSE PLAN: CPT | Performed by: RADIOLOGY

## 2020-05-29 PROCEDURE — 77338 DESIGN MLC DEVICE FOR IMRT: CPT | Performed by: RADIOLOGY

## 2020-05-29 PROCEDURE — 77301 RADIOTHERAPY DOSE PLAN IMRT: CPT | Performed by: RADIOLOGY

## 2020-06-01 ENCOUNTER — HOSPITAL ENCOUNTER (OUTPATIENT)
Dept: RADIATION ONCOLOGY | Facility: HOSPITAL | Age: 80
Setting detail: RADIATION/ONCOLOGY SERIES
Discharge: HOME | End: 2020-06-01

## 2020-06-04 ENCOUNTER — HOSPITAL ENCOUNTER (OUTPATIENT)
Dept: RADIATION ONCOLOGY | Facility: HOSPITAL | Age: 80
Discharge: HOME | End: 2020-06-04

## 2020-06-04 PROCEDURE — 77386: CPT | Performed by: RADIOLOGY

## 2020-06-05 ENCOUNTER — HOSPITAL ENCOUNTER (OUTPATIENT)
Dept: RADIATION ONCOLOGY | Facility: HOSPITAL | Age: 80
Discharge: HOME | End: 2020-06-05

## 2020-06-05 PROCEDURE — 77386: CPT | Performed by: RADIOLOGY

## 2020-06-05 PROCEDURE — 77336 RADIATION PHYSICS CONSULT: CPT | Performed by: RADIOLOGY

## 2020-06-08 ENCOUNTER — HOSPITAL ENCOUNTER (OUTPATIENT)
Dept: RADIATION ONCOLOGY | Facility: HOSPITAL | Age: 80
Discharge: HOME | End: 2020-06-08

## 2020-06-08 PROCEDURE — 77386: CPT | Performed by: RADIOLOGY

## 2020-06-09 ENCOUNTER — HOSPITAL ENCOUNTER (OUTPATIENT)
Dept: RADIATION ONCOLOGY | Facility: HOSPITAL | Age: 80
Discharge: HOME | End: 2020-06-09

## 2020-06-09 VITALS — BODY MASS INDEX: 29.36 KG/M2 | WEIGHT: 222.5 LBS

## 2020-06-09 PROCEDURE — 77386: CPT | Performed by: RADIOLOGY

## 2020-06-10 ENCOUNTER — HOSPITAL ENCOUNTER (OUTPATIENT)
Dept: RADIATION ONCOLOGY | Facility: HOSPITAL | Age: 80
Discharge: HOME | End: 2020-06-10

## 2020-06-10 ENCOUNTER — DOCUMENTATION (OUTPATIENT)
Dept: NUTRITION | Facility: HOSPITAL | Age: 80
End: 2020-06-10

## 2020-06-10 PROCEDURE — 77386: CPT | Performed by: RADIOLOGY

## 2020-06-10 NOTE — PROGRESS NOTES
[facility] Nutrition    Diagnosis:  Stage IIC  prostate cancer initially treated with stereotactic body radiotherapy for intermediate risk, clinically localized disease 1.5 years ago;  Axumin PET/CT demonstrated hypermetabolic pathologic lymphadenopathy along the iliac chain as well as some smaller periaortic lymph nodes  Treatment: Androgen ablation  Radiation: 60 Gray in 25 fractions over 5 weeks; the entire lymphatic chains will concurrently receive a minimum dose of 25 Gray / IMRT    Weight 222.5 lbs    Patient is not deemed to be at nutritional risk.     Nutritional consultation with patient for provision of education.  Discussed important nutritional considerations with a prostate cancer diagnosis and possible side effects of treatment, providing tips for management of bowel changes, fatigue and change of appetite. Review of patient's present diet indicates that he focuses on maintaining a healthy diet and physical activity as much as possible.  States that he plays pickleball, but has not been playing much lately related to Covid-19.    Will follow as needed.

## 2020-06-11 ENCOUNTER — HOSPITAL ENCOUNTER (OUTPATIENT)
Dept: RADIATION ONCOLOGY | Facility: HOSPITAL | Age: 80
Discharge: HOME | End: 2020-06-11

## 2020-06-11 ENCOUNTER — MDT ASSESSMENT (OUTPATIENT)
Dept: ONCOLOGY | Facility: CLINIC | Age: 80
End: 2020-06-11

## 2020-06-11 PROCEDURE — 77336 RADIATION PHYSICS CONSULT: CPT | Performed by: RADIOLOGY

## 2020-06-11 PROCEDURE — 77386: CPT | Performed by: RADIOLOGY

## 2020-06-11 NOTE — PROGRESS NOTES
MULTIDISCIPLINARY CONFERENCE INITIAL TREATMENT PLAN    PRESENTER: Shon Bunn M.D.  DATE: 2020  SITE:  Prostate (metastatic)    Stefan Pedraza  :  1940  [address] [place] [id number]  Home phone: [phone]  Mobile phone: [phone]  Work phone: Work phone not available  MRN:  [id number]    CLINICAL HISTORY:        HPI:  79 YOWM who presented with rising PSA (10.08 from 2020) with abnormal surveillance imaging.       REFERRING PROVIDER: Dereck Chao M.D. (Urology - [facility])         PLACE OF RESIDENCE:  [place], [place]       SOCIAL HISTORY:  Lifetime non-smoker.  He is  and retired.       FAMILY HISTORY:  [id number].       PAST MEDICAL HISTORY:  Prostate cancer (2018) Alvaro score 4+3=7 with pretreatment PSA of 5.30.  Prolaris genetic testing indicated more aggressive disease.  Patient was evaluated at [facility] also; however, he opted for [facility] SBRT (completed 10/2018).       Right cryptorchidism       PAST SURGICAL HISTORY:   Prostate biopsy (), prostatectomy (), left total knee replacement ()    IMAGIN/31/2020 ([facility]):  PET w/CT - The patient is noted to be status post prostatectomy with abnormal activity in the left internal iliac and iliac bifurcation left pelvic sidewall adenopathy measuring up to maximum SUV 6.6 left pelvic sidewall along with enlarged periaortic adenopathy measuring up to maximum SUV 3.7 well above bone marrow activity and abnormal for metastatic involvement. Abnormal activity although low-level of abnormality left superficial inguinal lymph nodes with enlargement.    BIOPSY/STAGING RESULTS    CLINICAL STAGING  Prostate cancer (CMS/HCC)  - Stage IIC (cT2b, cN0, cM0, PSA: 5.3, Grade Group: 3)      RECOMMENDED TREATMENT  Neoadjuvant therapy:    Recommended Neoadjuvant regimen and duration of therapy:   Anticipated surgical procedure:   Timing of procedure:   Surgical approach:   Anticipated procedure:    Anticipated hospital stay:   Anticipated adjuvant chemotherapy or definitive chemotherapy:  Regimen:   Duration:   Anticipated imaging:      GUIDELINE CONCORDANCE  Recommended treatment is in a concordance with National Comprehensive Cancer Network (NCCN):      If not, rationale for novel treatment recommendation is based on the following criteria:     REFERRAL SUPPORT  Surgery recommended:   Medical Oncology recommended:   Radiation Oncology recommended: Yes  Clinical Trial recommended:  Genetics Consultation:   Palliative Care Consult recommended:   Barriers to Care:   Social Work Consult recommended:   Behavioral Health Consult recommended:   Notes: Sop androgen ablation therapy per pt request. Radiate and  continue monitoring PSA  Electronically signed by:  Valeri Frederick  06/11/20  09:18  Privileged and Confidential Patient Safety Work Product:  The information contained herein has been compiled as part of the [facility] Patient Safety Evaluation System and is deemed to be a Patient Safety Work Product and is privileged and confidential.  Disclaimer:  Multidisciplinary cancer conferences provide consultative services to formulate an effective treatment plan for patients and include physician representation from medical oncology, radiation oncology, radiology, surgery, and pathology.  AJCC or other appropriate staging is discussed, along with site-specific prognostic indicators and treatment planning used by evidence-based treatment guidelines.  Treatment plans which are discussed during conference may/may not necessarily be followed by the patient’s managing physician(s), as there may be other factors taken into consideration which impact the treatment plan.  The specific treatment plan is ultimately determined on an individual basis by the patient and the physician(s) involved in his/her care.

## 2020-06-12 ENCOUNTER — HOSPITAL ENCOUNTER (OUTPATIENT)
Dept: RADIATION ONCOLOGY | Facility: HOSPITAL | Age: 80
Discharge: HOME | End: 2020-06-12

## 2020-06-12 PROCEDURE — 77386: CPT | Performed by: RADIOLOGY

## 2020-06-15 ENCOUNTER — HOSPITAL ENCOUNTER (OUTPATIENT)
Dept: RADIATION ONCOLOGY | Facility: HOSPITAL | Age: 80
Discharge: HOME | End: 2020-06-15

## 2020-06-15 PROCEDURE — 77386: CPT | Performed by: RADIOLOGY

## 2020-06-16 ENCOUNTER — DOCUMENTATION (OUTPATIENT)
Dept: RADIATION ONCOLOGY | Facility: HOSPITAL | Age: 80
End: 2020-06-16

## 2020-06-16 ENCOUNTER — HOSPITAL ENCOUNTER (OUTPATIENT)
Dept: RADIATION ONCOLOGY | Facility: HOSPITAL | Age: 80
Discharge: HOME | End: 2020-06-16

## 2020-06-16 VITALS — BODY MASS INDEX: 29.8 KG/M2 | WEIGHT: 225.9 LBS

## 2020-06-16 DIAGNOSIS — C61 PROSTATE CANCER (HCC): Primary | ICD-10-CM

## 2020-06-16 PROCEDURE — 77386: CPT | Performed by: RADIOLOGY

## 2020-06-16 NOTE — PROGRESS NOTES
Patient is interested in exploring all potential treatment options for his oligo metastatic prostate cancer.  He is currently undergoing involved field radiotherapy to the pelvis and lower periaortic nodes which were positive on Axumin PET/CT.  He has decided to not receive any additional androgen ablation at this time due to hot flashes, fatigue and other symptoms which interfere with the quality of life.  He therefore will be following intermittent androgen ablation after completion of radiotherapy.  Intermittent androgen ablation has been shown to be noninferior to continued hormonal therapy with close monitoring and willingness to restart treatment when necessary.    His case was presented at tumor board conference last week.  Consensus was that he would not be a candidate for cytotoxic chemotherapy with taxanes due to toxicity at his age and his desire to limit treatments with high potential for negative effectiveness quality of life.  Other agents such as abiratirone or apalutamide are given only concurrently with androgen ablation and are not of value as sole agents.  There are some newer genetically directed treatment such as parp inhibitors which could be considered if he has a genetic mutation target.  Patient is interested in exploring this latter approach.    Plan:  -Genetic counseling referral for patient risk and BRCA mutation evaluation.  -We will try to get Caris testing for 'MSI high' evaluation on his original prostate biopsy.  This was performed at [facility].  -Consultation appointment with Dr. Ronda Mack in a couple weeks, when testing results should be available.

## 2020-06-17 ENCOUNTER — HOSPITAL ENCOUNTER (OUTPATIENT)
Dept: RADIATION ONCOLOGY | Facility: HOSPITAL | Age: 80
Discharge: HOME | End: 2020-06-17

## 2020-06-17 PROCEDURE — 77386: CPT | Performed by: RADIOLOGY

## 2020-06-18 ENCOUNTER — HOSPITAL ENCOUNTER (OUTPATIENT)
Dept: RADIATION ONCOLOGY | Facility: HOSPITAL | Age: 80
Discharge: HOME | End: 2020-06-18

## 2020-06-18 PROCEDURE — 77386: CPT | Performed by: RADIOLOGY

## 2020-06-19 ENCOUNTER — HOSPITAL ENCOUNTER (OUTPATIENT)
Dept: RADIATION ONCOLOGY | Facility: HOSPITAL | Age: 80
Discharge: HOME | End: 2020-06-19

## 2020-06-19 PROCEDURE — 77386: CPT | Performed by: RADIOLOGY

## 2020-06-19 PROCEDURE — 77336 RADIATION PHYSICS CONSULT: CPT | Performed by: RADIOLOGY

## 2020-06-22 ENCOUNTER — HOSPITAL ENCOUNTER (OUTPATIENT)
Dept: RADIATION ONCOLOGY | Facility: HOSPITAL | Age: 80
Discharge: HOME | End: 2020-06-22

## 2020-06-22 PROCEDURE — 77386: CPT | Performed by: RADIOLOGY

## 2020-06-23 ENCOUNTER — HOSPITAL ENCOUNTER (OUTPATIENT)
Dept: RADIATION ONCOLOGY | Facility: HOSPITAL | Age: 80
Discharge: HOME | End: 2020-06-23

## 2020-06-23 VITALS — WEIGHT: 224.5 LBS | BODY MASS INDEX: 29.62 KG/M2

## 2020-06-23 PROCEDURE — 77386: CPT | Performed by: RADIOLOGY

## 2020-06-24 ENCOUNTER — HOSPITAL ENCOUNTER (OUTPATIENT)
Dept: RADIATION ONCOLOGY | Facility: HOSPITAL | Age: 80
Discharge: HOME | End: 2020-06-24

## 2020-06-24 PROCEDURE — 77386: CPT | Performed by: RADIOLOGY

## 2020-06-25 ENCOUNTER — HOSPITAL ENCOUNTER (OUTPATIENT)
Dept: RADIATION ONCOLOGY | Facility: HOSPITAL | Age: 80
Discharge: HOME | End: 2020-06-25

## 2020-06-25 PROCEDURE — 77386: CPT | Performed by: RADIOLOGY

## 2020-06-26 ENCOUNTER — HOSPITAL ENCOUNTER (OUTPATIENT)
Dept: RADIATION ONCOLOGY | Facility: HOSPITAL | Age: 80
Discharge: HOME | End: 2020-06-26

## 2020-06-26 PROCEDURE — 77386: CPT | Performed by: RADIOLOGY

## 2020-06-26 PROCEDURE — 77336 RADIATION PHYSICS CONSULT: CPT | Performed by: RADIOLOGY

## 2020-06-29 ENCOUNTER — HOSPITAL ENCOUNTER (OUTPATIENT)
Dept: RADIATION ONCOLOGY | Facility: HOSPITAL | Age: 80
Discharge: HOME | End: 2020-06-29

## 2020-06-29 PROCEDURE — 77386: CPT | Performed by: RADIOLOGY

## 2020-06-30 ENCOUNTER — HOSPITAL ENCOUNTER (OUTPATIENT)
Dept: RADIATION ONCOLOGY | Facility: HOSPITAL | Age: 80
Discharge: HOME | End: 2020-06-30

## 2020-06-30 VITALS — BODY MASS INDEX: 29.54 KG/M2 | WEIGHT: 223.9 LBS

## 2020-06-30 PROCEDURE — 77386: CPT | Performed by: RADIOLOGY

## 2020-07-01 ENCOUNTER — HOSPITAL ENCOUNTER (OUTPATIENT)
Dept: RADIATION ONCOLOGY | Facility: HOSPITAL | Age: 80
Discharge: HOME | End: 2020-07-01

## 2020-07-01 ENCOUNTER — HOSPITAL ENCOUNTER (OUTPATIENT)
Dept: RADIATION ONCOLOGY | Facility: HOSPITAL | Age: 80
Setting detail: RADIATION/ONCOLOGY SERIES
Discharge: HOME | End: 2020-07-01

## 2020-07-01 PROCEDURE — 77386: CPT | Performed by: RADIOLOGY

## 2020-07-02 ENCOUNTER — HOSPITAL ENCOUNTER (OUTPATIENT)
Dept: RADIATION ONCOLOGY | Facility: HOSPITAL | Age: 80
Discharge: HOME | End: 2020-07-02

## 2020-07-02 PROCEDURE — 77386: CPT | Performed by: RADIOLOGY

## 2020-07-02 PROCEDURE — 77336 RADIATION PHYSICS CONSULT: CPT | Performed by: RADIOLOGY

## 2020-07-06 ENCOUNTER — HOSPITAL ENCOUNTER (OUTPATIENT)
Dept: RADIATION ONCOLOGY | Facility: HOSPITAL | Age: 80
Discharge: HOME | End: 2020-07-06

## 2020-07-06 PROCEDURE — 77386: CPT | Performed by: RADIOLOGY

## 2020-07-06 NOTE — PROGRESS NOTES
Subjective     PROBLEM LIST:  1. Metastatic prostate cancer  A) diagnosed 6/2018 with janis 4+3=7 prostate adenocarcinoma.  Stage at diagnosis [id number] (stage IIC).  Pretreatment PSA 5.3.  Treated with SBRT, completed 10/2018.    B) March 2020 found to have rising PSA (10.08 on 3/2/20).  Axumin PET/CT 3/31/20 showed abnormal activity in the left internal iliac, left pelvic sidewall nodes, periaortic adenopathy, bone marrow activity, left superficial inguinal nodes.  Started androgen ablation (eligard) with partial PSA response, but patient wished to stop ADT due to side effects/negative impact on quality of life.  Involved LN treated with radiotherapy  2. Atrial fibrillation  3. Diabetes mellitus  4. GERD  5. GRADY on CPAP  6. hyperlipidemia    CHIEF COMPLAINT: prostate cancer      HISTORY OF PRESENT ILLNESS:  The patient is a 80 y.o. male, referred for evaluation of metastatic prostate cancer.    He was diagnosed in June 2018 with a Janis 7 prostate cancer.  He was treated with CyberKnife.  Approximately 18 months later he was found to have a rising PSA and PET scan imaging showed abdominal and pelvic neelima disease.  He has received 1 dose of Eligard in April.  He finished radiation to the involved lymph nodes this morning.  He has had side effects with this including hot flashes, fatigue, and loss of muscle mass.  He normally is fairly active.  He walks, plays [place], and does weight training.  He has not been doing this much recently because of the radiation.  He says that he is not interested in continuing Eligard treatment.  He would prefer to focus on quality of life and the side effects were not tolerable for him.  He is here to discuss whether there are any alternative treatment options that do not involve androgen suppression.        REVIEW OF SYSTEMS:  A 14 point review of systems was performed and is negative except as noted above.    Past Medical History:   Diagnosis Date   • Arthritis    •  Atrial fibrillation (CMS/HCC)    • Cataract     both- surgery done    • Diabetes mellitus (CMS/[facility])     prediabetic- checks sugar every other day    • Dyslipidemia    • GERD (gastroesophageal reflux disease)    • H/O cardiovascular stress test 08/23/2007    Revealing no significant ischemia, EF estimated at 46%   • H/O echocardiogram 02/02/2011    Revealing normal LV function, diastolic dysfunction noted, mild MR, nopericardial effusion.   • H/O transesophageal echocardiography (BRISSA) for monitoring     left ventricular ejection fraction of 55%, mild TR and MR   • History of cardioversion 2007    STATUS-POST SUCCESSFUL EXTERNAL CARDIOVERSION   • History of cardioversion 02/17/2012    external cardioversion to normal sinus rhythm   • History of Holter monitoring 01/25/2011    Revealing sinus rhythm with PAC/PVC/episodes of sinus bradycardia.   • History of shingles     8-10 years ago    • [place] (hard of hearing)     has hearing aides   • Hyperlipidemia    • Osteoarthritis    • PONV (postoperative nausea and vomiting)     only with ether   • Pre-diabetes    • Prostate cancer (CMS/[facility])    • Sleep apnea with use of continuous positive airway pressure (CPAP)    • Sleep apnea with use of continuous positive airway pressure (CPAP)     compliant with machine    • Status post cystoscopy 08/28/2018   • Wears glasses    • Wears glasses          Current Outpatient Medications on File Prior to Visit   Medication Sig Dispense Refill   • aspirin 325 MG EC tablet Take 1 tablet by mouth Daily. For 1 month (Patient taking differently: Take 81 mg by mouth Daily.) 30 tablet 0   • atorvastatin (LIPITOR) 10 MG tablet Take 10 mg by mouth Every Night.     • B Complex Vitamins (VITAMIN B COMPLEX) capsule capsule Take  by mouth Daily.     • ciprofloxacin (Cipro) 500 MG tablet Cipro 500 mg tablet   Take 1 tablet(s) EVERY 12 HOURS by oral route. Start the day before the prostate biopsy.     • clotrimazole-betamethasone (LOTRISONE) 1-0.05 % cream       • cyclobenzaprine (FLEXERIL) 10 MG tablet cyclobenzaprine 10 mg tablet   one po Every night at bedtime prn     • ferrous sulfate 324 (65 FE) MG tablet delayed-release EC tablet Take 324 mg by mouth Daily With Breakfast.     • flecainide (TAMBOCOR) 100 MG tablet TAKE 1 TABLET TWICE A  tablet 4   • fluocinolone acetonide (DERMOTIC) 0.01 % oil otic oil      • leuprolide (LUPRON) 30 MG injection Inject 30 mg into the appropriate muscle as directed by prescriber Every 4 (Four) Months.     • metFORMIN ER (GLUCOPHAGE-XR) 500 MG 24 hr tablet Take 500 mg by mouth Every Night.     • naproxen (NAPROSYN) 500 MG tablet 2 (Two) Times a Day.     • Omega-3 Fatty Acids (FISH OIL) 1000 MG capsule capsule Take 1,000 mg by mouth Daily With Breakfast.     • omeprazole (priLOSEC) 40 MG capsule      • oxybutynin XL (DITROPAN-XL) 5 MG 24 hr tablet Take 1 tablet by mouth Daily. 30 tablet 11   • tamsulosin (FLOMAX) 0.4 MG capsule 24 hr capsule Take 1 capsule by mouth Daily.     • VITAMIN D PO Take 1,000 Int'l Units by mouth.       No current facility-administered medications on file prior to visit.        Allergies   Allergen Reactions   • Penicillins Hives and Rash       Past Surgical History:   Procedure Laterality Date   • ATRIAL APPENDAGE EXCLUSION LEFT WITH TRANSESOPHAGEAL ECHOCARDIOGRAM N/A 7/16/2019    Procedure: Atrial Appendage Occlusion (Wavecrest), anticoagulation will be determined after pre-BRISSA;  Surgeon: Prabhakar Solis MD;  Location: [facility] INVASIVE LOCATION;  Service: Cardiology   • BELPHAROPTOSIS REPAIR      bilat    • CARDIAC ABLATION  2011   • CATARACT EXTRACTION      bilat    • CHOLECYSTECTOMY     • COLONOSCOPY      2018   • CYST REMOVAL      on back and back of neck   • KNEE ARTHROSCOPY W/ PARTIAL MEDIAL MENISCECTOMY Left    • ORCHIECTOMY Right    • SHOULDER ARTHROSCOPY W/ LABRAL REPAIR Right    • TONSILLECTOMY AND ADENOIDECTOMY     • TOTAL KNEE ARTHROPLASTY Left 11/15/2019    Procedure: TOTAL KNEE  "ARTHROPLASTY LEFT;  Surgeon: Gamaliel Polanco MD;  Location: [facility];  Service: Orthopedics   • WISDOM TOOTH EXTRACTION      all 4 removed        Social History     Socioeconomic History   • Marital status:      Spouse name: Not on file   • Number of children: Not on file   • Years of education: Not on file   • Highest education level: Not on file   Occupational History   • Occupation: Retired   Tobacco Use   • Smoking status: Never Smoker   • Smokeless tobacco: Never Used   Substance and Sexual Activity   • Alcohol use: No   • Drug use: No   • Sexual activity: Defer     Comment:    Social History Narrative    Caffeine: 3-4 servings per day    Patient lives at home with his wife       Family History   Problem Relation Age of Onset   • Arthritis Mother    • Pneumonia Mother    • Atrial fibrillation Father    • Hypertension Father    • Diabetes Father    • Heart disease Father    • No Known Problems Sister    • No Known Problems Maternal Grandmother    • Diabetes Maternal Grandfather    • No Known Problems Paternal Grandmother    • No Known Problems Paternal Grandfather        Objective     /78   Pulse 57   Temp 98 °F (36.7 °C) (Temporal)   Resp 18   Ht 185.4 cm (72.99\")   Wt 102 kg (225 lb)   SpO2 94%   BMI 29.69 kg/m²   Performance Status: 0  General: well appearing male in no acute distress  Neuro: alert and oriented  HEENT: sclerae anicteric, oropharynx clear  Lymphatics: no cervical, supraclavicular, or axillary adenopathy  Cardiovascular: regular rate and rhythm, no murmurs  Lungs: clear to auscultation bilaterally  Abdomen: soft, nontender, nondistended.  No palpable organomegaly  Extremities: no lower extremity edema  Skin: no rashes, lesions, bruising, or petechiae  Psych: mood and affect appropriate      NM Pet Skull Base To Mid Thigh  Narrative: EXAMINATION: NM PET, SKULL BASE TO MID THIGH-03/31/2020:     INDICATION: Neoplasm: prostate, recurrence, " suspected/known;  [id number] neoplasm of prostate.      TECHNIQUE: Radiopharmaceutical: 10.66 mCi of F-18 Fluciclovine  administered in the right AC vein with patient imaged after an  appropriate amount of time. CT datasets performed for fusion analysis  alone and should not be used for diagnostic purposes as these are  low-dose protocol.     The radiation dose reduction device was turned on as low as reasonably  achievable for each scan per ALARA protocol.     COMPARISON: NONE.     FINDINGS: Normal physiologic distribution of tracer activity within the  pituitary and submandibular glands, liver, spleen and pancreas for  example. The patient is noted to be status post prostatectomy with  abnormal activity in the left internal iliac and iliac bifurcation left  pelvic sidewall adenopathy measuring up to maximum SUV 6.6 left pelvic  sidewall along with enlarged periaortic adenopathy measuring up to  maximum SUV 3.7 well above bone marrow activity and abnormal for  metastatic involvement. Abnormal activity although low-level of  abnormality left superficial inguinal lymph nodes with enlargement.  Osseous structures and soft tissues otherwise unremarkable for abnormal  activity.     Impression: Abnormal activity left pelvic sidewall and iliac bifurcation  lymph nodes measuring up to maximum SUV 6.6 along with increased  abnormal activity within the enlarged left periaortic lymph nodes  consistent with metastatic involvement. Additionally, there are  asymmetrically enlarged left superficial inguinal lymph nodes with  low-level but abnormal activity consistent with involvement.      D:  03/31/2020  E:  03/31/2020     This report was finalized on 3/31/2020 12:53 PM by Dr. Guillermo Lozoya.       I personally reviewed hte PET CT images      Assessment/Plan     Stefan Pedraza is a 80 y.o. year old male with metastatic prostate cancer involving abdominal and pelvic lymph nodes.    We discussed that androgen deprivation therapy  is the standard treatment in metastatic prostate cancer, and all other treatments are studied in addition to this.  Other medications that may be beneficial for controlling prostate cancer include chemotherapy such as Taxotere, which would almost certainly have more toxicity than androgen deprivation therapy alone.    We discussed that there are specific scenarios where nonhormonal agents can be used.  If the tumor is MSI high or has a high tumor mutational burden, immunotherapy can be effective in those tumor types.  In addition part inhibitors have been studied for prostate cancer in the setting of a BRCA mutation.  He has had molecular testing on his tumor.  This shows MSI stable disease, and PD-L1 0%.  He does not have a TRK mutation.  BRCA was not done but he is scheduled to meet with genetics later this month.    With the available information I think his best option for treatment at the time that it is needed is androgen deprivation therapy.  I think it is reasonable to monitor closely with PSA levels and imaging as indicated, and hold off initiating treatment until it is clear that there is disease progression.  Intermittent androgen deprivation therapy has mainly been studied in the setting of rising PSA without measurable disease on scans, but I think this can be extrapolated to his case given his priority of maintaining quality of life.  He does understand that disease control may not be as good with an intermittent approach.    He will continue to follow-up with Dr. Bunn and Dr. Chao.  I did offer to continue to see him periodically but as I would not recommend any additional treatment at this point I think it is not necessary.  I am happy to see him in the future if needed.       Ronda Mack MD    7/9/2020

## 2020-07-07 ENCOUNTER — HOSPITAL ENCOUNTER (OUTPATIENT)
Dept: RADIATION ONCOLOGY | Facility: HOSPITAL | Age: 80
Discharge: HOME | End: 2020-07-07

## 2020-07-07 VITALS — BODY MASS INDEX: 29.54 KG/M2 | WEIGHT: 223.9 LBS

## 2020-07-07 DIAGNOSIS — C61 PROSTATE CANCER (HCC): Primary | ICD-10-CM

## 2020-07-07 PROCEDURE — 77386: CPT | Performed by: RADIOLOGY

## 2020-07-08 ENCOUNTER — HOSPITAL ENCOUNTER (OUTPATIENT)
Dept: RADIATION ONCOLOGY | Facility: HOSPITAL | Age: 80
Discharge: HOME | End: 2020-07-08

## 2020-07-08 ENCOUNTER — DOCUMENTATION (OUTPATIENT)
Dept: NUTRITION | Facility: HOSPITAL | Age: 80
End: 2020-07-08

## 2020-07-08 PROCEDURE — 77386: CPT | Performed by: RADIOLOGY

## 2020-07-08 NOTE — PROGRESS NOTES
[facility] Nutrition     Diagnosis:  Stage IIC  prostate cancer initially treated with stereotactic body radiotherapy for intermediate risk, clinically localized disease 1.5 years ago;  Axumin PET/CT demonstrated hypermetabolic pathologic lymphadenopathy along the iliac chain as well as some smaller periaortic lymph nodes  Treatment: Androgen ablation  Radiation: 60 Gray in 25 fractions over 5 weeks; the entire lymphatic chains will concurrently receive a minimum dose of 25 Gray / IMRT - patient has completed 23/25 treatments     Weight 223.9 lbs     Patient is not deemed to be at nutritional risk.     As he approaches completion of treatment, he denies any nutritional impact symptoms except for extreme fatigue that now commits him to a daily nap after treatment.  Reviewed the tips for management of fatigue, including higher protein intake that is included with each meal and snack throughout the day, adequate hydration and maintaining physical activity as he is able.  He verbalized understanding.

## 2020-07-09 ENCOUNTER — HOSPITAL ENCOUNTER (OUTPATIENT)
Dept: RADIATION ONCOLOGY | Facility: HOSPITAL | Age: 80
Discharge: HOME | End: 2020-07-09

## 2020-07-09 ENCOUNTER — CONSULT (OUTPATIENT)
Dept: ONCOLOGY | Facility: CLINIC | Age: 80
End: 2020-07-09

## 2020-07-09 VITALS
HEART RATE: 57 BPM | SYSTOLIC BLOOD PRESSURE: 135 MMHG | TEMPERATURE: 98 F | RESPIRATION RATE: 18 BRPM | OXYGEN SATURATION: 94 % | BODY MASS INDEX: 29.82 KG/M2 | DIASTOLIC BLOOD PRESSURE: 78 MMHG | WEIGHT: 225 LBS | HEIGHT: 73 IN

## 2020-07-09 DIAGNOSIS — C77.5 PROSTATE CANCER METASTATIC TO INTRAPELVIC LYMPH NODE (HCC): Primary | ICD-10-CM

## 2020-07-09 DIAGNOSIS — C61 PROSTATE CANCER METASTATIC TO INTRAPELVIC LYMPH NODE (HCC): Primary | ICD-10-CM

## 2020-07-09 PROCEDURE — 77386: CPT | Performed by: RADIOLOGY

## 2020-07-09 PROCEDURE — 99204 OFFICE O/P NEW MOD 45 MIN: CPT | Performed by: INTERNAL MEDICINE

## 2020-07-09 RX ORDER — CIPROFLOXACIN 500 MG/1
TABLET, FILM COATED ORAL
COMMUNITY
End: 2020-07-29

## 2020-07-09 RX ORDER — FLUOCINOLONE ACETONIDE 0.11 MG/ML
OIL AURICULAR (OTIC)
COMMUNITY
Start: 2020-05-23 | End: 2020-07-29

## 2020-07-09 RX ORDER — OMEPRAZOLE 40 MG/1
40 CAPSULE, DELAYED RELEASE ORAL DAILY
COMMUNITY
Start: 2020-06-14

## 2020-07-09 RX ORDER — CLOTRIMAZOLE AND BETAMETHASONE DIPROPIONATE 10; .64 MG/G; MG/G
CREAM TOPICAL
COMMUNITY
Start: 2020-04-16 | End: 2020-07-29

## 2020-07-19 ENCOUNTER — APPOINTMENT (OUTPATIENT)
Dept: PREADMISSION TESTING | Facility: HOSPITAL | Age: 80
End: 2020-07-19

## 2020-07-19 PROCEDURE — C9803 HOPD COVID-19 SPEC COLLECT: HCPCS

## 2020-07-19 PROCEDURE — U0002 COVID-19 LAB TEST NON-CDC: HCPCS

## 2020-07-19 PROCEDURE — U0004 COV-19 TEST NON-CDC HGH THRU: HCPCS

## 2020-07-20 LAB
REF LAB TEST METHOD: NORMAL
SARS-COV-2 RNA RESP QL NAA+PROBE: NOT DETECTED

## 2020-07-21 ENCOUNTER — HOSPITAL ENCOUNTER (OUTPATIENT)
Dept: CARDIOLOGY | Facility: HOSPITAL | Age: 80
Discharge: HOME | End: 2020-07-21
Admitting: PHYSICIAN ASSISTANT

## 2020-07-21 VITALS
HEART RATE: 44 BPM | RESPIRATION RATE: 16 BRPM | DIASTOLIC BLOOD PRESSURE: 77 MMHG | OXYGEN SATURATION: 94 % | SYSTOLIC BLOOD PRESSURE: 136 MMHG

## 2020-07-21 DIAGNOSIS — I48.0 PAROXYSMAL ATRIAL FIBRILLATION (HCC): ICD-10-CM

## 2020-07-21 LAB
BH CV ECHO MEAS - BSA(HAYCOCK): 2.3 M^2
BH CV ECHO MEAS - BSA: 2.2 M^2
BH CV ECHO MEAS - BZI_BMI: 30.8 KILOGRAMS/M^2
BH CV ECHO MEAS - BZI_METRIC_HEIGHT: 182 CM
BH CV ECHO MEAS - BZI_METRIC_WEIGHT: 102.1 KG
BH CV ECHO MEAS - RAP SYSTOLE: 8 MMHG
BH CV ECHO MEAS - RVSP: 37 MMHG
BH CV ECHO MEAS - TR MAX PG: 29 MMHG
BH CV ECHO MEAS - TR MAX VEL: 268.8 CM/SEC
BH CV VAS BP LEFT ARM: NORMAL MMHG
LV EF 2D ECHO EST: 60 %

## 2020-07-21 PROCEDURE — 93321 DOPPLER ECHO F-UP/LMTD STD: CPT

## 2020-07-21 PROCEDURE — 99153 MOD SED SAME PHYS/QHP EA: CPT

## 2020-07-21 PROCEDURE — 99152 MOD SED SAME PHYS/QHP 5/>YRS: CPT

## 2020-07-21 PROCEDURE — 93325 DOPPLER ECHO COLOR FLOW MAPG: CPT | Performed by: INTERNAL MEDICINE

## 2020-07-21 PROCEDURE — 93312 ECHO TRANSESOPHAGEAL: CPT | Performed by: INTERNAL MEDICINE

## 2020-07-21 PROCEDURE — 93321 DOPPLER ECHO F-UP/LMTD STD: CPT | Performed by: INTERNAL MEDICINE

## 2020-07-21 PROCEDURE — 93325 DOPPLER ECHO COLOR FLOW MAPG: CPT

## 2020-07-21 PROCEDURE — 25010000002 FENTANYL CITRATE (PF) 100 MCG/2ML SOLUTION: Performed by: INTERNAL MEDICINE

## 2020-07-21 PROCEDURE — 25010000002 MIDAZOLAM PER 1 MG: Performed by: INTERNAL MEDICINE

## 2020-07-21 PROCEDURE — 93312 ECHO TRANSESOPHAGEAL: CPT

## 2020-07-21 RX ORDER — FENTANYL CITRATE 50 UG/ML
INJECTION, SOLUTION INTRAMUSCULAR; INTRAVENOUS
Status: COMPLETED | OUTPATIENT
Start: 2020-07-21 | End: 2020-07-21

## 2020-07-21 RX ORDER — MIDAZOLAM HYDROCHLORIDE 1 MG/ML
INJECTION INTRAMUSCULAR; INTRAVENOUS
Status: COMPLETED | OUTPATIENT
Start: 2020-07-21 | End: 2020-07-21

## 2020-07-21 RX ADMIN — MIDAZOLAM HYDROCHLORIDE 2 MG: 1 INJECTION, SOLUTION INTRAMUSCULAR; INTRAVENOUS at 10:28

## 2020-07-21 RX ADMIN — FENTANYL CITRATE 50 MCG: 50 INJECTION, SOLUTION INTRAMUSCULAR; INTRAVENOUS at 10:28

## 2020-07-21 RX ADMIN — FENTANYL CITRATE 50 MCG: 50 INJECTION, SOLUTION INTRAMUSCULAR; INTRAVENOUS at 10:25

## 2020-07-21 RX ADMIN — MIDAZOLAM HYDROCHLORIDE 2 MG: 1 INJECTION, SOLUTION INTRAMUSCULAR; INTRAVENOUS at 10:25

## 2020-07-21 NOTE — H&P
[facility] AT [address], [address]  [place], [place], [address] [phone]  [email or web address])[facility]           HISTORY & PHYSICAL NOTE    Patient Care Team:  Patient Care Team:  Gamaliel Eaton MD as PCP - General  Dereck Chao MD as Referring Physician (Urology)  Prabhakar Solis MD as Consulting Physician (Cardiac Electrophysiology)  Rakesh Ortiz MD as Consulting Physician (Urology)  Shon Bunn MD as Consulting Physician (Radiation Oncology)        Chief complaint: Follow-up left atrial appendage occlusion device placement         HPI:    Stefan Pedraza is a 80 y.o. male.    Partial problem list, including cardiac problems:  1. Atrial fibrillation:   a. History of atrial fibrillation, initial diagnosis 2007.   b. Status-post successful external cardioversion, Dr. Dyer, 2007.  c. Initiation of amiodarone therapy with recent tapering dose, Dr. Lowe.   d. Echocardiogram, 02/02/2011, revealing normal LV function, diastolic dysfunction noted, mild MR, no pericardial effusion.   e. Holter monitor, 01/25/2011, revealing sinus rhythm with PAC/PVC/episodes of sinus bradycardia.   f. Chadsvasc=3.   g. 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   h. Pulmonary vein isolation procedure, 10/05/2011.  i. BRISSA with left ventricular ejection fraction of 55%, mild TR and MR.   j. Event recorder with intermittent episodes of flutter but the majority of the time in sinus rhythm.   k. External cardioversion to normal sinus rhythm, 02/17/2012.  l. Atypical chest pain.  m. History of left heart catheterization, 1990s in [place], reported as normal coronaries.   n. Stress test, 08/23/2007, revealing no significant ischemia, EF estimated at 46%.  o. Echo 7/28/17 LV Estimated EF = 57%., mild concentric hypertrophy, Left atrial cavity size is severely dilated. Normal  estimated pulmonary artery systolic pressure  p. External CV 7/13/17; NSR with recurrent AF three days later  q. Echocardiogram 7/28/17: EF 57%, mild LVH, LA severely dilated  r. ECV to NSR 8/31/17  2. Diabetes mellitus.   3. Gastroesophageal reflux disease.  4. Dyslipidemia.  5. Osteoarthritis.  6. Remote surgical history:  a. Cholecystectomy.   b. Right orchiectomy.  c. Right shoulder labral repair.  d. Left knee arthroscopy for partial medial meniscectomy    The patient presents today for 1 year transesophageal echocardiogram after WAVECREST left atrail appendage occlusive device placement per the Wavecrest trial. He reports he has been doing well from a cardiac standpoint. He denies any chest pain, shortness of breath, palpitations, lightheadedness or syncope.  He has mild intermittent left lower extremity edema. He has been able to perform his ADLs without any significant difficulty. He denies any bleeding diathesis.      Review of Systems:  Positive for lower extremity edema.  All other systems reviewed and are negative.    PFSH:  Patient Active Problem List   Diagnosis   • Atrial fibrillation (CMS/HCC)   • History of cardioversion   • H/O echocardiogram   • History of Holter monitoring   • H/O transesophageal echocardiography (BRISSA) for monitoring   • History of cardioversion   • H/O cardiovascular stress test   • Prediabetes   • GERD (gastroesophageal reflux disease)   • Dyslipidemia   • DJD (degenerative joint disease)   • Prostate cancer (CMS/HCC)   • Persistent atrial fibrillation (CMS/HCC)   • Primary osteoarthritis of left knee   • DM (diabetes mellitus) (CMS/HCC)   • Status post total left knee replacement   • Acute postoperative pain   • Leukocytosis, mild, likely reactive   • Prostate cancer metastatic to intrapelvic lymph node (CMS/HCC)       Current Outpatient Medications on File Prior to Encounter   Medication Sig Dispense Refill   • aspirin 325 MG EC tablet Take 1 tablet by mouth Daily. For 1  month (Patient taking differently: Take 81 mg by mouth Daily.) 30 tablet 0   • atorvastatin (LIPITOR) 10 MG tablet Take 10 mg by mouth Every Night.     • B Complex Vitamins (VITAMIN B COMPLEX) capsule capsule Take  by mouth Daily.     • ciprofloxacin (Cipro) 500 MG tablet Cipro 500 mg tablet   Take 1 tablet(s) EVERY 12 HOURS by oral route. Start the day before the prostate biopsy.     • clotrimazole-betamethasone (LOTRISONE) 1-0.05 % cream      • cyclobenzaprine (FLEXERIL) 10 MG tablet cyclobenzaprine 10 mg tablet   one po Every night at bedtime prn     • ferrous sulfate 324 (65 FE) MG tablet delayed-release EC tablet Take 324 mg by mouth Daily With Breakfast.     • flecainide (TAMBOCOR) 100 MG tablet TAKE 1 TABLET TWICE A  tablet 4   • fluocinolone acetonide (DERMOTIC) 0.01 % oil otic oil      • leuprolide (LUPRON) 30 MG injection Inject 30 mg into the appropriate muscle as directed by prescriber Every 4 (Four) Months.     • metFORMIN ER (GLUCOPHAGE-XR) 500 MG 24 hr tablet Take 500 mg by mouth Every Night.     • naproxen (NAPROSYN) 500 MG tablet 2 (Two) Times a Day.     • Omega-3 Fatty Acids (FISH OIL) 1000 MG capsule capsule Take 1,000 mg by mouth Daily With Breakfast.     • omeprazole (priLOSEC) 40 MG capsule      • oxybutynin XL (DITROPAN-XL) 5 MG 24 hr tablet Take 1 tablet by mouth Daily. 30 tablet 11   • tamsulosin (FLOMAX) 0.4 MG capsule 24 hr capsule Take 1 capsule by mouth Daily.     • VITAMIN D PO Take 1,000 Int'l Units by mouth.       No current facility-administered medications on file prior to encounter.      Allergies   Allergen Reactions   • Penicillins Hives and Rash       Social History     Socioeconomic History   • Marital status:      Spouse name: Not on file   • Number of children: Not on file   • Years of education: Not on file   • Highest education level: Not on file   Occupational History   • Occupation: Retired   Tobacco Use   • Smoking status: Never Smoker   • Smokeless  tobacco: Never Used   Substance and Sexual Activity   • Alcohol use: No   • Drug use: No   • Sexual activity: Defer     Comment:    Social History Narrative    Caffeine: 3-4 servings per day    Patient lives at home with his wife     Family History   Problem Relation Age of Onset   • Arthritis Mother    • Pneumonia Mother    • Atrial fibrillation Father    • Hypertension Father    • Diabetes Father    • Heart disease Father    • No Known Problems Sister    • No Known Problems Maternal Grandmother    • Diabetes Maternal Grandfather    • No Known Problems Paternal Grandmother    • No Known Problems Paternal Grandfather             Objective:     Vital Sign Min/Max for last 24 hours  No data recorded   No data recorded   No data recorded   No data recorded   No data recorded   No data recorded    No intake or output data in the 24 hours ending 07/21/20 0924        There were no vitals filed for this visit.    CONSTITUTIONAL: Well-nourished. In no acute distress.   LUNGS: Normal effort. Clear to auscultation bilaterally without wheezing, rhonchi, or rales noted.   CARDIOVASCULAR: The heart has a regular rate and rhythm with a normal S1 and S2. There is no murmur, gallop, rub, or click appreciated.   PERIPHERAL VASCULAR: Posterior tibial pulses are 2+ and symmetrical. There is no lower extremity edema.   PSYCHIATRIC: Alert, orientated x 3, appropriate affect     Labs, results reviewed by myself:  No results found for: CKTOTAL, CKMB, CKMBINDEX, TROPONINI, TROPONINT    No results found for: GLUCOSE, BUN, CREATININE, NA, K, CL, CO2, CALCIUM, PROTEINTOT, ALBUMIN, ALT, AST, ALKPHOS, BILITOT, EGFRIFNONA, LABIL2, BCR, ANIONGAP    No results found for: CHOL  No results found for: TRIG  No results found for: HDL  No results found for: LDL  No components found for: LDLDIRECTC      No results found for: WBC, RBC, HGB, HCT, MCV, MCH, MCHC, RDW, RDWSD, MPV, PLT, NEUTRORELPCT, LYMPHORELPCT, MONORELPCT, EOSRELPCT, BASORELPCT,  AUTOIGPER, NEUTROABS, LYMPHSABS, MONOSABS, EOSABS, BASOSABS, AUTOIGNUM, NRBC      Diagnostic Data:      Results for orders placed during the hospital encounter of 08/30/19   Adult Transesophageal Echo (BRISSA) W/ Cont if Necessary Per Protocol    Addendum  · Estimated EF = 60%. · Left ventricular systolic function is normal. · Mild-to-moderate mitral valve regurgitation is present · The Wavecrest device appears to have shifted slightly since implant but  remains well seated. · The velocities within the proximal portion of the Wavecrest device are  higher than those down in the distal portion of the left atrial appendage. · A small amount of flow was seen laterally around the device. This is  unchanged from implant and less than 0.3 cm · No fibrinous strands or thrombus appear to be in association with the  device. · As per protocol the patient will remain on Plavix for an additional 6  weeks.   Anesthesia: Cath lab/Echo lab moderate sedation  I was present with the patient for the duration of moderate sedation and  supervised staff who had no other duties and monitored the patient for the  entire procedure.  Name of independent trained observer: Lucero Salcedo RN Intra-service start time: 1010 Intra-service end time: 1050        Calli Ritchie MD 9/4/2019  5:37 PM          Narrative · Estimated EF = 60%.  · Left ventricular systolic function is normal.  · Mild-to-moderate mitral valve regurgitation is present  · The Wavecrest device appears to have shifted slightly since implant but   remains well seated.  · The velocities within the proximal portion of the Wavecrest device are   higher than those down in the distal portion of the left atrial appendage.  · A small amount of flow was seen laterally around the device. This is   unchanged from implant and less than 0.3 cm  · No fibrinous strands or thrombus appear to be in association with the   device.  · As per protocol the patient will remain on Plavix for  an additional 6   weeks.               Assessment and Plan:   ASSESSMENT:  1. Persistent atrial fibrillation  a. Remote PVA in 2011.  b. CHADSVAsc=3  c. Wavecrest left atrial appendage occlusive device placement per Wavecrest trial 7/16/2019.  2. Bradycardia  a. Stable, asymptomatic  3. Hyperlipidemia  a. Statin therapy  4. Diabetes mellitus    PLAN:  1. Plan for BRISSA today with Dr. Salgado. The risks, benefits, and alternatives of the procedure have been reviewed and the patient wishes to proceed.     Marguerite Correa, APRN  07/21/20 09:30

## 2020-07-22 ENCOUNTER — LAB (OUTPATIENT)
Dept: LAB | Facility: HOSPITAL | Age: 80
End: 2020-07-22

## 2020-07-22 ENCOUNTER — CLINICAL SUPPORT (OUTPATIENT)
Dept: GENETICS | Facility: HOSPITAL | Age: 80
End: 2020-07-22

## 2020-07-22 DIAGNOSIS — Z13.79 GENETIC TESTING: Primary | ICD-10-CM

## 2020-07-22 DIAGNOSIS — C61 PROSTATE CANCER METASTATIC TO INTRAPELVIC LYMPH NODE (HCC): ICD-10-CM

## 2020-07-22 DIAGNOSIS — C77.5 PROSTATE CANCER METASTATIC TO INTRAPELVIC LYMPH NODE (HCC): ICD-10-CM

## 2020-07-22 PROCEDURE — 96040: CPT | Performed by: GENETIC COUNSELOR, MS

## 2020-07-24 NOTE — PROGRESS NOTES
Stefan Pedraza is an 80-year-old male who was seen for genetic counseling due to a personal history of metastatic prostate cancer.  Mr. Pedraza was initially diagnosed with prostate cancer at 78.  Mr. Pedraza was interested in discussing his risk for a hereditary cancer syndrome and genetic testing recommendations.  A multi-gene panel was ordered, the CancerNext panel through [employer] which analyzes 34 genes associated with an increased cancer risk. The genes on this panel include APC, RADHA, BARD1, BMPR1A, BRCA1, BRCA2, BRIP1, CDH1, CDK4, CDKN2A, CHEK2, DICER1, EPCAM, GREM1, HOXB13, MLH1, MRE11A, MSH2, MSH6, MUTYH, NBN, NF1, PALB2, PMS2, POLD1, POLE, PTEN, RAD50, RAD51C, RAD51D, SMAD4, SMARCA4, STK11, and TP53. Results are expected in 2-3 weeks.     FAMILY HISTORY (see attached pedigree):    Family history is negative for known cases of prostate cancer, breast cancer, ovarian cancer, pancreatic cancer, or other malignancies.      RISK ASSESSMENT:  Mr. Pedraza’s personal history of cancer led to concern regarding a hereditary cancer syndrome. Mr. Pedraza clearly meets NCCN guidelines criteria for BRCA1/2 testing given his personal history of metastatic prostate cancer.  We discussed the availability of multigene panels that evaluate BRCA1/2 and additional genes associated with increased cancer risk. Mr. Pedraza opted to pursue multigene panel testing via the CancerNext panel. These risk assessments are based on the family history information provided at the time of the appointment and could change in the future should new information be obtained.    GENETIC COUNSELING: (30 minutes) We reviewed the family history information in detail. Cases of cancer follow three general patterns: sporadic, familial, and hereditary.  While most cancer is sporadic, some cases appear to occur in family clusters.  These cases are said to be familial and account for 10-20% of cancer cases.  Familial cases may be due to a combination  of shared genes and environmental factors among family members.  In even fewer families, the risk for cancer is inherited, and the genes responsible for the increased cancer risk are known.      Family histories typical of hereditary cancer syndromes usually include multiple first- and second-degree relatives diagnosed with cancer types that define a syndrome.  These cases tend to be diagnosed at younger-than-expected ages and can be bilateral or multifocal.  The cancer in these families follows an autosomal dominant inheritance pattern, which indicates the likely presence of a mutation in a cancer susceptibility gene.  Children and siblings of an individual believed to carry this mutation have a 50% chance of inheriting that mutation, thereby inheriting the increased risk to develop cancer.  These mutations can be passed down from the maternal or the paternal lineage.    Based on Mr. Pedraza’s personal history of prostate cancer, we discussed the BRCA1 and BRCA2 genes.  Mutations in these genes confer an increased risk for breast cancer, ovarian cancer, male breast cancer, prostate cancer, and pancreatic cancer.  We discussed that there are other, more rare, hereditary cancer syndromes. Based on Mr. Pedraza’s personal history and his desire to get more information regarding his personal risks and potential risks for his family, he opted to pursue testing through a panel evaluating several other genes known to increase the risk for cancer.    GENETIC TESTING:  The risks, benefits and limitations of genetic testing and implications for clinical management following testing were reviewed. DNA test results can influence decisions regarding screening and prevention.  Genetic testing can have significant psychological implications for both individuals and families. Also discussed was the possibility of employment and insurance discrimination based on genetic test results and the federal and states laws that are in place  to prevent this (NEMESIO).         The benefits and limitations of genetic testing were discussed.  The implications of a positive or negative test result were discussed.  We discussed the possibility that, in some cases, genetic test results may be ambiguous due to the identification of a genetic variant of uncertain significance. These variants may or may not be associated with an increased cancer risk. With multigene panel testing, it is not uncommon for a variant of uncertain significance (VUS) to be identified.  If a VUS is identified, testing family members is not recommended and screening recommendations are made based on the family history.  The laboratories that perform genetic testing work to reclassify the VUS and send out an amended report if and when a VUS is reclassified.  The majority of variant findings are ultimately reclassified to a negative result. Given his family history, a negative test result does not eliminate all cancer risk, although the risk would not be as high as it would with positive genetic testing.     PLAN:  Genetic testing via the CancerNext panel. Results are expected in 2-3 weeks and we will contact Mr. Pedraza with his results once they are received.      Carmen Wagner MS, [facility] [place], [facility]  Licensed Certified Genetic Counselor

## 2020-07-28 NOTE — RADIATION COMPLETION NOTES
RADIATION ONCOLOGY COMPLETION NOTE    PATIENT:   Stefan Pedraza  MEDICAL RECORD:  [id number]  :    1940  COMPLETION DATE:  2020  DIAGNOSIS:   Prostate cancer, recurrent  Initial stage IIC (cT2b, cN0, cM0, PSA: 5.3, Grade Group: 3)      BRIEF HISTORY:  This 80 y.o. patient completed radiotherapy.  He has prostate cancer, initially intermediate risk, [place] score 4+3 = 7, stage IIC, 1 1/2 years status post stereotactic body radiotherapy.  Now has oligo metastatic disease limited to lymphatics in the pelvis/lower abdomen.  He initiated androgen ablation with very good partial but incomplete biochemical response.  Patient strongly wished to discontinue androgen ablation and to be considered for salvage involved field radiotherapy.  Conventionally fractionated radiotherapy was given for local control of tumor in all Axumin PET avid sites.     TREATMENT COURSE:  The PET avid pathologic lymphadenopathy received a dose of 60 Gray in 25 daily fractions of 2.4 Gray using IMRT with helical Cesar therapy technique using 6 MV photons.  Concurrently, with simultaneous integrated boost technique, the uninvolved adjacent pelvic and periaortic lymphatics received a minimum dose of 45 Gray at 1.8 Liz daily dose.    DATES OF TREATMENT: 2020 through 2020    TOLERANCE:   excellent and no unexpected difficulties.  He has significant fatigue associated with low testosterone.    STATUS:  too early to determine response    DISPOSITION:  Follow up in Radiation Oncology in approximately 1 month.  He will no longer continue androgen ablation.  He is being considered for other systemic treatments.      Shon Bunn MD

## 2020-07-29 ENCOUNTER — OFFICE VISIT (OUTPATIENT)
Dept: CARDIOLOGY | Facility: CLINIC | Age: 80
End: 2020-07-29

## 2020-07-29 VITALS
BODY MASS INDEX: 29.69 KG/M2 | SYSTOLIC BLOOD PRESSURE: 122 MMHG | DIASTOLIC BLOOD PRESSURE: 70 MMHG | HEART RATE: 53 BPM | HEIGHT: 73 IN | WEIGHT: 224 LBS

## 2020-07-29 DIAGNOSIS — I48.19 PERSISTENT ATRIAL FIBRILLATION (HCC): Primary | ICD-10-CM

## 2020-07-29 PROCEDURE — 99213 OFFICE O/P EST LOW 20 MIN: CPT | Performed by: INTERNAL MEDICINE

## 2020-07-29 PROCEDURE — 93000 ELECTROCARDIOGRAM COMPLETE: CPT | Performed by: INTERNAL MEDICINE

## 2020-07-29 NOTE — PROGRESS NOTES
Stefan Pedraza  1940  [phone]    07/29/2020    [facility] CARDIOLOGY     Gamaliel Eaton MD  [address] [place] [address]    Chief Complaint   Patient presents with   • Atrial Fibrillation       Problem List:   1. Atrial fibrillation:   a. History of atrial fibrillation, initial diagnosis 2007.   b. Status-post successful external cardioversion, Dr. Dyer, 2007.  c. Initiation of amiodarone therapy with recent tapering dose, Dr. Lowe.   d. Echocardiogram, 02/02/2011, revealing normal LV function, diastolic dysfunction noted, mild MR, no pericardial effusion.   e. Holter monitor, 01/25/2011, revealing sinus rhythm with PAC/PVC/episodes of sinus bradycardia.   f. Chadsvasc=3.   g. 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   h. Pulmonary vein isolation procedure, 10/05/2011.  i. BRISSA with left ventricular ejection fraction of 55%, mild TR and MR.   j. Event recorder with intermittent episodes of flutter but the majority of the time in sinus rhythm.   k. External cardioversion to normal sinus rhythm, 02/17/2012.  l. Atypical chest pain.  m. History of left heart catheterization, 1990s in [place], reported as normal coronaries.   n. Stress test, 08/23/2007, revealing no significant ischemia, EF estimated at 46%.  o. Echo 7/28/17 LV Estimated EF = 57%., mild concentric hypertrophy, Left atrial cavity size is severely dilated. Normal estimated pulmonary artery systolic pressure  p. External CV 7/13/17; NSR with recurrent AF three days later  q. Echocardiogram 7/28/17: EF 57%, mild LVH, LA severely dilated  r. ECV to NSR 8/31/17  s. Wavecrest device insertion 7/16/19  t. BRISSA 7/21/2020 LVEF NL, No significant leakage around device, Mild MR,TR  2. Diabetes mellitus.   3. Gastroesophageal reflux disease.  4. Dyslipidemia.  5. Osteoarthritis.  6. Remote surgical  history:  a. Cholecystectomy.   b. Right orchiectomy.  c. Right shoulder labral repair.  d. Left knee arthroscopy for partial medial meniscectomy    Allergies  Allergies   Allergen Reactions   • Penicillins Hives and Rash       Current Medications    Current Outpatient Medications:   •  aspirin 325 MG EC tablet, Take 1 tablet by mouth Daily. For 1 month (Patient taking differently: Take 81 mg by mouth Daily.), Disp: 30 tablet, Rfl: 0  •  atorvastatin (LIPITOR) 10 MG tablet, Take 10 mg by mouth Every Night., Disp: , Rfl:   •  B Complex Vitamins (VITAMIN B COMPLEX) capsule capsule, Take  by mouth Daily., Disp: , Rfl:   •  cyclobenzaprine (FLEXERIL) 10 MG tablet, cyclobenzaprine 10 mg tablet  one po Every night at bedtime prn, Disp: , Rfl:   •  ferrous sulfate 324 (65 FE) MG tablet delayed-release EC tablet, Take 324 mg by mouth Daily With Breakfast., Disp: , Rfl:   •  flecainide (TAMBOCOR) 100 MG tablet, TAKE 1 TABLET TWICE A DAY, Disp: 180 tablet, Rfl: 4  •  leuprolide (LUPRON) 30 MG injection, Inject 30 mg into the appropriate muscle as directed by prescriber Every 4 (Four) Months., Disp: , Rfl:   •  metFORMIN ER (GLUCOPHAGE-XR) 500 MG 24 hr tablet, Take 500 mg by mouth Every Night., Disp: , Rfl:   •  naproxen (NAPROSYN) 500 MG tablet, 2 (Two) Times a Day., Disp: , Rfl:   •  Omega-3 Fatty Acids (FISH OIL) 1000 MG capsule capsule, Take 1,000 mg by mouth Daily With Breakfast., Disp: , Rfl:   •  omeprazole (priLOSEC) 40 MG capsule, Daily., Disp: , Rfl:   •  oxybutynin XL (DITROPAN-XL) 5 MG 24 hr tablet, Take 1 tablet by mouth Daily., Disp: 30 tablet, Rfl: 11  •  Probiotic Product (PRO-BIOTIC BLEND PO), Take  by mouth., Disp: , Rfl:   •  tamsulosin (FLOMAX) 0.4 MG capsule 24 hr capsule, Take 1 capsule by mouth Daily., Disp: , Rfl:   •  VITAMIN D PO, Take 1,000 Int'l Units by mouth., Disp: , Rfl:     History of Present Illness     Pt presents for follow up of AF. Since we last saw the pt, pt denies any AF episodes, SOB,  "CP, LH, and dizziness. Denies any hospitalizations, ER visits, bleeding, or TIA/CVA symptoms. Overall feels well.    ROS:  General:  Denies fatigue, weight gain or loss  Cardiovascular:  Denies CP, PND, syncope, near syncope, edema or palpitations.  Pulmonary:  Denies CROW, cough, or wheezing      Vitals:    07/29/20 1115   BP: 122/70   BP Location: Left arm   Patient Position: Sitting   Pulse: 53   Weight: 102 kg (224 lb)   Height: 185.4 cm (73\")     Body mass index is 29.55 kg/m².  PE:  General: NAD  Neck: no JVD, no carotid bruits, no TM  Heart RRR, NL S1, S2, S4 present, no rubs, murmurs  Lungs: CTA, no wheezes, rhonchi, or rales  Abd: soft, non-tender, NL BS  Ext: No musculoskeletal deformities, no edema, cyanosis, or clubbing  Psych: normal mood and affect    Diagnostic Data:        ECG 12 Lead  Date/Time: 7/29/2020 11:47 AM  Performed by: Prabhakar Solis MD  Authorized by: Prabhakar Solis MD   Comparison: compared with previous ECG from 3/10/2020  Similar to previous ECG  Rhythm: sinus bradycardia  BPM: 50              1. Persistent atrial fibrillation (CMS/HCC)          Plan:  1. Persistent Afib:  Remote PVA 2011, maintained NSR Tambocor therapy; well controlled on meds  2. DM Type II  3. HLD: Statin  4. Anticoagulation:  Chadsvasc=3, Watchman device per Wavecrest trial 7/16/19.   Currently on ASA.     F/up in 12 months      "

## 2020-08-06 ENCOUNTER — LAB (OUTPATIENT)
Dept: LAB | Facility: HOSPITAL | Age: 80
End: 2020-08-06

## 2020-08-06 DIAGNOSIS — C61 PROSTATE CANCER (HCC): ICD-10-CM

## 2020-08-06 PROCEDURE — 84403 ASSAY OF TOTAL TESTOSTERONE: CPT

## 2020-08-06 PROCEDURE — 36415 COLL VENOUS BLD VENIPUNCTURE: CPT

## 2020-08-06 PROCEDURE — 84153 ASSAY OF PSA TOTAL: CPT

## 2020-08-07 LAB
PSA SERPL-MCNC: 0.1 NG/ML (ref 0–4)
TESTOST SERPL-MCNC: <2.5 NG/DL (ref 193–740)

## 2020-08-10 ENCOUNTER — DOCUMENTATION (OUTPATIENT)
Dept: GENETICS | Facility: HOSPITAL | Age: 80
End: 2020-08-10

## 2020-08-10 NOTE — PROGRESS NOTES
Stefan Pedraza is an 80-year-old male who was seen for genetic counseling due to a personal history of prostate cancer.  Mr. Pedraza was initially diagnosed with prostate cancer at 77. Mr. Pedraza was interested in discussing his risk for a hereditary cancer syndrome and genetic testing recommendations.  A multi-gene panel was ordered, the CancerNext panel through [employer] which analyzes 36 genes associated with an increased cancer risk. The genes on this panel include APC, RADHA, AXIN2, BARD1, BMPR1A, BRCA1, BRCA2, BRIP1, CDH1, CDK4, CDKN2A, CHEK2, DICER1, EPCAM, GREM1, HOXB13, MLH1, MSH2, MSH3, MSH6, MUTYH, NBN, NF1, NTHL1, PALB2, PMS2, POLD1, POLE, PTEN, RAD51C, RAD51D, RECQL, SMAD4, SMARCA4, STK11, and TP53.  Genetic testing was negative for mutations in BRCA1/2 and 32 additional genes on the CancerNext panel. These normal results were discussed with Mr. Pedraza by telephone on 8/10/2020.    FAMILY HISTORY (see attached pedigree):    Family history is negative for known cases of prostate cancer, breast cancer, ovarian cancer, pancreatic cancer, or other malignancies.      RISK ASSESSMENT:  Mr. Pedraza’s personal history of cancer led to concern regarding a hereditary cancer syndrome. Mr. Pedraza clearly meets NCCN guidelines criteria for BRCA1/2 testing given his personal history of metastatic prostate cancer.  We discussed the availability of multigene panels that evaluate BRCA1/2 and additional genes associated with increased cancer risk. Mr. Pedraza opted to pursue multigene panel testing via the CancerNext panel. These risk assessments are based on the family history information provided at the time of the appointment and could change in the future should new information be obtained.    GENETIC COUNSELING: We reviewed the family history information in detail. Cases of cancer follow three general patterns: sporadic, familial, and hereditary.  While most cancer is sporadic, some cases appear to occur in  family clusters.  These cases are said to be familial and account for 10-20% of cancer cases.  Familial cases may be due to a combination of shared genes and environmental factors among family members.  In even fewer families, the risk for cancer is inherited, and the genes responsible for the increased cancer risk are known.      Family histories typical of hereditary cancer syndromes usually include multiple first- and second-degree relatives diagnosed with cancer types that define a syndrome.  These cases tend to be diagnosed at younger-than-expected ages and can be bilateral or multifocal.  The cancer in these families follows an autosomal dominant inheritance pattern, which indicates the likely presence of a mutation in a cancer susceptibility gene.  Children and siblings of an individual believed to carry this mutation have a 50% chance of inheriting that mutation, thereby inheriting the increased risk to develop cancer.  These mutations can be passed down from the maternal or the paternal lineage.    Based on Mr. Pedraza’s personal history of prostate cancer, we discussed the BRCA1 and BRCA2 genes.  Mutations in these genes confer an increased risk for breast cancer, ovarian cancer, male breast cancer, prostate cancer, and pancreatic cancer.  We discussed that there are other, more rare, hereditary cancer syndromes. Based on Mr. Pedraza’s personal history and his desire to get more information regarding his personal risks and potential risks for his family, he opted to pursue testing through a panel evaluating several other genes known to increase the risk for cancer.    GENETIC TESTING:  The risks, benefits and limitations of genetic testing and implications for clinical management following testing were reviewed. DNA test results can influence decisions regarding screening and prevention.  Genetic testing can have significant psychological implications for both individuals and families. Also discussed was  the possibility of employment and insurance discrimination based on genetic test results and the federal and states laws that are in place to prevent this (NEMESIO).         The benefits and limitations of genetic testing were discussed.  The implications of a positive or negative test result were discussed.  We discussed the possibility that, in some cases, genetic test results may be ambiguous due to the identification of a genetic variant of uncertain significance. These variants may or may not be associated with an increased cancer risk. With multigene panel testing, it is not uncommon for a variant of uncertain significance (VUS) to be identified.  If a VUS is identified, testing family members is not recommended and screening recommendations are made based on the family history.  The laboratories that perform genetic testing work to reclassify the VUS and send out an amended report if and when a VUS is reclassified.  The majority of variant findings are ultimately reclassified to a negative result. Given his family history, a negative test result does not eliminate all cancer risk, although the risk would not be as high as it would with positive genetic testing.     TEST RESULTS:  Genetic testing was negative by sequencing, rearrangement testing, and RNA analysis for pathogenic mutations in the genes included on the CancerNext panel. The negative result greatly lowers, but does not eliminate, the risk of a hereditary cancer syndrome for Mr. Pedraza. This assessment is based on the information provided at the time of the consultation.    PLAN:  Genetic counseling remains available to Mr. Pedraza and his family, and he is welcome to contact us at [phone] with any questions he may have.      Carmen Wagner MS, [facility] [place] [place], [facility]  Licensed Certified Genetic Counselor       Cc: Stefan Bunn MD

## 2020-08-11 ENCOUNTER — OFFICE VISIT (OUTPATIENT)
Dept: RADIATION ONCOLOGY | Facility: HOSPITAL | Age: 80
End: 2020-08-11

## 2020-08-11 ENCOUNTER — HOSPITAL ENCOUNTER (OUTPATIENT)
Dept: RADIATION ONCOLOGY | Facility: HOSPITAL | Age: 80
Setting detail: RADIATION/ONCOLOGY SERIES
Discharge: HOME | End: 2020-08-11

## 2020-08-11 DIAGNOSIS — C77.5 PROSTATE CANCER METASTATIC TO INTRAPELVIC LYMPH NODE (HCC): Primary | ICD-10-CM

## 2020-08-11 DIAGNOSIS — C61 PROSTATE CANCER METASTATIC TO INTRAPELVIC LYMPH NODE (HCC): Primary | ICD-10-CM

## 2020-08-11 NOTE — PROGRESS NOTES
TELEMEDICINE FOLLOW-UP NOTE    PATIENT:                                                      Stefan Pedraza  MEDICAL RECORD #:                        [id number]  :                                                          1940  COMPLETION DATE:   2020  DIAGNOSIS:     Lymph node metastasis    Prostate cancer (CMS/HCC)  - Stage IIC (cT2b, cN0, cM0, PSA: 5.3, Grade Group: 3)      Time Devoted to Visit: 15 min including time to review his previous records, with 75% devoted to direct discussion.    Reason for Visit:  I personally contacted Stefan Pedraza today in an effort to screen for coronavirus symptoms and also to perform his scheduled follow-up visit remotely in light of the coronavirus pandemic.  With respect to his specific risks for coronavirus, his screen is as follows:    COVID-19 RISK SCREEN     1. Has the patient had close contact without PPE with a lab confirmed COVID-19 (+) person or a person under investigation (PUI) for COVID-19 infection?  -- No     2. Has the patient had respiratory symptoms, worsened/new cough and/or SOA, unexplained fever, or sudden loss of smell and/or taste in the past 7 days? --  No    3. Does the patient have baseline higher exposure risk such as working in healthcare field or currently residing in healthcare facility?  --  No           BRIEF HISTORY:    Follow-up via telemedicine.  He has a history of prostate cancer treated with SBRT for intermediate risk, clinically localized disease in .  PSA did not reach target adi values and breanna to a maximum value of 14 ng/ml.  PET/CT demonstrated hypermetabolic pathologic lymphadenopathy along the iliac chain and periaortics.  He initiated androgen ablation in 2020 with partial biochemical response, but chose to discontinue LHRH agonist due to side effect intolerance.  He states that he would be somewhat reluctant to resume intermittent androgen ablation after radiotherapy, if necessary.  He therefore  underwent a course of salvage involved field radiotherapy to the pelvis and periaortic nodes to a dose of 60 Gy.  He tolerated treatment fairly well.  Energy level is low, but slowly improving.  He experienced moderate exacerbation of pre-existing urinary outflow obstructive symptoms, which seem to be abating.  He continues with increased urinary frequency and nocturia x3-4.  He otherwise has fairly good stream and no dysuria.  He remains on Flomax and oxybutynin daily.  Bowel disturbance alternating between diarrhea and constipation has since resolved.  No new aches or pains.  Erectile dysfunction persists.  He has taken Cialis, Viagra, and injections in the past with partial results.  Most recent PSA 8/6/2020 = 0.99 ng/ml.  Of note, he underwent genetic testing which was negative for mutations in BRCA1/2 or TRK mutation.  Molecular studies revealed MSI stable disease and PD-L1 0%.        IPSS Questionnaire (AUA-7):  Over the past month…    1)  Incomplete Emptying  How often have you had a sensation of not emptying your bladder?  0 - Not at all   2)  Frequency  How often have you had to urinate less than every two hours? 4 - More than half the time   3)  Intermittency  How often have you found you stopped and started again several times when you urinated?  5 - Almost always   4) Urgency  How often have you found it difficult to postpone urination?  3 - About half the time   5) Weak Stream  How often have you had a weak urinary stream?  3 - About half the time   6) Straining  How often have you had to push or strain to begin urination?  0 - Not at all   7) Nocturia  How many times did you typically get up at night to urinate?  3 - 3 times   Total Score:  19       Quality of life due to urinary symptoms:  If you were to spend the rest of your life with your urinary condition the way it is now, how would you feel about that? 2-Mostly Satisfied   Urine Leakage (Incontinence) 2-Mild (More than a few drops a day, 1-2  pads/day)     Sexual Health Inventory  Current Status    1)  How do you rate your confidence that you could achieve and keep an erection? 1-Very Low   2) When you had erections with sexual stimulation, how often were your erections hard enough for penetration (entering your partner)? 0-No sexual activity   3)  During sexual intercourse, how often were you able to maintain your erection after you had penetrated (entered) into your partner? 0-Did not attempt intercourse   4) During sexual intercourse, how difficult was it to maintain your erection to completion of intercourse? 0-Did not attempt intercourse   5) When you attempted sexual intercourse, how often was it satisfactory to you? 0-No sexual activity   Total Score: 2       Bowel Health Inventory  Current Status: 0-No problems, no rectal bleeding, no discharge, less then 5 bowel movements a day         MEDICATIONS: Medication reconciliation for the patient was reviewed and confirmed in the electronic medical record.    Review of Systems   Constitutional: Positive for fatigue.   Genitourinary: Positive for frequency and nocturia.    Musculoskeletal: Positive for back pain.   Neurological: Positive for dizziness.   All other systems reviewed and are negative.      KPS 80%    Physical Exam  Unable to perform as visit was conducted via telephone per patient preference/request in light of the COVID-19 pandemic.    The following portions of the patient's history were reviewed and updated as appropriate: allergies, current medications, past family history, past medical history, past social history, past surgical history and problem list.         Stefan was seen today for prostate cancer metastatic to intrapelvic lymph node.    Diagnoses and all orders for this visit:    Prostate cancer metastatic to intrapelvic lymph node (CMS/[facility])  -     PSA Diagnostic; Future         IMPRESSION:  Prostate cancer, initially intermediate risk, [place] score 4+3 = 7, stage IIC, 1 1/2  years status post stereotactic body radiotherapy.  He now has oligo metastatic disease limited to lymphatics in the pelvis/lower abdomen.  He initiated androgen ablation in April 2020, which he chose to discontinue shortly thereafter due to intolerance of side effects and negative impact on quality of life.  He is now 1 month status post salvage involved field radiotherapy to the Axumin PET avid sites.  He tolerated treatment fairly well.  Residual radiation-related toxicities continue to michael.  We reviewed the role of intermittent androgen ablation after completion of radiotherapy, with closely monitored serial PSAs and imaging as clinically indicated.  He has had a good biochemical response with most recent PSA of 0.099 ng/ml.  Per patient request, I will order a PSA to be drawn at his local lab just prior to f/u with his urologist in October.        RECOMMENDATIONS:  Mr. Pedraza remains under the urologic surveillance of Dr. Chao.    Return in about 6 months (around 2/11/2021) for Office Visit.    Carie Turner, AYAAN

## 2020-11-02 RX ORDER — OXYBUTYNIN CHLORIDE 5 MG/1
5 TABLET, EXTENDED RELEASE ORAL DAILY
Qty: 90 TABLET | Refills: 3 | Status: SHIPPED | OUTPATIENT
Start: 2020-11-02 | End: 2021-08-04

## 2020-11-23 ENCOUNTER — OFFICE VISIT (OUTPATIENT)
Dept: ORTHOPEDIC SURGERY | Facility: CLINIC | Age: 80
End: 2020-11-23

## 2020-11-23 VITALS — HEART RATE: 58 BPM | WEIGHT: 224 LBS | BODY MASS INDEX: 29.69 KG/M2 | OXYGEN SATURATION: 98 % | HEIGHT: 73 IN

## 2020-11-23 DIAGNOSIS — Z09 POSTOPERATIVE EXAMINATION: ICD-10-CM

## 2020-11-23 DIAGNOSIS — Z96.652 STATUS POST TOTAL LEFT KNEE REPLACEMENT: Primary | ICD-10-CM

## 2020-11-23 PROCEDURE — 99213 OFFICE O/P EST LOW 20 MIN: CPT | Performed by: ORTHOPAEDIC SURGERY

## 2020-11-23 ASSESSMENT — KOOS JR
KOOS JR SCORE: 9
KOOS JR SCORE: 63.776

## 2020-11-23 NOTE — PROGRESS NOTES
[facility]opa[facility] Surgery Clinic Note    Subjective     Chief Complaint   Patient presents with   • Follow-up     10 month follow up  1 year Status post total left knee replacement 11/15/19        HPI    It has been 10  month(s) since Mr. Pedraza's last visit. He returns to clinic today for postoperative follow-up of left knee pain. He rates his pain a 1/10 on the pain scale. Previous/current treatments: nothing. Current symptoms: same as prior visit.Overall, he is doing better.  Over 90% improvement compared to his preoperative symptoms.  Fully ambulatory without external aids.  Some pain with stair climbing.    I have reviewed the following portions of the patient's history and agree with: History of Present Illness and Review of Systems    Patient Active Problem List   Diagnosis   • Atrial fibrillation (CMS/HCC)   • History of cardioversion   • H/O echocardiogram   • History of Holter monitoring   • H/O transesophageal echocardiography (BRISSA) for monitoring   • History of cardioversion   • H/O cardiovascular stress test   • Prediabetes   • GERD (gastroesophageal reflux disease)   • Dyslipidemia   • DJD (degenerative joint disease)   • Prostate cancer (CMS/HCC)   • Persistent atrial fibrillation (CMS/HCC)   • Primary osteoarthritis of left knee   • DM (diabetes mellitus) (CMS/HCC)   • Status post total left knee replacement   • Acute postoperative pain   • Leukocytosis, mild, likely reactive   • Prostate cancer metastatic to intrapelvic lymph node (CMS/HCC)     Past Medical History:   Diagnosis Date   • Arthritis    • Atrial fibrillation (CMS/HCC)    • Cataract     both- surgery done    • Diabetes mellitus (CMS/HCC)     prediabetic- checks sugar every other day    • Dyslipidemia    • GERD (gastroesophageal reflux disease)    • H/O cardiovascular stress test 08/23/2007    Revealing no significant ischemia, EF estimated at 46%   • H/O echocardiogram 02/02/2011    Revealing normal LV function, diastolic dysfunction  noted, mild MR, nopericardial effusion.   • H/O transesophageal echocardiography (BRISSA) for monitoring     left ventricular ejection fraction of 55%, mild TR and MR   • History of cardioversion 2007    STATUS-POST SUCCESSFUL EXTERNAL CARDIOVERSION   • History of cardioversion 02/17/2012    external cardioversion to normal sinus rhythm   • History of Holter monitoring 01/25/2011    Revealing sinus rhythm with PAC/PVC/episodes of sinus bradycardia.   • History of radiation therapy 07/09/2020    salvage irradiation to pelvis/lower periaortic nodes   • History of shingles     8-10 years ago    • [place] ([place] of hearing)     has hearing aides   • Hyperlipidemia    • Osteoarthritis    • PONV (postoperative nausea and vomiting)     only with ether   • Pre-diabetes    • Prostate cancer (CMS/HCC)    • Sleep apnea with use of continuous positive airway pressure (CPAP)    • Sleep apnea with use of continuous positive airway pressure (CPAP)     compliant with machine    • Status post cystoscopy 08/28/2018   • Wears glasses    • Wears glasses       Past Surgical History:   Procedure Laterality Date   • ATRIAL APPENDAGE EXCLUSION LEFT WITH TRANSESOPHAGEAL ECHOCARDIOGRAM N/A 7/16/2019    Procedure: Atrial Appendage Occlusion (Wave[facility]st), anticoagulation will be determined after pre-BRISSA;  Surgeon: Prabhakar Solis MD;  Location:  [facility];  Service: Cardiology   • BELPHAROPTOSIS REPAIR      bilat    • CARDIAC ABLATION  2011   • CATARACT EXTRACTION      bilat    • CHOLECYSTECTOMY     • COLONOSCOPY      2018   • CYST REMOVAL      on back and back of neck   • KNEE ARTHROSCOPY W/ PARTIAL MEDIAL MENISCECTOMY Left    • ORCHIECTOMY Right    • SHOULDER ARTHROSCOPY W/ LABRAL REPAIR Right    • TONSILLECTOMY AND ADENOIDECTOMY     • TOTAL KNEE ARTHROPLASTY Left 11/15/2019    Procedure: TOTAL KNEE ARTHROPLASTY LEFT;  Surgeon: Gamaliel Polanco MD;  Location:  [facility];  Service: Orthopedics   • WISDOM TOOTH EXTRACTION      all 4  removed       Family History   Problem Relation Age of Onset   • Arthritis Mother    • Pneumonia Mother    • Atrial fibrillation Father    • Hypertension Father    • Diabetes Father    • Heart disease Father    • No Known Problems Sister    • No Known Problems Maternal Grandmother    • Diabetes Maternal Grandfather    • No Known Problems Paternal Grandmother    • No Known Problems Paternal Grandfather      Social History     Socioeconomic History   • Marital status:      Spouse name: Not on file   • Number of children: Not on file   • Years of education: Not on file   • Highest education level: Not on file   Occupational History   • Occupation: Retired   Tobacco Use   • Smoking status: Never Smoker   • Smokeless tobacco: Never Used   Substance and Sexual Activity   • Alcohol use: No   • Drug use: No   • Sexual activity: Defer     Comment:    Social History Narrative    Caffeine: 3-4 servings per day    Patient lives at home with his wife      Current Outpatient Medications on File Prior to Visit   Medication Sig Dispense Refill   • aspirin 325 MG EC tablet Take 1 tablet by mouth Daily. For 1 month (Patient taking differently: Take 81 mg by mouth Daily.) 30 tablet 0   • atorvastatin (LIPITOR) 10 MG tablet Take 10 mg by mouth Every Night.     • B Complex Vitamins (VITAMIN B COMPLEX) capsule capsule Take  by mouth Daily.     • cyclobenzaprine (FLEXERIL) 10 MG tablet cyclobenzaprine 10 mg tablet   one po Every night at bedtime prn     • ferrous sulfate 324 (65 FE) MG tablet delayed-release EC tablet Take 324 mg by mouth Daily With Breakfast.     • flecainide (TAMBOCOR) 100 MG tablet TAKE 1 TABLET TWICE A  tablet 4   • leuprolide (LUPRON) 30 MG injection Inject 30 mg into the appropriate muscle as directed by prescriber Every 4 (Four) Months.     • metFORMIN ER (GLUCOPHAGE-XR) 500 MG 24 hr tablet Take 500 mg by mouth Every Night.     • naproxen (NAPROSYN) 500 MG tablet 2 (Two) Times a Day.     •  "Omega-3 Fatty Acids (FISH OIL) 1000 MG capsule capsule Take 1,000 mg by mouth Daily With Breakfast.     • omeprazole (priLOSEC) 40 MG capsule Daily.     • oxybutynin XL (DITROPAN-XL) 5 MG 24 hr tablet Take 1 tablet by mouth Daily. 90 tablet 3   • Probiotic Product (PRO-BIOTIC BLEND PO) Take  by mouth.     • tamsulosin (FLOMAX) 0.4 MG capsule 24 hr capsule Take 1 capsule by mouth Daily.     • VITAMIN D PO Take 1,000 Int'l Units by mouth.       No current facility-administered medications on file prior to visit.       Allergies   Allergen Reactions   • Penicillins Hives and Rash        Review of Systems   Constitutional: Negative.    HENT: Negative.    Eyes: Negative.    Respiratory: Negative.    Cardiovascular: Negative.    Gastrointestinal: Negative.    Endocrine: Negative.    Genitourinary: Negative.    Musculoskeletal: Positive for arthralgias.   Skin: Negative.    Allergic/Immunologic: Negative.    Neurological: Negative.    Hematological: Negative.    Psychiatric/Behavioral: Negative.         Objective      Physical Exam  Pulse 58   Ht 185.4 cm (72.99\")   Wt 102 kg (224 lb)   SpO2 98%   BMI 29.56 kg/m²     Body mass index is 29.56 kg/m².    General:   Mental Status:  Alert   Appearance: Cooperative, in no acute distress   Build and Nutrition: Well-nourished well-developed male   Orientation: Alert and oriented to person, place and time   Posture: Normal   Gait: Normal    Integument:              Left knee: Wound is well-healed with no signs of infection     Lower Extremities:              Left Knee:                          Tenderness:    None                          Effusion:          None                          Swelling:          None                          Crepitus:          None                          Range of motion:        Extension:       0°                                                              Flexion:           130°  Instability:        No varus laxity, no valgus laxity, negative " anterior drawer  Deformities:     None    Imaging/Studies  Imaging Results (Last 24 Hours)     Procedure Component Value Units Date/Time    XR Knee 3+ View With Marmaduke Left [[id number]] Resulted: 11/23/20 0921     Updated: 11/23/20 0922    Narrative:      Left Knee Radiographs  Indication: status-post left total knee arthroplasty  Views: AP, lateral, and sunrise views of the left knee    Comparison: 12/9/2019    Findings:   Small lucency under the medial aspect of the tibial plate, with no gross   signs of loosening or failure.  Good alignment of the components.            Assessment and Plan     Diagnoses and all orders for this visit:    1. Status post total left knee replacement (Primary)  -     XR Knee 3+ View With M[place]uke Left    2. Postoperative examination        1. Status post total left knee replacement    2. Postoperative examination        I reviewed my findings with patient today.  His left total knee arthroplasty appears to be functioning well.  I will see him back in 4 years, which will be a 5-year checkup, but sooner for any problems.    Return in about 4 years (around 11/23/2024) for Recheck with X-Rays.    Medical Decision Making  Data/Risk: radiology tests and independent visualization of imaging, lab tests, or EMG/NCV    Gamaliel Polanco MD  11/23/20  09:29 RACHAEL Narvaez disclaimer:  Much of this encounter note is an electronic transcription/translation of spoken language to printed text. The electronic translation of spoken language may permit erroneous, or at times, nonsensical words or phrases to be inadvertently transcribed; Although I have reviewed the note for such errors, some may still exist.

## 2021-01-29 ENCOUNTER — TELEPHONE (OUTPATIENT)
Dept: CARDIOLOGY | Facility: CLINIC | Age: 81
End: 2021-01-29

## 2021-01-29 NOTE — TELEPHONE ENCOUNTER
I spoke with patient for 18 mon follow up in the Wave[facility]st trial. He states he is having no issues and working to find a Covid vaccine. No stroke like symptoms noted and study medications were reviewed. Plans were made for his next follow up in August.

## 2021-02-01 ENCOUNTER — OFFICE VISIT (OUTPATIENT)
Dept: ORTHOPEDIC SURGERY | Facility: CLINIC | Age: 81
End: 2021-02-01

## 2021-02-01 VITALS — HEIGHT: 73 IN | HEART RATE: 53 BPM | BODY MASS INDEX: 29.69 KG/M2 | WEIGHT: 224 LBS | OXYGEN SATURATION: 98 %

## 2021-02-01 DIAGNOSIS — M70.62 TROCHANTERIC BURSITIS OF LEFT HIP: Primary | ICD-10-CM

## 2021-02-01 PROCEDURE — 20610 DRAIN/INJ JOINT/BURSA W/O US: CPT | Performed by: ORTHOPAEDIC SURGERY

## 2021-02-01 PROCEDURE — 99214 OFFICE O/P EST MOD 30 MIN: CPT | Performed by: ORTHOPAEDIC SURGERY

## 2021-02-01 RX ORDER — ROPIVACAINE HYDROCHLORIDE 5 MG/ML
4 INJECTION, SOLUTION EPIDURAL; INFILTRATION; PERINEURAL
Status: COMPLETED | OUTPATIENT
Start: 2021-02-01 | End: 2021-02-01

## 2021-02-01 RX ORDER — TRIAMCINOLONE ACETONIDE 40 MG/ML
40 INJECTION, SUSPENSION INTRA-ARTICULAR; INTRAMUSCULAR
Status: COMPLETED | OUTPATIENT
Start: 2021-02-01 | End: 2021-02-01

## 2021-02-01 RX ADMIN — TRIAMCINOLONE ACETONIDE 40 MG: 40 INJECTION, SUSPENSION INTRA-ARTICULAR; INTRAMUSCULAR at 14:52

## 2021-02-01 RX ADMIN — ROPIVACAINE HYDROCHLORIDE 4 ML: 5 INJECTION, SOLUTION EPIDURAL; INFILTRATION; PERINEURAL at 14:52

## 2021-02-01 NOTE — PROGRESS NOTES
[facility] Clinic Note    Subjective     Chief Complaint   Patient presents with   • Left Hip - Pain        HPI    Stefan Pedraza is a 80 y.o. male who presents with left hip pain.  Onset: atraumatic and gradual in nature. The issue has been ongoing for 4 week(s). Pain is a 7/10 on the pain scale. Pain is described as dull and aching. Associated symptoms include pain and stiffness. The pain is worse with walking, sitting and sleeping; ice improve the pain. Previous treatments have included: nothing.  Pain on the lateral aspect of the hip.  No injury.    I have reviewed the following portions of the patient's history and agree with: History of Present Illness and Review of Systems    Patient Active Problem List   Diagnosis   • Atrial fibrillation (CMS/HCC)   • History of cardioversion   • H/O echocardiogram   • History of Holter monitoring   • H/O transesophageal echocardiography (BRISSA) for monitoring   • History of cardioversion   • H/O cardiovascular stress test   • Prediabetes   • GERD (gastroesophageal reflux disease)   • Dyslipidemia   • DJD (degenerative joint disease)   • Prostate cancer (CMS/HCC)   • Persistent atrial fibrillation (CMS/HCC)   • Primary osteoarthritis of left knee   • DM (diabetes mellitus) (CMS/HCC)   • Status post total left knee replacement   • Acute postoperative pain   • Leukocytosis, mild, likely reactive   • 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    • H/O transesophageal echocardiography (BRISSA) for monitoring     left ventricular ejection fraction of 55%, mild TR and MR   • History of cardioversion 2007    STATUS-POST SUCCESSFUL EXTERNAL CARDIOVERSION   • History of cardioversion 02/17/2012    external cardioversion to normal sinus rhythm   • History of Holter monitoring 01/25/2011    Revealing sinus rhythm with PAC/PVC/episodes of sinus bradycardia.   • History of radiation therapy 07/09/2020    salvage irradiation to pelvis/lower periaortic nodes   • History of shingles     8-10 years ago    • [place] (hard of hearing)     has hearing aides   • Hyperlipidemia    • Osteoarthritis    • PONV (postoperative nausea and vomiting)     only with ether   • Pre-diabetes    • Prostate cancer (CMS/HCC)    • Sleep apnea with use of continuous positive airway pressure (CPAP)    • Sleep apnea with use of continuous positive airway pressure (CPAP)     compliant with machine    • Status post cystoscopy 08/28/2018   • Wears glasses    • Wears glasses       Past Surgical History:   Procedure Laterality Date   • ATRIAL APPENDAGE EXCLUSION LEFT WITH TRANSESOPHAGEAL ECHOCARDIOGRAM N/A 7/16/2019    Procedure: Atrial Appendage Occlusion ([place]), anticoagulation will be determined after pre-BRISSA;  Surgeon: Prabhakar Solis MD;  Location:  [facility];  Service: Cardiology   • BELPHAROPTOSIS REPAIR      bilat    • CARDIAC ABLATION  2011   • CATARACT EXTRACTION      bilat    • CHOLECYSTECTOMY     • COLONOSCOPY      2018   • CYST REMOVAL      on back and back of neck   • KNEE ARTHROSCOPY W/ PARTIAL MEDIAL MENISCECTOMY Left    • ORCHIECTOMY Right    • SHOULDER ARTHROSCOPY W/ LABRAL REPAIR Right    • TONSILLECTOMY AND ADENOIDECTOMY     • TOTAL KNEE ARTHROPLASTY Left 11/15/2019    Procedure: TOTAL KNEE ARTHROPLASTY LEFT;  Surgeon: Gamaliel Polanco MD;  Location:  [facility] OR;  Service: Orthopedics   • WISDOM TOOTH EXTRACTION      all 4 removed       Family History   Problem  Relation Age of Onset   • Arthritis Mother    • Pneumonia Mother    • Atrial fibrillation Father    • Hypertension Father    • Diabetes Father    • Heart disease Father    • No Known Problems Sister    • No Known Problems Maternal Grandmother    • Diabetes Maternal Grandfather    • No Known Problems Paternal Grandmother    • No Known Problems Paternal Grandfather      Social History     Socioeconomic History   • Marital status:      Spouse name: Not on file   • Number of children: Not on file   • Years of education: Not on file   • Highest education level: Not on file   Occupational History   • Occupation: Retired   Tobacco Use   • Smoking status: Never Smoker   • Smokeless tobacco: Never Used   Substance and Sexual Activity   • Alcohol use: No   • Drug use: No   • Sexual activity: Defer     Comment:    Social History Narrative    Caffeine: 3-4 servings per day    Patient lives at home with his wife      Current Outpatient Medications on File Prior to Visit   Medication Sig Dispense Refill   • aspirin 325 MG EC tablet Take 1 tablet by mouth Daily. For 1 month (Patient taking differently: Take 81 mg by mouth Daily.) 30 tablet 0   • atorvastatin (LIPITOR) 10 MG tablet Take 10 mg by mouth Every Night.     • B Complex Vitamins (VITAMIN B COMPLEX) capsule capsule Take  by mouth Daily.     • cyclobenzaprine (FLEXERIL) 10 MG tablet cyclobenzaprine 10 mg tablet   one po Every night at bedtime prn     • ferrous sulfate 324 (65 FE) MG tablet delayed-release EC tablet Take 324 mg by mouth Daily With Breakfast.     • flecainide (TAMBOCOR) 100 MG tablet TAKE 1 TABLET TWICE A  tablet 4   • leuprolide (LUPRON) 30 MG injection Inject 30 mg into the appropriate muscle as directed by prescriber Every 4 (Four) Months.     • metFORMIN ER (GLUCOPHAGE-XR) 500 MG 24 hr tablet Take 500 mg by mouth Every Night.     • Multiple Vitamins-Minerals (OCUVITE ADULT 50+ PO) Ocuvite Adult 50 Plus     • naproxen (NAPROSYN) 500 MG  "tablet 2 (Two) Times a Day.     • Omega-3 Fatty Acids (FISH OIL) 1000 MG capsule capsule Take 1,000 mg by mouth Daily With Breakfast.     • omeprazole (priLOSEC) 40 MG capsule Daily.     • oxybutynin XL (DITROPAN-XL) 5 MG 24 hr tablet Take 1 tablet by mouth Daily. 90 tablet 3   • Probiotic Product (PRO-BIOTIC BLEND PO) Take  by mouth.     • tamsulosin (FLOMAX) 0.4 MG capsule 24 hr capsule Take 1 capsule by mouth Daily.     • VITAMIN D PO Take 1,000 Int'l Units by mouth.       No current facility-administered medications on file prior to visit.       Allergies   Allergen Reactions   • Penicillins Hives and Rash        Review of Systems     Objective      Physical Exam  Pulse 53   Ht 185.4 cm (72.99\")   Wt 102 kg (224 lb)   SpO2 98%   BMI 29.56 kg/m²     Body mass index is 29.56 kg/m².    General:   Mental Status:  Alert   Appearance: Cooperative, in no acute distress   Build and Nutrition: Well-nourished well-developed male   Orientation: Alert and oriented to person, place and time   Posture: Normal   Gait: Normal    Integument:   Left hip: No skin lesions, no rash, no ecchymosis    Neurologic:   Sensation:    Left foot: Intact to light touch on the dorsal and plantar aspect   Motor:  Left lower extremity: 5/5 quadriceps, hamstrings, ankle dorsiflexors, and ankle plantar flexors  Vascular:   Left lower extremity: 2+ dorsalis pedis pulse    Lower Extremity:   Left Hip:    Tenderness:  Tender laterally    Swelling:  None    Crepitus:  None    Atrophy:  None    Range of motion:  External Rotation: 30°       Internal Rotation: 30°       Flexion:  100°       Extension:  0°   Instability:  None  Deformities:  None  Functional testing: Negative Stinchfield    No leg length discrepancy      Imaging/Studies      Imaging Results (Last 24 Hours)     Procedure Component Value Units Date/Time    XR Hip With or Without Pelvis 2 - 3 View Left [[id number]] Resulted: 02/01/21 1413     Updated: 02/01/21 1414    Narrative:      " Left Hip Radiographs  Indication: left hip pain  Views: low AP pelvis and lateral of the left hip    Comparison: no prior studies available for review    Findings:   Mild degenerative changes, with no acute bony abnormalities, good   alignment.          Assessment and Plan     Diagnoses and all orders for this visit:    1. Trochanteric bursitis of left hip (Primary)  -     XR Hip With or Without Pelvis 2 - 3 View Left  -     Ambulatory Referral to Physical Therapy Evaluate and treat, Ortho  -     Large Joint Arthrocentesis: L greater trochanteric bursa        1. Trochanteric bursitis of left hip        I reviewed my findings with the patient today.  He has trochanteric bursitis and I offered him a trochanteric injection today.  Also recommended a course of physical therapy.  I will see him back in 3 months, but sooner for any problems.  Further imaging with MRI may be considered if appropriate in the future.    Procedure Note:  The potential benefits of performing a therapeutic left hip trochanteric bursal injection, as well as potential risks (including, but not limited to infection, swelling, pain, bleeding, bruising, nerve/blood vessel damage, skin color changes, transient elevation in blood glucose levels, and fat atrophy) were discussed with the patient.  After informed consent, timeout procedure was performed, and the skin on the lateral aspect of the left hip was prepped with chlorhexidine soap and alcohol, after which ethyl chloride was applied to the skin at the injection site. Via the lateral approach, 1ml of Kenalog 40mg/ml mixed with 4ml 0.5% ropivacaine plain was injected into the trochanteric bursa.  The patient tolerated the procedure well, experiencing 10% improvement a few minutes following the injection. There were no complications.  Band-Aid was applied to the injection site. Post-procedural instructions were given to the patient and/or their caregiver.      Return in about 3 months (around  5/1/2021).    Medical Decision Making  Management Options : prescription/IM medicine and physical/occupational therapy  Data/Risk: radiology tests and independent visualization of imaging, lab tests, or EMG/NCV      Gamaliel Polanco MD  02/01/21  18:03 EST    Dragon disclaimer:  Much of this encounter note is an electronic transcription/translation of spoken language to printed text. The electronic translation of spoken language may permit erroneous, or at times, nonsensical words or phrases to be inadvertently transcribed; Although I have reviewed the note for such errors, some may still exist.

## 2021-02-01 NOTE — PROGRESS NOTES
Procedure   Large Joint Arthrocentesis: L greater trochanteric bursa  Date/Time: 2/1/2021 2:52 PM  Consent given by: patient  Site marked: site marked  Timeout: Immediately prior to procedure a time out was called to verify the correct patient, procedure, equipment, support staff and site/side marked as required   Supporting Documentation  Indications: pain   Procedure Details  Location: hip - L greater trochanteric bursa  Preparation: Patient was prepped and draped in the usual sterile fashion  Needle size: 22 G  Approach: anterolateral  Medications administered: 4 mL ropivacaine 0.5 %; 40 mg triamcinolone acetonide 40 MG/ML  Patient tolerance: patient tolerated the procedure well with no immediate complications

## 2021-02-03 ENCOUNTER — IMMUNIZATION (OUTPATIENT)
Dept: VACCINE CLINIC | Facility: HOSPITAL | Age: 81
End: 2021-02-03

## 2021-02-03 PROCEDURE — 0001A: CPT | Performed by: INTERNAL MEDICINE

## 2021-02-03 PROCEDURE — 91300 HC SARSCOV02 VAC 30MCG/0.3ML IM: CPT | Performed by: INTERNAL MEDICINE

## 2021-02-11 ENCOUNTER — HOSPITAL ENCOUNTER (OUTPATIENT)
Dept: RADIATION ONCOLOGY | Facility: HOSPITAL | Age: 81
Setting detail: RADIATION/ONCOLOGY SERIES
Discharge: HOME | End: 2021-02-11

## 2021-02-11 ENCOUNTER — OFFICE VISIT (OUTPATIENT)
Dept: RADIATION ONCOLOGY | Facility: HOSPITAL | Age: 81
End: 2021-02-11

## 2021-02-11 DIAGNOSIS — C61 PROSTATE CANCER METASTATIC TO INTRAPELVIC LYMPH NODE (HCC): Primary | ICD-10-CM

## 2021-02-11 DIAGNOSIS — C77.5 PROSTATE CANCER METASTATIC TO INTRAPELVIC LYMPH NODE (HCC): Primary | ICD-10-CM

## 2021-02-11 NOTE — PROGRESS NOTES
TELEMEDICINE FOLLOW-UP NOTE    PATIENT:                                                      Stefan Pedraza  MEDICAL RECORD #:                        [id number]  :                                                          1940  COMPLETION DATE:   2020  DIAGNOSIS:     Prostate cancer (CMS/HCC)  - Stage IIC (cT2b, cN0, cM0, PSA: 5.3, Grade Group: 3)      Time Devoted to Visit: 10 min including time to review his previous records, with 75% devoted to direct discussion.    Reason for Visit:  I personally contacted Stefan Pedraza today in an effort to screen for coronavirus symptoms and also to perform his scheduled follow-up visit remotely per patient request/consent in light of the coronavirus pandemic.  With respect to his specific risks for coronavirus, his screen is as follows:    COVID-19 RISK SCREEN     1. Has the patient had close contact without PPE with a lab confirmed COVID-19 (+) person or a person under investigation (PUI) for COVID-19 infection?  -- No     2. Has the patient had respiratory symptoms, worsened/new cough and/or SOA, unexplained fever, or sudden loss of smell and/or taste in the past 7 days? --  No    3. Does the patient have baseline higher exposure risk such as working in healthcare field or currently residing in healthcare facility?  --  No           BRIEF HISTORY:    Follow-up via telemedicine.  He has a history of prostate cancer treated with SBRT for intermediate risk, clinically localized disease in .  PSA did not reach target adi values and breanna to a maximum value of 14 ng/ml.  Axumen PET/CT demonstrated metastatic disease with pathologic lymphadenopathy confined to the left pelvis and lower periaortic lymph nodes.  He initiated androgen ablation in 2020 with partial biochemical response, but chose to discontinue LHRH agonist due to side effect intolerance.    He was not considered a good candidate for cytotoxic chemotherapy with taxanes due to toxicity  at his age and desire to limit treatments with high potential for negative effective on quality of life.  He therefore underwent salvage involved field radiotherapy to the pelvis and periaortic nodes.   PSA is now undetectable.  From a symptom standpoint, he continues with chronic, moderate urinary outflow obstructive symptoms.  He has discontinued alpha blocker but remains on daily oxybutynin.  He denies hematuria, dysuria, or incontinence.  Bowels are regular.  He reports baseline erectile dysfunction.  He has had suboptimal results in the past with Cialis, Viagra, and injections.  Energy level is good.  He is active and walks outside when weather permits.  No new aches or pains.  He continues with occasional hot flashes which he relates to androgen ablation therapy.  Of note, he underwent genetic testing which was negative for mutations in BRCA1/2 or TRK mutation.  Molecular studies revealed MSI stable disease and PD-L1 0%.        IPSS Questionnaire (AUA-7):  Over the past month…    1)  Incomplete Emptying  How often have you had a sensation of not emptying your bladder?  1 - Less than 1 time in 5   2)  Frequency  How often have you had to urinate less than every two hours? 3 - About half the time   3)  Intermittency  How often have you found you stopped and started again several times when you urinated?  5 - Almost always   4) Urgency  How often have you found it difficult to postpone urination?  3 - About half the time   5) Weak Stream  How often have you had a weak urinary stream?  3 - About half the time   6) Straining  How often have you had to push or strain to begin urination?  0 - Not at all   7) Nocturia  How many times did you typically get up at night to urinate?  3 - 3 times   Total Score:  18       Quality of life due to urinary symptoms:  If you were to spend the rest of your life with your urinary condition the way it is now, how would you feel about that? 3-Mixed   Urine Leakage (Incontinence)  1-Mild (A few drops a day, no pad use)     Sexual Health Inventory  Current Status    1)  How do you rate your confidence that you could achieve and keep an erection? 1-Very Low   2) When you had erections with sexual stimulation, how often were your erections hard enough for penetration (entering your partner)? 1-Almost never or never   3)  During sexual intercourse, how often were you able to maintain your erection after you had penetrated (entered) into your partner? 0-Did not attempt intercourse   4) During sexual intercourse, how difficult was it to maintain your erection to completion of intercourse? 0-Did not attempt intercourse   5) When you attempted sexual intercourse, how often was it satisfactory to you? 0-No sexual activity   Total Score: 2       Bowel Health Inventory  Current Status: 0-No problems, no rectal bleeding, no discharge, less then 5 bowel movements a day         MEDICATIONS: Medication reconciliation for the patient was reviewed and confirmed in the electronic medical record.    Review of Systems   Endocrine: Positive for hot flashes.   Genitourinary: Positive for frequency and nocturia.    All other systems reviewed and are negative.      KPS 80%    Physical Exam  Pulmonary:      Respirations even, unlabored. No audible wheezing or cough.  Neurological:      A+Ox4, conversant, answers questions appropriately.  Psychiatric:     Judgement, affect, and decision-making WNL.    Limited physical exam as visit was conducted remotely via telephone in light of the COVID-19 pandemic.      LABORATORY:  PSA 8/6/2020  =  0.099 ng/ml  PSA 12/9/2020 = <0.04 ng/ml              The following portions of the patient's history were reviewed and updated as appropriate: allergies, current medications, past family history, past medical history, past social history, past surgical history and problem list.         Diagnoses and all orders for this visit:    1. Prostate cancer metastatic to intrapelvic lymph node  (CMS/[facility]) (Primary)         IMPRESSION:   Oligometastatic prostate cancer, initially intermediate risk Sciota score 4+3 = 7 treated with SBRT 2 years 3 months ago with rising PSA to a maximum value of 14 ng/ml.  He initiated a short course of androgen ablation with good partial response, but discontinued hormone therapy related to intolerance of side effects.  He is now 7 months status post salvage involved field radiotherapy to pelvis/lower periaortic nodes.  He has had a complete biochemical response with undetectable PSA.  No evidence of recurrent/residual disease at this time.  OBDULIO noted on recent urologic exam 12/14/2020 was reportedly benign.  He expressed continued desire to avoid androgen deprivation if at all possible.  We discussed role of intermittent androgen ablation if necessary, with closely monitored serial PSAs and imaging as clinically indicated.        RECOMMENDATIONS:  Mr. Pedraza continues routine urologic surveillance and biannual PSA testing under the care of Dr. Hansen, with follow-up and repeat PSA scheduled 6/18/2021.    Return in about 1 year (around 2/11/2022) for Office Visit.    Carie Turner, APRN

## 2021-02-15 ENCOUNTER — TREATMENT (OUTPATIENT)
Dept: PHYSICAL THERAPY | Facility: CLINIC | Age: 81
End: 2021-02-15

## 2021-02-15 DIAGNOSIS — M25.559 PAIN, HIP: Primary | ICD-10-CM

## 2021-02-15 PROCEDURE — 97162 PT EVAL MOD COMPLEX 30 MIN: CPT | Performed by: PHYSICAL THERAPIST

## 2021-02-15 NOTE — PROGRESS NOTES
Physical Therapy Initial Evaluation and Plan of Care      Patient: Stefan Pedraza   : 1940  Diagnosis/ICD-10 Code:  The encounter diagnosis was Pain, hip.   Referring practitioner: Gamaliel Polanco MD  Date of Initial Visit: Type: THERAPY  Noted: 2/15/2021  Today's Date: 2/15/2021  Patient seen for 1 sessions         Stefan Pedraza reports:  L hip pain for the past 2 months  Subjective Questionnaire: LEFS: 67/80    Subjective Evaluation    History of Present Illness  Onset date: about 2 months ago.  Mechanism of injury: Pt reports no known reason for onset of symptoms.  He recently had his 1 year follow-up for his left knee replacement.  His doctor recommended that he try physical therapy for his hip pain.  He reports no falls or injuries.  He has resumed exercise at his [employer] facility.  He is walking 2 miles per day on [employer] walking track.    He locates the pain in the distribution of L gluteus medius, from iliac crest to greater trochanter.  Sometimes he notices gluteal pain and posterior thigh pain to knee.    Subjective comment: L hip pain  Patient Occupation: retired, plays [employer], exercises at [employer] facility Pain  Current pain ratin  At best pain ratin  At worst pain ratin  Location: L gluteal area and greater trochanter  Alleviating factors: sitting.  Exacerbated by: walking, stairs.    Social Support  Lives in: multiple-level home  Lives with: spouse    Hand dominance: right    Diagnostic Tests  Abnormal x-ray: mild degenerative changes, no acute findings.    Treatments  No previous or current treatments  Previous treatment: injection treatment (cortisone injection has helped)  Patient Goals  Patient goals for therapy: decreased pain and increased strength           Treatment                Objective          Observations     Additional Hip Observation Details  Pt notices that his left leg is a little longer than his right.  He put a heel lift in his R shoe and thinks  this has helped.    Tenderness     Left Hip   Tenderness in the greater trochanter.     Lumbar Screen  Lumbar range of motion within normal limits with the following exceptions:WFL's    Neurological Testing     Sensation     Hip   Left Hip   Intact: light touch    Right Hip   Intact: light touch    Active Range of Motion     Additional Active Range of Motion Details  B hip ROM is WFL's.    Strength/Myotome Testing     Left Hip   Planes of Motion   Flexion: 4+  Extension: 4-  Abduction: 3  External rotation: 4  Internal rotation: 4+    Tests     Left Hip   Positive ELOISA.   Negative FADIR.   SLR: Negative.      General Comments     Hip Comments   Decreased left quad, hamstring, and hip flexor flexibility.         Assessment & Plan     Assessment  Impairments: impaired physical strength, lacks appropriate home exercise program and pain with function  Assessment details: Clinical presentation suggests gluteus medius strain as pt has begun a walking program recently.  He will benefit from a guided, progressive strengthening and flexibility program, including advice regarding exercise outside of PT sessions.  Functional Limitations: walking and standing  Goals  Plan Goals: 4 weeks  Pt. demonstrates independence and compliance with initial HEP.  Pt. reports reduction in pain intensity to no worse than 4/10 on NPRS.  R hip strength shows improvement over baseline measures.    6 weeks  Pt. demonstrates independence in advanced HEP for ongoing improvement.  L hip/LE strength is sufficient to allow participation in desired activities without increased pain.  LE flexibility is WNL's and not limiting to functional abilities.    Plan  Therapy options: will be seen for skilled physical therapy services  Planned modality interventions: cryotherapy and thermotherapy (hydrocollator packs)  Planned therapy interventions: manual therapy, strengthening, stretching, therapeutic activities, joint mobilization, home exercise program,  functional ROM exercises and flexibility  Frequency: 1x week  Duration in visits: 6  Treatment plan discussed with: patient  Plan details: PT weekly per POC, addressing pain and noted deficits in flexibility and strength.        Timed:  Manual Therapy:         mins  88407;  Therapeutic Exercise:         mins  12686;     Neuromuscular James:        mins  19061;    Therapeutic Activity:          mins  14789;     Gait Training:           mins  69329;     Ultrasound:          mins  61149;    Electrical Stimulation:         mins  43424 ( );    Untimed:  Electrical Stimulation:         mins  28299 ( );  Mechanical Traction:         mins  54395;     Timed Treatment:      mins   Total Treatment:     45   mins    PT SIGNATURE: Cynthia German, PT   DATE TREATMENT INITIATED: 2/15/2021    Initial Certification  Certification Period: 5/16/2021  I certify that the therapy services are furnished while this patient is under my care.  The services outlined above are required by this patient, and will be reviewed every 90 days.     PHYSICIAN: Gamaliel Polanco MD      DATE:     Please sign and return via fax to [phone].. Thank you, [facility].

## 2021-02-24 ENCOUNTER — IMMUNIZATION (OUTPATIENT)
Dept: VACCINE CLINIC | Facility: HOSPITAL | Age: 81
End: 2021-02-24

## 2021-02-24 PROCEDURE — 0002A: CPT | Performed by: INTERNAL MEDICINE

## 2021-02-24 PROCEDURE — 91300 HC SARSCOV02 VAC 30MCG/0.3ML IM: CPT | Performed by: INTERNAL MEDICINE

## 2021-02-25 ENCOUNTER — TREATMENT (OUTPATIENT)
Dept: PHYSICAL THERAPY | Facility: CLINIC | Age: 81
End: 2021-02-25

## 2021-02-25 DIAGNOSIS — M25.559 PAIN, HIP: Primary | ICD-10-CM

## 2021-02-25 PROCEDURE — 97110 THERAPEUTIC EXERCISES: CPT | Performed by: PHYSICAL THERAPIST

## 2021-02-25 PROCEDURE — 97530 THERAPEUTIC ACTIVITIES: CPT | Performed by: PHYSICAL THERAPIST

## 2021-02-25 NOTE — PROGRESS NOTES
Physical Therapy Daily Progress Note  VISIT: 2          Subjective    Stefan Pedraza reports: his hip is feeling better.  He likes his exercises.  He would like a heavier resistance band.  He has already walked 2 miles this morning.  He says the pain can get as bad as 7-8/10 with walking, but returns to 0/10 with rest.      Pre-treatment pain:  0  Post-treatment pain:  0      Objective    Treatment    Exercise 1  Exercise Name 1: prone quad and hip flexor stretch  Exercise 2  Exercise Name 2: sidelying clamshell  Exercise 3  Exercise Name 3: supine clamshell  Equipment/Resistance 3: blue band  Exercise 4  Exercise Name 4: bridging with blue band-cues for best technique  Exercise 5  Exercise Name 5: supine piriformis stretch  Exercise 6  Exercise Name 6: standing L hip extension/ER       Suggested walking every other day vs every day to allow hip to rest in between walks.    Pt asked about kneeling on left knee which causes sharp pain.  Advised against kneeling on the prosthetic knee per general post TKA guidelines.          Assessment & Plan     Assessment  Assessment details: Pt is responding well to exercises.    Plan  Plan details: Continue PT.  Progress exercise as tolerated.               Timed:  Manual Therapy:         mins  79215;  Therapeutic Exercise:    20     mins  42385;     Neuromuscular James:        mins  03916;    Therapeutic Activity:     10     mins  41577;     Gait Training:           mins  93817;     Ultrasound:          mins  04799;    Electrical Stimulation:         mins  70140 ( );    Untimed:  Electrical Stimulation:         mins  38703 ( );  Mechanical Traction:         mins  59392;     Timed Treatment:   30   mins   Total Treatment:     30   mins      Cynthia German PT  Physical Therapist

## 2021-03-01 ENCOUNTER — TREATMENT (OUTPATIENT)
Dept: PHYSICAL THERAPY | Facility: CLINIC | Age: 81
End: 2021-03-01

## 2021-03-01 DIAGNOSIS — M25.559 PAIN, HIP: Primary | ICD-10-CM

## 2021-03-01 PROCEDURE — 97110 THERAPEUTIC EXERCISES: CPT | Performed by: PHYSICAL THERAPIST

## 2021-03-01 NOTE — PROGRESS NOTES
Physical Therapy Daily Progress Note  VISIT: 3          Subjective    Stefan Pedraza reports: he does not have lateral hip pain at all today.  He indicates L superior gluteal pain which began during his 2 mile walk this morning.  He noticed it at about 1/2 mile into the walk, but completed the 2 mile walk anyway.  He then tried to play pickle ball, but was too uncomfortable to play.      Pre-treatment pain:  3  Post-treatment pain:  0      Objective    Treatment    Exercise 1  Exercise Name 1: prone lying-no superior gluteal pain  Exercise 2  Exercise Name 2: prone on elbows- aggravating to symptoms  Exercise 3  Exercise Name 3: lwft lumbar PA glide-increases symptoms  Exercise 4  Exercise Name 4: supine SKC  Exercise 5  Exercise Name 5: supine R lumbar rotation stretch-relieving to symptom  Exercise 6  Exercise Name 6: Addressed pts questions regarding R foot sliding forward in shoe.  Tightened laces at forefoot which seemed to help.  Exercise 7  Exercise Name 7: Advised pt to shorten walking distance, and possible take a day off in between walking days, depending upon symptom response.  Exercise 8  Exercise Name 8: prone alternating hip extension-aggravates superior gluteal pain                 Assessment & Plan     Assessment  Assessment details: Trochanteric pain is better today.  Superior gluteal pain seemed to respond to focused stretching in clinic today.    Plan  Plan details: Continue PT.  Progress exercises as tolerated.               Timed:  Manual Therapy:         mins  87208;  Therapeutic Exercise:    25     mins  27808;     Neuromuscular James:        mins  42966;    Therapeutic Activity:          mins  12289;     Gait Training:           mins  23190;     Ultrasound:          mins  44469;    Electrical Stimulation:         mins  56355 ( );    Untimed:  Electrical Stimulation:         mins  05580 ( );  Mechanical Traction:         mins  88486;     Timed Treatment:   25   mins   Total  Treatment:     25   mins      Cynthia German, PT  Physical Therapist

## 2021-03-08 ENCOUNTER — TREATMENT (OUTPATIENT)
Dept: PHYSICAL THERAPY | Facility: CLINIC | Age: 81
End: 2021-03-08

## 2021-03-08 DIAGNOSIS — M25.559 PAIN, HIP: Primary | ICD-10-CM

## 2021-03-08 PROCEDURE — 97530 THERAPEUTIC ACTIVITIES: CPT | Performed by: PHYSICAL THERAPIST

## 2021-03-08 PROCEDURE — 97110 THERAPEUTIC EXERCISES: CPT | Performed by: PHYSICAL THERAPIST

## 2021-03-08 NOTE — PROGRESS NOTES
Physical Therapy Daily Progress Note  VISIT: 4          Subjective    Stefan Pedraza reports: he notices his hip about 1 mile into his 2 mile walk.  The pain goes away when he sits to rest.  He does not have pain when he gets up in the morning.      Pre-treatment pain:  2  Post-treatment pain:  2      Objective    Treatment    Exercise 1  Exercise Name 1: prone assisted L quad stretch  Exercise 2  Exercise Name 2: prone knee flexion  Exercise 3  Exercise Name 3: prone hip extension  Exercise 4  Exercise Name 4: advice regarding upcoming car trip to [place] (stretch breaks)  Exercise 5  Exercise Name 5: advice regarding posture while walking, doing dishes    Manual Rx 1  Manual Rx 1 Location: L LE  Manual Rx 1 Type: LAD, passive quad stretch            Assessment & Plan     Assessment  Assessment details: Pt indicates overall improvement in symptoms.  Symptoms are specific to prolonged walking or standing in slightly forward posture.  Pt responds well to postural correction, but will benefit from postural exercises to improve endurance for upright posture.    Plan  Plan details: Continue PT when patient returns from out of town.  Progress postural endurance, lumbar and hip mobility as tolerated.               Timed:  Manual Therapy:         mins  10246;  Therapeutic Exercise:    20     mins  14808;     Neuromuscular James:        mins  07871;    Therapeutic Activity:     8     mins  04177;     Gait Training:           mins  83222;     Ultrasound:          mins  36444;    Electrical Stimulation:         mins  06357 ( );    Untimed:  Electrical Stimulation:         mins  97221 ( );  Mechanical Traction:         mins  72346;     Timed Treatment:   28   mins   Total Treatment:     28   mins      Cynthia German, PT  Physical Therapist

## 2021-03-29 ENCOUNTER — TREATMENT (OUTPATIENT)
Dept: PHYSICAL THERAPY | Facility: CLINIC | Age: 81
End: 2021-03-29

## 2021-03-29 DIAGNOSIS — M25.559 PAIN, HIP: Primary | ICD-10-CM

## 2021-03-29 PROCEDURE — 97110 THERAPEUTIC EXERCISES: CPT | Performed by: PHYSICAL THERAPIST

## 2021-03-29 NOTE — PROGRESS NOTES
Physical Therapy Daily Progress Note/Discharge Note    Patient: Stefan Pedraza   : 1940  Diagnosis/ICD-10 Code:  The encounter diagnosis was Pain, hip.   Referring practitioner: Gamaliel Polanco MD  Date of Initial Visit: Type: THERAPY  Noted: 2/15/2021  Today's Date: 3/29/2021  Visit:  5     Stefan Pedraza reports: no hip pain this morning and no worse than 2/10 at any time.  He does his stretches before walking and this has helped a lot.    Subjective     Treatment  Pre-treatment pain:  0  Post-treatment pain:  0  Exercise 1  Exercise Name 1: Reassessment/DC assessment completed today.  Exercise 2  Exercise Name 2: HEP review             Objective          Strength/Myotome Testing     Left Hip   Planes of Motion   Flexion: 5  Extension: 4  Abduction: 4-  Adduction: 4+  External rotation: 4+        Assessment & Plan     Assessment  Assessment details: Goals met to patient's satisfaction.  He is independent in HEP and has resumed recreational activities of choice.    Plan  Plan details: DC PT.        Plan Goals: 4 weeks  Pt. demonstrates independence and compliance with initial HEP-met.  Pt. reports reduction in pain intensity to no worse than 4/10 on NPRS-met.  L hip strength shows improvement over baseline measures-met.    6 weeks  Pt. demonstrates independence in advanced HEP for ongoing improvement-met.  L hip/LE strength is sufficient to allow participation in desired activities without increased pain-met.  LE flexibility is WNL's and not limiting to functional abilities-partially met, progressing.         Timed:  Manual Therapy:         mins  54067;  Therapeutic Exercise:    20     mins  89392;     Neuromuscular James:        mins  63787;    Therapeutic Activity:          mins  95537;     Gait Training:           mins  25543;     Ultrasound:          mins  29787;    Electrical Stimulation:         mins  96049 ( );    Untimed:  Electrical Stimulation:         mins  17357 (  );  Mechanical Traction:         mins  40709;     Timed Treatment:   20   mins   Total Treatment:     20   mins      Cynthia German PT  Physical Therapist

## 2021-05-10 RX ORDER — FLECAINIDE ACETATE 100 MG/1
TABLET ORAL
Qty: 180 TABLET | Refills: 1 | Status: SHIPPED | OUTPATIENT
Start: 2021-05-10 | End: 2021-11-04

## 2021-08-04 ENCOUNTER — OFFICE VISIT (OUTPATIENT)
Dept: CARDIOLOGY | Facility: CLINIC | Age: 81
End: 2021-08-04

## 2021-08-04 VITALS
SYSTOLIC BLOOD PRESSURE: 122 MMHG | OXYGEN SATURATION: 97 % | DIASTOLIC BLOOD PRESSURE: 62 MMHG | HEIGHT: 73 IN | WEIGHT: 226 LBS | BODY MASS INDEX: 29.95 KG/M2 | HEART RATE: 54 BPM

## 2021-08-04 DIAGNOSIS — Z95.818 PRESENCE OF WATCHMAN LEFT ATRIAL APPENDAGE CLOSURE DEVICE: ICD-10-CM

## 2021-08-04 DIAGNOSIS — Z79.899 LONG TERM CURRENT USE OF ANTIARRHYTHMIC MEDICAL THERAPY: ICD-10-CM

## 2021-08-04 DIAGNOSIS — E78.5 DYSLIPIDEMIA: ICD-10-CM

## 2021-08-04 DIAGNOSIS — I48.0 PAROXYSMAL ATRIAL FIBRILLATION (HCC): Primary | ICD-10-CM

## 2021-08-04 PROCEDURE — 93000 ELECTROCARDIOGRAM COMPLETE: CPT | Performed by: NURSE PRACTITIONER

## 2021-08-04 PROCEDURE — 99213 OFFICE O/P EST LOW 20 MIN: CPT | Performed by: INTERNAL MEDICINE

## 2021-08-04 RX ORDER — ASPIRIN 81 MG/1
81 TABLET ORAL DAILY
COMMUNITY

## 2021-08-04 NOTE — PROGRESS NOTES
Stefan Pedraza  1940  [phone]    08/04/2021    [facility] CARDIOLOGY     Referring Provider: No ref. provider found     Gamaliel Eaton MD  [address] [place] [address]    Chief Complaint   Patient presents with   • Atrial Fibrillation       Problem List:     1. Atrial fibrillation:   a. History of atrial fibrillation, initial diagnosis 2007.   b. Status-post successful external cardioversion, Dr. Dyer, 2007.  c. Initiation of amiodarone therapy with recent tapering dose, Dr. Lowe.   d. Echocardiogram, 02/02/2011, revealing normal LV function, diastolic dysfunction noted, mild MR, no pericardial effusion.   e. Holter monitor, 01/25/2011, revealing sinus rhythm with PAC/PVC/episodes of sinus bradycardia.   f. Chadsvasc=3.   g. 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   h. Pulmonary vein isolation procedure, 10/05/2011.  i. BRISSA with left ventricular ejection fraction of 55%, mild TR and MR.   j. Event recorder with intermittent episodes of flutter but the majority of the time in sinus rhythm.   k. External cardioversion to normal sinus rhythm, 02/17/2012.  l. Atypical chest pain.  m. History of left heart catheterization, 1990s in [place], reported as normal coronaries.   n. Stress test, 08/23/2007, revealing no significant ischemia, EF estimated at 46%.  o. Echo 7/28/17 LV Estimated EF = 57%., mild concentric hypertrophy, Left atrial cavity size is severely dilated. Normal estimated pulmonary artery systolic pressure  p. External CV 7/13/17; NSR with recurrent AF three days later  q. Echocardiogram 7/28/17: EF 57%, mild LVH, LA severely dilated  r. ECV to NSR 8/31/17  s. Wavecrest device insertion 7/16/19  t. BRISSA 7/21/2020 LVEF NL, No significant leakage around device, Mild MR,TR  2. Diabetes mellitus.   3. Gastroesophageal reflux  disease.  4. Dyslipidemia.  5. Osteoarthritis.  6. Remote surgical history:  a. Cholecystectomy.   b. Right orchiectomy.  c. Right shoulder labral repair.  d. Left knee arthroscopy for partial medial meniscectomy      Allergies  Allergies   Allergen Reactions   • Penicillins Hives and Rash       Current Medications    Current Outpatient Medications:   •  aspirin 81 MG EC tablet, Take 81 mg by mouth Daily., Disp: , Rfl:   •  atorvastatin (LIPITOR) 10 MG tablet, Take 10 mg by mouth Every Night., Disp: , Rfl:   •  B Complex Vitamins (VITAMIN B COMPLEX) capsule capsule, Take 1 capsule by mouth Daily., Disp: , Rfl:   •  Cholecalciferol (Vitamin D3) 25 MCG (1000 UT) capsule, Take 1,000 Units by mouth Daily., Disp: , Rfl:   •  cyclobenzaprine (FLEXERIL) 10 MG tablet, Take 10 mg by mouth As Needed., Disp: , Rfl:   •  ferrous sulfate 324 (65 FE) MG tablet delayed-release EC tablet, Take 324 mg by mouth Daily With Breakfast., Disp: , Rfl:   •  flecainide (TAMBOCOR) 100 MG tablet, TAKE 1 TABLET TWICE A DAY, Disp: 180 tablet, Rfl: 1  •  metFORMIN ER (GLUCOPHAGE-XR) 500 MG 24 hr tablet, Take 500 mg by mouth Every Night., Disp: , Rfl:   •  Multiple Vitamins-Minerals (OCUVITE ADULT 50+ PO), Take 1 tablet by mouth Daily., Disp: , Rfl:   •  naproxen (NAPROSYN) 500 MG tablet, Take 500 mg by mouth 2 (Two) Times a Day., Disp: , Rfl:   •  Omega-3 Fatty Acids (FISH OIL) 1000 MG capsule capsule, Take 1,000 mg by mouth Daily With Breakfast., Disp: , Rfl:   •  omeprazole (priLOSEC) 40 MG capsule, Take 40 mg by mouth Daily., Disp: , Rfl:       History of Present Illness     Pt presents for follow up of PAF now s/p LAAC as part of the Wavecrest study July 2019. Patient maintained on flecainide therapy for history of atrial fibrillation. Since we last saw the pt, pt denies any AF episodes, SOB, CP. Patient does report occasional dizziness associated with falls primarily with standing too quickly or bending over. Patient denies significant  "injury from fall.. Denies any hospitalizations, ER visits.  Patient currently transition to aspirin monotherapy status post C without reported signs of bleeding, or TIA/CVA symptoms. Overall feels well.      ROS:  General:  Denies fatigue, weight gain or loss  Cardiovascular:  Denies CP, PND, syncope, near syncope, edema or palpitations.  Pulmonary:  Denies CROW, cough, or wheezing      Vitals:    08/04/21 1011   BP: 122/62   BP Location: Left arm   Patient Position: Sitting   Pulse: 54   SpO2: 97%   Weight: 103 kg (226 lb)   Height: 185.4 cm (73\")     Body mass index is 29.82 kg/m².  PE:  General: NAD  Neck: no JVD, no carotid bruits, no TM  Heart RRR, bradycardic, NL S1, S2, S4 present, no rubs, murmurs  Lungs: CTA, no wheezes, rhonchi, or rales  Abd: soft, non-tender, NL BS  Ext: No musculoskeletal deformities, no edema, cyanosis, or clubbing  Psych: normal mood and affect    Diagnostic Data:        ECG 12 Lead    Date/Time: 8/4/2021 10:23 AM  Performed by: Tay Cuevas APRN  Authorized by: Tay Cuevas APRN   Comparison: compared with previous ECG from 7/29/2020  Similar to previous ECG  Rhythm: sinus bradycardia  BPM: 54  Comments: QRS duration 88 ms            1. Paroxysmal atrial fibrillation (CMS/HCC)    2. Presence of Watchman left atrial appendage closure device    3. Long term current use of antiarrhythmic medical therapy    4. Dyslipidemia          Plan:    1) AF  Controlled on flecainide therapy without reported recurrence  Status post remote PVA in 2011  Continue present medications.     2) Anticoagulation  CHADSVASc = 3 status post left atrial appendage closure as part of with Wavecrest trial 7/16/2019  Continue ASA monotherapy    3) Long-term antiarrhythmic use  Gentle EKG in office today with control of A. fib on flecainide therapy.  Continue flecainide 100 mg twice daily as prescribed.  Discussed option of lowering flecainide to 50 mg p.o. twice daily since he does have " relative bradycardia.  The patient is not interested at this time about that option but will think about it at home.    4) Dyslipidemia  Continue Lipitor as prescribed.    F/up in 8 months      Electronically signed by AYAAN Brody, 08/04/21, 10:26 AM EDT.     I, Prabhakar Solis MD, personally performed the services described in this documentation as scribed by the above named individual in my presence, and it is both accurate and complete.  8/4/2021  10:40 EDT

## 2021-11-04 RX ORDER — FLECAINIDE ACETATE 100 MG/1
TABLET ORAL
Qty: 180 TABLET | Refills: 2 | Status: SHIPPED | OUTPATIENT
Start: 2021-11-04 | End: 2022-08-01

## 2022-02-09 ENCOUNTER — TELEPHONE (OUTPATIENT)
Dept: CARDIOLOGY | Facility: CLINIC | Age: 82
End: 2022-02-09

## 2022-02-09 NOTE — TELEPHONE ENCOUNTER
I spoke with patient for 2.5 year follow up for Wavecrest trial. He states he is well with no ER visits or hospitalizations since last visit. He is compliant with study medications and next in person follow up discussed for 8/22

## 2022-02-11 ENCOUNTER — HOSPITAL ENCOUNTER (OUTPATIENT)
Dept: RADIATION ONCOLOGY | Facility: HOSPITAL | Age: 82
Setting detail: RADIATION/ONCOLOGY SERIES
Discharge: HOME | End: 2022-02-11

## 2022-02-11 ENCOUNTER — OFFICE VISIT (OUTPATIENT)
Dept: RADIATION ONCOLOGY | Facility: HOSPITAL | Age: 82
End: 2022-02-11

## 2022-02-11 VITALS
RESPIRATION RATE: 15 BRPM | TEMPERATURE: 96.8 F | SYSTOLIC BLOOD PRESSURE: 149 MMHG | HEART RATE: 57 BPM | DIASTOLIC BLOOD PRESSURE: 72 MMHG | HEIGHT: 73 IN | OXYGEN SATURATION: 94 % | WEIGHT: 232.4 LBS | BODY MASS INDEX: 30.8 KG/M2

## 2022-02-11 DIAGNOSIS — C61 PROSTATE CANCER METASTATIC TO INTRAPELVIC LYMPH NODE: ICD-10-CM

## 2022-02-11 DIAGNOSIS — C77.5 PROSTATE CANCER METASTATIC TO INTRAPELVIC LYMPH NODE: ICD-10-CM

## 2022-02-11 PROCEDURE — G0463 HOSPITAL OUTPT CLINIC VISIT: HCPCS

## 2022-02-11 RX ORDER — TADALAFIL 20 MG/1
20 TABLET ORAL DAILY PRN
Qty: 20 TABLET | Refills: 11 | Status: SHIPPED | OUTPATIENT
Start: 2022-02-11 | End: 2023-02-09

## 2022-02-11 RX ORDER — GABAPENTIN 300 MG/1
CAPSULE ORAL EVERY 8 HOURS SCHEDULED
COMMUNITY
End: 2022-08-17

## 2022-02-11 NOTE — PROGRESS NOTES
FOLLOW UP NOTE    PATIENT:                                                      Stefan Pedraza  MEDICAL RECORD #:                        [id number]  :                                                          1940  COMPLETION DATE:   2020  DIAGNOSIS:     Prostate cancer (HCC)  - Stage IIC (cT2b, cN0, cM0, PSA: 5.3, Grade Group: 3)      BRIEF HISTORY:    He has a history of prostate cancer treated with SBRT for intermediate risk, clinically localized disease in .  PSA did not reach target adi values and breanna to a maximum value of 14 ng/ml.  Axumen PET/CT demonstrated metastatic disease with pathologic lymphadenopathy confined to the left pelvis and lower periaortic lymph nodes.  He initiated androgen ablation in 2020 with partial biochemical response, but chose to discontinue LHRH agonist due to side effect intolerance.    He was not considered a good candidate for cytotoxic chemotherapy with taxanes due to toxicity at his age and desire to limit treatments with high potential for negative effective on quality of life.  He therefore underwent salvage involved field radiotherapy to the pelvis and periaortic nodes, completing 2020.   PSA reached undetectable level.  Most recent PSA from 11/3/2021 was 0.06 ng/ml..  From a symptom standpoint, he continues with chronic, moderate urinary outflow obstructive symptoms.  He has discontinued alpha blocker and oxybutynin.  He denies hematuria, dysuria, or incontinence.  Bowels are regular.  He experiences occasional hemorrhoidal flare associated with hematochezia.  He reports baseline erectile dysfunction.  He has had suboptimal results in the past with Cialis, Viagra, and injections.  He requests a retrial of Cialis.  Energy level is good.  He is active.  No new aches or pains.  He continues with occasional hot flashes which he relates to androgen ablation therapy.    MEDICATIONS: Medication reconciliation for the patient was reviewed and  confirmed in the electronic medical record.    Review of Systems   Constitutional: Positive for fatigue.   Cardiovascular: Positive for leg swelling.   Gastrointestinal: Positive for constipation and diarrhea.   Genitourinary: Positive for nocturia.    Musculoskeletal: Positive for back pain and neck pain.       Karnofsky score: 80       IPSS Questionnaire (AUA-7):  Over the past month…    1)  Incomplete Emptying  How often have you had a sensation of not emptying your bladder?  1 - Less than 1 time in 5   2)  Frequency  How often have you had to urinate less than every two hours? 4 - More than half the time   3)  Intermittency  How often have you found you stopped and started again several times when you urinated?  4 - More than half the time   4) Urgency  How often have you found it difficult to postpone urination?  4 - More than half the time   5) Weak Stream  How often have you had a weak urinary stream?  5 - Almost always   6) Straining  How often have you had to push or strain to begin urination?  1 - Less than 1 time in 5   7) Nocturia  How many times did you typically get up at night to urinate?  4 - 4 times   Total Score:  23       Quality of life due to urinary symptoms:  If you were to spend the rest of your life with your urinary condition the way it is now, how would you feel about that? 4-Mostly Dissatisfied   Urine Leakage (Incontinence) 1 mild a few drops per day no pad use     Sexual Health Inventory  Current Status    1)  How do you rate your confidence that you could achieve and keep an erection? 1-Very Low   2) When you had erections with sexual stimulation, how often were your erections hard enough for penetration (entering your partner)? 1-Almost never or never   3)  During sexual intercourse, how often were you able to maintain your erection after you had penetrated (entered) into your partner? 1-Almost never or never   4) During sexual intercourse, how difficult was it to maintain your  erection to completion of intercourse? 1-Extremely difficult   5) When you attempted sexual intercourse, how often was it satisfactory to you? 1-Almost never or never   Total Score: 5       Bowel Health Inventory  Current Status: 2-Moderate diarrhea and colic and/or bowel movements more than 5 times daily and/or excessive rectal mucus and/or intermittent bleeding           Physical Exam  Vitals and nursing note reviewed.   Constitutional:       Appearance: He is well-developed.   HENT:      Head: Normocephalic and atraumatic.   Cardiovascular:      Rate and Rhythm: Normal rate and regular rhythm.      Heart sounds: Normal heart sounds. No murmur heard.      Pulmonary:      Effort: Pulmonary effort is normal.      Breath sounds: Normal breath sounds. No wheezing or rales.   Chest:   Breasts:      Right: No supraclavicular adenopathy.      Left: No supraclavicular adenopathy.       Abdominal:      General: Bowel sounds are normal. There is no distension.      Palpations: Abdomen is soft.      Tenderness: There is no abdominal tenderness.   Musculoskeletal:         General: No tenderness. Normal range of motion.      Cervical back: Normal range of motion and neck supple.      Right lower leg: Edema ( 1+ lower leg) present.      Left lower leg: Edema ( 1+ lower leg) present.   Lymphadenopathy:      Cervical: No cervical adenopathy.      Upper Body:      Right upper body: No supraclavicular adenopathy.      Left upper body: No supraclavicular adenopathy.   Skin:     General: Skin is warm and dry.   Neurological:      Mental Status: He is alert and oriented to person, place, and time.      Sensory: No sensory deficit.   Psychiatric:         Behavior: Behavior normal.         Thought Content: Thought content normal.         Judgment: Judgment normal.         VITAL SIGNS:   Vitals:    02/11/22 0849   BP: 149/72   Pulse: 57   Resp: 15   Temp: 96.8 °F (36 °C)   SpO2: 94%  Comment: RA   Weight: 105 kg (232 lb 6.4 oz)   Height:  "185.4 cm (73\")   PainSc: 0-No pain             Karnofsky score: 80         The following portions of the patient's history were reviewed and updated as appropriate: allergies, current medications, past family history, past medical history, past social history, past surgical history and problem list.         Diagnoses and all orders for this visit:    1. 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  Dispense: 20 tablet; Refill: 11         IMPRESSION:  Oligometastatic prostate cancer, initially intermediate risk [place] score 4+3 = 7 treated with SBRT 3 years 3 months ago with rising PSA to a maximum value of 14 ng/ml.  He initiated a short course of androgen ablation with good partial response, but discontinued hormone therapy related to intolerance of side effects.  He is now 1 year 7 months status post salvage involved field radiotherapy to pelvis/lower periaortic nodes.  He had a complete biochemical response with undetectable PSA.  PSA is still very low with barely detectable value of 0.06 ng/ml.  He does still have a prostate gland.  OBDULIO noted on recent urologic exam 12/14/2020 was reportedly benign.  He expressed continued desire to avoid androgen deprivation if at all possible.    RECOMMENDATIONS:  Mr. Pedraza continues routine urologic surveillance and biannual PSA testing under the care of Dr. Hansen, with follow-up and repeat PSA.    Return in about 1 year (around 2/11/2023) for Office Visit.    Shon Bunn MD    Approximately 30 minutes was spent with patient and reviewing records.  "

## 2022-03-16 NOTE — PROGRESS NOTES
[facility]   OFFICE NOTE      Stefan Pedraza  1940  PCP: Gamaliel Eaton MD    SUBJECTIVE:   Stefan Pedraza is a 78 y.o. male seen for a follow up visit regarding the following:     CC: Afib    Problem List:   1. Atrial fibrillation:   a. History of atrial fibrillation, initial diagnosis 2007.   b. Status-post successful external cardioversion, Dr. Dyer, 2007.  c. Initiation of amiodarone therapy with recent tapering dose, Dr. Lowe.   d. Echocardiogram, 02/02/2011, revealing normal LV function, diastolic dysfunction noted, mild MR, no pericardial effusion.   e. Holter monitor, 01/25/2011, revealing sinus rhythm with PAC/PVC/episodes of sinus bradycardia.   f. Chadsvasc=3.   g. 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   h. Pulmonary vein isolation procedure, 10/05/2011.  i. BRISSA with left ventricular ejection fraction of 55%, mild TR and MR.   j. Event recorder with intermittent episodes of flutter but the majority of the time in sinus rhythm.   k. External cardioversion to normal sinus rhythm, 02/17/2012.  l. Atypical chest pain.  m. History of left heart catheterization, 1990s in [place], reported as normal coronaries.   n. Stress test, 08/23/2007, revealing no significant ischemia, EF estimated at 46%.  o. Echo 7/28/17 LV Estimated EF = 57%., mild concentric hypertrophy, Left atrial cavity size is severely dilated. Normal estimated pulmonary artery systolic pressure  p. External CV 7/13/17; NSR with recurrent AF three days later  q. Echocardiogram 7/28/17: EF 57%, mild LVH, LA severely dilated  r. ECV to NSR 8/31/17  2. Diabetes mellitus.   3. Gastroesophageal reflux disease.  4. Dyslipidemia.  5. Osteoarthritis.  6. Remote surgical history:  a. Cholecystectomy.   b. Right orchiectomy.  c. Right shoulder labral repair.  d. Left knee arthroscopy for partial medial  "meniscectomy      HPI:   78-year-old Alcides presents today for treatment of atrial fibrillation, bradycardia, anticoagulation, and Tambocor therapy.  Since last visit with our office the patient denies any episodes of atrial fibrillation.  He denies sustained palpitations.  He denies any chest pain or chest tightness suggesting angina pectoris.  He states his blood pressures normally controlled.  He denies any dizziness, near-syncope or syncope.  He reports that he plays pickle ball on a regular basis without any symptoms and he feels that his heart rate gets fast enough to be active.  He has multiple questions today regarding the procedure of the watchman device as he would like to now pursue this in the near future he wants to be \"off\" blood thinners.  Otherwise he states he is doing well on the Tambocor therapy.        ROS:  Review of Symptoms:  General: no recent weight loss/gain, weakness or fatigue  Skin: no rashes, lumps, or other skin changes  HEENT: no dizziness, lightheadedness, or vision changes  Respiratory: no cough or hemoptysis  Cardiovascular: no palpitations, and tachycardia  Gastrointestinal: no black/tarry stools or diarrhea  Urinary: no change in frequency or urgency  Peripheral Vascular: no claudication or leg cramps  Musculoskeletal: no muscle or joint pain/stiffness  Psychiatric: no depression or excessive stress  Neurological: no sensory or motor loss, no syncope  Hematologic: no anemia, easy bruising or bleeding  Endocrine: no thyroid problems, nor heat or cold intolerance    Cardiac PMH: (Old records have been reviewed and summarized below)      Past Medical History, Past Surgical History, Family history, Social History, and Medications were all reviewed with the patient today and updated as necessary.       Current Outpatient Medications:   •  apixaban (ELIQUIS) 5 MG tablet tablet, Take 1 tablet by mouth Every 12 (Twelve) Hours., Disp: 180 tablet, Rfl: 3  •  atorvastatin (LIPITOR) 10 MG " tablet, Take 10 mg by mouth Daily., Disp: , Rfl:   •  B Complex Vitamins (VITAMIN B COMPLEX PO), Take 1 tablet by mouth Daily., Disp: , Rfl:   •  cyclobenzaprine (FLEXERIL) 10 MG tablet, cyclobenzaprine 10 mg tablet  one po Every night at bedtime prn, Disp: , Rfl:   •  ferrous sulfate 324 (65 FE) MG tablet delayed-release EC tablet, Take 324 mg by mouth Daily With Breakfast., Disp: , Rfl:   •  flecainide (TAMBOCOR) 100 MG tablet, TAKE 1 TABLET TWICE A DAY, Disp: 180 tablet, Rfl: 3  •  metFORMIN ER (GLUCOPHAGE-XR) 500 MG 24 hr tablet, Take 500 mg by mouth Daily., Disp: , Rfl:   •  naproxen (NAPROSYN) 500 MG tablet, Take 500 mg by mouth 2 (Two) Times a Day With Meals., Disp: , Rfl:   •  Omega-3 Fatty Acids (FISH OIL) 1000 MG capsule capsule, Take  by mouth Daily With Breakfast., Disp: , Rfl:   •  omeprazole (priLOSEC) 40 MG capsule, Take 40 mg by mouth Daily., Disp: , Rfl:   •  tamsulosin (FLOMAX) 0.4 MG capsule 24 hr capsule, Take 1 capsule by mouth Every Night., Disp: 30 capsule, Rfl: 11      Allergies   Allergen Reactions   • Penicillins Rash     Patient Active Problem List   Diagnosis   • Atrial fibrillation (CMS/HCC)   • History of cardioversion   • H/O echocardiogram   • History of Holter monitoring   • H/O transesophageal echocardiography (BRISSA) for monitoring   • History of cardioversion   • H/O cardiovascular stress test   • Prediabetes   • GERD (gastroesophageal reflux disease)   • Dyslipidemia   • Osteoarthritis   • Prostate cancer (CMS/HCC)     Past Medical History:   Diagnosis Date   • Arthritis    • Atrial fibrillation (CMS/HCC)    • Cataract     both   • Diabetes mellitus (CMS/HCC)    • Dyslipidemia    • GERD (gastroesophageal reflux disease)    • H/O cardiovascular stress test 08/23/2007    Revealing no significant ischemia, EF estimated at 46%   • H/O echocardiogram 02/02/2011    Revealing normal LV function, diastolic dysfunction noted, mild MR, nopericardial effusion.   • H/O transesophageal  "echocardiography (BRISSA) for monitoring     left ventricular ejection fraction of 55%, mild TR and MR   • History of cardioversion 2007    STATUS-POST SUCCESSFUL EXTERNAL CARDIOVERSION   • History of cardioversion 02/17/2012    external cardioversion to normal sinus rhythm   • History of Holter monitoring 01/25/2011    Revealing sinus rhythm with PAC/PVC/episodes of sinus bradycardia.   • Hyperlipidemia    • Osteoarthritis    • Pre-diabetes    • Prostate cancer (CMS/HCC)    • Status post cystoscopy 08/28/2018     Past Surgical History:   Procedure Laterality Date   • CARDIAC ABLATION  2011   • CATARACT EXTRACTION     • CHOLECYSTECTOMY     • COLONOSCOPY     • KNEE ARTHROSCOPY W/ PARTIAL MEDIAL MENISCECTOMY Left    • ORCHIECTOMY Right    • SHOULDER ARTHROSCOPY W/ LABRAL REPAIR Right      Family History   Problem Relation Age of Onset   • Arthritis Mother    • Pneumonia Mother    • Atrial fibrillation Father    • Hypertension Father    • Diabetes Father    • Heart disease Father    • No Known Problems Sister    • No Known Problems Maternal Grandmother    • Diabetes Maternal Grandfather    • No Known Problems Paternal Grandmother    • No Known Problems Paternal Grandfather      Social History     Tobacco Use   • Smoking status: Never Smoker   • Smokeless tobacco: Never Used   Substance Use Topics   • Alcohol use: No         PHYSICAL EXAM:    /70 (BP Location: Left arm, Patient Position: Sitting)   Pulse (!) 48   Ht 185.4 cm (73\")   Wt 104 kg (229 lb)   SpO2 99%   BMI 30.21 kg/m²        Wt Readings from Last 5 Encounters:   04/24/19 104 kg (229 lb)   01/31/19 104 kg (229 lb 4.8 oz)   09/28/18 106 kg (233 lb 11.2 oz)   09/17/18 108 kg (237 lb 8 oz)   09/12/18 107 kg (236 lb)       BP Readings from Last 5 Encounters:   04/24/19 138/70   01/31/19 149/76   09/28/18 146/79   09/17/18 148/74   09/12/18 134/74       General appearance - Alert, well appearing, and in no distress   Mental status - Affect appropriate to " There are no Wet Read(s) to document. mood.  Eyes - Sclerae anicteric,  ENMT - Hearing grossly normal bilaterally, Dental hygiene good.  Neck - Carotids upstroke normal bilaterally, no bruits, no JVD.  Resp - Clear to auscultation, no wheezes, rales or rhonchi, symmetric air entry.  Heart - Normal rate, regular rhythm, normal S1, S2, no murmurs, rubs, clicks or gallops.  GI - Soft, nontender, nondistended, no masses or organomegaly.  Neurological - Grossly intact - normal speech, no focal findings  Musculoskeletal - No joint tenderness, deformity or swelling, no muscular tenderness noted.  Extremities - Peripheral pulses normal, no pedal edema, no clubbing or cyanosis.  Skin - Normal coloration and turgor.  Psych -  oriented to person, place, and time.    Medical problems and test results were reviewed with the patient today.     No results found for this or any previous visit (from the past 672 hour(s)).      EKG: (EKG has been independently visualized by me and summarized below)    ECG 12 Lead  Date/Time: 4/24/2019 12:58 PM  Performed by: Irineo Burks PA  Authorized by: Irineo Burks PA   Comparison: compared with previous ECG from 9/12/2018  Rhythm: sinus bradycardia  Rate: bradycardic  ST Segments: ST segments normal  QRS axis: normal    Clinical impression: abnormal EKG            ASSESSMENT   1. Persistent Afib: Remote PVA 2011, ECV 2017.  Tambocor therapy  2. Remote normal Cornaries by [facility].   3. DM Type II  4. HLD: Statin  5. Chadsvasc=3, Continue Eliquis. Patient intrested in pursuing Watchman device  6. Asymptomatic Bradycardia    PLAN  · The patient has not had any breakthrough episodes of atrial fibrillation currently on Tambocor therapy he will continue his medication.  · Patient has asymptomatic bradycardia he states he is able to exercise frequently without any symptoms and his heart rate increases appropriately  · The patient is interested in pursuing watchman device he was considered this in the past and was a candidate he  would like to now push forward with this and be referred to our research clinic.  · Return for follow-up in 6 months with Dr. Solis or sooner if needed.    4/24/2019  12:55 PM    Will Kimmie CLARK

## 2022-08-01 RX ORDER — FLECAINIDE ACETATE 100 MG/1
TABLET ORAL
Qty: 180 TABLET | Refills: 3 | Status: SHIPPED | OUTPATIENT
Start: 2022-08-01

## 2022-08-17 ENCOUNTER — OFFICE VISIT (OUTPATIENT)
Dept: CARDIOLOGY | Facility: CLINIC | Age: 82
End: 2022-08-17

## 2022-08-17 VITALS
OXYGEN SATURATION: 96 % | SYSTOLIC BLOOD PRESSURE: 134 MMHG | BODY MASS INDEX: 31.14 KG/M2 | DIASTOLIC BLOOD PRESSURE: 62 MMHG | HEIGHT: 73 IN | HEART RATE: 59 BPM | WEIGHT: 235 LBS

## 2022-08-17 DIAGNOSIS — Z95.818 PRESENCE OF WATCHMAN LEFT ATRIAL APPENDAGE CLOSURE DEVICE: ICD-10-CM

## 2022-08-17 DIAGNOSIS — I48.0 PAROXYSMAL ATRIAL FIBRILLATION: Primary | ICD-10-CM

## 2022-08-17 PROCEDURE — 99213 OFFICE O/P EST LOW 20 MIN: CPT | Performed by: INTERNAL MEDICINE

## 2022-08-17 PROCEDURE — 93000 ELECTROCARDIOGRAM COMPLETE: CPT | Performed by: INTERNAL MEDICINE

## 2022-08-17 RX ORDER — TRIAMCINOLONE ACETONIDE 1 MG/G
CREAM TOPICAL AS NEEDED
COMMUNITY
Start: 2022-07-14

## 2022-08-17 NOTE — PROGRESS NOTES
Stefan Pedraza  1940  [phone]    08/17/2022    [facility]     Referring Provider: No ref. provider found     Gamaliel Eaton MD  [address] [place] [address]    Chief Complaint   Patient presents with   • PAF       Problem List:   1. Atrial fibrillation:   a. History of atrial fibrillation, initial diagnosis 2007.   b. Status-post successful external cardioversion, Dr. Dyer, 2007.  c. Initiation of amiodarone therapy with recent tapering dose, Dr. Lowe.   d. Echocardiogram, 02/02/2011, revealing normal LV function, diastolic dysfunction noted, mild MR, no pericardial effusion.   e. Holter monitor, 01/25/2011, revealing sinus rhythm with PAC/PVC/episodes of sinus bradycardia.   f. Chadsvasc=3.   g. 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   h. Pulmonary vein isolation procedure, 10/05/2011.  i. BRISSA with left ventricular ejection fraction of 55%, mild TR and MR.   j. Event recorder with intermittent episodes of flutter but the majority of the time in sinus rhythm.   k. External cardioversion to normal sinus rhythm, 02/17/2012.  l. Atypical chest pain.  m. History of left heart catheterization, 1990s in [place], reported as normal coronaries.   n. Stress test, 08/23/2007, revealing no significant ischemia, EF estimated at 46%.  o. Echo 7/28/17 LV Estimated EF = 57%., mild concentric hypertrophy, Left atrial cavity size is severely dilated. Normal estimated pulmonary artery systolic pressure  p. External CV 7/13/17; NSR with recurrent AF three days later  q. Echocardiogram 7/28/17: EF 57%, mild LVH, LA severely dilated  r. ECV to NSR 8/31/17  s. Wavecrest device insertion 7/16/19  t. BRISSA 7/21/2020 LVEF NL, No significant leakage around device, Mild MR,TR  2. Diabetes mellitus.   3. Gastroesophageal reflux  disease.  4. Dyslipidemia.  5. Osteoarthritis.  6. Remote surgical history:  a. Cholecystectomy.   b. Right orchiectomy.  c. Right shoulder labral repair.  d. Left knee arthroscopy for partial medial meniscectomy    Allergies  Allergies   Allergen Reactions   • Penicillins Hives and Rash       Current Medications    Current Outpatient Medications:   •  aspirin 81 MG EC tablet, Take 81 mg by mouth Daily., Disp: , Rfl:   •  atorvastatin (LIPITOR) 10 MG tablet, Take 10 mg by mouth Every Night., Disp: , Rfl:   •  B Complex Vitamins (VITAMIN B COMPLEX) capsule capsule, Take 1 capsule by mouth Daily., Disp: , Rfl:   •  Cholecalciferol (Vitamin D3) 25 MCG (1000 UT) capsule, Take 1,000 Units by mouth Daily., Disp: , Rfl:   •  cyclobenzaprine (FLEXERIL) 10 MG tablet, Take 10 mg by mouth As Needed., Disp: , Rfl:   •  ferrous sulfate 324 (65 FE) MG tablet delayed-release EC tablet, Take 324 mg by mouth Daily With Breakfast., Disp: , Rfl:   •  flecainide (TAMBOCOR) 100 MG tablet, TAKE 1 TABLET TWICE A DAY, Disp: 180 tablet, Rfl: 3  •  metFORMIN ER (GLUCOPHAGE-XR) 500 MG 24 hr tablet, Take 1,000 mg by mouth Every Night., Disp: , Rfl:   •  Multiple Vitamins-Minerals (OCUVITE ADULT 50+ PO), Take 1 tablet by mouth Daily., Disp: , Rfl:   •  naproxen (NAPROSYN) 500 MG tablet, Take 500 mg by mouth Daily., Disp: , Rfl:   •  Omega-3 Fatty Acids (FISH OIL) 1000 MG capsule capsule, Take 1,000 mg by mouth Daily With Breakfast., Disp: , Rfl:   •  omeprazole (priLOSEC) 40 MG capsule, Take 40 mg by mouth Daily., Disp: , Rfl:   •  tadalafil (CIALIS) 20 MG tablet, Take 1 tablet by mouth Daily As Needed for Erectile Dysfunction., Disp: 20 tablet, Rfl: 11  •  triamcinolone (KENALOG) 0.1 % cream, As Needed., Disp: , Rfl:     History of Present Illness     Pt presents for follow up of AF. Since we last saw the pt, pt denies any AF episodes, SOB, CP, LH, and dizziness. Denies any hospitalizations, ER visits, bleeding, or TIA/CVA symptoms. Overall  "feels well. BP at home ok. Exercises 3-4 times a week     ROS:  General:  Denies fatigue, weight gain or loss  Cardiovascular:  Denies CP, PND, syncope, near syncope, edema or palpitations.  Pulmonary:  Denies CROW, cough, or wheezing      Vitals:    08/17/22 0914   BP: 134/62   BP Location: Left arm   Patient Position: Sitting   Pulse: 59   SpO2: 96%   Weight: 107 kg (235 lb)   Height: 185.4 cm (73\")     Body mass index is 31 kg/m².  PE:  General: NAD  Neck: no JVD, no carotid bruits, no TM  Heart RRR, NL S1, S2, S4 present, no rubs, murmurs  Lungs: CTA, no wheezes, rhonchi, or rales  Abd: soft, non-tender, NL BS  Ext: No musculoskeletal deformities, no edema, cyanosis, or clubbing  Psych: normal mood and affect    Diagnostic Data:        ECG 12 Lead    Date/Time: 8/17/2022 9:32 AM  Performed by: Prabhakar Solis MD  Authorized by: Prabhakar Solis MD   Comparison: compared with previous ECG from 8/4/2021  Similar to previous ECG  Rhythm: sinus bradycardia  BPM: 58              1. Paroxysmal atrial fibrillation (HCC)    2. Presence of Watchman left atrial appendage closure device          Plan:    1) PAF  Controlled on flecainide therapy without reported recurrence  Status post remote PVA in 2011  Continue present medications.      2) Anticoagulation  CHADSVASc = 3 status post left atrial appendage closure as part of with Wavecrest trial 7/16/2019  Continue ASA monotherapy        F/up in 12 months      "

## 2022-12-22 ENCOUNTER — OFFICE VISIT (OUTPATIENT)
Dept: ONCOLOGY | Facility: CLINIC | Age: 82
End: 2022-12-22

## 2022-12-22 VITALS
WEIGHT: 240 LBS | OXYGEN SATURATION: 98 % | HEART RATE: 53 BPM | RESPIRATION RATE: 18 BRPM | DIASTOLIC BLOOD PRESSURE: 77 MMHG | BODY MASS INDEX: 31.81 KG/M2 | HEIGHT: 73 IN | TEMPERATURE: 97.3 F | SYSTOLIC BLOOD PRESSURE: 140 MMHG

## 2022-12-22 DIAGNOSIS — C61 PROSTATE CANCER: Primary | ICD-10-CM

## 2022-12-22 PROCEDURE — 99215 OFFICE O/P EST HI 40 MIN: CPT | Performed by: INTERNAL MEDICINE

## 2022-12-22 NOTE — PROGRESS NOTES
Subjective     PROBLEM LIST:  1. Metastatic prostate cancer  A) diagnosed 6/2018 with janis 4+3=7 prostate adenocarcinoma.  Stage at diagnosis [id number] (stage IIC).  Pretreatment PSA 5.3.  Treated with SBRT, completed 10/2018.    B) March 2020 found to have rising PSA (10.08 on 3/2/20).  Axumin PET/CT 3/31/20 showed abnormal activity in the left internal iliac, left pelvic sidewall nodes, periaortic adenopathy, bone marrow activity, left superficial inguinal nodes.  Started androgen ablation (eligard) with partial PSA response, but patient wished to stop ADT due to side effects/negative impact on quality of life.  Involved LN treated with radiotherapy  C) PSA increased to 0.3->1.9 in Fall 2022.  PSMA PET 11/30/22 showed PSMA uptake in the prostate gland c/w local recurrence, right retrocrural LN, left superior mediastinal LN.  2. Atrial fibrillation  3. Diabetes mellitus  4. GERD  5. GRADY on CPAP  6. hyperlipidemia    CHIEF COMPLAINT: prostate cancer      HISTORY OF PRESENT ILLNESS:  The patient is a 82 y.o. male, referred for metastatic prostate cancer.    He has been followed by Dr. Hansen.  He says his PSA 6 months ago was 0.3, and then on the most recent check it had increased to 1.9.  This prompted a PSMA PET scan.    He is asymptomatic.  He says overall he feels pretty well.  He does report still having some night sweats after he received a single dose of Eligard 2 years ago.    REVIEW OF SYSTEMS:  A 14 point review of systems was performed and is negative except as noted above.    Past Medical History:   Diagnosis Date   • Arthritis    • Atrial fibrillation (HCC)    • Cataract     both- surgery done    • Diabetes mellitus (HCC)     prediabetic- checks sugar every other day    • Dyslipidemia    • GERD (gastroesophageal reflux disease)    • H/O cardiovascular stress test 08/23/2007    Revealing no significant ischemia, EF estimated at 46%   • H/O echocardiogram 02/02/2011    Revealing normal LV function,  "diastolic dysfunction noted, mild MR, nopericardial effusion.   • H/O transesophageal echocardiography (BRISSA) for monitoring     left ventricular ejection fraction of 55%, mild TR and MR   • History of cardioversion 2007    STATUS-POST SUCCESSFUL EXTERNAL CARDIOVERSION   • History of cardioversion 02/17/2012    external cardioversion to normal sinus rhythm   • History of Holter monitoring 01/25/2011    Revealing sinus rhythm with PAC/PVC/episodes of sinus bradycardia.   • History of radiation therapy 07/09/2020    salvage irradiation to pelvis/lower periaortic nodes   • History of shingles     8-10 years ago    • [place] (hard of hearing)     has hearing aides   • Hyperlipidemia    • Osteoarthritis    • PONV (postoperative nausea and vomiting)     only with ether   • Pre-diabetes    • Prostate cancer (HCC)    • Sleep apnea with use of continuous positive airway pressure (CPAP)    • Sleep apnea with use of continuous positive airway pressure (CPAP)     compliant with machine    • Status post cystoscopy 08/28/2018   • Wears glasses    • Wears glasses            Objective     /77   Pulse 53   Temp 97.3 °F (36.3 °C) (Temporal)   Resp 18   Ht 185.4 cm (72.99\")   Wt 109 kg (240 lb)   SpO2 98%   BMI 31.67 kg/m²   Performance Status:  ECOG score: 0           General: well appearing male in no acute distress  Neuro: alert and oriented  HEENT: sclerae anicteric, oropharynx clear  Extremities: no lower extremity edema  Skin: no rashes, lesions, bruising, or petechiae  Psych: mood and affect appropriate    Lab Results   Component Value Date    WBC 11.82 (H) 11/16/2019    HGB 13.1 11/16/2019    HCT 39.3 11/16/2019    MCV 93.1 11/16/2019     11/16/2019     Lab Results   Component Value Date    GLUCOSE 137 (H) 11/16/2019    BUN 20 11/16/2019    CREATININE 0.83 11/16/2019    EGFRIFNONA 89 11/16/2019    BCR 24.1 11/16/2019    K 4.7 11/16/2019    CO2 25.0 11/16/2019    CALCIUM 9.0 11/16/2019                 ASSESSMENT " AND PLAN:     Stefan Pedraza is a 82 y.o. male with castrate sensitive metastatic prostate cancer, with recent PSMA PET scan showing disease involving mediastinal lymph node, retrocrural lymph node, as well as likely in the prostate gland.    We discussed options for management.  He has previously declined androgen deprivation therapy due to toxicities.  He does have fairly low-volume disease and it would be an option to monitor only at this point.  However his PSA more than doubled in a 6-month timeframe.  It is difficult to predict when or how he may become symptomatic from his disease, but I would anticipate continued growth and spread without treatment.    We discussed treatment with androgen deprivation therapy along with a oral antiandrogen therapy.  We discussed potential side effects of androgen deprivation therapy including loss of muscle mass, sexual dysfunction, decreased bone density, hot flashes, gynecomastia, decreased energy levels, and cardiovascular/metabolic risk increase.  We discussed potential side effects of apalutamide including on hot flashes, hypertension, thyroid dysfunction, nausea, diarrhea, cytopenias, and fatigue.    He would like to proceed assuming the cost is reasonable.  We will plan to begin androgen deprivation therapy with Eligard next week, and apalutamide when available.    Follow-up with me in about 4 to 6 weeks.             A total greater than 60 mins minutes was spent in face to face patient time, examination, counseling, charting, reviewing test results, and reviewing outside records.    Ronda Mack MD    12/22/2022

## 2022-12-23 ENCOUNTER — SPECIALTY PHARMACY (OUTPATIENT)
Dept: ONCOLOGY | Facility: HOSPITAL | Age: 82
End: 2022-12-23

## 2022-12-23 DIAGNOSIS — C61 PROSTATE CANCER METASTATIC TO INTRAPELVIC LYMPH NODE: Primary | ICD-10-CM

## 2022-12-23 DIAGNOSIS — C77.5 PROSTATE CANCER METASTATIC TO INTRAPELVIC LYMPH NODE: Primary | ICD-10-CM

## 2022-12-23 NOTE — PROGRESS NOTES
Oral Chemotherapy - New Referral    Received a referral from Dr. Mack    Treatment Plan: Erleada (apalutamide) + leuprolide  Start date of treatment planned for: As soon as oral specialty medication is available.  Indication: metastatic castration sensitive prostate cancer  Relevant past treatments: radiation, 1 dose of leuprolide  Is the therapy appropriate based on treatment guidelines and FDA labeling?: yes  Therapeutic Goals: Continue treatment until progression or intolerable toxicity  Patient can self-administer oral medications: Yes    • Drug-Drug Interactions: The current medication list was reviewed and there are some relevant drug-drug interactions with the oral specialty medication, including apalutamide can lower the concentration of tadalafil and atorvastatin.  No concern re: tadalafil since that's PRN for ED.  Will educate the patient about this interaction as well as lower atorvastatin concentrations and that his PCP should be checking his lipid panel and can adjust the dose as needed..  This will be discussed during education.  • Medication Allergies: The patient has no relevant allergies as it relates to their oral specialty medication  • Review of Labs/Dose Adjustments: The patient's most recent labs were reviewed and all are WNL to start treatment at this dose.     A prescription was released to [facility] specialty pharmacy for   Drug: Erleada (apalutamide)  Strength: 60 mg  Directions: Take 4 tablets by mouth daily  Quantity: 120  Refills: 11    Pharmacy education is scheduled for 12/27/22 at 2 pm. and CCA and consent will be signed at that time.    Andra Hernandez, PharmD, [facility]  Oncology Clinical Pharmacist  12/23/2022  14:27 EST

## 2022-12-27 ENCOUNTER — SPECIALTY PHARMACY (OUTPATIENT)
Dept: ONCOLOGY | Facility: HOSPITAL | Age: 82
End: 2022-12-27

## 2022-12-27 ENCOUNTER — HOSPITAL ENCOUNTER (OUTPATIENT)
Dept: ONCOLOGY | Facility: HOSPITAL | Age: 82
Discharge: HOME | End: 2022-12-27

## 2022-12-27 ENCOUNTER — OFFICE VISIT (OUTPATIENT)
Dept: ONCOLOGY | Facility: CLINIC | Age: 82
End: 2022-12-27

## 2022-12-27 ENCOUNTER — LAB (OUTPATIENT)
Dept: LAB | Facility: HOSPITAL | Age: 82
End: 2022-12-27

## 2022-12-27 ENCOUNTER — APPOINTMENT (OUTPATIENT)
Dept: LAB | Facility: HOSPITAL | Age: 82
End: 2022-12-27

## 2022-12-27 VITALS
SYSTOLIC BLOOD PRESSURE: 130 MMHG | HEART RATE: 58 BPM | OXYGEN SATURATION: 97 % | TEMPERATURE: 98.2 F | BODY MASS INDEX: 30.51 KG/M2 | WEIGHT: 231.2 LBS | DIASTOLIC BLOOD PRESSURE: 74 MMHG

## 2022-12-27 DIAGNOSIS — C77.5 PROSTATE CANCER METASTATIC TO INTRAPELVIC LYMPH NODE: Primary | ICD-10-CM

## 2022-12-27 DIAGNOSIS — C77.5 PROSTATE CANCER METASTATIC TO INTRAPELVIC LYMPH NODE: ICD-10-CM

## 2022-12-27 DIAGNOSIS — C61 PROSTATE CANCER METASTATIC TO INTRAPELVIC LYMPH NODE: Primary | ICD-10-CM

## 2022-12-27 DIAGNOSIS — C61 PROSTATE CANCER METASTATIC TO INTRAPELVIC LYMPH NODE: ICD-10-CM

## 2022-12-27 LAB
ALBUMIN SERPL-MCNC: 4.4 G/DL (ref 3.5–5.2)
ALBUMIN/GLOB SERPL: 1.5 G/DL
ALP SERPL-CCNC: 66 U/L (ref 39–117)
ALT SERPL W P-5'-P-CCNC: 15 U/L (ref 1–41)
ANION GAP SERPL CALCULATED.3IONS-SCNC: 11 MMOL/L (ref 5–15)
AST SERPL-CCNC: 18 U/L (ref 1–40)
BASOPHILS # BLD AUTO: 0.03 10*3/MM3 (ref 0–0.2)
BASOPHILS NFR BLD AUTO: 0.6 % (ref 0–1.5)
BILIRUB SERPL-MCNC: 0.8 MG/DL (ref 0–1.2)
BUN SERPL-MCNC: 22 MG/DL (ref 8–23)
BUN/CREAT SERPL: 27.2 (ref 7–25)
CALCIUM SPEC-SCNC: 9.6 MG/DL (ref 8.6–10.5)
CHLORIDE SERPL-SCNC: 102 MMOL/L (ref 98–107)
CO2 SERPL-SCNC: 26 MMOL/L (ref 22–29)
CREAT SERPL-MCNC: 0.81 MG/DL (ref 0.76–1.27)
DEPRECATED RDW RBC AUTO: 45.6 FL (ref 37–54)
EGFRCR SERPLBLD CKD-EPI 2021: 88 ML/MIN/1.73
EOSINOPHIL # BLD AUTO: 0.21 10*3/MM3 (ref 0–0.4)
EOSINOPHIL NFR BLD AUTO: 4.1 % (ref 0.3–6.2)
ERYTHROCYTE [DISTWIDTH] IN BLOOD BY AUTOMATED COUNT: 12.9 % (ref 12.3–15.4)
GLOBULIN UR ELPH-MCNC: 2.9 GM/DL
GLUCOSE SERPL-MCNC: 100 MG/DL (ref 65–99)
HCT VFR BLD AUTO: 40.8 % (ref 37.5–51)
HGB BLD-MCNC: 13.7 G/DL (ref 13–17.7)
IMM GRANULOCYTES # BLD AUTO: 0.02 10*3/MM3 (ref 0–0.05)
IMM GRANULOCYTES NFR BLD AUTO: 0.4 % (ref 0–0.5)
LYMPHOCYTES # BLD AUTO: 1.4 10*3/MM3 (ref 0.7–3.1)
LYMPHOCYTES NFR BLD AUTO: 27.3 % (ref 19.6–45.3)
MCH RBC QN AUTO: 32.2 PG (ref 26.6–33)
MCHC RBC AUTO-ENTMCNC: 33.6 G/DL (ref 31.5–35.7)
MCV RBC AUTO: 96 FL (ref 79–97)
MONOCYTES # BLD AUTO: 0.52 10*3/MM3 (ref 0.1–0.9)
MONOCYTES NFR BLD AUTO: 10.1 % (ref 5–12)
NEUTROPHILS NFR BLD AUTO: 2.95 10*3/MM3 (ref 1.7–7)
NEUTROPHILS NFR BLD AUTO: 57.5 % (ref 42.7–76)
PLATELET # BLD AUTO: 164 10*3/MM3 (ref 140–450)
PMV BLD AUTO: 9.6 FL (ref 6–12)
POTASSIUM SERPL-SCNC: 4.6 MMOL/L (ref 3.5–5.2)
PROT SERPL-MCNC: 7.3 G/DL (ref 6–8.5)
RBC # BLD AUTO: 4.25 10*6/MM3 (ref 4.14–5.8)
SODIUM SERPL-SCNC: 139 MMOL/L (ref 136–145)
WBC NRBC COR # BLD: 5.13 10*3/MM3 (ref 3.4–10.8)

## 2022-12-27 PROCEDURE — 99213 OFFICE O/P EST LOW 20 MIN: CPT | Performed by: NURSE PRACTITIONER

## 2022-12-27 PROCEDURE — 84153 ASSAY OF PSA TOTAL: CPT | Performed by: NURSE PRACTITIONER

## 2022-12-27 PROCEDURE — 85025 COMPLETE CBC W/AUTO DIFF WBC: CPT

## 2022-12-27 PROCEDURE — 36415 COLL VENOUS BLD VENIPUNCTURE: CPT

## 2022-12-27 PROCEDURE — 80053 COMPREHEN METABOLIC PANEL: CPT

## 2022-12-27 NOTE — PROGRESS NOTES
Specialty Pharmacy Patient Management Program  Oncology Initial Assessment       Stefan Pedraza is a 82 y.o. male with prostate cancer seen by an Oncology provider and enrolled in the Oncology Patient Management program offered by [facility].  An initial outreach was conducted, including assessment of therapy appropriateness and specialty medication education for Erleada (apalutamide) and leuprolide. The patient was introduced to services offered by [facility], including: regular assessments, refill coordination, curbside pick-up or mail order delivery options, prior authorization maintenance, and financial assistance programs as applicable. The patient was also provided with contact information for the pharmacy team.     Regimen: Erleada (apalutamide) 60mg - 4 tablets PO daily and leuprolide IM every 6 months    Start date of oral specialty medication: As soon as oral specialty medication is available.    Relevant Past Medical History, Comorbidities, and Vaccines  Relevant medical history and concomitant health conditions were discussed with the patient. The patient's chart has been reviewed for relevant past medical history and comorbid health conditions and updated as necessary.  Vaccines are coordinated by the patient's oncologist and primary care provider.  Past Medical History:   Diagnosis Date   • Arthritis    • Atrial fibrillation (HCC)    • Cataract     both- surgery done    • Diabetes mellitus (HCC)     prediabetic- checks sugar every other day    • Dyslipidemia    • GERD (gastroesophageal reflux disease)    • H/O cardiovascular stress test 08/23/2007    Revealing no significant ischemia, EF estimated at 46%   • H/O echocardiogram 02/02/2011    Revealing normal LV function, diastolic dysfunction noted, mild MR, nopericardial effusion.   • H/O transesophageal echocardiography (BRISSA) for monitoring     left ventricular ejection fraction of 55%, mild TR and  MR   • History of cardioversion 2007    STATUS-POST SUCCESSFUL EXTERNAL CARDIOVERSION   • History of cardioversion 02/17/2012    external cardioversion to normal sinus rhythm   • History of Holter monitoring 01/25/2011    Revealing sinus rhythm with PAC/PVC/episodes of sinus bradycardia.   • History of radiation therapy 07/09/2020    salvage irradiation to pelvis/lower periaortic nodes   • History of shingles     8-10 years ago    • [place]hard of hearing[place]     has hearing aides   • Hyperlipidemia    • Osteoarthritis    • PONV (postoperative nausea and vomiting)     only with ether   • Pre-diabetes    • Prostate cancer (HCC)    • Sleep apnea with use of continuous positive airway pressure (CPAP)    • Sleep apnea with use of continuous positive airway pressure (CPAP)     compliant with machine    • Status post cystoscopy 08/28/2018   • Wears glasses    • Wears glasses      Social History     Socioeconomic History   • Marital status:    Tobacco Use   • Smoking status: Never   • Smokeless tobacco: Never   Substance and Sexual Activity   • Alcohol use: No   • Drug use: No   • Sexual activity: Defer     Comment:        Allergies  Known allergies and reactions were discussed with the patient. The patient's chart has been reviewed for allergy information and updated as necessary.   Penicillins    Current Medication List  This medication list has been reviewed with the patient and evaluated for any interactions or necessary modifications/recommendations, and updated to include all prescription medications, OTC medications, and supplements the patient is currently taking.  This list reflects what is contained in the patient's profile, which has also been marked as reviewed to communicate to other providers it is the most up to date version of the patient's current medication therapy.   Prior to Admission medications    Medication Sig Start Date End Date Taking? Authorizing Provider   Apalutamide 60 MG tablet Take  4 tablets by mouth Daily. 12/28/22   Ronda Mack MD   aspirin 81 MG EC tablet Take 81 mg by mouth Daily.    Donald Stuart MD   atorvastatin (LIPITOR) 10 MG tablet Take 10 mg by mouth Every Night.    Donald Stuart MD   B Complex Vitamins (VITAMIN B COMPLEX) capsule capsule Take 1 capsule by mouth Daily.    Donald Stuart MD   Cholecalciferol (Vitamin D3) 25 MCG (1000 UT) capsule Take 1,000 Units by mouth Daily.    Donald Stuart MD   cyclobenzaprine (FLEXERIL) 10 MG tablet Take 10 mg by mouth As Needed.    Donald Stuart MD   ferrous sulfate 324 (65 FE) MG tablet delayed-release EC tablet Take 324 mg by mouth Daily With Breakfast.    Donald Stuart MD   flecainide (TAMBOCOR) 100 MG tablet TAKE 1 TABLET TWICE A DAY 8/1/22   Prabhakar Solis MD   metFORMIN ER (GLUCOPHAGE-XR) 500 MG 24 hr tablet Take 1,000 mg by mouth Every Night. 6/5/17   Donald Stuart MD   Multiple Vitamins-Minerals (OCUVITE ADULT 50+ PO) Take 1 tablet by mouth Daily.    Donald Stuart MD   naproxen (NAPROSYN) 500 MG tablet Take 500 mg by mouth Daily. 11/26/19   Donald Stuart MD   Omega-3 Fatty Acids (FISH OIL) 1000 MG capsule capsule Take 1,000 mg by mouth Daily With Breakfast.    Donald Stuart MD   omeprazole (priLOSEC) 40 MG capsule Take 40 mg by mouth Daily. 6/14/20   Donald Stuart MD   tadalafil (CIALIS) 20 MG tablet Take 1 tablet by mouth Daily As Needed for Erectile Dysfunction. 2/11/22   Shon Bunn MD   triamcinolone (KENALOG) 0.1 % cream As Needed. 7/14/22   Donald Stuart MD   Apalutamide 60 MG tablet Take 4 tablets by mouth Daily. 12/28/22 12/27/22  Ronda Mack MD       Drug Interactions  • Reviewed concomitant medications, allergies, labs, comorbidities/medical history, and immunization history.   • Drug-drug interactions noted and discussed during education: Apalutamide can decrease the effect of omeprazole. Recommended patient switch  to Pepcid, patient wants to continue with omeprazole until he knows that it is no longer working. Apalutamide can also decrease the effects of atorvastatin. Recommended to have PCP to monitor cholesterol and triglyceride levels. Reminded the patient to let us know before making any changes or starting any new prescription or OTC medications so we can first assess drug interactions.  • Drug-food interactions noted and discussed during education: Patient was instructed to avoid eating grapefruit and drinking grapefruit juice    Recommended Medications Assessment  • Bone Health (such as calcium/vitamin D, bisphosphonate, RANKL inhibitor) - Not Indicated  • VTE prophylaxis - Not Indicated   • Prophylactic antimicrobials - Not Indicated   • Tumor lysis syndrome prophylaxis - Risk for TLS Low. Not indicated.     Relevant Laboratory Values  Lab Results   Component Value Date    GLUCOSE 137 (H) 11/16/2019    CALCIUM 9.0 11/16/2019     11/16/2019    K 4.7 11/16/2019    CO2 25.0 11/16/2019     11/16/2019    BUN 20 11/16/2019    CREATININE 0.83 11/16/2019    EGFRIFNONA 89 11/16/2019    BCR 24.1 11/16/2019    ANIONGAP 12.0 11/16/2019     Lab Results   Component Value Date    WBC 11.82 (H) 11/16/2019    RBC 4.22 11/16/2019    HGB 13.1 11/16/2019    HCT 39.3 11/16/2019    MCV 93.1 11/16/2019    MCH 31.0 11/16/2019    MCHC 33.3 11/16/2019    RDW 12.7 11/16/2019    RDWSD 43.2 11/16/2019    MPV 10.5 11/16/2019     11/16/2019    NEUTRORELPCT 46.8 11/05/2019    LYMPHORELPCT 37.1 11/05/2019    MONORELPCT 10.0 11/05/2019    EOSRELPCT 4.9 11/05/2019    BASORELPCT 1.0 11/05/2019    AUTOIGPER 0.2 11/05/2019    NEUTROABS 1.92 11/05/2019    LYMPHSABS 1.52 11/05/2019    MONOSABS 0.41 11/05/2019    EOSABS 0.20 11/05/2019    BASOSABS 0.04 11/05/2019    AUTOIGNUM 0.01 11/05/2019    NRBC 0.0 11/05/2019       Initial Education Provided for Specialty Medication  The patient has been provided with the following education. All  questions and concerns have been addressed prior to the patient receiving the medication, and the patient has verbalized understanding of the education and any materials provided.  Additional patient education shall be provided and documented upon request by the patient, provider or payer.      Provided patient with:   Chemo calendar to help improve adherence., Education sheets about the medication, 24-hour clinic phone number and my contact information and instructions to call should additional questions arise.     Medication Education Sheets Provided: (select all that apply)  • Oral Specialty Medication: Erleada (apalutamide)  • IV: leuprolide  • Steroid: none    Other Education Sheets Provided: (select all that apply)  Adherence and Symptom Tracker Sheet and AZALEA Information    TOPICS COMMENTS   Storage and Handling of Oral Specialty Medication Store in the original container, in a dry location out of direct sunlight, and out of reach of children or pets. and Store at room temperature.  Discussed safe handling and what to do with any unused medication.   Administration of Oral Specialty Medication Take with or without food at the same time(s) each day. and Do not crush or chew tablets.   Adherence to Oral Specialty Regimen and Handling Missed Doses Patient is likely to have good treatment adherence; reinforced the importance of adherence. Reviewed how to address missed doses and to let us know of any missed doses.   Anemia: role of RBC, cause, s/s, ways to manage, role of transfusion Reviewed the role of RBC and the use of transfusions if hemoglobin decreases too much.  Patient to notify us if they experience shortness of breath, dizziness, or palpitations.  Also let patient know they could feel more tired than usual and to try to stay active, but rest if they need to.    Neutropenia: role of WBC, cause, infection precautions, s/s of infection, when to call MD Reviewed the role of WBC, good infection prevention  practices, and when to call the clinic (temperature 100.4F, sore throat, burning urination, etc)  • COVID Vaccines: 2 doses plus 2 boosters  • Flu Vaccine: 2022 flu vaccine received   Nutrition and Appetite Changes:  importance of maintaining healthy diet & weight, ways to manage to improve intake, dietary consult, exercise regimen, electrolyte and/or blood glucose abnormalities · Increased Blood Sugar: This patient's oncology therapy can increase blood sugar.  Recommended the patient monitor blood sugar more closely and to contact their primary care provider for management recommendations if blood glucose values started trending up.  · Electrolyte Abnormalities:  Explained the oncology therapy may lead to abnormalities in electrolytes, specifically: increased potassium  · Lipid Panel Abnormalities:  Explained the oncology therapy may lead to abnormalities in lipid values, specifically: increased cholesterol and triglycerides.   Nausea/Vomiting: cause, use of antiemetics, dietary changes, when to call MD • Emetic risk: Minimal  • Premeds:  none  • Scheduled meds: none  • PRN home meds: Ondansetron  • Pharmacy home meds sent to: [facility]     Instructed the patient to take a dose of the PRN medication at the first onset of nausea and if it's not working to call us for additional medications.  Also provided non-drug measures to mitigate nausea.   Pain: causes, ways to manage Chemo - Discussed muscle and joint aches/pains with chemotherapy, and recommended the use of OTC pain relief with ibuprofen or acetaminophen if needed.   Organ Toxicities: cause, s/s, need for diagnostic tests, labs, when to notify MD Discussed potential effects on organ systems, monitoring, diagnostic tests, labs, and when to notify their MD. Discussed the signs/symptoms of the following: cardiotoxicity and central neurotoxicity (confusion, vision changes, stiff neck, seizure), increased risk of stroke, seizures, falls and fractures.    Miscellaneous • Financial Issues: Sent to [facility]. He met with [place] today.  • Lab Draws: On or before day 1 of each cycle, no sooner than 3 days early. Patient will get baseline labs today.   Infertility and Sexuality:  causes, fertility preservation options, sexuality changes, ways to manage, importance of birth control Oral Oncology Therapy: Reviewed safe sex practices and minimizing exposure to body fluids while on oral oncology therapy. and The patient is not sexually active with a woman of childbearing potential.   Home Care: how to manage bodily fluids Counseled on management of soiled linens and proper flush technique.  Discussed how to manage all the side effects at home and advised when to contact the MD office     Adherence and Self-Administration  • Barriers to Patient Adherence and/or Self-Administration: none   • Methods for Supporting Patient Adherence and/or Self-Administration: dosing calendar  • Expected duration of therapy: Until disease progression or intolerable toxicity    Goals of Therapy  • Patient Goals of Therapy:   o Consistently take medications as prescribed  o Patient will adhere to medication regimen  o Patient will report any medication side effects to healthcare provider  • Clinical Goals:   o Support patient understanding of medication regimen  o Ensure patient knows the pharmacy contact information  o Schedule regular follow-up to monitor the treatment serious adverse events  o Schedule regular follow-up to confirm medication adherence  o Schedule regular follow-up to monitor disease progression or stabilty      Reassessment Plan & Follow-Up  1. Pharmacist to perform regular reassessments no more than (6) months from the previous assessment.  2. Welcome information and patient satisfaction survey to be sent by retail team with patient's initial fill.  3. Care Coordinator to set up future refill outreaches, coordinate prescription delivery, and escalate  clinical questions to pharmacist.     Additional Plans, Therapy Recommendations or Therapy Problems to Be Addressed: He walked to lab window and then checked in to see Love. He will started medication when he receives it from [facility].     Attestation  I attest that the initiated specialty medication(s) are appropriate for the patient based on my assessment.  If the prescribed therapy is at any point deemed not appropriate based on the current or future assessments, a consultation will be initiated with the patient's specialty care provider to determine the best course of action. The revised plan of therapy will be documented along with any additional patient education provided.         Lana Shi, PharmD Candidate 2023    Date and Time: 12/27/2022 3:22 pm

## 2022-12-27 NOTE — PROGRESS NOTES
CHEMOTHERAPY PREPARATION    Stefan Pedraza  [id number]  1940    Subjective   Chief Complaint: Treatment Preparation and Needs Assessment    History of present illness:  Stefan Pedraza is a 82 y.o. year old male who is here today for chemotherapy preparation and needs assessment. The patient has been diagnosed with prostate cancer and is scheduled to begin treatment with apalutamide and Lupron.     Oncology History:    Oncology/Hematology History   Prostate cancer (HCC)   8/23/2018 Initial Diagnosis    Prostate cancer (CMS/HCC)     10/22/2018 - 10/26/2018 Radiation    Radiation OncologyTreatment Course:  Stefan Pedraza received 3500 cGy in 5 fractions to prostate via Stereotactic Radiation Therapy - SRT.     6/4/2020 - 7/9/2020 Radiation    Radiation OncologyTreatment Course:  Stefan Pedraza received 6000 cGy in 25 fractions to pelvis via External Beam Radiation - EBRT.     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     Past Medical History, Past Surgical History, Social History, Family History have been reviewed and are without significant changes except as mentioned.    Review of Systems   All other systems reviewed and are negative.      Objective   Physical Exam  Vital Signs: /74   Pulse 58   Temp 98.2 °F (36.8 °C) (Infrared)   Wt 105 kg (231 lb 3.2 oz)   SpO2 97%   BMI 30.51 kg/m²   Vitals:    12/27/22 1458   PainSc: 0-No pain           General Appearance:  alert, cooperative, no apparent distress, appears stated age and normal weight   Neurologic/Psychiatric: A&O x 3, gait steady, appropriate affect   HEENT:  Normocephalic, without obvious abnormality   Lungs:    "respirations regular, even, and unlabored bilaterally       Extremities: Normal, atraumatic; no clubbing, cyanosis, or edema    Skin: No rashes, lesions, or abnormal coloration noted     ECOG Performance Status: 0 - Asymptomatic            NEEDS ASSESSMENTS    Genetics  The patient's new diagnosis and family history have been reviewed for genetic counseling needs. A genetic referral is not recommended. He had genetic testing previously, results on 8/10/2020 was negative for mutations in BRCA1/2 and 32 additional genes on the CancerNext panel.        VAD Assessment  N/A    Advance Care Planning   ACP discussion was declined by the patient. Patient has an advance directive (not in EMR), copy requested.  The patient and I discussed advanced care planning, \"Conversations that Matter\".   This service was offered, free of charge, for development of advance directives with a certified ACP facilitator.  The patient does have an up-to-date advanced directive. This document is not on file with our office. The patient is not interested in an appointment with one of our facilitators to create or update their advanced directives.         Palliative Care  The patient and I discussed palliative care services. Palliative care is not the same as Hospice care. This is specialized medical care for people living with serious illness with the goal of improving quality of life for the patient and their family. [facility] has partnered with [facility] to offer our patients outpatient palliative care early along with their treatment to assist in coordination of care, symptom management, pain management, and medical decision making.  Oncology criteria for palliative care referral is not met at this time. The patient is not interested in a palliative care consultation.     Additional Referral needs  none      CHEMOTHERAPY EDUCATION    Booklets Given: Chemotherapy and You []  Eating Hints []    Sexuality/Fertility Books []    "   Chemotherapy/Biotherapy Education Sheets: (list all that apply)  Cancer resourse contacts information                                                                                                                                                                 Chemotherapy Regimen:   Treatment Plans     Name Type Plan Dates Plan Provider         Active    OP SUPPORTIVE Leuprolide 45 mg Q6M ONCOLOGY SUPPORTIVE CARE 1  12/29/2022 - Present Ronda Mack MD     OP PROSTATE Apalutamide ONCOLOGY TREATMENT  12/28/2022 - Present Ronda Mcak MD                    DETAILED CHEMOTHERAPY TEACHING COMPLETED BY PHARMACY. CHEMOTHERAPY CONSENT COMPLETED BY PHARMACY. SEE PHARMACY EDUCATION FOR DOCUMENTATION.           Assessment and Plan:    Diagnoses and all orders for this visit:    1. 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 Needs assessment was completed including genetics, psychosocial needs, barriers to care, VAD evaluation, advanced care planning, and palliative care services. Referrals have been ordered as appropriate based upon our evaluation and patient desires.     Chemotherapy teaching was also completed today as documented above. Adequate time was given to answer all questions to his satisfaction. Patient and family are aware of their care team members and contact information if they have questions or problems throughout the treatment course. Needs assessments and education has been completed. The patient is adequately prepared to begin treatment as scheduled.     I spent 20 minutes caring for Stefan on this date of service. This time includes time spent by me in the following activities: preparing for the visit, reviewing tests, obtaining and/or reviewing a separately obtained history, performing a medically appropriate examination and/or evaluation, counseling and educating the  patient/family/caregiver and documenting information in the medical record.     Electronically signed by AYAAN Nash on 12/27/22 at 15:29 EST.

## 2022-12-28 ENCOUNTER — TELEPHONE (OUTPATIENT)
Dept: CARDIOLOGY | Facility: CLINIC | Age: 82
End: 2022-12-28

## 2022-12-28 ENCOUNTER — HOSPITAL ENCOUNTER (OUTPATIENT)
Dept: ONCOLOGY | Facility: HOSPITAL | Age: 82
Setting detail: INFUSION SERIES
Discharge: HOME | End: 2022-12-28

## 2022-12-28 VITALS
TEMPERATURE: 97.9 F | HEART RATE: 65 BPM | SYSTOLIC BLOOD PRESSURE: 139 MMHG | WEIGHT: 230 LBS | DIASTOLIC BLOOD PRESSURE: 77 MMHG | BODY MASS INDEX: 30.48 KG/M2 | HEIGHT: 73 IN | RESPIRATION RATE: 18 BRPM

## 2022-12-28 DIAGNOSIS — C77.5 PROSTATE CANCER METASTATIC TO INTRAPELVIC LYMPH NODE: Primary | ICD-10-CM

## 2022-12-28 DIAGNOSIS — C61 PROSTATE CANCER METASTATIC TO INTRAPELVIC LYMPH NODE: ICD-10-CM

## 2022-12-28 DIAGNOSIS — C61 PROSTATE CANCER METASTATIC TO INTRAPELVIC LYMPH NODE: Primary | ICD-10-CM

## 2022-12-28 DIAGNOSIS — C77.5 PROSTATE CANCER METASTATIC TO INTRAPELVIC LYMPH NODE: ICD-10-CM

## 2022-12-28 PROCEDURE — 96372 THER/PROPH/DIAG INJ SC/IM: CPT

## 2022-12-28 PROCEDURE — 96402 CHEMO HORMON ANTINEOPL SQ/IM: CPT

## 2022-12-28 PROCEDURE — 25010000002 LEUPROLIDE (6 MONTH) PER 7.5 MG: Performed by: NURSE PRACTITIONER

## 2022-12-28 RX ADMIN — LEUPROLIDE ACETATE 45 MG: KIT SUBCUTANEOUS at 15:42

## 2022-12-28 NOTE — TELEPHONE ENCOUNTER
Patient wanted to update you that he has been diagnosed with prostate cancer. He is starting Apalutamide and Leuprolide. He is being followed by [facility].

## 2022-12-29 LAB — PSA SERPL-MCNC: 4.26 NG/ML (ref 0–4)

## 2023-02-09 ENCOUNTER — OFFICE VISIT (OUTPATIENT)
Dept: ONCOLOGY | Facility: CLINIC | Age: 83
End: 2023-02-09
Payer: MEDICARE

## 2023-02-09 ENCOUNTER — LAB (OUTPATIENT)
Dept: LAB | Facility: HOSPITAL | Age: 83
End: 2023-02-09
Payer: MEDICARE

## 2023-02-09 VITALS
HEART RATE: 58 BPM | OXYGEN SATURATION: 98 % | DIASTOLIC BLOOD PRESSURE: 78 MMHG | WEIGHT: 233 LBS | HEIGHT: 73 IN | BODY MASS INDEX: 30.88 KG/M2 | TEMPERATURE: 97.1 F | RESPIRATION RATE: 18 BRPM | SYSTOLIC BLOOD PRESSURE: 150 MMHG

## 2023-02-09 DIAGNOSIS — C61 PROSTATE CANCER METASTATIC TO INTRAPELVIC LYMPH NODE: ICD-10-CM

## 2023-02-09 DIAGNOSIS — C77.5 PROSTATE CANCER METASTATIC TO INTRAPELVIC LYMPH NODE: ICD-10-CM

## 2023-02-09 DIAGNOSIS — C61 PROSTATE CANCER: Primary | ICD-10-CM

## 2023-02-09 LAB
ALBUMIN SERPL-MCNC: 3.8 G/DL (ref 3.5–5.2)
ALBUMIN/GLOB SERPL: 1.2 G/DL
ALP SERPL-CCNC: 65 U/L (ref 39–117)
ALT SERPL W P-5'-P-CCNC: 19 U/L (ref 1–41)
ANION GAP SERPL CALCULATED.3IONS-SCNC: 7 MMOL/L (ref 5–15)
AST SERPL-CCNC: 18 U/L (ref 1–40)
BASOPHILS # BLD AUTO: 0.05 10*3/MM3 (ref 0–0.2)
BASOPHILS NFR BLD AUTO: 1.1 % (ref 0–1.5)
BILIRUB SERPL-MCNC: 0.3 MG/DL (ref 0–1.2)
BUN SERPL-MCNC: 18 MG/DL (ref 8–23)
BUN/CREAT SERPL: 19.4 (ref 7–25)
CALCIUM SPEC-SCNC: 8.9 MG/DL (ref 8.6–10.5)
CHLORIDE SERPL-SCNC: 102 MMOL/L (ref 98–107)
CO2 SERPL-SCNC: 28 MMOL/L (ref 22–29)
CREAT SERPL-MCNC: 0.93 MG/DL (ref 0.76–1.27)
DEPRECATED RDW RBC AUTO: 45.6 FL (ref 37–54)
EGFRCR SERPLBLD CKD-EPI 2021: 82 ML/MIN/1.73
EOSINOPHIL # BLD AUTO: 0.28 10*3/MM3 (ref 0–0.4)
EOSINOPHIL NFR BLD AUTO: 6.2 % (ref 0.3–6.2)
ERYTHROCYTE [DISTWIDTH] IN BLOOD BY AUTOMATED COUNT: 12.9 % (ref 12.3–15.4)
GLOBULIN UR ELPH-MCNC: 3.1 GM/DL
GLUCOSE SERPL-MCNC: 140 MG/DL (ref 65–99)
HCT VFR BLD AUTO: 38.6 % (ref 37.5–51)
HGB BLD-MCNC: 13 G/DL (ref 13–17.7)
IMM GRANULOCYTES # BLD AUTO: 0.01 10*3/MM3 (ref 0–0.05)
IMM GRANULOCYTES NFR BLD AUTO: 0.2 % (ref 0–0.5)
LYMPHOCYTES # BLD AUTO: 1.62 10*3/MM3 (ref 0.7–3.1)
LYMPHOCYTES NFR BLD AUTO: 35.8 % (ref 19.6–45.3)
MCH RBC QN AUTO: 32.3 PG (ref 26.6–33)
MCHC RBC AUTO-ENTMCNC: 33.7 G/DL (ref 31.5–35.7)
MCV RBC AUTO: 95.8 FL (ref 79–97)
MONOCYTES # BLD AUTO: 0.49 10*3/MM3 (ref 0.1–0.9)
MONOCYTES NFR BLD AUTO: 10.8 % (ref 5–12)
NEUTROPHILS NFR BLD AUTO: 2.08 10*3/MM3 (ref 1.7–7)
NEUTROPHILS NFR BLD AUTO: 45.9 % (ref 42.7–76)
PLATELET # BLD AUTO: 176 10*3/MM3 (ref 140–450)
PMV BLD AUTO: 9.6 FL (ref 6–12)
POTASSIUM SERPL-SCNC: 4.3 MMOL/L (ref 3.5–5.2)
PROT SERPL-MCNC: 6.9 G/DL (ref 6–8.5)
RBC # BLD AUTO: 4.03 10*6/MM3 (ref 4.14–5.8)
SODIUM SERPL-SCNC: 137 MMOL/L (ref 136–145)
WBC NRBC COR # BLD: 4.53 10*3/MM3 (ref 3.4–10.8)

## 2023-02-09 PROCEDURE — 84153 ASSAY OF PSA TOTAL: CPT

## 2023-02-09 PROCEDURE — 85025 COMPLETE CBC W/AUTO DIFF WBC: CPT

## 2023-02-09 PROCEDURE — 36415 COLL VENOUS BLD VENIPUNCTURE: CPT

## 2023-02-09 PROCEDURE — 99214 OFFICE O/P EST MOD 30 MIN: CPT | Performed by: INTERNAL MEDICINE

## 2023-02-09 PROCEDURE — 80053 COMPREHEN METABOLIC PANEL: CPT

## 2023-02-09 NOTE — PROGRESS NOTES
"      PROBLEM LIST:  1. Metastatic prostate cancer  A) diagnosed 6/2018 with janis 4+3=7 prostate adenocarcinoma.  Stage at diagnosis [id number] (stage IIC).  Pretreatment PSA 5.3.  Treated with SBRT, completed 10/2018.    B) March 2020 found to have rising PSA (10.08 on 3/2/20).  Axumin PET/CT 3/31/20 showed abnormal activity in the left internal iliac, left pelvic sidewall nodes, periaortic adenopathy, bone marrow activity, left superficial inguinal nodes.  Started androgen ablation (eligard) with partial PSA response, but patient wished to stop ADT due to side effects/negative impact on quality of life.  Involved LN treated with radiotherapy  C) PSA increased to 0.3->1.9 in Fall 2022.  PSMA PET 11/30/22 showed PSMA uptake in the prostate gland c/w local recurrence, right retrocrural LN, left superior mediastinal LN. Eligard and apalutamide started January 2023.  2. Atrial fibrillation  3. Diabetes mellitus  4. GERD  5. GRADY on CPAP  6. hyperlipidemia    Subjective     CHIEF COMPLAINT: prostate cancer    HISTORY OF PRESENT ILLNESS:   Stefan Pedraza returns for follow-up.   He has been on treatment for about a month.  He says it is going okay.  His main complaint is fatigue.  He is exercising with some walking on the treadmill as well as weight training, but after doing this he feels wiped out for hours.  He has mild nausea which is manageable.      Objective      /78 Comment: MANUAL  Pulse 58   Temp 97.1 °F (36.2 °C) (Infrared)   Resp 18   Ht 185.4 cm (72.99\")   Wt 106 kg (233 lb)   SpO2 98%   BMI 30.75 kg/m²      Performance Status:  ECOG score: 0             General: well appearing male in no acute distress  Neuro: alert and oriented  HEENT: sclera anicteric, oropharynx clear  Extremeties: no lower extremity edema  Skin: no rashes, lesions, bruising, or petechiae  Psych: mood and affect appropriate    I have reexamined the patient and the results are consistent with the previously documented " exam. Ronda Mack MD        RECENT LABS:  Lab Results   Component Value Date    WBC 5.13 12/27/2022    HGB 13.7 12/27/2022    HCT 40.8 12/27/2022    MCV 96.0 12/27/2022     12/27/2022       Lab Results   Component Value Date    GLUCOSE 100 (H) 12/27/2022    BUN 22 12/27/2022    CREATININE 0.81 12/27/2022    EGFRIFNONA 89 11/16/2019    BCR 27.2 (H) 12/27/2022    K 4.6 12/27/2022    CO2 26.0 12/27/2022    CALCIUM 9.6 12/27/2022    ALBUMIN 4.4 12/27/2022    AST 18 12/27/2022    ALT 15 12/27/2022         With metastatic        ASSESSMENT AND PLAN:     Stefan Pedraza is a 82 y.o. male androgen sensitive prostate cancer, here for follow-up on treatment after starting Eligard and apalutamide.  So far he is tolerating treatment fairly well with expected side effects of mild nausea and fatigue.  I would encourage continued exercise and strength training.  We discussed that stimulant medications are sometimes used for fatigue but he is not interested in another pill at this time.    We will check labs today including a PSA.  I will tentatively plan to repeat a PSMA PET scan after about 3 months of therapy.    Follow-up in 1 month.      Total time of patient care on day of service including time prior to, face to face with patient, and following visit spent in reviewing records, lab results, discussion with patient, and documentation/charting was > 34 minutes.                    Ronda Mack MD  [facility]    2/9/2023          CC:

## 2023-02-10 ENCOUNTER — TELEPHONE (OUTPATIENT)
Dept: ONCOLOGY | Facility: CLINIC | Age: 83
End: 2023-02-10
Payer: MEDICARE

## 2023-02-10 LAB — PSA SERPL-MCNC: 0.03 NG/ML (ref 0–4)

## 2023-02-10 NOTE — TELEPHONE ENCOUNTER
I called patient per Dr. Mack to let him know the psa had dropped significantly to 0.027.  Patient verbalized understanding.

## 2023-02-15 ENCOUNTER — HOSPITAL ENCOUNTER (OUTPATIENT)
Dept: RADIATION ONCOLOGY | Facility: HOSPITAL | Age: 83
Setting detail: RADIATION/ONCOLOGY SERIES
Discharge: HOME | End: 2023-02-15
Payer: MEDICARE

## 2023-02-15 ENCOUNTER — OFFICE VISIT (OUTPATIENT)
Dept: RADIATION ONCOLOGY | Facility: HOSPITAL | Age: 83
End: 2023-02-15
Payer: MEDICARE

## 2023-02-15 VITALS
OXYGEN SATURATION: 96 % | DIASTOLIC BLOOD PRESSURE: 70 MMHG | RESPIRATION RATE: 16 BRPM | TEMPERATURE: 97.3 F | SYSTOLIC BLOOD PRESSURE: 149 MMHG | HEIGHT: 73 IN | BODY MASS INDEX: 31.01 KG/M2 | HEART RATE: 55 BPM | WEIGHT: 234 LBS

## 2023-02-15 DIAGNOSIS — C77.5 PROSTATE CANCER METASTATIC TO INTRAPELVIC LYMPH NODE: Primary | ICD-10-CM

## 2023-02-15 DIAGNOSIS — C61 PROSTATE CANCER METASTATIC TO INTRAPELVIC LYMPH NODE: Primary | ICD-10-CM

## 2023-02-15 PROCEDURE — G0463 HOSPITAL OUTPT CLINIC VISIT: HCPCS

## 2023-02-15 NOTE — PROGRESS NOTES
FOLLOW UP NOTE    PATIENT:                                                      Stefan Pedraza  MEDICAL RECORD #:                        [id number]  :                                                          1940  COMPLETION DATE:   2020  DIAGNOSIS:     Prostate cancer (HCC)  - Stage IIC (cT2b, cN0, cM0, PSA: 5.3, Grade Group: 3)      BRIEF HISTORY:    He has a history of prostate cancer treated with SBRT for intermediate risk, clinically localized disease in .  PSA did not reach target adi value and subsequently breanna to a maximum value of 14 ng/ml.  Axumen PET/CT in  demonstrated metastatic disease with pathologic lymphadenopathy confined to the left pelvis and lower periaortic lymph nodes.  He initiated androgen ablation in 2020 with partial biochemical response, but chose to discontinue LHRH agonist due to side effect intolerance.    He was not considered a good candidate for cytotoxic chemotherapy with taxanes due to toxicity at his age and desire to limit treatments with high potential for negative effective on quality of life.  He therefore underwent salvage involved field radiotherapy to the pelvis and periaortic nodes, completing 2020.   PSA reached undetectable level.    Unfortunately, PSA again began to rise up to 4.26 ng/ml on 2022.  Repeat PSMA PET performed at [facility] reported evidence of mediastinal metastatic prostate cancer.  He restarted total androgen ablation with bicalutamide and leuprolide.  He is having tolerable hot flashes.  PSA was 0.027 ng/ml on 2023.    MEDICATIONS: Medication reconciliation for the patient was reviewed and confirmed in the electronic medical record.    Review of Systems   Constitutional: Positive for fatigue.   Endocrine: Positive for hot flashes.   All other systems reviewed and are negative.        IPSS Questionnaire (AUA-7):  Over the past month…     1)  Incomplete Emptying  How often have you had a sensation of  not emptying your bladder?  0 -not at all   2)  Frequency  How often have you had to urinate less than every two hours? 3 -half the time   3)  Intermittency  How often have you found you stopped and started again several times when you urinated?  0 -not at all   4) Urgency  How often have you found it difficult to postpone urination?  3 -half the time   5) Weak Stream  How often have you had a weak urinary stream?  3 half the time   6) Straining  How often have you had to push or strain to begin urination?  0 -not at all   7) Nocturia  How many times did you typically get up at night to urinate?  3 - 3 times   Total Score: 12         Quality of life due to urinary symptoms:  If you were to spend the rest of your life with your urinary condition the way it is now, how would you feel about that? 3- Mixed   Urine Leakage (Incontinence) 1 mild a few drops per day no pad use      Sexual Health Inventory  Current Status     1)  How do you rate your confidence that you could achieve and keep an erection? 1-Very Low   2) When you had erections with sexual stimulation, how often were your erections hard enough for penetration (entering your partner)? 1-Almost never or never   3)  During sexual intercourse, how often were you able to maintain your erection after you had penetrated (entered) into your partner? 1-Almost never or never   4) During sexual intercourse, how difficult was it to maintain your erection to completion of intercourse? 1-Extremely difficult   5) When you attempted sexual intercourse, how often was it satisfactory to you? 1-Almost never or never   Total Score: 5         Bowel Health Inventory  Current Status: 2-Moderate diarrhea and colic and/or bowel movements more than 5 times daily and/or excessive rectal mucus and/or intermittent bleeding                  Physical Exam  Vitals and nursing note reviewed.   Constitutional:       Appearance: He is well-developed.   HENT:      Head: Normocephalic and  "atraumatic.   Cardiovascular:      Rate and Rhythm: Normal rate and regular rhythm.      Heart sounds: Normal heart sounds. No murmur heard.  Pulmonary:      Effort: Pulmonary effort is normal.      Breath sounds: Normal breath sounds. No wheezing or rales.   Abdominal:      General: Bowel sounds are normal. There is no distension.      Palpations: Abdomen is soft.      Tenderness: There is no abdominal tenderness.   Musculoskeletal:         General: No tenderness. Normal range of motion.      Cervical back: Normal range of motion and neck supple.      Right lower leg: Edema present.      Left lower leg: Edema present.   Lymphadenopathy:      Cervical: No cervical adenopathy.      Upper Body:      Right upper body: No supraclavicular adenopathy.      Left upper body: No supraclavicular adenopathy.   Skin:     General: Skin is warm and dry.   Neurological:      Mental Status: He is alert and oriented to person, place, and time.      Sensory: No sensory deficit.   Psychiatric:         Behavior: Behavior normal.         Thought Content: Thought content normal.         Judgment: Judgment normal.         VITAL SIGNS:   Vitals:    02/15/23 1356   BP: 149/70   Pulse: 55   Resp: 16   Temp: 97.3 °F (36.3 °C)   TempSrc: Skin   SpO2: 96%  Comment: RA   Weight: 106 kg (234 lb)   Height: 185.4 cm (73\")   PainSc: 0-No pain                       The following portions of the patient's history were reviewed and updated as appropriate: allergies, current medications, past family history, past medical history, past social history, past surgical history and problem list.         Diagnoses and all orders for this visit:    1. 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  He initiated a short course of androgen ablation with " good partial response, but discontinued hormone therapy related to intolerance of side effects.  He is now 2 year 7 months status post salvage involved field radiotherapy to pelvis/lower periaortic nodes.  He had an initial complete biochemical response with undetectable PSA.  More recently restarted total androgen ablation after PSA reached a value 4.26 ng/ml last December and pet imaging demonstrated mediastinal lymphadenopathy.  He is tolerating androgen ablation at this time fairly well and has had excellent biochemical response with PSA reduction down to value 0.027 ng/ml indicating he still has hormone sensitive disease.    RECOMMENDATIONS: He will continue general systemic management of androgen ablation under the care of Darius Mack and Jade.  There is not currently a role for any additional palliative radiotherapy.  I will be happy to see him back at any time if he develops any symptomatic or treatment resistant foci of tumor or if he has questions.    I spent a total of 15 minutes on todays visit, with more than 10 minutes in direct face to face communication, and the remainder of the time spent in reviewing the relevant history, records, available imaging, and for documentation.    Return if symptoms worsen or fail to improve.    Shon Bunn MD

## 2023-02-21 ENCOUNTER — TELEPHONE (OUTPATIENT)
Dept: OTHER | Facility: OTHER | Age: 83
End: 2023-02-21
Payer: MEDICARE

## 2023-02-21 NOTE — RESEARCH
I spoke with patient for 3.5 year follow up-[facility] trial. He states he is compliant with medications,no diagnosis of stroke since last visit. He did report a recurrence of  prostate cancer that he was originally diagnosed with in 2018. He was under gone medication injections and takes ongoing oral medications for treatment. Plans made for his next in person visit.

## 2023-03-10 ENCOUNTER — OFFICE VISIT (OUTPATIENT)
Dept: ONCOLOGY | Facility: CLINIC | Age: 83
End: 2023-03-10
Payer: MEDICARE

## 2023-03-10 ENCOUNTER — LAB (OUTPATIENT)
Dept: LAB | Facility: HOSPITAL | Age: 83
End: 2023-03-10
Payer: MEDICARE

## 2023-03-10 VITALS
HEIGHT: 73 IN | OXYGEN SATURATION: 98 % | HEART RATE: 54 BPM | SYSTOLIC BLOOD PRESSURE: 138 MMHG | WEIGHT: 229 LBS | RESPIRATION RATE: 18 BRPM | BODY MASS INDEX: 30.35 KG/M2 | DIASTOLIC BLOOD PRESSURE: 71 MMHG | TEMPERATURE: 97 F

## 2023-03-10 DIAGNOSIS — C61 PROSTATE CANCER METASTATIC TO INTRAPELVIC LYMPH NODE: Primary | ICD-10-CM

## 2023-03-10 DIAGNOSIS — C77.5 PROSTATE CANCER METASTATIC TO INTRAPELVIC LYMPH NODE: ICD-10-CM

## 2023-03-10 DIAGNOSIS — C61 PROSTATE CANCER METASTATIC TO INTRAPELVIC LYMPH NODE: ICD-10-CM

## 2023-03-10 DIAGNOSIS — C77.5 PROSTATE CANCER METASTATIC TO INTRAPELVIC LYMPH NODE: Primary | ICD-10-CM

## 2023-03-10 LAB
ALBUMIN SERPL-MCNC: 4.2 G/DL (ref 3.5–5.2)
ALBUMIN/GLOB SERPL: 1.4 G/DL
ALP SERPL-CCNC: 56 U/L (ref 39–117)
ALT SERPL W P-5'-P-CCNC: 16 U/L (ref 1–41)
ANION GAP SERPL CALCULATED.3IONS-SCNC: 12 MMOL/L (ref 5–15)
AST SERPL-CCNC: 20 U/L (ref 1–40)
BASOPHILS # BLD AUTO: 0.02 10*3/MM3 (ref 0–0.2)
BASOPHILS NFR BLD AUTO: 0.5 % (ref 0–1.5)
BILIRUB SERPL-MCNC: 0.5 MG/DL (ref 0–1.2)
BUN SERPL-MCNC: 18 MG/DL (ref 8–23)
BUN/CREAT SERPL: 26.5 (ref 7–25)
CALCIUM SPEC-SCNC: 9.1 MG/DL (ref 8.6–10.5)
CHLORIDE SERPL-SCNC: 102 MMOL/L (ref 98–107)
CO2 SERPL-SCNC: 25 MMOL/L (ref 22–29)
CREAT SERPL-MCNC: 0.68 MG/DL (ref 0.76–1.27)
DEPRECATED RDW RBC AUTO: 45.5 FL (ref 37–54)
EGFRCR SERPLBLD CKD-EPI 2021: 92.8 ML/MIN/1.73
EOSINOPHIL # BLD AUTO: 0.2 10*3/MM3 (ref 0–0.4)
EOSINOPHIL NFR BLD AUTO: 4.9 % (ref 0.3–6.2)
ERYTHROCYTE [DISTWIDTH] IN BLOOD BY AUTOMATED COUNT: 12.8 % (ref 12.3–15.4)
GLOBULIN UR ELPH-MCNC: 2.9 GM/DL
GLUCOSE SERPL-MCNC: 100 MG/DL (ref 65–99)
HCT VFR BLD AUTO: 38.8 % (ref 37.5–51)
HGB BLD-MCNC: 13.2 G/DL (ref 13–17.7)
IMM GRANULOCYTES # BLD AUTO: 0.01 10*3/MM3 (ref 0–0.05)
IMM GRANULOCYTES NFR BLD AUTO: 0.2 % (ref 0–0.5)
LYMPHOCYTES # BLD AUTO: 1.49 10*3/MM3 (ref 0.7–3.1)
LYMPHOCYTES NFR BLD AUTO: 36.2 % (ref 19.6–45.3)
MCH RBC QN AUTO: 32.5 PG (ref 26.6–33)
MCHC RBC AUTO-ENTMCNC: 34 G/DL (ref 31.5–35.7)
MCV RBC AUTO: 95.6 FL (ref 79–97)
MONOCYTES # BLD AUTO: 0.44 10*3/MM3 (ref 0.1–0.9)
MONOCYTES NFR BLD AUTO: 10.7 % (ref 5–12)
NEUTROPHILS NFR BLD AUTO: 1.96 10*3/MM3 (ref 1.7–7)
NEUTROPHILS NFR BLD AUTO: 47.5 % (ref 42.7–76)
PLATELET # BLD AUTO: 172 10*3/MM3 (ref 140–450)
PMV BLD AUTO: 9.6 FL (ref 6–12)
POTASSIUM SERPL-SCNC: 4.3 MMOL/L (ref 3.5–5.2)
PROT SERPL-MCNC: 7.1 G/DL (ref 6–8.5)
PSA SERPL-MCNC: <0.014 NG/ML (ref 0–4)
RBC # BLD AUTO: 4.06 10*6/MM3 (ref 4.14–5.8)
SODIUM SERPL-SCNC: 139 MMOL/L (ref 136–145)
WBC NRBC COR # BLD: 4.12 10*3/MM3 (ref 3.4–10.8)

## 2023-03-10 PROCEDURE — 36415 COLL VENOUS BLD VENIPUNCTURE: CPT

## 2023-03-10 PROCEDURE — 1126F AMNT PAIN NOTED NONE PRSNT: CPT | Performed by: NURSE PRACTITIONER

## 2023-03-10 PROCEDURE — 80053 COMPREHEN METABOLIC PANEL: CPT

## 2023-03-10 PROCEDURE — 99214 OFFICE O/P EST MOD 30 MIN: CPT | Performed by: NURSE PRACTITIONER

## 2023-03-10 PROCEDURE — 85025 COMPLETE CBC W/AUTO DIFF WBC: CPT

## 2023-03-10 PROCEDURE — 1160F RVW MEDS BY RX/DR IN RCRD: CPT | Performed by: NURSE PRACTITIONER

## 2023-03-10 PROCEDURE — 84153 ASSAY OF PSA TOTAL: CPT

## 2023-03-10 PROCEDURE — 1159F MED LIST DOCD IN RCRD: CPT | Performed by: NURSE PRACTITIONER

## 2023-03-10 NOTE — PROGRESS NOTES
"      PROBLEM LIST:  1. Metastatic prostate cancer  A) diagnosed 6/2018 with janis 4+3=7 prostate adenocarcinoma.  Stage at diagnosis [id number] (stage IIC).  Pretreatment PSA 5.3.  Treated with SBRT, completed 10/2018.    B) March 2020 found to have rising PSA (10.08 on 3/2/20).  Axumin PET/CT 3/31/20 showed abnormal activity in the left internal iliac, left pelvic sidewall nodes, periaortic adenopathy, bone marrow activity, left superficial inguinal nodes.  Started androgen ablation (eligard) with partial PSA response, but patient wished to stop ADT due to side effects/negative impact on quality of life.  Involved LN treated with radiotherapy  C) PSA increased to 0.3->1.9 in Fall 2022.  PSMA PET 11/30/22 showed PSMA uptake in the prostate gland c/w local recurrence, right retrocrural LN, left superior mediastinal LN. Eligard and apalutamide started January 2023.  2. Atrial fibrillation  3. Diabetes mellitus  4. GERD  5. GRADY on CPAP  6. hyperlipidemia    Subjective     CHIEF COMPLAINT: prostate cancer    HISTORY OF PRESENT ILLNESS:   Stefan Pedraza returns for follow-up.   He has been on treatment for about two months.  He has had some very mild nausea that has not required any Zofran.  He has moderate fatigue.  He is exercising 3 days a week for approximately 40 to 60 minutes each day.  He has some mild shortness of air with exertion such as walking distance.  He denies any pleuritic pain, chest pain or palpitations.      Objective      /71   Pulse 54   Temp 97 °F (36.1 °C) (Temporal)   Resp 18   Ht 185.4 cm (72.99\")   Wt 104 kg (229 lb)   SpO2 98%   BMI 30.22 kg/m²    Vitals:    03/10/23 0943   PainSc: 0-No pain               ECOG score: 0             General: well appearing male in no acute distress  Neuro: alert and oriented  HEENT: sclera anicteric, oropharynx clear  Extremeties: no lower extremity edema  Skin: no rashes, lesions, bruising, or petechiae  Psych: mood and affect appropriate    I " have reexamined the patient and the results are consistent with the previously documented exam. AYAAN Zavala        RECENT LABS:  Lab Results   Component Value Date    WBC 4.53 02/09/2023    HGB 13.0 02/09/2023    HCT 38.6 02/09/2023    MCV 95.8 02/09/2023     02/09/2023       Lab Results   Component Value Date    GLUCOSE 140 (H) 02/09/2023    BUN 18 02/09/2023    CREATININE 0.93 02/09/2023    EGFRIFNONA 89 11/16/2019    BCR 19.4 02/09/2023    K 4.3 02/09/2023    CO2 28.0 02/09/2023    CALCIUM 8.9 02/09/2023    ALBUMIN 3.8 02/09/2023    AST 18 02/09/2023    ALT 19 02/09/2023         With metastatic        ASSESSMENT AND PLAN:     Stefan Pedraza is a 82 y.o. male androgen sensitive prostate cancer, here for follow-up on treatment after starting Eligard and apalutamide.  So far he is tolerating treatment fairly well with expected side effects of mild nausea and fatigue.  I encouraged him to continue exercise and strength training.  His PSA on 2/9/2023 was 0.027.    We will check labs today including a PSA.  We will plan to repeat a PSMA PET scan prior to return in 1 month.  Order placed today.    Follow-up in 1 month.                        Eboni Avalos APRN  [facility]ology [facility] On[facility]    3/10/2023          CC:

## 2023-03-13 DIAGNOSIS — C61 PROSTATE CANCER METASTATIC TO INTRAPELVIC LYMPH NODE: Primary | ICD-10-CM

## 2023-03-13 DIAGNOSIS — R97.21 BIOCHEMICALLY RECURRENT MALIGNANT NEOPLASM OF PROSTATE: ICD-10-CM

## 2023-03-13 DIAGNOSIS — C61 BIOCHEMICALLY RECURRENT MALIGNANT NEOPLASM OF PROSTATE: ICD-10-CM

## 2023-03-13 DIAGNOSIS — C77.5 PROSTATE CANCER METASTATIC TO INTRAPELVIC LYMPH NODE: Primary | ICD-10-CM

## 2023-03-14 ENCOUNTER — TELEPHONE (OUTPATIENT)
Dept: ONCOLOGY | Facility: CLINIC | Age: 83
End: 2023-03-14
Payer: MEDICARE

## 2023-03-14 NOTE — TELEPHONE ENCOUNTER
Called patient to let him know that we do not  Need to do pet/ct imaging right now with undetectable PSA. He is clearly responding well to treatment.  We can do ct and bone scan imaging if needed, and would likely repeat PSMA pet if he has psa progression.

## 2023-03-24 ENCOUNTER — HOSPITAL ENCOUNTER (OUTPATIENT)
Dept: PET IMAGING | Facility: HOSPITAL | Age: 83
Discharge: HOME | End: 2023-03-24
Payer: MEDICARE

## 2023-04-13 ENCOUNTER — LAB (OUTPATIENT)
Dept: LAB | Facility: HOSPITAL | Age: 83
End: 2023-04-13
Payer: MEDICARE

## 2023-04-13 ENCOUNTER — OFFICE VISIT (OUTPATIENT)
Dept: ONCOLOGY | Facility: CLINIC | Age: 83
End: 2023-04-13
Payer: MEDICARE

## 2023-04-13 VITALS
BODY MASS INDEX: 30.09 KG/M2 | WEIGHT: 227 LBS | DIASTOLIC BLOOD PRESSURE: 70 MMHG | SYSTOLIC BLOOD PRESSURE: 150 MMHG | TEMPERATURE: 97.1 F | HEIGHT: 73 IN | HEART RATE: 50 BPM | OXYGEN SATURATION: 97 % | RESPIRATION RATE: 18 BRPM

## 2023-04-13 DIAGNOSIS — C61 PROSTATE CANCER METASTATIC TO INTRAPELVIC LYMPH NODE: Primary | ICD-10-CM

## 2023-04-13 DIAGNOSIS — C77.5 PROSTATE CANCER METASTATIC TO INTRAPELVIC LYMPH NODE: ICD-10-CM

## 2023-04-13 DIAGNOSIS — C77.5 PROSTATE CANCER METASTATIC TO INTRAPELVIC LYMPH NODE: Primary | ICD-10-CM

## 2023-04-13 DIAGNOSIS — C61 PROSTATE CANCER METASTATIC TO INTRAPELVIC LYMPH NODE: ICD-10-CM

## 2023-04-13 LAB
ALBUMIN SERPL-MCNC: 4 G/DL (ref 3.5–5.2)
ALBUMIN/GLOB SERPL: 1.3 G/DL
ALP SERPL-CCNC: 60 U/L (ref 39–117)
ALT SERPL W P-5'-P-CCNC: 11 U/L (ref 1–41)
ANION GAP SERPL CALCULATED.3IONS-SCNC: 11 MMOL/L (ref 5–15)
AST SERPL-CCNC: 15 U/L (ref 1–40)
BASOPHILS # BLD AUTO: 0.02 10*3/MM3 (ref 0–0.2)
BASOPHILS NFR BLD AUTO: 0.5 % (ref 0–1.5)
BILIRUB SERPL-MCNC: 0.5 MG/DL (ref 0–1.2)
BUN SERPL-MCNC: 15 MG/DL (ref 8–23)
BUN/CREAT SERPL: 18.8 (ref 7–25)
CALCIUM SPEC-SCNC: 9.3 MG/DL (ref 8.6–10.5)
CHLORIDE SERPL-SCNC: 106 MMOL/L (ref 98–107)
CO2 SERPL-SCNC: 26 MMOL/L (ref 22–29)
CREAT SERPL-MCNC: 0.8 MG/DL (ref 0.76–1.27)
DEPRECATED RDW RBC AUTO: 46.2 FL (ref 37–54)
EGFRCR SERPLBLD CKD-EPI 2021: 88.4 ML/MIN/1.73
EOSINOPHIL # BLD AUTO: 0.21 10*3/MM3 (ref 0–0.4)
EOSINOPHIL NFR BLD AUTO: 5 % (ref 0.3–6.2)
ERYTHROCYTE [DISTWIDTH] IN BLOOD BY AUTOMATED COUNT: 12.9 % (ref 12.3–15.4)
GLOBULIN UR ELPH-MCNC: 3 GM/DL
GLUCOSE SERPL-MCNC: 101 MG/DL (ref 65–99)
HCT VFR BLD AUTO: 38.8 % (ref 37.5–51)
HGB BLD-MCNC: 13 G/DL (ref 13–17.7)
IMM GRANULOCYTES # BLD AUTO: 0.01 10*3/MM3 (ref 0–0.05)
IMM GRANULOCYTES NFR BLD AUTO: 0.2 % (ref 0–0.5)
LYMPHOCYTES # BLD AUTO: 1.49 10*3/MM3 (ref 0.7–3.1)
LYMPHOCYTES NFR BLD AUTO: 35.6 % (ref 19.6–45.3)
MCH RBC QN AUTO: 32.4 PG (ref 26.6–33)
MCHC RBC AUTO-ENTMCNC: 33.5 G/DL (ref 31.5–35.7)
MCV RBC AUTO: 96.8 FL (ref 79–97)
MONOCYTES # BLD AUTO: 0.47 10*3/MM3 (ref 0.1–0.9)
MONOCYTES NFR BLD AUTO: 11.2 % (ref 5–12)
NEUTROPHILS NFR BLD AUTO: 1.98 10*3/MM3 (ref 1.7–7)
NEUTROPHILS NFR BLD AUTO: 47.5 % (ref 42.7–76)
PLATELET # BLD AUTO: 178 10*3/MM3 (ref 140–450)
PMV BLD AUTO: 9.5 FL (ref 6–12)
POTASSIUM SERPL-SCNC: 4.5 MMOL/L (ref 3.5–5.2)
PROT SERPL-MCNC: 7 G/DL (ref 6–8.5)
PSA SERPL-MCNC: <0.014 NG/ML (ref 0–4)
RBC # BLD AUTO: 4.01 10*6/MM3 (ref 4.14–5.8)
SODIUM SERPL-SCNC: 143 MMOL/L (ref 136–145)
WBC NRBC COR # BLD: 4.18 10*3/MM3 (ref 3.4–10.8)

## 2023-04-13 PROCEDURE — 1126F AMNT PAIN NOTED NONE PRSNT: CPT | Performed by: INTERNAL MEDICINE

## 2023-04-13 PROCEDURE — 80053 COMPREHEN METABOLIC PANEL: CPT

## 2023-04-13 PROCEDURE — 36415 COLL VENOUS BLD VENIPUNCTURE: CPT

## 2023-04-13 PROCEDURE — 84153 ASSAY OF PSA TOTAL: CPT

## 2023-04-13 PROCEDURE — 99213 OFFICE O/P EST LOW 20 MIN: CPT | Performed by: INTERNAL MEDICINE

## 2023-04-13 PROCEDURE — 85025 COMPLETE CBC W/AUTO DIFF WBC: CPT

## 2023-04-13 NOTE — PROGRESS NOTES
"      PROBLEM LIST:  1. Metastatic prostate cancer  A) diagnosed 6/2018 with janis 4+3=7 prostate adenocarcinoma.  Stage at diagnosis [id number] (stage IIC).  Pretreatment PSA 5.3.  Treated with SBRT, completed 10/2018.    B) March 2020 found to have rising PSA (10.08 on 3/2/20).  Axumin PET/CT 3/31/20 showed abnormal activity in the left internal iliac, left pelvic sidewall nodes, periaortic adenopathy, bone marrow activity, left superficial inguinal nodes.  Started androgen ablation (eligard) with partial PSA response, but patient wished to stop ADT due to side effects/negative impact on quality of life.  Involved LN treated with radiotherapy  C) PSA increased to 0.3->1.9 in Fall 2022.  PSMA PET 11/30/22 showed PSMA uptake in the prostate gland c/w local recurrence, right retrocrural LN, left superior mediastinal LN. Eligard and apalutamide started January 2023.  2. Atrial fibrillation  3. Diabetes mellitus  4. GERD  5. GRADY on CPAP  6. hyperlipidemia    Subjective     CHIEF COMPLAINT: prostate cancer    HISTORY OF PRESENT ILLNESS:   Stefan Pedraza returns for follow-up.   He continues on Eligard and apalutamide.  He is feeling good other than fatigue.  He continues to exercise about 3 days a week but it wears him out.  No nausea.  No other complaints.    Objective      /70 Comment: LUE  Pulse 50   Temp 97.1 °F (36.2 °C) (Infrared)   Resp 18   Ht 185.4 cm (72.99\")   Wt 103 kg (227 lb)   SpO2 97%   BMI 29.96 kg/m²    Vitals:    04/13/23 1049   PainSc: 0-No pain               ECOG score: 0             General: well appearing male in no acute distress  Neuro: alert and oriented  HEENT: sclera anicteric, oropharynx clear  Extremeties: no lower extremity edema  Skin: no rashes, lesions, bruising, or petechiae  Psych: mood and affect appropriate    I have reexamined the patient and the results are consistent with the previously documented exam. Ronda Mack MD        RECENT LABS:  Lab Results "   Component Value Date    WBC 4.18 04/13/2023    HGB 13.0 04/13/2023    HCT 38.8 04/13/2023    MCV 96.8 04/13/2023     04/13/2023       Lab Results   Component Value Date    GLUCOSE 100 (H) 03/10/2023    BUN 18 03/10/2023    CREATININE 0.68 (L) 03/10/2023    EGFRIFNONA 89 11/16/2019    BCR 26.5 (H) 03/10/2023    K 4.3 03/10/2023    CO2 25.0 03/10/2023    CALCIUM 9.1 03/10/2023    ALBUMIN 4.2 03/10/2023    AST 20 03/10/2023    ALT 16 03/10/2023                 ASSESSMENT AND PLAN:     Stefan Pedraza is a 82 y.o. male androgen sensitive prostate cancer, here for follow-up on treatment after starting Eligard and apalutamide.  So far he is tolerating treatment fairly well with expected side effects of getting fatigue.  I encouraged him to continue exercise and strength training.  PSA was undetectable last month.    We will repeat imaging as needed based on symptoms or PSA increase.    Follow-up in 2 months with his next Lupron injection.                        Ronda Mack MD  [facility] Hematology and Oncology    4/13/2023          CC:

## 2023-05-15 NOTE — PROGRESS NOTES
Drug: apalutamide  Strength: 60 mg  Directions: Take 4 tablets PO daily  QTY: 120  RF:11    Released to pharmacy: [facility]    Completed independent double check on medication order/RX.     Due to financial limitations with insurance, patient must fill with [facility]. RX sent to [facility].   Mid-Level (A): Tobi Frausto PA-C

## 2023-05-19 ENCOUNTER — TELEPHONE (OUTPATIENT)
Dept: ONCOLOGY | Facility: CLINIC | Age: 83
End: 2023-05-19
Payer: MEDICARE

## 2023-05-19 NOTE — TELEPHONE ENCOUNTER
I checked with Andra Hernandez, pharmD and it is okay to add Vitamin B12 to his regimen.  I called patient and he verbalized understanding.

## 2023-05-19 NOTE — TELEPHONE ENCOUNTER
Caller: Stefan Pedraza    Relationship: Self    Best call back number: [phone]      What was the call regarding: PATIENT WANTED TO MAKE SURE IT WAS SAFE TO ADD VITAMIN B12 TO HIS MEDICATIONS     Do you require a callback: YES

## 2023-07-27 RX ORDER — FLECAINIDE ACETATE 100 MG/1
TABLET ORAL
Qty: 180 TABLET | Refills: 2 | Status: SHIPPED | OUTPATIENT
Start: 2023-07-27

## 2023-08-16 ENCOUNTER — OFFICE VISIT (OUTPATIENT)
Dept: CARDIOLOGY | Facility: CLINIC | Age: 83
End: 2023-08-16
Payer: MEDICARE

## 2023-08-16 VITALS
HEART RATE: 65 BPM | WEIGHT: 228.2 LBS | SYSTOLIC BLOOD PRESSURE: 115 MMHG | HEIGHT: 73 IN | BODY MASS INDEX: 30.24 KG/M2 | DIASTOLIC BLOOD PRESSURE: 80 MMHG | OXYGEN SATURATION: 97 %

## 2023-08-16 DIAGNOSIS — I48.0 PAROXYSMAL ATRIAL FIBRILLATION: Primary | ICD-10-CM

## 2023-08-16 PROCEDURE — 99213 OFFICE O/P EST LOW 20 MIN: CPT | Performed by: INTERNAL MEDICINE

## 2023-08-16 PROCEDURE — 1160F RVW MEDS BY RX/DR IN RCRD: CPT | Performed by: INTERNAL MEDICINE

## 2023-08-16 PROCEDURE — 1159F MED LIST DOCD IN RCRD: CPT | Performed by: INTERNAL MEDICINE

## 2023-08-16 PROCEDURE — 93000 ELECTROCARDIOGRAM COMPLETE: CPT | Performed by: INTERNAL MEDICINE

## 2023-08-16 RX ORDER — FAMOTIDINE 20 MG/1
20 TABLET, FILM COATED ORAL 2 TIMES DAILY
COMMUNITY

## 2023-08-16 NOTE — PROGRESS NOTES
Stefan Pedraza  1940  [phone]    08/16/2023    [facility] CARDIOLOGY     Referring Provider: No ref. provider found     Gamaliel Eaton MD  [address] [place] [address]    Chief Complaint   Patient presents with    Paroxysmal atrial fibrillation       Problem List:   Paroxsymal Atrial fibrillation:   History of atrial fibrillation, initial diagnosis 2007.   Status-post successful external cardioversion, Dr. Dow, 2007.  Initiation of amiodarone therapy with recent tapering dose, Dr. Lowe.   Echocardiogram, 02/02/2011, revealing normal LV function, diastolic dysfunction noted, mild MR, no pericardial effusion.   Holter monitor, 01/25/2011, revealing sinus rhythm with PAC/PVC/episodes of sinus bradycardia.   Chadsvasc=3.   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   Pulmonary vein isolation procedure, 10/05/2011.  BRISSA with left ventricular ejection fraction of 55%, mild TR and MR.   Event recorder with intermittent episodes of flutter but the majority of the time in sinus rhythm.   External cardioversion to normal sinus rhythm, 02/17/2012.  Atypical chest pain.  History of left heart catheterization, 1990s in [place], reported as normal coronaries.   Stress test, 08/23/2007, revealing no significant ischemia, EF estimated at 46%.  Echo 7/28/17 LV Estimated EF = 57%., mild concentric hypertrophy, Left atrial cavity size is severely dilated. Normal estimated pulmonary artery systolic pressure  External CV 7/13/17; NSR with recurrent AF three days later  Echocardiogram 7/28/17: EF 57%, mild LVH, LA severely dilated  ECV to NSR 8/31/17  Wavecrest device insertion 7/16/19  BRISSA 7/21/2020 LVEF NL, No significant leakage around device, Mild MR,TR  Diabetes mellitus.   Gastroesophageal reflux disease.  Dyslipidemia.  Osteoarthritis.  Prostate cancer - Ronda Mack MD   Remote  surgical history:  Cholecystectomy.   Right orchiectomy.  Right shoulder labral repair.  Left knee arthroscopy for partial medial meniscectomy      Allergies  Allergies   Allergen Reactions    Penicillins Hives and Rash       Current Medications    Current Outpatient Medications:     Apalutamide 60 MG tablet, Take 4 tablets by mouth Daily., Disp: 120 tablet, Rfl: 11    aspirin 81 MG EC tablet, Take 1 tablet by mouth Daily., Disp: , Rfl:     atorvastatin (LIPITOR) 10 MG tablet, Take 1 tablet by mouth Every Night., Disp: , Rfl:     B Complex Vitamins (VITAMIN B COMPLEX) capsule capsule, Take 1 capsule by mouth Daily., Disp: , Rfl:     Cholecalciferol (Vitamin D3) 25 MCG (1000 UT) capsule, Take 1 capsule by mouth Daily., Disp: , Rfl:     cyclobenzaprine (FLEXERIL) 10 MG tablet, Take 1 tablet by mouth As Needed., Disp: , Rfl:     famotidine (PEPCID) 20 MG tablet, Take 1 tablet by mouth 2 (Two) Times a Day., Disp: , Rfl:     ferrous sulfate 324 (65 FE) MG tablet delayed-release EC tablet, Take 1 tablet by mouth Daily With Breakfast., Disp: , Rfl:     flecainide (TAMBOCOR) 100 MG tablet, TAKE 1 TABLET TWICE A DAY, Disp: 180 tablet, Rfl: 2    leuprolide acetate 5 MG/ML kit, leuprolide 1 mg/0.2 mL subcutaneous solution  Inject by sub-q route., Disp: , Rfl:     metFORMIN ER (GLUCOPHAGE-XR) 500 MG 24 hr tablet, Take 2 tablets by mouth Every Night., Disp: , Rfl:     Multiple Vitamins-Minerals (OCUVITE ADULT 50+ PO), Take 1 tablet by mouth Daily., Disp: , Rfl:     Omega-3 Fatty Acids (FISH OIL) 1000 MG capsule capsule, Take 1 capsule by mouth Daily With Breakfast., Disp: , Rfl:     omeprazole (priLOSEC) 40 MG capsule, Take 1 capsule by mouth Daily., Disp: , Rfl:     triamcinolone (KENALOG) 0.1 % cream, As Needed., Disp: , Rfl:     History of Present Illness:     Pt presents for follow up of PAF. Since we last saw the pt, pt denies any AF episodes. He checks his HRs at home regularly to check. He denies any SOB, CP, LH, and  "dizziness, syncope. He has balance issues.  Denies any hospitalizations, ER visits, bleeding, or TIA/CVA symptoms. Overall feels well.     ROS:  General:  Denies fatigue, weight gain or loss  Cardiovascular:  Denies CP, PND, syncope, near syncope, edema or palpitations.  Pulmonary:  Denies CROW, cough, or wheezing      Vitals:    08/16/23 1042   BP: 115/80   BP Location: Right arm   Patient Position: Sitting   Cuff Size: Adult   Pulse: 65   SpO2: 97%   Weight: 104 kg (228 lb 3.2 oz)   Height: 185.4 cm (73\")     Body mass index is 30.11 kg/mý.  PE:  General: NAD  Neck: no JVD, no carotid bruits, no TM  Heart RRR, NL S1, S2, S4 present, no rubs, murmurs  Lungs: CTA, no wheezes, rhonchi, or rales  Abd: soft, non-tender, NL BS  Ext: No musculoskeletal deformities, no edema, cyanosis, or clubbing  Psych: normal mood and affect    Diagnostic Data:        ECG 12 Lead    Date/Time: 8/16/2023 10:58 AM  Performed by: Prabhakar Solis MD  Authorized by: Prabhakar Solis MD   Comparison: compared with previous ECG from 8/17/2022  Similar to previous ECG  Rhythm: sinus rhythm  BPM: 57               1. Paroxysmal atrial fibrillation          Plan:  1) PAF  Controlled on flecainide therapy without reported recurrence. Interested in decreasing flecainide to once daily dosing, and we can try this.   Status post remote PVA in 2011  Continue present medications.      2) Anticoagulation  CHADSVASc = 3 status post left atrial appendage closure as part of with Wavecrest trial 7/16/2019  Continue ASA monotherapy. No issues     F/up in 12 months    Electronically signed by SHIRA Gaona, 08/16/23, 10:51 AM EDT.    IPrabhakar MD, personally performed the services described in this documentation as scribed by the above named individual in my presence, and it is both accurate and complete.  8/16/2023  11:06 EDT     "

## 2023-08-31 ENCOUNTER — OFFICE VISIT (OUTPATIENT)
Dept: ONCOLOGY | Facility: CLINIC | Age: 83
End: 2023-08-31
Payer: MEDICARE

## 2023-08-31 ENCOUNTER — LAB (OUTPATIENT)
Dept: LAB | Facility: HOSPITAL | Age: 83
End: 2023-08-31
Payer: MEDICARE

## 2023-08-31 VITALS
SYSTOLIC BLOOD PRESSURE: 140 MMHG | OXYGEN SATURATION: 97 % | HEART RATE: 61 BPM | RESPIRATION RATE: 18 BRPM | HEIGHT: 73 IN | BODY MASS INDEX: 29.69 KG/M2 | TEMPERATURE: 97.1 F | WEIGHT: 224 LBS | DIASTOLIC BLOOD PRESSURE: 90 MMHG

## 2023-08-31 DIAGNOSIS — C77.5 PROSTATE CANCER METASTATIC TO INTRAPELVIC LYMPH NODE: ICD-10-CM

## 2023-08-31 DIAGNOSIS — C61 PROSTATE CANCER METASTATIC TO INTRAPELVIC LYMPH NODE: ICD-10-CM

## 2023-08-31 LAB
ALBUMIN SERPL-MCNC: 3.9 G/DL (ref 3.5–5.2)
ALBUMIN/GLOB SERPL: 1.1 G/DL
ALP SERPL-CCNC: 69 U/L (ref 39–117)
ALT SERPL W P-5'-P-CCNC: 12 U/L (ref 1–41)
ANION GAP SERPL CALCULATED.3IONS-SCNC: 10 MMOL/L (ref 5–15)
AST SERPL-CCNC: 22 U/L (ref 1–40)
BASOPHILS # BLD AUTO: 0.04 10*3/MM3 (ref 0–0.2)
BASOPHILS NFR BLD AUTO: 0.8 % (ref 0–1.5)
BILIRUB SERPL-MCNC: 0.6 MG/DL (ref 0–1.2)
BUN SERPL-MCNC: 17 MG/DL (ref 8–23)
BUN/CREAT SERPL: 19.1 (ref 7–25)
CALCIUM SPEC-SCNC: 9.3 MG/DL (ref 8.6–10.5)
CHLORIDE SERPL-SCNC: 103 MMOL/L (ref 98–107)
CO2 SERPL-SCNC: 26 MMOL/L (ref 22–29)
CREAT SERPL-MCNC: 0.89 MG/DL (ref 0.76–1.27)
DEPRECATED RDW RBC AUTO: 43.4 FL (ref 37–54)
EGFRCR SERPLBLD CKD-EPI 2021: 85 ML/MIN/1.73
EOSINOPHIL # BLD AUTO: 0.25 10*3/MM3 (ref 0–0.4)
EOSINOPHIL NFR BLD AUTO: 5 % (ref 0.3–6.2)
ERYTHROCYTE [DISTWIDTH] IN BLOOD BY AUTOMATED COUNT: 12.2 % (ref 12.3–15.4)
GLOBULIN UR ELPH-MCNC: 3.6 GM/DL
GLUCOSE SERPL-MCNC: 124 MG/DL (ref 65–99)
HCT VFR BLD AUTO: 39.5 % (ref 37.5–51)
HGB BLD-MCNC: 13.5 G/DL (ref 13–17.7)
IMM GRANULOCYTES # BLD AUTO: 0.02 10*3/MM3 (ref 0–0.05)
IMM GRANULOCYTES NFR BLD AUTO: 0.4 % (ref 0–0.5)
LYMPHOCYTES # BLD AUTO: 1.73 10*3/MM3 (ref 0.7–3.1)
LYMPHOCYTES NFR BLD AUTO: 34.3 % (ref 19.6–45.3)
MCH RBC QN AUTO: 32.8 PG (ref 26.6–33)
MCHC RBC AUTO-ENTMCNC: 34.2 G/DL (ref 31.5–35.7)
MCV RBC AUTO: 96.1 FL (ref 79–97)
MONOCYTES # BLD AUTO: 0.56 10*3/MM3 (ref 0.1–0.9)
MONOCYTES NFR BLD AUTO: 11.1 % (ref 5–12)
NEUTROPHILS NFR BLD AUTO: 2.45 10*3/MM3 (ref 1.7–7)
NEUTROPHILS NFR BLD AUTO: 48.4 % (ref 42.7–76)
PLATELET # BLD AUTO: 193 10*3/MM3 (ref 140–450)
PMV BLD AUTO: 9 FL (ref 6–12)
POTASSIUM SERPL-SCNC: 4.4 MMOL/L (ref 3.5–5.2)
PROT SERPL-MCNC: 7.5 G/DL (ref 6–8.5)
PSA SERPL-MCNC: <0.014 NG/ML (ref 0–4)
RBC # BLD AUTO: 4.11 10*6/MM3 (ref 4.14–5.8)
SODIUM SERPL-SCNC: 139 MMOL/L (ref 136–145)
WBC NRBC COR # BLD: 5.05 10*3/MM3 (ref 3.4–10.8)

## 2023-08-31 PROCEDURE — 80053 COMPREHEN METABOLIC PANEL: CPT | Performed by: INTERNAL MEDICINE

## 2023-08-31 PROCEDURE — 84153 ASSAY OF PSA TOTAL: CPT | Performed by: INTERNAL MEDICINE

## 2023-08-31 PROCEDURE — 99214 OFFICE O/P EST MOD 30 MIN: CPT | Performed by: INTERNAL MEDICINE

## 2023-08-31 PROCEDURE — 36415 COLL VENOUS BLD VENIPUNCTURE: CPT | Performed by: INTERNAL MEDICINE

## 2023-08-31 PROCEDURE — 85025 COMPLETE CBC W/AUTO DIFF WBC: CPT | Performed by: INTERNAL MEDICINE

## 2023-08-31 PROCEDURE — 1126F AMNT PAIN NOTED NONE PRSNT: CPT | Performed by: INTERNAL MEDICINE

## 2023-08-31 RX ORDER — METHYLPHENIDATE HYDROCHLORIDE 5 MG/1
5 TABLET ORAL 2 TIMES DAILY
Qty: 60 TABLET | Refills: 0 | Status: SHIPPED | OUTPATIENT
Start: 2023-08-31

## 2023-08-31 NOTE — PROGRESS NOTES
"      PROBLEM LIST:  1. Metastatic prostate cancer  A) diagnosed 6/2018 with janis 4+3=7 prostate adenocarcinoma.  Stage at diagnosis [id number] (stage IIC).  Pretreatment PSA 5.3.  Treated with SBRT, completed 10/2018.    B) March 2020 found to have rising PSA (10.08 on 3/2/20).  Axumin PET/CT 3/31/20 showed abnormal activity in the left internal iliac, left pelvic sidewall nodes, periaortic adenopathy, bone marrow activity, left superficial inguinal nodes.  Started androgen ablation (eligard) with partial PSA response, but patient wished to stop ADT due to side effects/negative impact on quality of life.  Involved LN treated with radiotherapy  C) PSA increased to 0.3->1.9 in Fall 2022.  PSMA PET 11/30/22 showed PSMA uptake in the prostate gland c/w local recurrence, right retrocrural LN, left superior mediastinal LN. Eligard and apalutamide started January 2023.  2. Atrial fibrillation  3. Diabetes mellitus  4. GERD  5. GRADY on CPAP  6. hyperlipidemia    Subjective     CHIEF COMPLAINT: prostate cancer    HISTORY OF PRESENT ILLNESS:   Stefan Pedraza returns for follow-up.   He continues on Eligard and apalutamide.  Overall he is feeling about the same.  He continues to go to the gym several days a week but it wears him out when he does it.  He is not able to be very active for the rest of the day.  He sleeps pretty well at night and also takes naps during the day.      Objective      /90   Pulse 61   Temp 97.1 øF (36.2 øC) (Infrared)   Resp 18   Ht 185.4 cm (72.99\")   Wt 102 kg (224 lb)   SpO2 97%   BMI 29.56 kg/mý    Vitals:    08/31/23 1007   PainSc: 0-No pain                             General: well appearing male in no acute distress  Neuro: alert and oriented  HEENT: sclera anicteric, oropharynx clear  Vascular: Regular rate and rhythm, no murmurs  Lungs: Clear to auscultation bilaterally  Extremeties: no lower extremity edema  Skin: no rashes, lesions, bruising, or petechiae  Psych: mood and " affect appropriate    I have reexamined the patient and the results are consistent with the previously documented exam. Ronda Mack MD        RECENT LABS:  Lab Results   Component Value Date    WBC 5.05 08/31/2023    HGB 13.5 08/31/2023    HCT 39.5 08/31/2023    MCV 96.1 08/31/2023     08/31/2023       Lab Results   Component Value Date    GLUCOSE 101 (H) 04/13/2023    BUN 15 04/13/2023    CREATININE 0.80 04/13/2023    EGFRIFNONA 89 11/16/2019    BCR 18.8 04/13/2023    K 4.5 04/13/2023    CO2 26.0 04/13/2023    CALCIUM 9.3 04/13/2023    ALBUMIN 4.0 04/13/2023    AST 15 04/13/2023    ALT 11 04/13/2023               Lab Results   Component Value Date    PSA <0.014 06/29/2023    PSA <0.014 04/13/2023    PSA <0.014 03/10/2023         ASSESSMENT AND PLAN:     Stefan Pedraza is a 83 y.o. male androgen sensitive prostate cancer, here for follow-up on treatment after starting Eligard and apalutamide.  PSA has remained undetectable.  We will continue with his current treatment.    We will repeat imaging as needed based on symptoms or PSA increase.    Fatigue: Discussed that this is a common side effect of testosterone suppression.  We could consider trying Ritalin to see if this helps with his energy, I will discuss with him when we call about his PSA levels.    Follow-up in 2 months with labs on return.    Addendum: called patient to let him PSA remains undetectable.  Also discussed trying ritalin for fatigue.  He is interested in trying this.  Will send Rx for 5 mg BID.            Ronda Mack MD  [facility] Hematology and Oncology    8/31/2023          CC:

## 2023-09-01 ENCOUNTER — SPECIALTY PHARMACY (OUTPATIENT)
Dept: ONCOLOGY | Facility: HOSPITAL | Age: 83
End: 2023-09-01
Payer: MEDICARE

## 2023-09-13 DIAGNOSIS — C61 PROSTATE CANCER METASTATIC TO INTRAPELVIC LYMPH NODE: ICD-10-CM

## 2023-09-13 DIAGNOSIS — C77.5 PROSTATE CANCER METASTATIC TO INTRAPELVIC LYMPH NODE: ICD-10-CM

## 2023-09-13 NOTE — TELEPHONE ENCOUNTER
Caller: Keila Stefaneboni Chopra    Relationship: Self    Best call back number: [phone]    Requested Prescriptions:   Requested Prescriptions     Pending Prescriptions Disp Refills    methylphenidate (Ritalin) 5 MG tablet 60 tablet 0     Sig: Take 1 tablet by mouth 2 (Two) Times a Day.        Pharmacy where request should be sent: [facility] [address] [phone] [facility] [phone]      Last office visit with prescribing clinician: 8/31/2023   Last telemedicine visit with prescribing clinician: Visit date not found   Next office visit with prescribing clinician: Visit date not found     Additional details provided by patient: NEEDING MED TO GO TO [facility]    Does the patient have less than a 3 day supply:  [x] Yes  [] No    Would you like a call back once the refill request has been completed: [x] Yes [] No    If the office needs to give you a call back, can they leave a voicemail: [x] Yes [] No    Teresa Mason Rep   09/13/23 15:29 EDT

## 2023-09-14 RX ORDER — METHYLPHENIDATE HYDROCHLORIDE 5 MG/1
5 TABLET ORAL 2 TIMES DAILY
Qty: 60 TABLET | Refills: 0 | Status: SHIPPED | OUTPATIENT
Start: 2023-09-14

## 2023-09-26 ENCOUNTER — TELEPHONE (OUTPATIENT)
Dept: OTHER | Facility: OTHER | Age: 83
End: 2023-09-26
Payer: MEDICARE

## 2023-09-26 NOTE — TELEPHONE ENCOUNTER
I spoke with patient  to confirm the discontinuation of the Wavecrest 2 trial. Patient has been compliant with medications and has no ER visits or hospitalizations to report since our last visit. Verbalized understanding that a letter will be mailed with instructions for any further questions. I thanked him for participating in the trial.

## 2023-10-31 ENCOUNTER — SPECIALTY PHARMACY (OUTPATIENT)
Dept: ONCOLOGY | Facility: HOSPITAL | Age: 83
End: 2023-10-31
Payer: MEDICARE

## 2023-10-31 ENCOUNTER — OFFICE VISIT (OUTPATIENT)
Dept: ONCOLOGY | Facility: CLINIC | Age: 83
End: 2023-10-31
Payer: MEDICARE

## 2023-10-31 ENCOUNTER — LAB (OUTPATIENT)
Dept: LAB | Facility: HOSPITAL | Age: 83
End: 2023-10-31
Payer: MEDICARE

## 2023-10-31 VITALS
TEMPERATURE: 97.3 F | WEIGHT: 222 LBS | DIASTOLIC BLOOD PRESSURE: 81 MMHG | OXYGEN SATURATION: 98 % | BODY MASS INDEX: 29.42 KG/M2 | HEIGHT: 73 IN | RESPIRATION RATE: 20 BRPM | HEART RATE: 56 BPM | SYSTOLIC BLOOD PRESSURE: 146 MMHG

## 2023-10-31 DIAGNOSIS — C61 PROSTATE CANCER METASTATIC TO INTRAPELVIC LYMPH NODE: Primary | ICD-10-CM

## 2023-10-31 DIAGNOSIS — C77.5 PROSTATE CANCER METASTATIC TO INTRAPELVIC LYMPH NODE: ICD-10-CM

## 2023-10-31 DIAGNOSIS — C61 PROSTATE CANCER METASTATIC TO INTRAPELVIC LYMPH NODE: ICD-10-CM

## 2023-10-31 DIAGNOSIS — C77.5 PROSTATE CANCER METASTATIC TO INTRAPELVIC LYMPH NODE: Primary | ICD-10-CM

## 2023-10-31 LAB
ALBUMIN SERPL-MCNC: 3.9 G/DL (ref 3.5–5.2)
ALBUMIN/GLOB SERPL: 1.3 G/DL
ALP SERPL-CCNC: 66 U/L (ref 39–117)
ALT SERPL W P-5'-P-CCNC: 10 U/L (ref 1–41)
ANION GAP SERPL CALCULATED.3IONS-SCNC: 11 MMOL/L (ref 5–15)
AST SERPL-CCNC: 16 U/L (ref 1–40)
BASOPHILS # BLD AUTO: 0.04 10*3/MM3 (ref 0–0.2)
BASOPHILS NFR BLD AUTO: 0.8 % (ref 0–1.5)
BILIRUB SERPL-MCNC: 0.5 MG/DL (ref 0–1.2)
BUN SERPL-MCNC: 14 MG/DL (ref 8–23)
BUN/CREAT SERPL: 16.3 (ref 7–25)
CALCIUM SPEC-SCNC: 9.1 MG/DL (ref 8.6–10.5)
CHLORIDE SERPL-SCNC: 102 MMOL/L (ref 98–107)
CO2 SERPL-SCNC: 25 MMOL/L (ref 22–29)
CREAT SERPL-MCNC: 0.86 MG/DL (ref 0.76–1.27)
DEPRECATED RDW RBC AUTO: 44.3 FL (ref 37–54)
EGFRCR SERPLBLD CKD-EPI 2021: 85.9 ML/MIN/1.73
EOSINOPHIL # BLD AUTO: 0.22 10*3/MM3 (ref 0–0.4)
EOSINOPHIL NFR BLD AUTO: 4.4 % (ref 0.3–6.2)
ERYTHROCYTE [DISTWIDTH] IN BLOOD BY AUTOMATED COUNT: 12.2 % (ref 12.3–15.4)
GLOBULIN UR ELPH-MCNC: 3 GM/DL
GLUCOSE SERPL-MCNC: 110 MG/DL (ref 65–99)
HCT VFR BLD AUTO: 39.2 % (ref 37.5–51)
HGB BLD-MCNC: 13.2 G/DL (ref 13–17.7)
IMM GRANULOCYTES # BLD AUTO: 0.02 10*3/MM3 (ref 0–0.05)
IMM GRANULOCYTES NFR BLD AUTO: 0.4 % (ref 0–0.5)
LYMPHOCYTES # BLD AUTO: 1.54 10*3/MM3 (ref 0.7–3.1)
LYMPHOCYTES NFR BLD AUTO: 31.1 % (ref 19.6–45.3)
MCH RBC QN AUTO: 32.9 PG (ref 26.6–33)
MCHC RBC AUTO-ENTMCNC: 33.7 G/DL (ref 31.5–35.7)
MCV RBC AUTO: 97.8 FL (ref 79–97)
MONOCYTES # BLD AUTO: 0.47 10*3/MM3 (ref 0.1–0.9)
MONOCYTES NFR BLD AUTO: 9.5 % (ref 5–12)
NEUTROPHILS NFR BLD AUTO: 2.66 10*3/MM3 (ref 1.7–7)
NEUTROPHILS NFR BLD AUTO: 53.8 % (ref 42.7–76)
PLATELET # BLD AUTO: 205 10*3/MM3 (ref 140–450)
PMV BLD AUTO: 9.4 FL (ref 6–12)
POTASSIUM SERPL-SCNC: 3.9 MMOL/L (ref 3.5–5.2)
PROT SERPL-MCNC: 6.9 G/DL (ref 6–8.5)
PSA SERPL-MCNC: <0.014 NG/ML (ref 0–4)
RBC # BLD AUTO: 4.01 10*6/MM3 (ref 4.14–5.8)
SODIUM SERPL-SCNC: 138 MMOL/L (ref 136–145)
WBC NRBC COR # BLD: 4.95 10*3/MM3 (ref 3.4–10.8)

## 2023-10-31 PROCEDURE — 85025 COMPLETE CBC W/AUTO DIFF WBC: CPT

## 2023-10-31 PROCEDURE — 84153 ASSAY OF PSA TOTAL: CPT

## 2023-10-31 PROCEDURE — 80053 COMPREHEN METABOLIC PANEL: CPT

## 2023-10-31 PROCEDURE — 1159F MED LIST DOCD IN RCRD: CPT | Performed by: NURSE PRACTITIONER

## 2023-10-31 PROCEDURE — 1126F AMNT PAIN NOTED NONE PRSNT: CPT | Performed by: NURSE PRACTITIONER

## 2023-10-31 PROCEDURE — 1160F RVW MEDS BY RX/DR IN RCRD: CPT | Performed by: NURSE PRACTITIONER

## 2023-10-31 PROCEDURE — 36415 COLL VENOUS BLD VENIPUNCTURE: CPT

## 2023-10-31 PROCEDURE — 99213 OFFICE O/P EST LOW 20 MIN: CPT | Performed by: NURSE PRACTITIONER

## 2023-10-31 NOTE — PROGRESS NOTES
"      PROBLEM LIST:  1. Metastatic prostate cancer  A) diagnosed 6/2018 with janis 4+3=7 prostate adenocarcinoma.  Stage at diagnosis [id number] (stage IIC).  Pretreatment PSA 5.3.  Treated with SBRT, completed 10/2018.    B) March 2020 found to have rising PSA (10.08 on 3/2/20).  Axumin PET/CT 3/31/20 showed abnormal activity in the left internal iliac, left pelvic sidewall nodes, periaortic adenopathy, bone marrow activity, left superficial inguinal nodes.  Started androgen ablation (eligard) with partial PSA response, but patient wished to stop ADT due to side effects/negative impact on quality of life.  Involved LN treated with radiotherapy  C) PSA increased to 0.3->1.9 in Fall 2022.  PSMA PET 11/30/22 showed PSMA uptake in the prostate gland c/w local recurrence, right retrocrural LN, left superior mediastinal LN. Eligard and apalutamide started January 2023.  2. Atrial fibrillation  3. Diabetes mellitus  4. GERD  5. GRADY on CPAP  6. hyperlipidemia    Subjective     CHIEF COMPLAINT: prostate cancer    HISTORY OF PRESENT ILLNESS:   Stefan Pedraza returns for follow-up.   He continues on Eligard and apalutamide.  He was started on Ritalin 5 mg twice daily for fatigue.  The Ritalin did not make any improvement in his fatigue.  He is interested in a possible dose reduction of one of his medications to see if this would help with the fatigue.  He denies any new symptoms.        Objective      /81 Comment: LUE  Pulse 56   Temp 97.3 °F (36.3 °C) (Infrared)   Resp 20   Ht 185.4 cm (73\")   Wt 101 kg (222 lb)   SpO2 98% Comment: RA  BMI 29.29 kg/m²    Vitals:    10/31/23 0927   PainSc: 0-No pain                 ECOG score: 0             General: well appearing male in no acute distress  Neuro: alert and oriented  HEENT: sclera anicteric, oropharynx clear  Vascular: Regular rate and rhythm, no murmurs  Lungs: Clear to auscultation bilaterally  Extremeties: no lower extremity edema  Skin: no rashes, " lesions, bruising, or petechiae  Psych: mood and affect appropriate    I have reexamined the patient and the results are consistent with the previously documented exam. AYAAN Zavala        RECENT LABS:  Lab Results   Component Value Date    WBC 4.95 10/31/2023    HGB 13.2 10/31/2023    HCT 39.2 10/31/2023    MCV 97.8 (H) 10/31/2023     10/31/2023       Lab Results   Component Value Date    GLUCOSE 124 (H) 08/31/2023    BUN 17 08/31/2023    CREATININE 0.89 08/31/2023    EGFRIFNONA 89 11/16/2019    BCR 19.1 08/31/2023    K 4.4 08/31/2023    CO2 26.0 08/31/2023    CALCIUM 9.3 08/31/2023    ALBUMIN 3.9 08/31/2023    AST 22 08/31/2023    ALT 12 08/31/2023               Lab Results   Component Value Date    PSA <0.014 08/31/2023    PSA <0.014 06/29/2023    PSA <0.014 04/13/2023         ASSESSMENT AND PLAN:     Stefan Pedraza is a 83 y.o. male androgen sensitive prostate cancer, here for follow-up on treatment after starting Eligard and apalutamide.  His last PSA on 8/31/2023 was undetectable.  He would like Dr. Mack's input on possible dose reduction of his medication to see if it would help with the fatigue.  I will discuss with Dr. Mack tomorrow and let him know her input.    We will repeat imaging as needed based on symptoms or PSA increase.    Fatigue: Discussed that this is a common side effect of testosterone suppression.  Ritalin 5 mg twice daily did not help with the fatigue.  He is not interested in continuing the Ritalin or increasing the Ritalin to 10 mg twice daily.    Follow-up in 2 months with labs on return.              Eboni Avalos APRN  [facility] Hematology and Oncology    10/31/2023          CC:

## 2023-11-01 ENCOUNTER — TELEPHONE (OUTPATIENT)
Dept: ONCOLOGY | Facility: CLINIC | Age: 83
End: 2023-11-01
Payer: MEDICARE

## 2023-11-01 NOTE — TELEPHONE ENCOUNTER
Discussed with Dr. Mack patient's concerns about fatigue related to prostate cancer treatment.  Dr. Mack is concerned that the Lupron is what is causing the fatigue but unfortunately Lupron cannot be dose reduced.  She suggest we try to hold the apalutamide for approximately 2 weeks and see if patient is feeling better.  If he does feel better after holding the apalutamide we could look at dose reducing the apalutamide at that time.  Notified patient of above information.  He is going to hold the apalutamide starting today.  He will contact us in 2 weeks to let us know how he is feeling.

## 2023-11-20 ENCOUNTER — TELEPHONE (OUTPATIENT)
Dept: ONCOLOGY | Facility: CLINIC | Age: 83
End: 2023-11-20
Payer: MEDICARE

## 2023-11-20 ENCOUNTER — SPECIALTY PHARMACY (OUTPATIENT)
Dept: ONCOLOGY | Facility: HOSPITAL | Age: 83
End: 2023-11-20
Payer: MEDICARE

## 2023-11-20 DIAGNOSIS — C61 PROSTATE CANCER METASTATIC TO INTRAPELVIC LYMPH NODE: Primary | ICD-10-CM

## 2023-11-20 DIAGNOSIS — C61 PROSTATE CANCER METASTATIC TO INTRAPELVIC LYMPH NODE: ICD-10-CM

## 2023-11-20 DIAGNOSIS — C77.5 PROSTATE CANCER METASTATIC TO INTRAPELVIC LYMPH NODE: Primary | ICD-10-CM

## 2023-11-20 DIAGNOSIS — C77.5 PROSTATE CANCER METASTATIC TO INTRAPELVIC LYMPH NODE: ICD-10-CM

## 2023-11-20 NOTE — PROGRESS NOTES
Re: Refills of Oral Specialty Medication - Erleada (apalutamide)    Drug-Drug Interactions: The current medication list was reviewed and there are no relevant drug-drug interactions with the specialty medication.  Medication Allergies: The patient has no relevant allergies as it relates to their oral specialty medication  Review of Labs/Dose Adjustments: DOSE CHANGE - I reviewed the most recent note and labs. Due to significant fatigue that was interfering with his quality of life the dose is being reduced. I sent refills as described below.    Drug: Erleada (apalutamide)  Strength: 60 mg  Directions: Take 3 tablets by mouth daily  Quantity: 90  Refills: 11  Pharmacy prescription sent to: [facility]    Lana Sanabria PharmD, BCOP  Clinical Oncology Pharmacy Specialist  Phone: [phone]    11/20/2023  14:54 EST

## 2023-11-20 NOTE — TELEPHONE ENCOUNTER
Can you send a message to pharmacist regarding reduced dose of apalutamide to 180 mg daily starting tomorrow please

## 2023-11-20 NOTE — TELEPHONE ENCOUNTER
Caller: Stefan Pedraza    Relationship: Self    Best call back number: [phone]     What is the best time to reach you: ASAP    Who are you requesting to speak with (clinical staff, provider,  specific staff member): GISSELL FENG    What was the call regarding: PT IS ASKING TO SPEAK TO GISSELL FENG, HE HAS A QUESTION THAT RELATES TO MEDICATION HE IS TAKING.  PLEASE CALL PT BACK TO DISCUSS.  DID NOT WISH TO PROVIDE ADDITIONAL INFO.

## 2023-11-20 NOTE — TELEPHONE ENCOUNTER
Returned call to patient.  He has been holding his apalutamide since 11/2/2023.  He feels like his energy level has improved off the apalutamide.  Since his energy has improved off the apalutamide,  we will try a dose reduction in the apalutamide to 180 mg daily starting tomorrow.  Hopefully,  the reduced dose will help with his significant fatigue that was interfering with his quality of life at the 240 mg dose of apalutamide.  We will recheck a PSA tomorrow per patient request.

## 2023-11-21 ENCOUNTER — LAB (OUTPATIENT)
Dept: LAB | Facility: HOSPITAL | Age: 83
End: 2023-11-21
Payer: MEDICARE

## 2023-11-21 DIAGNOSIS — C61 PROSTATE CANCER METASTATIC TO INTRAPELVIC LYMPH NODE: ICD-10-CM

## 2023-11-21 DIAGNOSIS — C77.5 PROSTATE CANCER METASTATIC TO INTRAPELVIC LYMPH NODE: ICD-10-CM

## 2023-11-21 LAB
ALBUMIN SERPL-MCNC: 3.9 G/DL (ref 3.5–5.2)
ALBUMIN/GLOB SERPL: 1.3 G/DL
ALP SERPL-CCNC: 75 U/L (ref 39–117)
ALT SERPL W P-5'-P-CCNC: 11 U/L (ref 1–41)
ANION GAP SERPL CALCULATED.3IONS-SCNC: 8 MMOL/L (ref 5–15)
AST SERPL-CCNC: 30 U/L (ref 1–40)
BASOPHILS # BLD AUTO: 0.05 10*3/MM3 (ref 0–0.2)
BASOPHILS NFR BLD AUTO: 0.6 % (ref 0–1.5)
BILIRUB SERPL-MCNC: 0.6 MG/DL (ref 0–1.2)
BUN SERPL-MCNC: 15 MG/DL (ref 8–23)
BUN/CREAT SERPL: 18.1 (ref 7–25)
CALCIUM SPEC-SCNC: 9.1 MG/DL (ref 8.6–10.5)
CHLORIDE SERPL-SCNC: 104 MMOL/L (ref 98–107)
CO2 SERPL-SCNC: 26 MMOL/L (ref 22–29)
CREAT SERPL-MCNC: 0.83 MG/DL (ref 0.76–1.27)
DEPRECATED RDW RBC AUTO: 44.7 FL (ref 37–54)
EGFRCR SERPLBLD CKD-EPI 2021: 86.8 ML/MIN/1.73
EOSINOPHIL # BLD AUTO: 0.24 10*3/MM3 (ref 0–0.4)
EOSINOPHIL NFR BLD AUTO: 2.8 % (ref 0.3–6.2)
ERYTHROCYTE [DISTWIDTH] IN BLOOD BY AUTOMATED COUNT: 12.4 % (ref 12.3–15.4)
GLOBULIN UR ELPH-MCNC: 2.9 GM/DL
GLUCOSE SERPL-MCNC: 98 MG/DL (ref 65–99)
HCT VFR BLD AUTO: 39.4 % (ref 37.5–51)
HGB BLD-MCNC: 13.2 G/DL (ref 13–17.7)
IMM GRANULOCYTES # BLD AUTO: 0.04 10*3/MM3 (ref 0–0.05)
IMM GRANULOCYTES NFR BLD AUTO: 0.5 % (ref 0–0.5)
LYMPHOCYTES # BLD AUTO: 1.99 10*3/MM3 (ref 0.7–3.1)
LYMPHOCYTES NFR BLD AUTO: 23.4 % (ref 19.6–45.3)
MCH RBC QN AUTO: 33 PG (ref 26.6–33)
MCHC RBC AUTO-ENTMCNC: 33.5 G/DL (ref 31.5–35.7)
MCV RBC AUTO: 98.5 FL (ref 79–97)
MONOCYTES # BLD AUTO: 0.89 10*3/MM3 (ref 0.1–0.9)
MONOCYTES NFR BLD AUTO: 10.5 % (ref 5–12)
NEUTROPHILS NFR BLD AUTO: 5.28 10*3/MM3 (ref 1.7–7)
NEUTROPHILS NFR BLD AUTO: 62.2 % (ref 42.7–76)
NRBC BLD AUTO-RTO: 0 /100 WBC (ref 0–0.2)
PLATELET # BLD AUTO: 214 10*3/MM3 (ref 140–450)
PMV BLD AUTO: 10.2 FL (ref 6–12)
POTASSIUM SERPL-SCNC: 4.2 MMOL/L (ref 3.5–5.2)
PROT SERPL-MCNC: 6.8 G/DL (ref 6–8.5)
RBC # BLD AUTO: 4 10*6/MM3 (ref 4.14–5.8)
SODIUM SERPL-SCNC: 138 MMOL/L (ref 136–145)
WBC NRBC COR # BLD AUTO: 8.49 10*3/MM3 (ref 3.4–10.8)

## 2023-11-21 PROCEDURE — 85025 COMPLETE CBC W/AUTO DIFF WBC: CPT

## 2023-11-21 PROCEDURE — 84153 ASSAY OF PSA TOTAL: CPT

## 2023-11-21 PROCEDURE — 36415 COLL VENOUS BLD VENIPUNCTURE: CPT

## 2023-11-21 PROCEDURE — 80053 COMPREHEN METABOLIC PANEL: CPT

## 2023-11-22 LAB — PSA SERPL-MCNC: <0.014 NG/ML (ref 0–4)

## 2023-11-27 ENCOUNTER — TELEPHONE (OUTPATIENT)
Dept: CARDIOLOGY | Facility: CLINIC | Age: 83
End: 2023-11-27
Payer: MEDICARE

## 2023-11-27 NOTE — TELEPHONE ENCOUNTER
Patient currently has severe cold-like symptoms. He has an appointment with his PCP tomorrow.     Since he has been sick, he has gone out of rhythm. I advised him to take a COVID test before his appointment tomorrow.    He has not been checking his BP or HR regularly.    I transferred him to scheduling because he wants to make an appointment with [facility] to be evaluated.

## 2023-12-04 NOTE — PROGRESS NOTES
Stefan Pedraza  1940  [phone]      [facility] CARDIOLOGY       Gamaliel Eaton MD  [address] [place] [address]    Chief Complaint   Patient presents with    Follow-up     Paroxysmal atrial fibrillation       Problem List:   Paroxsymal Atrial fibrillation:   History of atrial fibrillation, initial diagnosis 2007.   Status-post successful external cardioversion, Dr. Dyer, 2007.  Initiation of amiodarone therapy with recent tapering dose, Dr. Lowe.   Echocardiogram, 02/02/2011, revealing normal LV function, diastolic dysfunction noted, mild MR, no pericardial effusion.   Holter monitor, 01/25/2011, revealing sinus rhythm with PAC/PVC/episodes of sinus bradycardia.   Chadsvasc=3.   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   Pulmonary vein isolation procedure, 10/05/2011.  BRISSA with left ventricular ejection fraction of 55%, mild TR and MR.   Event recorder with intermittent episodes of flutter but the majority of the time in sinus rhythm.   External cardioversion to normal sinus rhythm, 02/17/2012.  Atypical chest pain.  History of left heart catheterization, 1990s in [place], reported as normal coronaries.   Stress test, 08/23/2007, revealing no significant ischemia, EF estimated at 46%.  Echo 7/28/17 LV Estimated EF = 57%., mild concentric hypertrophy, Left atrial cavity size is severely dilated. Normal estimated pulmonary artery systolic pressure  External CV 7/13/17; NSR with recurrent AF three days later  Echocardiogram 7/28/17: EF 57%, mild LVH, LA severely dilated  ECV to NSR 8/31/17  Wavecrest device insertion 7/16/19  BRISSA 7/21/2020 LVEF NL, No significant leakage around device, Mild MR,TR  Diabetes mellitus.   Gastroesophageal reflux disease.  Dyslipidemia.  Osteoarthritis.  Prostate cancer - Ronda Mack MD   Remote surgical history:  Cholecystectomy.   Right  orchiectomy.  Right shoulder labral repair.  Left knee arthroscopy for partial medial meniscectomy      Allergies  Allergies   Allergen Reactions    Penicillins Hives and Rash       Current Medications    Current Outpatient Medications:     Apalutamide (ERLEADA) 60 MG tablet, Take 3 tablets by mouth Daily., Disp: 90 tablet, Rfl: 11    aspirin 81 MG EC tablet, Take 1 tablet by mouth Daily., Disp: , Rfl:     atorvastatin (LIPITOR) 10 MG tablet, Take 1 tablet by mouth Every Night., Disp: , Rfl:     B Complex Vitamins (VITAMIN B COMPLEX) capsule capsule, Take 1 capsule by mouth Daily., Disp: , Rfl:     Cholecalciferol (Vitamin D3) 25 MCG (1000 UT) capsule, Take 1 capsule by mouth Daily., Disp: , Rfl:     cyclobenzaprine (FLEXERIL) 10 MG tablet, Take 1 tablet by mouth As Needed., Disp: , Rfl:     doxycycline (VIBRAMYICN) 100 MG tablet, Take 1 tablet by mouth Every 12 (Twelve) Hours., Disp: , Rfl:     famotidine (PEPCID) 20 MG tablet, Take 1 tablet by mouth 2 (Two) Times a Day., Disp: , Rfl:     ferrous sulfate 324 (65 FE) MG tablet delayed-release EC tablet, Take 1 tablet by mouth Daily With Breakfast., Disp: , Rfl:     flecainide (TAMBOCOR) 100 MG tablet, TAKE 1 TABLET TWICE A DAY, Disp: 180 tablet, Rfl: 2    fluticasone (FLONASE) 50 MCG/ACT nasal spray, 2 sprays Daily., Disp: , Rfl:     leuprolide acetate 5 MG/ML kit, leuprolide 1 mg/0.2 mL subcutaneous solution  Inject by sub-q route., Disp: , Rfl:     metFORMIN ER (GLUCOPHAGE-XR) 500 MG 24 hr tablet, Take 2 tablets by mouth Every Night., Disp: , Rfl:     Multiple Vitamins-Minerals (OCUVITE ADULT 50+ PO), Take 1 tablet by mouth Daily., Disp: , Rfl:     Omega-3 Fatty Acids (FISH OIL) 1000 MG capsule capsule, Take 1 capsule by mouth Daily With Breakfast., Disp: , Rfl:     omeprazole (priLOSEC) 40 MG capsule, Take 1 capsule by mouth Daily., Disp: , Rfl:     triamcinolone (KENALOG) 0.1 % cream, As Needed., Disp: , Rfl:     History of Present Illness:     Pt presents for  "follow up of PAF.  Since last seen by our office he states he had upper respiratory infection.  Following this she started to feel low energy and feels weak.  He checked his pulse noticed irregular for like was back in atrial fibrillation.  His upper respiratory infectious nearly resolved at this point.  He denies any chest pain chest tightness heart failure type symptoms.  He reports his blood pressure controlled.      ROS:  General:  Denies fatigue, weight gain or loss  Cardiovascular:  Denies CP, PND, syncope, near syncope, edema or palpitations.  Pulmonary:  Denies CROW, cough, or wheezing      Vitals:    12/05/23 0846   BP: 138/88   BP Location: Right arm   Patient Position: Sitting   Pulse: 97   SpO2: 98%   Weight: 98.9 kg (218 lb)   Height: 185.4 cm (73\")       Body mass index is 28.76 kg/m².  PE:  General: NAD  Neck: no JVD, no carotid bruits, no TM  Heart IRIR, NL S1, S2, S4 present, no rubs, murmurs  Lungs: CTA, no wheezes, rhonchi, or rales  Abd: soft, non-tender, NL BS  Ext: No musculoskeletal deformities, no edema, cyanosis, or clubbing  Psych: normal mood and affect    Diagnostic Data:        ECG 12 Lead    Date/Time: 12/5/2023 10:07 AM  Performed by: Irineo Burks PA    Authorized by: Irineo Burks PA  Comparison: compared with previous ECG from 8/16/2023  Rhythm: atrial fibrillation  Rate: tachycardic  Conduction: conduction normal    Clinical impression: non-specific ECG                 1. Paroxysmal atrial fibrillation    2. Persistent atrial fibrillation            Plan:  1) PAF  Status post remote PVA in 2011  Recurrent A-fib in the setting of recent upper respiratory infection.      2) Anticoagulation  CHADSVASc = 3 status post left atrial appendage closure as part of with Wavecrest trial 7/16/2019  Continue ASA monotherapy.       Patient has recurrent symptomatic atrial fibrillation in setting of upper respiratory infection.  The patient has a Left atrial appendage device. Prior to " ECV will initiate Eliquis 5 mg twice daily and schedule cardioversion in 2 weeks.  He will hold aspirin while on Eliquis.  The risk and benefits of the procedure were explained in detail to the patient and he understands and is agreeable proceed.  Electronically signed by SHIRA Castillo, 12/05/23, 10:08 AM EST.

## 2023-12-05 ENCOUNTER — OFFICE VISIT (OUTPATIENT)
Dept: CARDIOLOGY | Facility: CLINIC | Age: 83
End: 2023-12-05
Payer: MEDICARE

## 2023-12-05 VITALS
OXYGEN SATURATION: 98 % | WEIGHT: 218 LBS | BODY MASS INDEX: 28.89 KG/M2 | DIASTOLIC BLOOD PRESSURE: 88 MMHG | HEART RATE: 97 BPM | HEIGHT: 73 IN | SYSTOLIC BLOOD PRESSURE: 138 MMHG

## 2023-12-05 DIAGNOSIS — I48.0 PAROXYSMAL ATRIAL FIBRILLATION: Primary | ICD-10-CM

## 2023-12-05 DIAGNOSIS — I48.19 PERSISTENT ATRIAL FIBRILLATION: ICD-10-CM

## 2023-12-05 RX ORDER — FLUTICASONE PROPIONATE 50 MCG
2 SPRAY, SUSPENSION (ML) NASAL DAILY
COMMUNITY
Start: 2023-11-28

## 2023-12-05 RX ORDER — DOXYCYCLINE HYCLATE 100 MG
100 TABLET ORAL EVERY 12 HOURS SCHEDULED
COMMUNITY
Start: 2023-11-28

## 2023-12-19 ENCOUNTER — HOSPITAL ENCOUNTER (OUTPATIENT)
Dept: CARDIOLOGY | Facility: HOSPITAL | Age: 83
Discharge: HOME | End: 2023-12-19
Attending: INTERNAL MEDICINE | Admitting: INTERNAL MEDICINE
Payer: MEDICARE

## 2023-12-19 VITALS
DIASTOLIC BLOOD PRESSURE: 80 MMHG | TEMPERATURE: 97.4 F | HEIGHT: 73 IN | BODY MASS INDEX: 29.26 KG/M2 | SYSTOLIC BLOOD PRESSURE: 137 MMHG | RESPIRATION RATE: 14 BRPM | WEIGHT: 220.8 LBS | OXYGEN SATURATION: 96 % | HEART RATE: 56 BPM

## 2023-12-19 DIAGNOSIS — I48.0 PAROXYSMAL ATRIAL FIBRILLATION: ICD-10-CM

## 2023-12-19 DIAGNOSIS — I48.19 PERSISTENT ATRIAL FIBRILLATION: ICD-10-CM

## 2023-12-19 LAB
ANION GAP SERPL CALCULATED.3IONS-SCNC: 12 MMOL/L (ref 5–15)
BUN BLDA-MCNC: 12 MG/DL (ref 8–26)
BUN SERPL-MCNC: 13 MG/DL (ref 8–23)
BUN/CREAT SERPL: 17.6 (ref 7–25)
CA-I BLDA-SCNC: 1.24 MMOL/L (ref 1.2–1.32)
CALCIUM SPEC-SCNC: 9 MG/DL (ref 8.6–10.5)
CHLORIDE BLDA-SCNC: 104 MMOL/L (ref 98–109)
CHLORIDE SERPL-SCNC: 105 MMOL/L (ref 98–107)
CO2 BLDA-SCNC: 24 MMOL/L (ref 24–29)
CO2 SERPL-SCNC: 23 MMOL/L (ref 22–29)
CREAT BLDA-MCNC: 0.6 MG/DL (ref 0.6–1.3)
CREAT SERPL-MCNC: 0.74 MG/DL (ref 0.76–1.27)
DEPRECATED RDW RBC AUTO: 43.5 FL (ref 37–54)
EGFRCR SERPLBLD CKD-EPI 2021: 89.9 ML/MIN/1.73
EGFRCR SERPLBLD CKD-EPI 2021: 95.8 ML/MIN/1.73
ERYTHROCYTE [DISTWIDTH] IN BLOOD BY AUTOMATED COUNT: 12.6 % (ref 12.3–15.4)
GLUCOSE BLDC GLUCOMTR-MCNC: 103 MG/DL (ref 70–130)
GLUCOSE SERPL-MCNC: 102 MG/DL (ref 65–99)
HCT VFR BLD AUTO: 37.2 % (ref 37.5–51)
HCT VFR BLDA CALC: 36 % (ref 38–51)
HGB BLD-MCNC: 12.7 G/DL (ref 13–17.7)
HGB BLDA-MCNC: 12.2 G/DL (ref 12–17)
MCH RBC QN AUTO: 32.4 PG (ref 26.6–33)
MCHC RBC AUTO-ENTMCNC: 34.1 G/DL (ref 31.5–35.7)
MCV RBC AUTO: 94.9 FL (ref 79–97)
PLATELET # BLD AUTO: 205 10*3/MM3 (ref 140–450)
PMV BLD AUTO: 9.6 FL (ref 6–12)
POTASSIUM BLDA-SCNC: 3.7 MMOL/L (ref 3.5–4.9)
POTASSIUM SERPL-SCNC: 3.9 MMOL/L (ref 3.5–5.2)
RBC # BLD AUTO: 3.92 10*6/MM3 (ref 4.14–5.8)
SODIUM BLD-SCNC: 140 MMOL/L (ref 138–146)
SODIUM SERPL-SCNC: 140 MMOL/L (ref 136–145)
WBC NRBC COR # BLD AUTO: 4.79 10*3/MM3 (ref 3.4–10.8)

## 2023-12-19 PROCEDURE — 92960 CARDIOVERSION ELECTRIC EXT: CPT | Performed by: INTERNAL MEDICINE

## 2023-12-19 PROCEDURE — 36415 COLL VENOUS BLD VENIPUNCTURE: CPT

## 2023-12-19 PROCEDURE — 93010 ELECTROCARDIOGRAM REPORT: CPT | Performed by: INTERNAL MEDICINE

## 2023-12-19 PROCEDURE — 85014 HEMATOCRIT: CPT

## 2023-12-19 PROCEDURE — 80048 BASIC METABOLIC PNL TOTAL CA: CPT | Performed by: INTERNAL MEDICINE

## 2023-12-19 PROCEDURE — 80047 BASIC METABLC PNL IONIZED CA: CPT

## 2023-12-19 PROCEDURE — 85027 COMPLETE CBC AUTOMATED: CPT | Performed by: INTERNAL MEDICINE

## 2023-12-19 PROCEDURE — 25010000002 MIDAZOLAM PER 1 MG: Performed by: INTERNAL MEDICINE

## 2023-12-19 PROCEDURE — 92960 CARDIOVERSION ELECTRIC EXT: CPT

## 2023-12-19 PROCEDURE — 93005 ELECTROCARDIOGRAM TRACING: CPT | Performed by: INTERNAL MEDICINE

## 2023-12-19 RX ORDER — NALOXONE HCL 0.4 MG/ML
0.4 VIAL (ML) INJECTION ONCE AS NEEDED
Status: DISCONTINUED | OUTPATIENT
Start: 2023-12-19 | End: 2023-12-19 | Stop reason: HOSPADM

## 2023-12-19 RX ORDER — ETOMIDATE 2 MG/ML
0.3 INJECTION INTRAVENOUS ONCE
Status: DISCONTINUED | OUTPATIENT
Start: 2023-12-19 | End: 2023-12-19 | Stop reason: HOSPADM

## 2023-12-19 RX ORDER — MIDAZOLAM HYDROCHLORIDE 1 MG/ML
2-8 INJECTION INTRAMUSCULAR; INTRAVENOUS ONCE AS NEEDED
Status: DISCONTINUED | OUTPATIENT
Start: 2023-12-19 | End: 2023-12-19 | Stop reason: HOSPADM

## 2023-12-19 RX ORDER — MIDAZOLAM HYDROCHLORIDE 1 MG/ML
INJECTION INTRAMUSCULAR; INTRAVENOUS
Status: COMPLETED | OUTPATIENT
Start: 2023-12-19 | End: 2023-12-19

## 2023-12-19 RX ORDER — FLUMAZENIL 0.1 MG/ML
0.5 INJECTION INTRAVENOUS ONCE AS NEEDED
Status: DISCONTINUED | OUTPATIENT
Start: 2023-12-19 | End: 2023-12-19 | Stop reason: HOSPADM

## 2023-12-19 RX ADMIN — MIDAZOLAM HYDROCHLORIDE 1 MG: 1 INJECTION, SOLUTION INTRAMUSCULAR; INTRAVENOUS at 08:12

## 2023-12-19 RX ADMIN — METHOHEXITAL SODIUM 20 MG: 500 INJECTION, POWDER, LYOPHILIZED, FOR SOLUTION INTRAMUSCULAR; INTRAVENOUS; RECTAL at 08:12

## 2023-12-19 NOTE — H&P
Cardiology H&P     Stefan Pedraza  1940  [phone]  There is no work phone number on file.    12/19/23    DATE OF ADMISSION: 12/19/2023  [facility] Gamaliel Wheat MD  [address]  / [place] [address]  Referring Provider: Irineo Burks PA     CC: AFIB     Problem List:   Paroxsymal Atrial fibrillation:   History of atrial fibrillation, initial diagnosis 2007.   Status-post successful external cardioversion, Dr. Dyer, 2007.  Initiation of amiodarone therapy with recent tapering dose, Dr. Lowe.   Echocardiogram, 02/02/2011, revealing normal LV function, diastolic dysfunction noted, mild MR, no pericardial effusion.   Holter monitor, 01/25/2011, revealing sinus rhythm with PAC/PVC/episodes of sinus bradycardia.   Chadsvasc=3.   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   Pulmonary vein isolation procedure, 10/05/2011.  BRISSA with left ventricular ejection fraction of 55%, mild TR and MR.   Event recorder with intermittent episodes of flutter but the majority of the time in sinus rhythm.   External cardioversion to normal sinus rhythm, 02/17/2012.  Atypical chest pain.  History of left heart catheterization, 1990s in [place], reported as normal coronaries.   Stress test, 08/23/2007, revealing no significant ischemia, EF estimated at 46%.  Echo 7/28/17 LV Estimated EF = 57%., mild concentric hypertrophy, Left atrial cavity size is severely dilated. Normal estimated pulmonary artery systolic pressure  External CV 7/13/17; NSR with recurrent AF three days later  Echocardiogram 7/28/17: EF 57%, mild LVH, LA severely dilated  ECV to NSR 8/31/17  Wavecrest device insertion 7/16/19  BRISSA 7/21/2020 LVEF NL, No significant leakage around device, Mild MR,TR  Diabetes mellitus.   Gastroesophageal reflux disease.  Dyslipidemia.  Osteoarthritis.  Prostate cancer - Ronda Mack MD    Remote surgical history:  Cholecystectomy.   Right orchiectomy.  Right shoulder labral repair.  Left knee arthroscopy for partial medial meniscectomy      History of Present Illness:   Stefan Pedraza is an 83-year-old male with above-stated past medical history who presents today for external cardioversion due to recurrent persistent atrial fibrillation.  He states he recently had an upper respiratory infection around Thanksgiving and stated after this he started to feel low energy and weakness.  He checked his pulse and noticed it felt irregular.  He has been feeling short of breath and low energy.  He has been on Eliquis for the past 2 weeks in anticipation of the cardioversion today.  Of note he does have a left atrial appendage closure device (wave Crest trial).  He denies any recent bleeding issues or strokelike symptoms.      Allergies   Allergen Reactions    Penicillins Hives and Rash       Prior to Admission Medications       Prescriptions Last Dose Informant Patient Reported? Taking?    Apalutamide (ERLEADA) 60 MG tablet 12/19/2023  No Yes    Take 3 tablets by mouth Daily.    aspirin 81 MG EC tablet 12/18/2023  Yes Yes    Take 1 tablet by mouth Daily.    atorvastatin (LIPITOR) 10 MG tablet 12/18/2023 Self Yes Yes    Take 1 tablet by mouth Every Night.    B Complex Vitamins (VITAMIN B COMPLEX) capsule capsule 12/19/2023  Yes Yes    Take 1 capsule by mouth Daily.    Cholecalciferol (Vitamin D3) 25 MCG (1000 UT) capsule 12/19/2023  Yes Yes    Take 1 capsule by mouth Daily.    cyclobenzaprine (FLEXERIL) 10 MG tablet Past Month Self Yes Yes    Take 1 tablet by mouth As Needed.    famotidine (PEPCID) 20 MG tablet 12/19/2023  Yes Yes    Take 1 tablet by mouth 2 (Two) Times a Day.    ferrous sulfate 324 (65 FE) MG tablet delayed-release EC tablet 12/19/2023 Self Yes Yes    Take 1 tablet by mouth Daily With Breakfast.    flecainide (TAMBOCOR) 100 MG tablet 12/19/2023  No Yes    TAKE 1 TABLET TWICE A DAY     leuprolide acetate 5 MG/ML kit More than a month  Yes No    Inject 1 mL under the skin into the appropriate area as directed Every 6 (Six) Months.    metFORMIN ER (GLUCOPHAGE-XR) 500 MG 24 hr tablet 12/18/2023 Self Yes Yes    Take 2 tablets by mouth Every Night.    Multiple Vitamins-Minerals (OCUVITE ADULT 50+ PO) 12/19/2023  Yes Yes    Take 1 tablet by mouth Daily.    Omega-3 Fatty Acids (FISH OIL) 1000 MG capsule capsule 12/19/2023 Self Yes Yes    Take 1 capsule by mouth Daily With Breakfast.    omeprazole (priLOSEC) 40 MG capsule 12/19/2023  Yes Yes    Take 1 capsule by mouth Daily.    triamcinolone (KENALOG) 0.1 % cream More than a month  Yes No    As Needed.            No current facility-administered medications for this encounter.    Current Outpatient Medications:     Apalutamide (ERLEADA) 60 MG tablet, Take 3 tablets by mouth Daily., Disp: 90 tablet, Rfl: 11    aspirin 81 MG EC tablet, Take 1 tablet by mouth Daily., Disp: , Rfl:     atorvastatin (LIPITOR) 10 MG tablet, Take 1 tablet by mouth Every Night., Disp: , Rfl:     B Complex Vitamins (VITAMIN B COMPLEX) capsule capsule, Take 1 capsule by mouth Daily., Disp: , Rfl:     Cholecalciferol (Vitamin D3) 25 MCG (1000 UT) capsule, Take 1 capsule by mouth Daily., Disp: , Rfl:     cyclobenzaprine (FLEXERIL) 10 MG tablet, Take 1 tablet by mouth As Needed., Disp: , Rfl:     famotidine (PEPCID) 20 MG tablet, Take 1 tablet by mouth 2 (Two) Times a Day., Disp: , Rfl:     ferrous sulfate 324 (65 FE) MG tablet delayed-release EC tablet, Take 1 tablet by mouth Daily With Breakfast., Disp: , Rfl:     flecainide (TAMBOCOR) 100 MG tablet, TAKE 1 TABLET TWICE A DAY, Disp: 180 tablet, Rfl: 2    metFORMIN ER (GLUCOPHAGE-XR) 500 MG 24 hr tablet, Take 2 tablets by mouth Every Night., Disp: , Rfl:     Multiple Vitamins-Minerals (OCUVITE ADULT 50+ PO), Take 1 tablet by mouth Daily., Disp: , Rfl:     Omega-3 Fatty Acids (FISH OIL) 1000 MG capsule capsule, Take 1 capsule by mouth  Daily With Breakfast., Disp: , Rfl:     omeprazole (priLOSEC) 40 MG capsule, Take 1 capsule by mouth Daily., Disp: , Rfl:     leuprolide acetate 5 MG/ML kit, Inject 1 mL under the skin into the appropriate area as directed Every 6 (Six) Months., Disp: , Rfl:     triamcinolone (KENALOG) 0.1 % cream, As Needed., Disp: , Rfl:     Social History     Socioeconomic History    Marital status:    Tobacco Use    Smoking status: Never    Smokeless tobacco: Never   Substance and Sexual Activity    Alcohol use: No    Drug use: No    Sexual activity: Defer     Comment:        Family History   Problem Relation Age of Onset    Arthritis Mother     Pneumonia Mother     Atrial fibrillation Father     Hypertension Father     Diabetes Father     Heart disease Father     No Known Problems Sister     No Known Problems Maternal Grandmother     Diabetes Maternal Grandfather     No Known Problems Paternal Grandmother     No Known Problems Paternal Grandfather        REVIEW OF SYSTEMS:   CONSTITUTIONAL:         No weight loss, fever, chills, weakness + fatigue.   HEENT:                            No visual loss, blurred vision, double vision, yellow sclerae.                                             No hearing loss, congestion, sore throat.   SKIN:                                No rashes, urticaria, ulcers, sores.     RESPIRATORY:               + shortness of breath, - hemoptysis, cough, sputum.   GI:                                     No anorexia, nausea, vomiting, diarrhea. No abdominal pain, melena.   :                                   No burning on urination, hematuria or increased frequency.  ENDOCRINE:                   No diaphoresis, cold or heat intolerance. No polyuria or polydipsia.   NEURO:                            No headache, dizziness, syncope, paralysis, ataxia, or parasthesias.                                            No change in bowel or bladder control. No history of CVA/TIA  MUSCULOSKELETAL:     No muscle, back pain, joint pain or stiffness.   HEMATOLOGY:               No anemia, bleeding, bruising. No history of DVT/PE.  PSYCH:                            No history of depression, anxiety    Vitals:    12/19/23 0816 12/19/23 0820 12/19/23 0824 12/19/23 0828   BP: (!) 156/102 126/80 130/83 137/80   BP Location:       Patient Position:       Pulse: 55 59 57 56   Resp: 14 14 14 14   Temp:       TempSrc:       SpO2: 94% 95% 94% 96%   Weight:       Height:             Vital Sign Min/Max for last 24 hours  Temp  Min: 97.4 °F (36.3 °C)  Max: 97.4 °F (36.3 °C)   BP  Min: 126/80  Max: 156/102   Pulse  Min: 55  Max: 96   Resp  Min: 14  Max: 14   SpO2  Min: 94 %  Max: 98 %   No data recorded    No intake or output data in the 24 hours ending 12/19/23 1215          Physical Exam:  GEN: Well nourished, Well- developed  No acute distress  HEENT: Normocephalic, Atraumatic, PERRLA, moist mucous membranes  NECK: supple, NO JVD, no thyromegaly, no lymphadenopathy  CARDIAC: S1S2 , irr, irr, no murmur, gallop, rub  LUNGS: Clear to ausculation, normal respiratory effort  ABDOMEN: Soft, nontender, normal bowel sounds  EXTREMITIES:No gross deformities,  No clubbing, cyanosis, or edema  SKIN: Warm, dry  NEURO: No focal deficits  PSYCHIATRIC: Normal affect and mood      I personally viewed and interpreted the patient's EKG/Telemetry/lab data    Data:   Results from last 7 days   Lab Units 12/19/23  0738 12/19/23  0737   WBC 10*3/mm3 4.79  --    HEMOGLOBIN g/dL 12.7*  --    HEMOGLOBIN, POC g/dL  --  12.2   HEMATOCRIT % 37.2*  --    HEMATOCRIT POC %  --  36*   PLATELETS 10*3/mm3 205  --      Results from last 7 days   Lab Units 12/19/23  0738 12/19/23  0737   SODIUM mmol/L 140  --    POTASSIUM mmol/L 3.9  --    CHLORIDE mmol/L 105  --    CO2 mmol/L 23.0  --    BUN mg/dL 13  --    CREATININE mg/dL 0.74* 0.60   GLUCOSE mg/dL 102*  --                                   No intake or output data in the 24 hours ending 12/19/23  1215          Telemetry: Atrial fibrillation          Assessment and Plan:   1) persistent atrial fibrillation  Status post remote PVA in 2011, on flecainide 100 mg twice daily  Recurrent A-fib in the setting of recent upper respiratory infection.  He will undergo external cardioversion today with Dr. Solis. The risks, benefits, and alternatives of the procedure have been reviewed and the patient wishes to proceed.        2) Anticoagulation  CHADSVASc = 3 status post left atrial appendage closure as part of with Wavecrest trial 7/16/2019  -Started on Eliquis 2 weeks ago, plan to continue for 2 to 3 weeks after ECV and then back to aspirin monotherapy          Electronically signed by SHIRA Gaona, 12/19/23, 7:57 AM EST.

## 2023-12-22 LAB
QT INTERVAL: 510 MS
QTC INTERVAL: 500 MS

## 2024-01-04 ENCOUNTER — SPECIALTY PHARMACY (OUTPATIENT)
Dept: ONCOLOGY | Facility: HOSPITAL | Age: 84
End: 2024-01-04
Payer: MEDICARE

## 2024-01-04 ENCOUNTER — OFFICE VISIT (OUTPATIENT)
Dept: ONCOLOGY | Facility: CLINIC | Age: 84
End: 2024-01-04
Payer: MEDICARE

## 2024-01-04 VITALS
HEART RATE: 50 BPM | TEMPERATURE: 98.1 F | RESPIRATION RATE: 16 BRPM | SYSTOLIC BLOOD PRESSURE: 146 MMHG | DIASTOLIC BLOOD PRESSURE: 72 MMHG | HEIGHT: 73 IN | WEIGHT: 221 LBS | BODY MASS INDEX: 29.29 KG/M2 | OXYGEN SATURATION: 96 %

## 2024-01-04 DIAGNOSIS — C77.5 PROSTATE CANCER METASTATIC TO INTRAPELVIC LYMPH NODE: Primary | ICD-10-CM

## 2024-01-04 DIAGNOSIS — C61 PROSTATE CANCER METASTATIC TO INTRAPELVIC LYMPH NODE: Primary | ICD-10-CM

## 2024-01-04 NOTE — PROGRESS NOTES
"      PROBLEM LIST:  1. Metastatic prostate cancer  A) diagnosed 6/2018 with janis 4+3=7 prostate adenocarcinoma.  Stage at diagnosis [id number] (stage IIC).  Pretreatment PSA 5.3.  Treated with SBRT, completed 10/2018.    B) March 2020 found to have rising PSA (10.08 on 3/2/20).  Axumin PET/CT 3/31/20 showed abnormal activity in the left internal iliac, left pelvic sidewall nodes, periaortic adenopathy, bone marrow activity, left superficial inguinal nodes.  Started androgen ablation (eligard) with partial PSA response, but patient wished to stop ADT due to side effects/negative impact on quality of life.  Involved LN treated with radiotherapy  C) PSA increased to 0.3->1.9 in Fall 2022.  PSMA PET 11/30/22 showed PSMA uptake in the prostate gland c/w local recurrence, right retrocrural LN, left superior mediastinal LN. Eligard and apalutamide started January 2023.  2. Atrial fibrillation  3. Diabetes mellitus  4. GERD  5. GRADY on CPAP  6. hyperlipidemia    Subjective     CHIEF COMPLAINT: prostate cancer    HISTORY OF PRESENT ILLNESS:   Stefan Pedraza returns for follow-up.   He continues on Eligard and apalutamide.  He continues to have significant fatigue.  He took a short break from the apalutamide and then started back on a reduced dose but he says he cannot notice any appreciable difference with this.        Objective      /72 Comment: LUE  Pulse 50   Temp 98.1 °F (36.7 °C) (Temporal)   Resp 16   Ht 185.4 cm (72.99\")   Wt 100 kg (221 lb)   SpO2 96%   BMI 29.16 kg/m²    Vitals:    01/04/24 1016   PainSc: 0-No pain                 ECOG score: 0             General: well appearing male in no acute distress  Neuro: alert and oriented  HEENT: sclera anicteric, oropharynx clear  CV: Regular rate and rhythm, no murmurs  Lungs: Clear to auscultation bilaterally  Extremeties: no lower extremity edema  Skin: no rashes, lesions, bruising, or petechiae  Psych: mood and affect appropriate          RECENT " LABS:  Lab Results   Component Value Date    WBC 4.79 12/19/2023    HGB 12.7 (L) 12/19/2023    HCT 37.2 (L) 12/19/2023    MCV 94.9 12/19/2023     12/19/2023       Lab Results   Component Value Date    GLUCOSE 102 (H) 12/19/2023    BUN 13 12/19/2023    CREATININE 0.74 (L) 12/19/2023    EGFRIFNONA 89 11/16/2019    BCR 17.6 12/19/2023    K 3.9 12/19/2023    CO2 23.0 12/19/2023    CALCIUM 9.0 12/19/2023    ALBUMIN 3.9 11/21/2023    AST 30 11/21/2023    ALT 11 11/21/2023               Lab Results   Component Value Date    PSA <0.014 11/21/2023    PSA <0.014 10/31/2023    PSA <0.014 08/31/2023         ASSESSMENT AND PLAN:     Stefan Pedraza is a 83 y.o. male androgen sensitive prostate cancer, here for follow-up on Eligard and apalutamide.  PSA has remained undetectable on treatment.    However he continues to have significant fatigue.  I suspect this is mostly related to the Lupron and not the apalutamide.  We spent time discussing options.  He does appear to have fairly indolent disease and I think a treatment break is reasonable.  We discussed that there might be some decrease in overall disease control in the long-term, but there is the potential that he has improved quality of life and may be able to remain off treatment for many months if not longer.  He would like to proceed with a treatment holiday.    We will repeat imaging as needed based on symptoms or PSA increase.  May consider routine every 6 month imaging if PSA becomes detectable.    Follow-up in 2 months with labs on return.      Total time of patient care on day of service including time prior to, face to face with patient, and following visit spent in reviewing records, lab results, , discussion with patient, and documentation/charting was > 40 minutes.          Ronda Mack MD  [facility]    1/4/2024          CC:

## 2024-01-22 NOTE — PROGRESS NOTES
I completed an independent review of the medication order and prescription. I agree with Dr. Delgado' assessment and what is in her note.  I confirm she sent the reduced dose of apalutamide 180 mg/day prescription to [facility] as described.      Andra Hernandez, PharmD, [facility] - Oncology Clinical Pharmacist [phone]    11/21/2023  16:01 EST     PATIENT STATES THAT LIDOCANE PATCHES NEEDS A PRIOR AUTHORIZATION.    PATIENT ONLY HAS ONE LEFT.

## 2024-03-14 ENCOUNTER — TELEPHONE (OUTPATIENT)
Dept: ONCOLOGY | Facility: CLINIC | Age: 84
End: 2024-03-14

## 2024-03-14 ENCOUNTER — OFFICE VISIT (OUTPATIENT)
Dept: ONCOLOGY | Facility: CLINIC | Age: 84
End: 2024-03-14
Payer: MEDICARE

## 2024-03-14 ENCOUNTER — LAB (OUTPATIENT)
Dept: LAB | Facility: HOSPITAL | Age: 84
End: 2024-03-14
Payer: MEDICARE

## 2024-03-14 ENCOUNTER — SPECIALTY PHARMACY (OUTPATIENT)
Dept: ONCOLOGY | Facility: HOSPITAL | Age: 84
End: 2024-03-14
Payer: MEDICARE

## 2024-03-14 VITALS
DIASTOLIC BLOOD PRESSURE: 78 MMHG | HEIGHT: 73 IN | RESPIRATION RATE: 18 BRPM | WEIGHT: 222 LBS | BODY MASS INDEX: 29.42 KG/M2 | SYSTOLIC BLOOD PRESSURE: 137 MMHG | OXYGEN SATURATION: 95 % | TEMPERATURE: 97.1 F | HEART RATE: 66 BPM

## 2024-03-14 DIAGNOSIS — C77.5 PROSTATE CANCER METASTATIC TO INTRAPELVIC LYMPH NODE: ICD-10-CM

## 2024-03-14 DIAGNOSIS — C61 PROSTATE CANCER METASTATIC TO INTRAPELVIC LYMPH NODE: Primary | ICD-10-CM

## 2024-03-14 DIAGNOSIS — C77.5 PROSTATE CANCER METASTATIC TO INTRAPELVIC LYMPH NODE: Primary | ICD-10-CM

## 2024-03-14 DIAGNOSIS — C61 PROSTATE CANCER METASTATIC TO INTRAPELVIC LYMPH NODE: ICD-10-CM

## 2024-03-14 LAB
ALBUMIN SERPL-MCNC: 4.3 G/DL (ref 3.5–5.2)
ALBUMIN/GLOB SERPL: 1.5 G/DL
ALP SERPL-CCNC: 68 U/L (ref 39–117)
ALT SERPL W P-5'-P-CCNC: 13 U/L (ref 1–41)
ANION GAP SERPL CALCULATED.3IONS-SCNC: 7 MMOL/L (ref 5–15)
AST SERPL-CCNC: 16 U/L (ref 1–40)
BASOPHILS # BLD AUTO: 0.03 10*3/MM3 (ref 0–0.2)
BASOPHILS NFR BLD AUTO: 0.6 % (ref 0–1.5)
BILIRUB SERPL-MCNC: 0.4 MG/DL (ref 0–1.2)
BUN SERPL-MCNC: 18 MG/DL (ref 8–23)
BUN/CREAT SERPL: 24 (ref 7–25)
CALCIUM SPEC-SCNC: 9.3 MG/DL (ref 8.6–10.5)
CHLORIDE SERPL-SCNC: 102 MMOL/L (ref 98–107)
CO2 SERPL-SCNC: 29 MMOL/L (ref 22–29)
CREAT SERPL-MCNC: 0.75 MG/DL (ref 0.76–1.27)
DEPRECATED RDW RBC AUTO: 42.9 FL (ref 37–54)
EGFRCR SERPLBLD CKD-EPI 2021: 89.5 ML/MIN/1.73
EOSINOPHIL # BLD AUTO: 0.24 10*3/MM3 (ref 0–0.4)
EOSINOPHIL NFR BLD AUTO: 4.5 % (ref 0.3–6.2)
ERYTHROCYTE [DISTWIDTH] IN BLOOD BY AUTOMATED COUNT: 12.3 % (ref 12.3–15.4)
GLOBULIN UR ELPH-MCNC: 2.9 GM/DL
GLUCOSE SERPL-MCNC: 118 MG/DL (ref 65–99)
HCT VFR BLD AUTO: 38.3 % (ref 37.5–51)
HGB BLD-MCNC: 13 G/DL (ref 13–17.7)
IMM GRANULOCYTES # BLD AUTO: 0.02 10*3/MM3 (ref 0–0.05)
IMM GRANULOCYTES NFR BLD AUTO: 0.4 % (ref 0–0.5)
LYMPHOCYTES # BLD AUTO: 1.81 10*3/MM3 (ref 0.7–3.1)
LYMPHOCYTES NFR BLD AUTO: 33.6 % (ref 19.6–45.3)
MCH RBC QN AUTO: 32.1 PG (ref 26.6–33)
MCHC RBC AUTO-ENTMCNC: 33.9 G/DL (ref 31.5–35.7)
MCV RBC AUTO: 94.6 FL (ref 79–97)
MONOCYTES # BLD AUTO: 0.57 10*3/MM3 (ref 0.1–0.9)
MONOCYTES NFR BLD AUTO: 10.6 % (ref 5–12)
NEUTROPHILS NFR BLD AUTO: 2.72 10*3/MM3 (ref 1.7–7)
NEUTROPHILS NFR BLD AUTO: 50.3 % (ref 42.7–76)
PLATELET # BLD AUTO: 193 10*3/MM3 (ref 140–450)
PMV BLD AUTO: 8.9 FL (ref 6–12)
POTASSIUM SERPL-SCNC: 4.5 MMOL/L (ref 3.5–5.2)
PROT SERPL-MCNC: 7.2 G/DL (ref 6–8.5)
PSA SERPL-MCNC: <0.014 NG/ML (ref 0–4)
RBC # BLD AUTO: 4.05 10*6/MM3 (ref 4.14–5.8)
SODIUM SERPL-SCNC: 138 MMOL/L (ref 136–145)
WBC NRBC COR # BLD AUTO: 5.39 10*3/MM3 (ref 3.4–10.8)

## 2024-03-14 PROCEDURE — 85025 COMPLETE CBC W/AUTO DIFF WBC: CPT

## 2024-03-14 PROCEDURE — 80053 COMPREHEN METABOLIC PANEL: CPT

## 2024-03-14 PROCEDURE — 84153 ASSAY OF PSA TOTAL: CPT

## 2024-03-14 PROCEDURE — 99214 OFFICE O/P EST MOD 30 MIN: CPT | Performed by: INTERNAL MEDICINE

## 2024-03-14 PROCEDURE — 36415 COLL VENOUS BLD VENIPUNCTURE: CPT

## 2024-03-14 PROCEDURE — 1126F AMNT PAIN NOTED NONE PRSNT: CPT | Performed by: INTERNAL MEDICINE

## 2024-03-14 NOTE — TELEPHONE ENCOUNTER
Per Dr. Mack, I called patient to let him know that the psa level is still undetectable so we will recheck at his appt in May.  Patient verbalized understanding.

## 2024-03-14 NOTE — PROGRESS NOTES
"      PROBLEM LIST:  1. Metastatic prostate cancer  A) diagnosed 6/2018 with janis 4+3=7 prostate adenocarcinoma.  Stage at diagnosis [id number] (stage IIC).  Pretreatment PSA 5.3.  Treated with SBRT, completed 10/2018.    B) March 2020 found to have rising PSA (10.08 on 3/2/20).  Axumin PET/CT 3/31/20 showed abnormal activity in the left internal iliac, left pelvic sidewall nodes, periaortic adenopathy, bone marrow activity, left superficial inguinal nodes.  Started androgen ablation (eligard) with partial PSA response, but patient wished to stop ADT due to side effects/negative impact on quality of life.  Involved LN treated with radiotherapy  C) PSA increased to 0.3->1.9 in Fall 2022.  PSMA PET 11/30/22 showed PSMA uptake in the prostate gland c/w local recurrence, right retrocrural LN, left superior mediastinal LN. Eligard and apalutamide started January 2023.  Treatment break due to toxicity/quality of life concerns initiated Jan 2024.  2. Atrial fibrillation  3. Diabetes mellitus  4. GERD  5. GRADY on CPAP  6. hyperlipidemia    Subjective     CHIEF COMPLAINT: prostate cancer    HISTORY OF PRESENT ILLNESS:   Stefan Pedraza returns for follow-up.   He reports that since stopping androgen deprivation therapy he does notice an improvement in his energy and his motivation.  No new complaints or concerns.      Objective      /78   Pulse 66   Temp 97.1 °F (36.2 °C)   Resp 18   Ht 185.4 cm (72.99\")   Wt 101 kg (222 lb)   SpO2 95%   BMI 29.30 kg/m²    Vitals:    03/14/24 1019   PainSc: 0-No pain                 ECOG score: 0             General: well appearing male in no acute distress  Neuro: alert and oriented  HEENT: sclera anicteric, oropharynx clear  Skin: no rashes, lesions, bruising, or petechiae  Psych: mood and affect appropriate          RECENT LABS:  Lab Results   Component Value Date    WBC 5.39 03/14/2024    HGB 13.0 03/14/2024    HCT 38.3 03/14/2024    MCV 94.6 03/14/2024     " 03/14/2024       Lab Results   Component Value Date    GLUCOSE 102 (H) 12/19/2023    BUN 13 12/19/2023    CREATININE 0.74 (L) 12/19/2023    EGFRIFNONA 89 11/16/2019    BCR 17.6 12/19/2023    K 3.9 12/19/2023    CO2 23.0 12/19/2023    CALCIUM 9.0 12/19/2023    ALBUMIN 3.9 11/21/2023    AST 30 11/21/2023    ALT 11 11/21/2023               Lab Results   Component Value Date    PSA <0.014 11/21/2023    PSA <0.014 10/31/2023    PSA <0.014 08/31/2023         ASSESSMENT AND PLAN:     Stefan Pedraza is a 83 y.o. male androgen sensitive prostate cancer, here for follow-up.  He had a undetectable PSA on Eligard and apalutamide, but opted for a treatment holiday starting January 2024 due to severe fatigue.    His symptoms have improved.  PSA is pending today.  We will plan to check it every 2 months, and consider imaging if his PSA becomes detectable at a significant level.  I would likely recommend a PSMA PET for imaging initially.  We discussed that resuming treatment will be a decision based on the trend of his PSA, imaging findings, and how he is feeling overall.    Follow-up in 2 months.          Total time of patient care on day of service including time prior to, face to face with patient, and following visit spent in reviewing records, lab results, , discussion with patient, and documentation/charting was > 32 minutes.          Ronda Mack MD  [facility]    3/14/2024          CC:

## 2024-03-27 ENCOUNTER — OFFICE VISIT (OUTPATIENT)
Dept: CARDIOLOGY | Facility: CLINIC | Age: 84
End: 2024-03-27
Payer: MEDICARE

## 2024-03-27 VITALS
BODY MASS INDEX: 31.5 KG/M2 | WEIGHT: 225 LBS | SYSTOLIC BLOOD PRESSURE: 116 MMHG | HEART RATE: 55 BPM | OXYGEN SATURATION: 95 % | HEIGHT: 71 IN | DIASTOLIC BLOOD PRESSURE: 72 MMHG

## 2024-03-27 DIAGNOSIS — I48.0 PAROXYSMAL ATRIAL FIBRILLATION: Primary | ICD-10-CM

## 2024-03-27 NOTE — PROGRESS NOTES
Stefan Pedraza  1940  [phone]      [facility] CARDIOLOGY       Gamaliel Eaton MD  [address] [place] [address]    Chief Complaint   Patient presents with    Paroxysmal atrial fibrillation       Problem List:   Paroxsymal Atrial fibrillation:   History of atrial fibrillation, initial diagnosis 2007.   Status-post successful external cardioversion, Dr. Dyer, 2007.  Initiation of amiodarone therapy with recent tapering dose, Dr. Lowe.   Echocardiogram, 02/02/2011, revealing normal LV function, diastolic dysfunction noted, mild MR, no pericardial effusion.   Holter monitor, 01/25/2011, revealing sinus rhythm with PAC/PVC/episodes of sinus bradycardia.   Chadsvasc=3.   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   Pulmonary vein isolation procedure, 10/05/2011.  BRISSA with left ventricular ejection fraction of 55%, mild TR and MR.   Event recorder with intermittent episodes of flutter but the majority of the time in sinus rhythm.   External cardioversion to normal sinus rhythm, 02/17/2012.  Atypical chest pain.  History of left heart catheterization, 1990s in [place], reported as normal coronaries.   Stress test, 08/23/2007, revealing no significant ischemia, EF estimated at 46%.  Echo 7/28/17 LV Estimated EF = 57%., mild concentric hypertrophy, Left atrial cavity size is severely dilated. Normal estimated pulmonary artery systolic pressure  External CV 7/13/17; NSR with recurrent AF three days later  Echocardiogram 7/28/17: EF 57%, mild LVH, LA severely dilated  ECV to NSR 8/31/17  Wavecrest device insertion 7/16/19  BRISSA 7/21/2020 LVEF NL, No significant leakage around device, Mild MR,TR  ECV for recurrent Afib 12/19/2023-Flecanide   Diabetes mellitus.   Gastroesophageal reflux disease.  Dyslipidemia.  Osteoarthritis.  Prostate cancer - Ronda Mack MD   [facility] surgical  history:  Cholecystectomy.   Right orchiectomy.  Right shoulder labral repair.  Left knee arthroscopy for partial medial meniscectomy      Allergies  Allergies   Allergen Reactions    Penicillins Hives and Rash       Current Medications    Current Outpatient Medications:     Apalutamide (ERLEADA) 60 MG tablet, Take 3 tablets by mouth Daily., Disp: 90 tablet, Rfl: 11    aspirin 81 MG EC tablet, Take 1 tablet by mouth Daily., Disp: , Rfl:     atorvastatin (LIPITOR) 10 MG tablet, Take 1 tablet by mouth Every Night., Disp: , Rfl:     B Complex Vitamins (VITAMIN B COMPLEX) capsule capsule, Take 1 capsule by mouth Daily., Disp: , Rfl:     Cholecalciferol (Vitamin D3) 25 MCG (1000 UT) capsule, Take 1 capsule by mouth Daily., Disp: , Rfl:     cyclobenzaprine (FLEXERIL) 10 MG tablet, Take 1 tablet by mouth As Needed., Disp: , Rfl:     famotidine (PEPCID) 20 MG tablet, Take 1 tablet by mouth 2 (Two) Times a Day., Disp: , Rfl:     ferrous sulfate 324 (65 FE) MG tablet delayed-release EC tablet, Take 1 tablet by mouth Daily With Breakfast., Disp: , Rfl:     flecainide (TAMBOCOR) 100 MG tablet, TAKE 1 TABLET TWICE A DAY, Disp: 180 tablet, Rfl: 2    leuprolide acetate 5 MG/ML kit, Inject 1 mL under the skin into the appropriate area as directed Every 6 (Six) Months., Disp: , Rfl:     metFORMIN ER (GLUCOPHAGE-XR) 500 MG 24 hr tablet, Take 2 tablets by mouth Every Night., Disp: , Rfl:     Multiple Vitamins-Minerals (OCUVITE ADULT 50+ PO), Take 1 tablet by mouth Daily., Disp: , Rfl:     Omega-3 Fatty Acids (FISH OIL) 1000 MG capsule capsule, Take 1 capsule by mouth Daily With Breakfast., Disp: , Rfl:     omeprazole (priLOSEC) 40 MG capsule, Take 1 capsule by mouth Daily., Disp: , Rfl:     triamcinolone (KENALOG) 0.1 % cream, As Needed., Disp: , Rfl:     History of Present Illness:     Pt presents for follow up of PAF.  He had recurrent Afib after a URI, and had a ECV 12/19/2023. He states he is maintaining sinus rhythm. He denies  "any chest pain, sob,, dizziness, near syncope syncope. Overall he feels good.     ROS:  General:  Denies fatigue, weight gain or loss  Cardiovascular:  Denies CP, PND, syncope, near syncope, edema or palpitations.  Pulmonary:  Denies CROW, cough, or wheezing      Vitals:    03/27/24 1044   BP: 116/72   BP Location: Right arm   Patient Position: Sitting   Cuff Size: Adult   Pulse: 55   SpO2: 95%   Weight: 102 kg (225 lb)   Height: 180.3 cm (71\")       Body mass index is 31.38 kg/m².  PE:  General: NAD  Neck: no JVD, no carotid bruits, no TM  Heart IRIR, NL S1, S2, S4 present, no rubs, murmurs  Lungs: CTA, no wheezes, rhonchi, or rales  Abd: soft, non-tender, NL BS  Ext: No musculoskeletal deformities, no edema, cyanosis, or clubbing  Psych: normal mood and affect    Diagnostic Data:        ECG 12 Lead    Date/Time: 3/27/2024 11:08 AM  Performed by: Irineo Burks PA    Authorized by: Irineo Burks PA  Comparison: compared with previous ECG from 12/22/2023  Similar to previous ECG  Rhythm: sinus rhythm  Rate: normal  Conduction: conduction normal  ST Segments: ST segments normal  T Waves: T waves normal  QRS axis: normal    Clinical impression: normal ECG                 1. Paroxysmal atrial fibrillation              Plan:  1) PAF  Status post remote PVA in 2011  Recurrent A-fib in the setting of recent upper respiratory infection, S/p ECV 12/19/2023. EKG today Sinus rhythm 3/27/2024      2) Anticoagulation  CHADSVASc = 3 status post left atrial appendage closure as part of with Wavecrest trial 7/16/2019  Continue ASA monotherapy.     Electronically signed by SHIRA Castillo, 03/27/24, 11:10 AM EDT.      "

## 2024-04-23 RX ORDER — FLECAINIDE ACETATE 100 MG/1
TABLET ORAL
Qty: 180 TABLET | Refills: 3 | Status: SHIPPED | OUTPATIENT
Start: 2024-04-23

## 2024-05-16 ENCOUNTER — OFFICE VISIT (OUTPATIENT)
Dept: ONCOLOGY | Facility: CLINIC | Age: 84
End: 2024-05-16
Payer: MEDICARE

## 2024-05-16 ENCOUNTER — LAB (OUTPATIENT)
Dept: LAB | Facility: HOSPITAL | Age: 84
End: 2024-05-16
Payer: MEDICARE

## 2024-05-16 ENCOUNTER — TELEPHONE (OUTPATIENT)
Dept: ONCOLOGY | Facility: CLINIC | Age: 84
End: 2024-05-16
Payer: MEDICARE

## 2024-05-16 VITALS
HEART RATE: 57 BPM | BODY MASS INDEX: 30.38 KG/M2 | SYSTOLIC BLOOD PRESSURE: 129 MMHG | WEIGHT: 217 LBS | OXYGEN SATURATION: 98 % | RESPIRATION RATE: 18 BRPM | TEMPERATURE: 97.1 F | HEIGHT: 71 IN | DIASTOLIC BLOOD PRESSURE: 81 MMHG

## 2024-05-16 DIAGNOSIS — C77.5 PROSTATE CANCER METASTATIC TO INTRAPELVIC LYMPH NODE: Primary | ICD-10-CM

## 2024-05-16 DIAGNOSIS — C77.5 PROSTATE CANCER METASTATIC TO INTRAPELVIC LYMPH NODE: ICD-10-CM

## 2024-05-16 DIAGNOSIS — C61 PROSTATE CANCER METASTATIC TO INTRAPELVIC LYMPH NODE: ICD-10-CM

## 2024-05-16 DIAGNOSIS — C61 PROSTATE CANCER METASTATIC TO INTRAPELVIC LYMPH NODE: Primary | ICD-10-CM

## 2024-05-16 LAB
ALBUMIN SERPL-MCNC: 3.9 G/DL (ref 3.5–5.2)
ALBUMIN/GLOB SERPL: 1.2 G/DL
ALP SERPL-CCNC: 68 U/L (ref 39–117)
ALT SERPL W P-5'-P-CCNC: 17 U/L (ref 1–41)
ANION GAP SERPL CALCULATED.3IONS-SCNC: 7 MMOL/L (ref 5–15)
AST SERPL-CCNC: 17 U/L (ref 1–40)
BASOPHILS # BLD AUTO: 0.04 10*3/MM3 (ref 0–0.2)
BASOPHILS NFR BLD AUTO: 0.8 % (ref 0–1.5)
BILIRUB SERPL-MCNC: 0.6 MG/DL (ref 0–1.2)
BUN SERPL-MCNC: 13 MG/DL (ref 8–23)
BUN/CREAT SERPL: 16.7 (ref 7–25)
CALCIUM SPEC-SCNC: 9.5 MG/DL (ref 8.6–10.5)
CHLORIDE SERPL-SCNC: 102 MMOL/L (ref 98–107)
CO2 SERPL-SCNC: 29 MMOL/L (ref 22–29)
CREAT SERPL-MCNC: 0.78 MG/DL (ref 0.76–1.27)
DEPRECATED RDW RBC AUTO: 44.9 FL (ref 37–54)
EGFRCR SERPLBLD CKD-EPI 2021: 88.5 ML/MIN/1.73
EOSINOPHIL # BLD AUTO: 0.36 10*3/MM3 (ref 0–0.4)
EOSINOPHIL NFR BLD AUTO: 7.4 % (ref 0.3–6.2)
ERYTHROCYTE [DISTWIDTH] IN BLOOD BY AUTOMATED COUNT: 12.7 % (ref 12.3–15.4)
GLOBULIN UR ELPH-MCNC: 3.2 GM/DL
GLUCOSE SERPL-MCNC: 111 MG/DL (ref 65–99)
HCT VFR BLD AUTO: 38.7 % (ref 37.5–51)
HGB BLD-MCNC: 13 G/DL (ref 13–17.7)
IMM GRANULOCYTES # BLD AUTO: 0.01 10*3/MM3 (ref 0–0.05)
IMM GRANULOCYTES NFR BLD AUTO: 0.2 % (ref 0–0.5)
LYMPHOCYTES # BLD AUTO: 1.59 10*3/MM3 (ref 0.7–3.1)
LYMPHOCYTES NFR BLD AUTO: 32.6 % (ref 19.6–45.3)
MCH RBC QN AUTO: 31.7 PG (ref 26.6–33)
MCHC RBC AUTO-ENTMCNC: 33.6 G/DL (ref 31.5–35.7)
MCV RBC AUTO: 94.4 FL (ref 79–97)
MONOCYTES # BLD AUTO: 0.43 10*3/MM3 (ref 0.1–0.9)
MONOCYTES NFR BLD AUTO: 8.8 % (ref 5–12)
NEUTROPHILS NFR BLD AUTO: 2.44 10*3/MM3 (ref 1.7–7)
NEUTROPHILS NFR BLD AUTO: 50.2 % (ref 42.7–76)
PLATELET # BLD AUTO: 208 10*3/MM3 (ref 140–450)
PMV BLD AUTO: 8.9 FL (ref 6–12)
POTASSIUM SERPL-SCNC: 4.3 MMOL/L (ref 3.5–5.2)
PROT SERPL-MCNC: 7.1 G/DL (ref 6–8.5)
PSA SERPL-MCNC: <0.014 NG/ML (ref 0–4)
RBC # BLD AUTO: 4.1 10*6/MM3 (ref 4.14–5.8)
SODIUM SERPL-SCNC: 138 MMOL/L (ref 136–145)
WBC NRBC COR # BLD AUTO: 4.87 10*3/MM3 (ref 3.4–10.8)

## 2024-05-16 PROCEDURE — 85025 COMPLETE CBC W/AUTO DIFF WBC: CPT

## 2024-05-16 PROCEDURE — 1126F AMNT PAIN NOTED NONE PRSNT: CPT | Performed by: NURSE PRACTITIONER

## 2024-05-16 PROCEDURE — 36415 COLL VENOUS BLD VENIPUNCTURE: CPT

## 2024-05-16 PROCEDURE — 80053 COMPREHEN METABOLIC PANEL: CPT

## 2024-05-16 PROCEDURE — 1159F MED LIST DOCD IN RCRD: CPT | Performed by: NURSE PRACTITIONER

## 2024-05-16 PROCEDURE — 84153 ASSAY OF PSA TOTAL: CPT

## 2024-05-16 PROCEDURE — 99214 OFFICE O/P EST MOD 30 MIN: CPT | Performed by: NURSE PRACTITIONER

## 2024-05-16 PROCEDURE — 1160F RVW MEDS BY RX/DR IN RCRD: CPT | Performed by: NURSE PRACTITIONER

## 2024-05-16 NOTE — PROGRESS NOTES
"      PROBLEM LIST:  1. Metastatic prostate cancer  A) diagnosed 6/2018 with janis 4+3=7 prostate adenocarcinoma.  Stage at diagnosis [id number] (stage IIC).  Pretreatment PSA 5.3.  Treated with SBRT, completed 10/2018.    B) March 2020 found to have rising PSA (10.08 on 3/2/20).  Axumin PET/CT 3/31/20 showed abnormal activity in the left internal iliac, left pelvic sidewall nodes, periaortic adenopathy, bone marrow activity, left superficial inguinal nodes.  Started androgen ablation (eligard) with partial PSA response, but patient wished to stop ADT due to side effects/negative impact on quality of life.  Involved LN treated with radiotherapy  C) PSA increased to 0.3->1.9 in Fall 2022.  PSMA PET 11/30/22 showed PSMA uptake in the prostate gland c/w local recurrence, right retrocrural LN, left superior mediastinal LN. Eligard and apalutamide started January 2023.  Treatment break due to toxicity/quality of life concerns initiated Jan 2024.  2. Atrial fibrillation  3. Diabetes mellitus  4. GERD  5. GRADY on CPAP  6. hyperlipidemia    Subjective     CHIEF COMPLAINT: prostate cancer    HISTORY OF PRESENT ILLNESS:   Stefan Pedraza returns for follow-up.   He continues to feel better off of treatment. He denies new complaints of pain. He has lost some weight, but has been intentionally trying to cut back. He is hopeful to be able to continue to avoid treatment.       Objective      /81   Pulse 57   Temp 97.1 °F (36.2 °C) (Infrared)   Resp 18   Ht 180.3 cm (70.98\")   Wt 98.4 kg (217 lb)   SpO2 98%   BMI 30.28 kg/m²    Vitals:    05/16/24 1046   PainSc: 0-No pain          ECOG score: 0      General: well appearing male in no acute distress  Neuro: alert and oriented  HEENT: sclera anicteric, oropharynx clear  Skin: no rashes, lesions, bruising, or petechiae  Psych: mood and affect appropriate        RECENT LABS:  Lab Results   Component Value Date    WBC 4.87 05/16/2024    HGB 13.0 05/16/2024    HCT 38.7 " 05/16/2024    MCV 94.4 05/16/2024     05/16/2024       Lab Results   Component Value Date    GLUCOSE 111 (H) 05/16/2024    BUN 13 05/16/2024    CREATININE 0.78 05/16/2024    EGFRIFNONA 89 11/16/2019    BCR 16.7 05/16/2024    K 4.3 05/16/2024    CO2 29.0 05/16/2024    CALCIUM 9.5 05/16/2024    ALBUMIN 3.9 05/16/2024    AST 17 05/16/2024    ALT 17 05/16/2024               Lab Results   Component Value Date    PSA <0.014 03/14/2024    PSA <0.014 11/21/2023    PSA <0.014 10/31/2023         ASSESSMENT AND PLAN:     Stefan Pedraza is a 83 y.o. male androgen sensitive prostate cancer, here for follow-up.  He had a undetectable PSA on Eligard and apalutamide, but opted for a treatment holiday starting January 2024 due to severe fatigue.    He continues to feel better off treatment. He denies any new symptoms or concerns. We will call him with his PSA results from today. If it continues to be undetectable we will continue drug holiday. If he has had a rise in his PSA, we will consider imaging using PSMA PET for restaging.     We will see him back in 2 months with repeat labs.       Total time of patient care on day of service including time prior to, face to face with patient, and following visit spent in reviewing records, lab results, , discussion with patient, and documentation/charting was > 36 minutes.          Kathie Zavala APRN  [facility] Hematology and On[facility]    5/16/2024          CC:

## 2024-05-16 NOTE — TELEPHONE ENCOUNTER
Called patient to let him know Dr. Mack reviewed his labs and that the PSA was undetectable.  Patient verbalized understanding.

## 2024-05-17 ENCOUNTER — SPECIALTY PHARMACY (OUTPATIENT)
Dept: ONCOLOGY | Facility: HOSPITAL | Age: 84
End: 2024-05-17
Payer: MEDICARE

## 2024-07-18 ENCOUNTER — LAB (OUTPATIENT)
Dept: LAB | Facility: HOSPITAL | Age: 84
End: 2024-07-18
Payer: MEDICARE

## 2024-07-18 ENCOUNTER — OFFICE VISIT (OUTPATIENT)
Dept: ONCOLOGY | Facility: CLINIC | Age: 84
End: 2024-07-18
Payer: MEDICARE

## 2024-07-18 VITALS
WEIGHT: 219 LBS | SYSTOLIC BLOOD PRESSURE: 152 MMHG | RESPIRATION RATE: 16 BRPM | OXYGEN SATURATION: 96 % | TEMPERATURE: 97.3 F | HEIGHT: 71 IN | BODY MASS INDEX: 30.66 KG/M2 | HEART RATE: 68 BPM | DIASTOLIC BLOOD PRESSURE: 84 MMHG

## 2024-07-18 DIAGNOSIS — C61 PROSTATE CANCER METASTATIC TO INTRAPELVIC LYMPH NODE: ICD-10-CM

## 2024-07-18 DIAGNOSIS — C61 PROSTATE CANCER: Primary | ICD-10-CM

## 2024-07-18 DIAGNOSIS — C77.5 PROSTATE CANCER METASTATIC TO INTRAPELVIC LYMPH NODE: ICD-10-CM

## 2024-07-18 LAB
ALBUMIN SERPL-MCNC: 4 G/DL (ref 3.5–5.2)
ALBUMIN/GLOB SERPL: 1.3 G/DL
ALP SERPL-CCNC: 65 U/L (ref 39–117)
ALT SERPL W P-5'-P-CCNC: 13 U/L (ref 1–41)
ANION GAP SERPL CALCULATED.3IONS-SCNC: 8 MMOL/L (ref 5–15)
AST SERPL-CCNC: 14 U/L (ref 1–40)
BASOPHILS # BLD AUTO: 0.03 10*3/MM3 (ref 0–0.2)
BASOPHILS NFR BLD AUTO: 0.6 % (ref 0–1.5)
BILIRUB SERPL-MCNC: 0.4 MG/DL (ref 0–1.2)
BUN SERPL-MCNC: 19 MG/DL (ref 8–23)
BUN/CREAT SERPL: 22.9 (ref 7–25)
CALCIUM SPEC-SCNC: 9.1 MG/DL (ref 8.6–10.5)
CHLORIDE SERPL-SCNC: 107 MMOL/L (ref 98–107)
CO2 SERPL-SCNC: 29 MMOL/L (ref 22–29)
CREAT SERPL-MCNC: 0.83 MG/DL (ref 0.76–1.27)
DEPRECATED RDW RBC AUTO: 46 FL (ref 37–54)
EGFRCR SERPLBLD CKD-EPI 2021: 86.3 ML/MIN/1.73
EOSINOPHIL # BLD AUTO: 0.21 10*3/MM3 (ref 0–0.4)
EOSINOPHIL NFR BLD AUTO: 4 % (ref 0.3–6.2)
ERYTHROCYTE [DISTWIDTH] IN BLOOD BY AUTOMATED COUNT: 12.9 % (ref 12.3–15.4)
GLOBULIN UR ELPH-MCNC: 3.2 GM/DL
GLUCOSE SERPL-MCNC: 114 MG/DL (ref 65–99)
HCT VFR BLD AUTO: 36.9 % (ref 37.5–51)
HGB BLD-MCNC: 12.5 G/DL (ref 13–17.7)
IMM GRANULOCYTES # BLD AUTO: 0.01 10*3/MM3 (ref 0–0.05)
IMM GRANULOCYTES NFR BLD AUTO: 0.2 % (ref 0–0.5)
LYMPHOCYTES # BLD AUTO: 1.7 10*3/MM3 (ref 0.7–3.1)
LYMPHOCYTES NFR BLD AUTO: 32.4 % (ref 19.6–45.3)
MCH RBC QN AUTO: 32.6 PG (ref 26.6–33)
MCHC RBC AUTO-ENTMCNC: 33.9 G/DL (ref 31.5–35.7)
MCV RBC AUTO: 96.3 FL (ref 79–97)
MONOCYTES # BLD AUTO: 0.48 10*3/MM3 (ref 0.1–0.9)
MONOCYTES NFR BLD AUTO: 9.1 % (ref 5–12)
NEUTROPHILS NFR BLD AUTO: 2.82 10*3/MM3 (ref 1.7–7)
NEUTROPHILS NFR BLD AUTO: 53.7 % (ref 42.7–76)
PLATELET # BLD AUTO: 188 10*3/MM3 (ref 140–450)
PMV BLD AUTO: 9.3 FL (ref 6–12)
POTASSIUM SERPL-SCNC: 4.7 MMOL/L (ref 3.5–5.2)
PROT SERPL-MCNC: 7.2 G/DL (ref 6–8.5)
PSA SERPL-MCNC: <0.014 NG/ML (ref 0–4)
RBC # BLD AUTO: 3.83 10*6/MM3 (ref 4.14–5.8)
SODIUM SERPL-SCNC: 144 MMOL/L (ref 136–145)
WBC NRBC COR # BLD AUTO: 5.25 10*3/MM3 (ref 3.4–10.8)

## 2024-07-18 PROCEDURE — 99213 OFFICE O/P EST LOW 20 MIN: CPT | Performed by: NURSE PRACTITIONER

## 2024-07-18 PROCEDURE — 84153 ASSAY OF PSA TOTAL: CPT

## 2024-07-18 PROCEDURE — 1126F AMNT PAIN NOTED NONE PRSNT: CPT | Performed by: NURSE PRACTITIONER

## 2024-07-18 PROCEDURE — 1160F RVW MEDS BY RX/DR IN RCRD: CPT | Performed by: NURSE PRACTITIONER

## 2024-07-18 PROCEDURE — 80053 COMPREHEN METABOLIC PANEL: CPT

## 2024-07-18 PROCEDURE — 1159F MED LIST DOCD IN RCRD: CPT | Performed by: NURSE PRACTITIONER

## 2024-07-18 PROCEDURE — 85025 COMPLETE CBC W/AUTO DIFF WBC: CPT

## 2024-07-18 PROCEDURE — 36415 COLL VENOUS BLD VENIPUNCTURE: CPT

## 2024-07-18 NOTE — PROGRESS NOTES
"      PROBLEM LIST:  1. Metastatic prostate cancer  A) diagnosed 6/2018 with janis 4+3=7 prostate adenocarcinoma.  Stage at diagnosis [id number] (stage IIC).  Pretreatment PSA 5.3.  Treated with SBRT, completed 10/2018.    B) March 2020 found to have rising PSA (10.08 on 3/2/20).  Axumin PET/CT 3/31/20 showed abnormal activity in the left internal iliac, left pelvic sidewall nodes, periaortic adenopathy, bone marrow activity, left superficial inguinal nodes.  Started androgen ablation (eligard) with partial PSA response, but patient wished to stop ADT due to side effects/negative impact on quality of life.  Involved LN treated with radiotherapy  C) PSA increased to 0.3->1.9 in Fall 2022.  PSMA PET 11/30/22 showed PSMA uptake in the prostate gland c/w local recurrence, right retrocrural LN, left superior mediastinal LN. Eligard and apalutamide started January 2023.  Treatment break due to toxicity/quality of life concerns initiated Jan 2024.  2. Atrial fibrillation  3. Diabetes mellitus  4. GERD  5. GRADY on CPAP  6. hyperlipidemia    Subjective     CHIEF COMPLAINT: prostate cancer    HISTORY OF PRESENT ILLNESS:   Stefan Pedraza returns for follow-up.   He continues to feel better off of treatment.  He denies any pain.  His energy level is back to normal.  He denies any new medical concerns today.        Objective      /84 Comment: LUE  Pulse 68   Temp 97.3 °F (36.3 °C) (Infrared)   Resp 16   Ht 180.3 cm (71\")   Wt 99.3 kg (219 lb)   SpO2 96% Comment: RA  BMI 30.54 kg/m²    Vitals:    07/18/24 1024   PainSc: 0-No pain            ECOG score: 1      General: well appearing male in no acute distress  Neuro: alert and oriented  HEENT: sclera anicteric, oropharynx clear  Skin: no rashes, lesions, bruising, or petechiae  Psych: mood and affect appropriate        RECENT LABS:  Lab Results   Component Value Date    WBC 5.25 07/18/2024    HGB 12.5 (L) 07/18/2024    HCT 36.9 (L) 07/18/2024    MCV 96.3 07/18/2024 "     07/18/2024       Lab Results   Component Value Date    GLUCOSE 111 (H) 05/16/2024    BUN 13 05/16/2024    CREATININE 0.78 05/16/2024    EGFRIFNONA 89 11/16/2019    BCR 16.7 05/16/2024    K 4.3 05/16/2024    CO2 29.0 05/16/2024    CALCIUM 9.5 05/16/2024    ALBUMIN 3.9 05/16/2024    AST 17 05/16/2024    ALT 17 05/16/2024               Lab Results   Component Value Date    PSA <0.014 05/16/2024    PSA <0.014 03/14/2024    PSA <0.014 11/21/2023         ASSESSMENT AND PLAN:     Stefan Pedraza is a 84 y.o. male androgen sensitive prostate cancer, here for follow-up.  He had a undetectable PSA on Eligard and apalutamide, but opted for a treatment holiday starting January 2024 due to severe fatigue.    He continues to feel better off treatment. He denies any new symptoms or concerns. We will call him with his PSA results from today. If it continues to be undetectable we will continue drug holiday. If he has had a rise in his PSA, we will consider imaging using PSMA PET for restaging.     We will see him back in 2 months with repeat labs.                 Eboni Avalos, [facility] Hematology and Oncology    7/18/2024          CC:

## 2024-09-19 ENCOUNTER — LAB (OUTPATIENT)
Dept: LAB | Facility: HOSPITAL | Age: 84
End: 2024-09-19
Payer: MEDICARE

## 2024-09-19 ENCOUNTER — OFFICE VISIT (OUTPATIENT)
Dept: ONCOLOGY | Facility: CLINIC | Age: 84
End: 2024-09-19
Payer: MEDICARE

## 2024-09-19 VITALS
DIASTOLIC BLOOD PRESSURE: 76 MMHG | RESPIRATION RATE: 16 BRPM | HEIGHT: 73 IN | SYSTOLIC BLOOD PRESSURE: 141 MMHG | HEART RATE: 83 BPM | WEIGHT: 220.3 LBS | BODY MASS INDEX: 29.2 KG/M2 | OXYGEN SATURATION: 97 % | TEMPERATURE: 97.6 F

## 2024-09-19 DIAGNOSIS — C61 PROSTATE CANCER METASTATIC TO INTRAPELVIC LYMPH NODE: Primary | ICD-10-CM

## 2024-09-19 DIAGNOSIS — C61 PROSTATE CANCER: ICD-10-CM

## 2024-09-19 DIAGNOSIS — C77.5 PROSTATE CANCER METASTATIC TO INTRAPELVIC LYMPH NODE: Primary | ICD-10-CM

## 2024-09-19 LAB
ALBUMIN SERPL-MCNC: 4 G/DL (ref 3.5–5.2)
ALBUMIN/GLOB SERPL: 1.4 G/DL
ALP SERPL-CCNC: 67 U/L (ref 39–117)
ALT SERPL W P-5'-P-CCNC: 16 U/L (ref 1–41)
ANION GAP SERPL CALCULATED.3IONS-SCNC: 8 MMOL/L (ref 5–15)
AST SERPL-CCNC: 15 U/L (ref 1–40)
BASOPHILS # BLD AUTO: 0.02 10*3/MM3 (ref 0–0.2)
BASOPHILS NFR BLD AUTO: 0.4 % (ref 0–1.5)
BILIRUB SERPL-MCNC: 0.7 MG/DL (ref 0–1.2)
BUN SERPL-MCNC: 14 MG/DL (ref 8–23)
BUN/CREAT SERPL: 16.7 (ref 7–25)
CALCIUM SPEC-SCNC: 9 MG/DL (ref 8.6–10.5)
CHLORIDE SERPL-SCNC: 102 MMOL/L (ref 98–107)
CO2 SERPL-SCNC: 29 MMOL/L (ref 22–29)
CREAT SERPL-MCNC: 0.84 MG/DL (ref 0.76–1.27)
DEPRECATED RDW RBC AUTO: 44.1 FL (ref 37–54)
EGFRCR SERPLBLD CKD-EPI 2021: 86 ML/MIN/1.73
EOSINOPHIL # BLD AUTO: 0.21 10*3/MM3 (ref 0–0.4)
EOSINOPHIL NFR BLD AUTO: 3.9 % (ref 0.3–6.2)
ERYTHROCYTE [DISTWIDTH] IN BLOOD BY AUTOMATED COUNT: 12.5 % (ref 12.3–15.4)
GLOBULIN UR ELPH-MCNC: 2.9 GM/DL
GLUCOSE SERPL-MCNC: 108 MG/DL (ref 65–99)
HCT VFR BLD AUTO: 38.5 % (ref 37.5–51)
HGB BLD-MCNC: 13 G/DL (ref 13–17.7)
IMM GRANULOCYTES # BLD AUTO: 0.01 10*3/MM3 (ref 0–0.05)
IMM GRANULOCYTES NFR BLD AUTO: 0.2 % (ref 0–0.5)
LYMPHOCYTES # BLD AUTO: 1.27 10*3/MM3 (ref 0.7–3.1)
LYMPHOCYTES NFR BLD AUTO: 23.3 % (ref 19.6–45.3)
MCH RBC QN AUTO: 32 PG (ref 26.6–33)
MCHC RBC AUTO-ENTMCNC: 33.8 G/DL (ref 31.5–35.7)
MCV RBC AUTO: 94.8 FL (ref 79–97)
MONOCYTES # BLD AUTO: 0.68 10*3/MM3 (ref 0.1–0.9)
MONOCYTES NFR BLD AUTO: 12.5 % (ref 5–12)
NEUTROPHILS NFR BLD AUTO: 3.25 10*3/MM3 (ref 1.7–7)
NEUTROPHILS NFR BLD AUTO: 59.7 % (ref 42.7–76)
PLATELET # BLD AUTO: 175 10*3/MM3 (ref 140–450)
PMV BLD AUTO: 8.9 FL (ref 6–12)
POTASSIUM SERPL-SCNC: 4.4 MMOL/L (ref 3.5–5.2)
PROT SERPL-MCNC: 6.9 G/DL (ref 6–8.5)
PSA SERPL-MCNC: <0.014 NG/ML (ref 0–4)
RBC # BLD AUTO: 4.06 10*6/MM3 (ref 4.14–5.8)
SODIUM SERPL-SCNC: 139 MMOL/L (ref 136–145)
WBC NRBC COR # BLD AUTO: 5.44 10*3/MM3 (ref 3.4–10.8)

## 2024-09-19 PROCEDURE — 84153 ASSAY OF PSA TOTAL: CPT

## 2024-09-19 PROCEDURE — 36415 COLL VENOUS BLD VENIPUNCTURE: CPT

## 2024-09-19 PROCEDURE — 99213 OFFICE O/P EST LOW 20 MIN: CPT | Performed by: INTERNAL MEDICINE

## 2024-09-19 PROCEDURE — 80053 COMPREHEN METABOLIC PANEL: CPT

## 2024-09-19 PROCEDURE — 1126F AMNT PAIN NOTED NONE PRSNT: CPT | Performed by: INTERNAL MEDICINE

## 2024-09-19 PROCEDURE — 85025 COMPLETE CBC W/AUTO DIFF WBC: CPT

## 2024-09-20 ENCOUNTER — TELEPHONE (OUTPATIENT)
Dept: ONCOLOGY | Facility: CLINIC | Age: 84
End: 2024-09-20
Payer: MEDICARE

## 2024-09-27 NOTE — PROGRESS NOTES
Stefan Pedraza  [id number]  1940  [phone]      10/03/2024      [facility] CARDIOLOGY     Referring Provider: No ref. provider found     Gamaliel Eaton MD  [address] [place] [address]    No chief complaint on file.      Problem List  Paroxsymal Atrial fibrillation:   History of atrial fibrillation, initial diagnosis 2007.   Status-post successful external cardioversion, Dr. Dyer, 2007.  Initiation of amiodarone therapy with recent tapering dose, Dr. Lowe.   Echocardiogram, 02/02/2011, revealing normal LV function, diastolic dysfunction noted, mild MR, no pericardial effusion.   Holter monitor, 01/25/2011, revealing sinus rhythm with PAC/PVC/episodes of sinus bradycardia.   Chadsvasc=3.   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   Pulmonary vein isolation procedure, 10/05/2011.  BRISSA with left ventricular ejection fraction of 55%, mild TR and MR.   Event recorder with intermittent episodes of flutter but the majority of the time in sinus rhythm.   External cardioversion to normal sinus rhythm, 02/17/2012.  Atypical chest pain.  History of left heart catheterization, 1990s in [place], reported as normal coronaries.   Stress test, 08/23/2007, revealing no significant ischemia, EF estimated at 46%.  Echo 7/28/17 LV Estimated EF = 57%., mild concentric hypertrophy, Left atrial cavity size is severely dilated. Normal estimated pulmonary artery systolic pressure  External CV 7/13/17; NSR with recurrent AF three days later  Echocardiogram 7/28/17: EF 57%, mild LVH, LA severely dilated  ECV to NSR 8/31/17  Wavecrest device insertion 7/16/19  BRISSA 7/21/2020 LVEF NL, No significant leakage around device, Mild MR,TR  ECV for recurrent Afib 12/19/2023-Flecanide   Diabetes mellitus.   Gastroesophageal reflux disease.  Dyslipidemia.  Osteoarthritis.  Prostate cancer - Ronda Mack  "MD   Remote surgical history:  Cholecystectomy.   Right orchiectomy.  Right shoulder labral repair.  Left knee arthroscopy for partial medial meniscectomy      History of Present Illness   Stefan Pedraza is a 84 y.o. male who presents to my electrophysiology clinic for follow up of Afib after a URI, and had a ECV 12/19/2023. Since we last saw the patient, ***.      No outpatient medications have been marked as taking for the 10/3/24 encounter (Appointment) with Irineo Burks PA.            Physical Exam  There were no vitals filed for this visit.  There is no height or weight on file to calculate BMI.  Physical Exam     Diagnostic Data  Procedures    Lab Results   Component Value Date    GLUCOSE 108 (H) 09/19/2024    CALCIUM 9.0 09/19/2024     09/19/2024    K 4.4 09/19/2024    CO2 29.0 09/19/2024     09/19/2024    BUN 14 09/19/2024    CREATININE 0.84 09/19/2024    EGFRIFNONA 89 11/16/2019    BCR 16.7 09/19/2024    ANIONGAP 8.0 09/19/2024     Lab Results   Component Value Date    WBC 5.44 09/19/2024    HGB 13.0 09/19/2024    HCT 38.5 09/19/2024    MCV 94.8 09/19/2024     09/19/2024     Lab Results   Component Value Date    INR 1.12 11/05/2019    INR 1.10 07/15/2019    INR 1.09 08/31/2017    PROTIME 13.9 11/05/2019    PROTIME 13.7 07/15/2019    PROTIME 11.9 (H) 08/31/2017     No results found for: \"TSH\", \"V8RBGLB\", \"Z9NDBVP\", \"THYROIDAB\"    I personally viewed and interpreted the patient's EKG/Telemetry/lab data    Stefan Pedraza  reports that he has never smoked. He has never been exposed to tobacco smoke. He has never used smokeless tobacco. I have educated him on the risk of diseases from using tobacco products such as {Tobacco Cessation Diseases:74598::\"cancer\",\"COPD\",\"heart disease\"}.     I advised him to quit and he is {Willing/Not Willing to Quit Tobacco Products:54811}.    I spent {Time Spent Tobacco :51432} minutes counseling the patient.           {ACP Discussion " (Optional):69296}    Assessment and Plan  Diagnoses and all orders for this visit:    1. Paroxysmal atrial fibrillation (Primary)        PAF  -s/p PVA in 2011  -ECV,12/19/2023  -AAD: Flecainide  -CHADSVASc = 3 s/p LAAO as part of Wavecrest trial 7/16/2019  -Continue ASA   - ***    Follow-Up  No follow-ups on file.      Thank you for allowing me to participate in the care of your patient. Please to not hesitate to contact me with additional questions or concerns.     Alcides Mccoy MD [facility]  Cardiac Electrophysiologist  [facility]    {Time Spent (Optional):20911}

## 2024-10-03 ENCOUNTER — OFFICE VISIT (OUTPATIENT)
Dept: CARDIOLOGY | Facility: CLINIC | Age: 84
End: 2024-10-03
Payer: MEDICARE

## 2024-10-03 VITALS
BODY MASS INDEX: 29.03 KG/M2 | SYSTOLIC BLOOD PRESSURE: 110 MMHG | HEIGHT: 73 IN | WEIGHT: 219 LBS | DIASTOLIC BLOOD PRESSURE: 70 MMHG | OXYGEN SATURATION: 96 % | HEART RATE: 82 BPM

## 2024-10-03 DIAGNOSIS — I48.0 PAROXYSMAL ATRIAL FIBRILLATION: Primary | ICD-10-CM

## 2024-10-03 DIAGNOSIS — I48.19 PERSISTENT ATRIAL FIBRILLATION: ICD-10-CM

## 2024-10-03 NOTE — PROGRESS NOTES
Stefan Pedraza  1940  [phone]      [facility] CARDIOLOGY       Gamaliel Eaton MD  [address] [place] [address]    Chief Complaint   Patient presents with    Paroxysmal atrial fibrillation       Problem List:   Paroxsymal Atrial fibrillation:   History of atrial fibrillation, initial diagnosis 2007.   Status-post successful external cardioversion, Dr. Dyer, 2007.  Initiation of amiodarone therapy with recent tapering dose, Dr. Lowe.   Echocardiogram, 02/02/2011, revealing normal LV function, diastolic dysfunction noted, mild MR, no pericardial effusion.   Holter monitor, 01/25/2011, revealing sinus rhythm with PAC/PVC/episodes of sinus bradycardia.   Chadsvasc=3.   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   Pulmonary vein isolation procedure, 10/05/2011.  BRISSA with left ventricular ejection fraction of 55%, mild TR and MR.   Event recorder with intermittent episodes of flutter but the majority of the time in sinus rhythm.   External cardioversion to normal sinus rhythm, 02/17/2012.  Atypical chest pain.  History of left heart catheterization, 1990s in [place], reported as normal coronaries.   Stress test, 08/23/2007, revealing no significant ischemia, EF estimated at 46%.  Echo 7/28/17 LV Estimated EF = 57%., mild concentric hypertrophy, Left atrial cavity size is severely dilated. Normal estimated pulmonary artery systolic pressure  External CV 7/13/17; NSR with recurrent AF three days later  Echocardiogram 7/28/17: EF 57%, mild LVH, LA severely dilated  ECV to NSR 8/31/17  Wavecrest device insertion 7/16/19  BRISSA 7/21/2020 LVEF NL, No significant leakage around device, Mild MR,TR  ECV for recurrent Afib 12/19/2023-Flecanide   Diabetes mellitus.   Gastroesophageal reflux disease.  Dyslipidemia.  Osteoarthritis.  Prostate cancer - Ronda Mack MD   [facility] surgical  history:  Cholecystectomy.   Right orchiectomy.  Right shoulder labral repair.  Left knee arthroscopy for partial medial meniscectomy      Allergies  Allergies   Allergen Reactions    Penicillins Hives and Rash       Current Medications    Current Outpatient Medications:     aspirin 81 MG EC tablet, Take 1 tablet by mouth Daily., Disp: , Rfl:     atorvastatin (LIPITOR) 10 MG tablet, Take 1 tablet by mouth Every Night., Disp: , Rfl:     B Complex Vitamins (VITAMIN B COMPLEX) capsule capsule, Take 1 capsule by mouth Daily., Disp: , Rfl:     Cholecalciferol (Vitamin D3) 25 MCG (1000 UT) capsule, Take 1 capsule by mouth Daily., Disp: , Rfl:     cyclobenzaprine (FLEXERIL) 10 MG tablet, Take 1 tablet by mouth As Needed., Disp: , Rfl:     famotidine (PEPCID) 20 MG tablet, Take 1 tablet by mouth 2 (Two) Times a Day., Disp: , Rfl:     ferrous sulfate 324 (65 FE) MG tablet delayed-release EC tablet, Take 1 tablet by mouth Daily With Breakfast., Disp: , Rfl:     flecainide (TAMBOCOR) 100 MG tablet, TAKE 1 TABLET TWICE A DAY, Disp: 180 tablet, Rfl: 3    leuprolide acetate 5 MG/ML kit, Inject 1 mL under the skin into the appropriate area as directed Every 6 (Six) Months., Disp: , Rfl:     metFORMIN ER (GLUCOPHAGE-XR) 500 MG 24 hr tablet, Take 2 tablets by mouth Every Night., Disp: , Rfl:     Multiple Vitamins-Minerals (OCUVITE ADULT 50+ PO), Take 1 tablet by mouth Daily., Disp: , Rfl:     Omega-3 Fatty Acids (FISH OIL) 1000 MG capsule capsule, Take 1 capsule by mouth Daily With Breakfast., Disp: , Rfl:     omeprazole (priLOSEC) 40 MG capsule, Take 1 capsule by mouth Daily., Disp: , Rfl:     triamcinolone (KENALOG) 0.1 % cream, As Needed., Disp: , Rfl:     History of Present Illness:     84-year-old gentleman here for A-fib.  States he is feeling pretty good.  He denies any chest pain dizziness near syncope denies any heart failure symptoms.  He thinks he may have some fatigue..           Vitals:    10/03/24 1021   BP: 110/70  "  BP Location: Right arm   Patient Position: Sitting   Pulse: 82   SpO2: 96%   Weight: 99.3 kg (219 lb)   Height: 185.4 cm (73\")       Body mass index is 28.89 kg/m².  PE:  General: NAD  Neck: no JVD, no carotid bruits, no TM  Heart IRIR, NL S1, S2, S4 present, no rubs, murmurs  Lungs: CTA, no wheezes, rhonchi, or rales  Abd: soft, non-tender, NL BS  Ext: No musculoskeletal deformities, no edema, cyanosis, or clubbing  Psych: normal mood and affect    Diagnostic Data:      Procedures           1. Paroxysmal atrial fibrillation              Plan:  1) PAF  Status post remote PVA in 2011  Recurrent A-fib in the setting of recent upper respiratory infection, S/p ECV 12/19/2023. EKG today Sinus rhythm 3/27/2024 .  He is now back in A-fib today.  He was not completely aware of the symptoms although he thinks he may have some fatigue.      2) Anticoagulation  CHADSVASc = 3 status post left atrial appendage closure as part of with Wavecrest trial 7/16/2019  Continue ASA monotherapy.       We discussed treatment options for atrial fibrillation.  Will place a Zio monitor today to see if he is in persistent A-fib if so we offered him the option of Tikosyn therapy and stead of flecainide to maintain rhythm.  He would like to consider this.  Return for follow-up as scheduled.  Electronically signed by SHIRA Castillo, 03/27/24, 11:10 AM EDT.      "

## 2024-10-17 ENCOUNTER — TELEPHONE (OUTPATIENT)
Dept: CARDIOLOGY | Facility: CLINIC | Age: 84
End: 2024-10-17
Payer: MEDICARE

## 2024-10-17 NOTE — TELEPHONE ENCOUNTER
Patient would like to consider Tikosyn, if so then stop Flecanide 5 days. Schedule as outpatient if ok with dandy

## 2024-10-17 NOTE — TELEPHONE ENCOUNTER
PT called stating that he was back out of rhythm and is not able to get in to see Dr Solis until April.  He wants to know what the next step would be.  He stated tikosyn was mentioned at his last appt with SHIRA Hook but he would like to get Dr Solis's opinion and see if this is what he should do.  PT is asking for a quick response as he is symptomatic.    Please advise

## 2024-10-21 ENCOUNTER — TELEPHONE (OUTPATIENT)
Dept: CARDIOLOGY | Facility: CLINIC | Age: 84
End: 2024-10-21

## 2024-10-25 ENCOUNTER — DOCUMENTATION (OUTPATIENT)
Dept: CARDIOLOGY | Facility: CLINIC | Age: 84
End: 2024-10-25

## 2024-10-28 ENCOUNTER — TELEPHONE (OUTPATIENT)
Dept: CARDIOLOGY | Facility: CLINIC | Age: 84
End: 2024-10-28

## 2024-10-28 ENCOUNTER — TELEPHONE (OUTPATIENT)
Dept: CARDIOLOGY | Facility: CLINIC | Age: 84
End: 2024-10-28
Payer: MEDICARE

## 2024-10-28 DIAGNOSIS — I48.0 PAROXYSMAL ATRIAL FIBRILLATION: Primary | ICD-10-CM

## 2024-10-28 NOTE — TELEPHONE ENCOUNTER
Patient notified and aware. He has already stopped taking Flecainide. I sent a RX for Eliquis 5 mg PO BID to [facility] pharmacy. Order placed for Tikosyn admission.

## 2024-10-28 NOTE — TELEPHONE ENCOUNTER
I called the patient back and answered all of his questions in regards to his upcoming [facility] admission in 3 weeks.

## 2024-10-28 NOTE — TELEPHONE ENCOUNTER
----- Message from Prabhakar Solis sent at 10/25/2024 11:54 AM EDT -----  I talked with the patient.  He is to stop flecainide now.  Please start Eliquis 5 mg p.o. twice daily.  In 3 weeks, he is to be admitted for Tikosyn.

## 2024-10-28 NOTE — TELEPHONE ENCOUNTER
Caller: Stefan Pedraza    Relationship: Self    Best call back number: [phone]     What is the best time to reach you: AFTERNOON    Who are you requesting to speak with (clinical staff, provider,  specific staff member): CLINICAL      What was the call regarding: PATIENT CALLED TO REQUEST A CALL BACK FROM THE NURSE IN REGARDS TO AN IN HOSPITAL VISIT. PLEASE CONTACT PATIENT IN REGARDS TO THIS ISSUE.    Is it okay if the provider responds through [employer]: PLEASE CALL

## 2024-10-31 ENCOUNTER — PREP FOR SURGERY (OUTPATIENT)
Dept: OTHER | Facility: HOSPITAL | Age: 84
End: 2024-10-31
Payer: MEDICARE

## 2024-10-31 RX ORDER — NITROGLYCERIN 0.4 MG/1
0.4 TABLET SUBLINGUAL
OUTPATIENT
Start: 2024-10-31

## 2024-11-11 ENCOUNTER — TELEPHONE (OUTPATIENT)
Dept: ONCOLOGY | Facility: CLINIC | Age: 84
End: 2024-11-11
Payer: MEDICARE

## 2024-11-11 NOTE — TELEPHONE ENCOUNTER
Caller: Stefan Pedraza    Relationship to patient: Self    Best call back number: [phone]    Type of visit: F/U    Requested date: PUSH OUT TO NEXT WEEK ANY DAY EXCEPT WEDNESDAY MORNING    If rescheduling, when is the original appointment: 11/21

## 2024-11-15 ENCOUNTER — TELEPHONE (OUTPATIENT)
Dept: CARDIOLOGY | Facility: CLINIC | Age: 84
End: 2024-11-15
Payer: MEDICARE

## 2024-11-15 NOTE — TELEPHONE ENCOUNTER
I called the patient to f/u. I answered his questions and concerns about his upcoming Tikosyn admission. He is going to call his insurance company to make sure that this medication is affordable for him. He will keep us updated.

## 2024-11-15 NOTE — TELEPHONE ENCOUNTER
Caller: Stefan Pedraza    Relationship: Self    Best call back number: [phone]    What is the best time to reach you: ANYTIME     Who are you requesting to speak with (clinical staff, provider,  specific staff member): CLINICAL     What was the call regarding: PATIENT WILL BE ADMITTED IN THE HOSPITAL FOR A TIKOSYN STUDY FROM 11/19-11/21 AND HAS SOME QUESTIONS ABOUT THE MEDICATION. PATIENT STATED THE MEDICATION WAS NEVER DISCUSSED WITH HIM AND THAT HE WOULD LIKE DR. NAQVI TO CALL TO PROVIDE HIM MORE INFORMATION.     Is it okay if the provider responds through [employer]: PREFERS A CALL    PATIENT REQUESTED THIS BE SENT OVER AS HIGH PRIORITY

## 2024-11-19 ENCOUNTER — APPOINTMENT (OUTPATIENT)
Dept: CARDIOLOGY | Facility: HOSPITAL | Age: 84
End: 2024-11-19
Payer: MEDICARE

## 2024-11-19 ENCOUNTER — HOSPITAL ENCOUNTER (INPATIENT)
Facility: HOSPITAL | Age: 84
LOS: 3 days | Discharge: HOME | End: 2024-11-22
Attending: INTERNAL MEDICINE | Admitting: INTERNAL MEDICINE
Payer: MEDICARE

## 2024-11-19 LAB
ANION GAP SERPL CALCULATED.3IONS-SCNC: 10 MMOL/L (ref 5–15)
APTT PPP: 31.2 SECONDS (ref 22–39)
BUN SERPL-MCNC: 14 MG/DL (ref 8–23)
BUN/CREAT SERPL: 16.7 (ref 7–25)
CA-I SERPL ISE-MCNC: 1.23 MMOL/L (ref 1.15–1.3)
CALCIUM SPEC-SCNC: 9.6 MG/DL (ref 8.6–10.5)
CHLORIDE SERPL-SCNC: 103 MMOL/L (ref 98–107)
CO2 SERPL-SCNC: 27 MMOL/L (ref 22–29)
CREAT SERPL-MCNC: 0.84 MG/DL (ref 0.76–1.27)
EGFRCR SERPLBLD CKD-EPI 2021: 86 ML/MIN/1.73
GLUCOSE SERPL-MCNC: 106 MG/DL (ref 65–99)
INR PPP: 1.19 (ref 0.89–1.12)
MAGNESIUM SERPL-MCNC: 2 MG/DL (ref 1.6–2.4)
POTASSIUM SERPL-SCNC: 4.4 MMOL/L (ref 3.5–5.2)
PROTHROMBIN TIME: 15.3 SECONDS (ref 12.2–14.5)
QT INTERVAL: 408 MS
QTC INTERVAL: 504 MS
SODIUM SERPL-SCNC: 140 MMOL/L (ref 136–145)

## 2024-11-19 PROCEDURE — 83735 ASSAY OF MAGNESIUM: CPT | Performed by: PHYSICIAN ASSISTANT

## 2024-11-19 PROCEDURE — 93010 ELECTROCARDIOGRAM REPORT: CPT | Performed by: INTERNAL MEDICINE

## 2024-11-19 PROCEDURE — 99222 1ST HOSP IP/OBS MODERATE 55: CPT | Performed by: PHYSICIAN ASSISTANT

## 2024-11-19 PROCEDURE — 82330 ASSAY OF CALCIUM: CPT | Performed by: PHYSICIAN ASSISTANT

## 2024-11-19 PROCEDURE — 85610 PROTHROMBIN TIME: CPT | Performed by: PHYSICIAN ASSISTANT

## 2024-11-19 PROCEDURE — 80048 BASIC METABOLIC PNL TOTAL CA: CPT | Performed by: PHYSICIAN ASSISTANT

## 2024-11-19 PROCEDURE — 93005 ELECTROCARDIOGRAM TRACING: CPT | Performed by: PHYSICIAN ASSISTANT

## 2024-11-19 PROCEDURE — 85730 THROMBOPLASTIN TIME PARTIAL: CPT | Performed by: PHYSICIAN ASSISTANT

## 2024-11-19 PROCEDURE — 93005 ELECTROCARDIOGRAM TRACING: CPT | Performed by: INTERNAL MEDICINE

## 2024-11-19 RX ORDER — DOFETILIDE 0.5 MG/1
500 CAPSULE ORAL ONCE
Status: COMPLETED | OUTPATIENT
Start: 2024-11-20 | End: 2024-11-20

## 2024-11-19 RX ORDER — DOFETILIDE 0.5 MG/1
500 CAPSULE ORAL ONCE
Status: DISCONTINUED | OUTPATIENT
Start: 2024-11-20 | End: 2024-11-19

## 2024-11-19 RX ORDER — METFORMIN HYDROCHLORIDE 500 MG/1
1000 TABLET, EXTENDED RELEASE ORAL NIGHTLY
Status: DISCONTINUED | OUTPATIENT
Start: 2024-11-19 | End: 2024-11-22 | Stop reason: HOSPADM

## 2024-11-19 RX ORDER — ATORVASTATIN CALCIUM 10 MG/1
10 TABLET, FILM COATED ORAL NIGHTLY
Status: DISCONTINUED | OUTPATIENT
Start: 2024-11-19 | End: 2024-11-22 | Stop reason: HOSPADM

## 2024-11-19 RX ORDER — DOFETILIDE 0.5 MG/1
500 CAPSULE ORAL ONCE
Status: DISCONTINUED | OUTPATIENT
Start: 2024-11-20 | End: 2024-11-20

## 2024-11-19 RX ORDER — PANTOPRAZOLE SODIUM 40 MG/1
40 TABLET, DELAYED RELEASE ORAL
Status: DISCONTINUED | OUTPATIENT
Start: 2024-11-20 | End: 2024-11-22 | Stop reason: HOSPADM

## 2024-11-19 RX ORDER — DOFETILIDE 0.5 MG/1
500 CAPSULE ORAL EVERY 12 HOURS
Status: DISCONTINUED | OUTPATIENT
Start: 2024-11-20 | End: 2024-11-19

## 2024-11-19 RX ORDER — ASPIRIN 81 MG/1
81 TABLET ORAL DAILY
Status: DISCONTINUED | OUTPATIENT
Start: 2024-11-20 | End: 2024-11-22 | Stop reason: HOSPADM

## 2024-11-19 RX ORDER — FAMOTIDINE 20 MG/1
20 TABLET, FILM COATED ORAL 2 TIMES DAILY
Status: DISCONTINUED | OUTPATIENT
Start: 2024-11-19 | End: 2024-11-22 | Stop reason: HOSPADM

## 2024-11-19 RX ORDER — DOFETILIDE 0.5 MG/1
500 CAPSULE ORAL EVERY 12 HOURS SCHEDULED
Status: DISCONTINUED | OUTPATIENT
Start: 2024-11-20 | End: 2024-11-20

## 2024-11-19 RX ORDER — DOFETILIDE 0.5 MG/1
500 CAPSULE ORAL ONCE
Status: COMPLETED | OUTPATIENT
Start: 2024-11-19 | End: 2024-11-19

## 2024-11-19 RX ORDER — FERROUS SULFATE 325(65) MG
325 TABLET ORAL
Status: DISCONTINUED | OUTPATIENT
Start: 2024-11-20 | End: 2024-11-22 | Stop reason: HOSPADM

## 2024-11-19 RX ORDER — NITROGLYCERIN 0.4 MG/1
0.4 TABLET SUBLINGUAL
Status: DISCONTINUED | OUTPATIENT
Start: 2024-11-19 | End: 2024-11-22 | Stop reason: HOSPADM

## 2024-11-19 RX ADMIN — DOFETILIDE 500 MCG: 0.5 CAPSULE ORAL at 15:20

## 2024-11-19 RX ADMIN — APIXABAN 5 MG: 5 TABLET, FILM COATED ORAL at 20:53

## 2024-11-19 RX ADMIN — FAMOTIDINE 20 MG: 20 TABLET, FILM COATED ORAL at 20:53

## 2024-11-19 RX ADMIN — ATORVASTATIN CALCIUM 10 MG: 10 TABLET, FILM COATED ORAL at 20:53

## 2024-11-19 RX ADMIN — METFORMIN HYDROCHLORIDE 1000 MG: 500 TABLET, EXTENDED RELEASE ORAL at 20:53

## 2024-11-19 NOTE — PROGRESS NOTES
"Pharmacy Consult - Tikosyn Initiation    Stefan Pedraza is a 84 y.o. male admitted for Tikosyn initiation and monitoring.    Height: 185.4 cm (73\")  Weight: 98.5 kg (217 lb 1.6 oz)    Evaluation of Drug-Drug Interactions     Previous antiarrhythmic medications must be discontinued prior to admission       - Amiodarone must be discontinued >3 weeks prior to Tikosyn start       - Sotalol and class I antiarrhythmics (Dysopyramide, Flecainide, Mexiletine, Propafenone) must be discontinued >48 hours prior to Tikosyn start         - Previous antiarrhythmic therapy: Flecainide d/c'd ~2 weeks ago    Current drug-drug interactions noted with admission medications and Tikosyn    The following medications are contraindicated with Dofetilide and will be/have been discontinued:  - Azole antifungals (Isavuconazonium sulfate, Itraconazole, Ketoconazole, Posaconazole)  - Bictegravir  - Cimetidine   - Citalopram  - Dolutegravir  - Fingolimod  - Fluoroquinolone antibiotics (Ciprofloxacin, Levofloxacin, Moxifloxacin, Norfloxacin)  - Ibutilide   - Hydrochlorothiazide and any combination products containing Hydrochlorothiazide   - Lamotrigine   - Macrolide antibiotics (Erythromycin, Clarithromycin)  - Megestrol  - Pimozide  - Procainamide  - Prochlorperazine  - Pyrimethamine   - Quetiapine  - Quinidine  - Ranolazine   - Sauinavir  - Thioridazine   - Trimethoprim and Trimethoprim-Sulfamethoxazole   - Verapamil  - Ziprasidone     The following medications are recognized to prolong QT interval, however the medication may continue during initial Dofetilide dosing:  - Antidepressants (Amitriptyline, Amoxapine, Clomipramine, Desipramine, Doxepin, Imipramine, Maprotiline, Mirtazapine, Nortriptyline, Protriptyline, Triipramine)  - Antipsychotics (Haloperidol,  Risperidone, Asenapine)   - Diltiazem  - Eribulin  - Ivabradine   - Loop diuretics (Bumetanide, Furosemide, Torsemide)  - Ondansetron  - Paliperidone  - Phenothiazines " (Chlorpromazine, Fluphenazine, Mesoridazine, Perphenazine, Promazine, Trifluoperazine)    The following medications may increase Dofetilide serum concentrations and can be co-administered:  - Amiloride   - Azole antifungals (Fluconazole, Voriconazole)  - Cannabinoids  - Diltiazem   - Metformin   - Nefazodone   - Glyburide  - Protease Inhibitors (Amprenavir, Indinavir, Nelfinavir, Ritonavir)  - Quinidine  - SSRI's (Escitalopram, Fluvoxamine, Fluoxetine, Paroxetine, Sertraline)  - Triamterene    Laboratory    Results from last 7 days   Lab Units 11/19/24  1217   SODIUM mmol/L 140   POTASSIUM mmol/L 4.4   CHLORIDE mmol/L 103   CO2 mmol/L 27.0   BUN mg/dL 14   CREATININE mg/dL 0.84   GLUCOSE mg/dL 106*   CALCIUM mg/dL 9.6     Results from last 7 days   Lab Units 11/19/24  1217   MAGNESIUM mg/dL 2.0     Pharmacy will order electrolyte replacement per protocols if indicated (Mag < 2.0, K < 4.0) based on laboratory values on admission      - Magnesium replacement protocol ordered: Yes     - Potassium replacement protocol ordered: Yes    Estimated CrCl =  84 mL/min  (Calculated with Cockroft-Gault equation using actual body weight and serum creatinine for calculation--can use laboratory values from previous 24 hours)    Initial QTc and EKG Monitoring    QTc = 504    If QTc is:     < 440 msec: okay to proceed with initiation      > 440 msec: must contact MD or physician extender (AYAAN/AMBER)             - Provider contacted: SHIRA Reid            - Okay to proceed: Yes    Patient has a functioning atrial pacemaker - No     Cardiology aware, will proceed    Initial Tikosyn dose based on Creatinine Clearance:    CrCl > 60 mL/min - Dofetilide 500 mcg PO Q12H  CrCl 40-60 mL/min - Dofetilide 250 mcg PO Q12H  CrCl 20-39 mL/min - Dofetilide 125 mcg PO Q12H  CrCl < 20 mL/min - Do not start medication, contact MD    (Will dose medication 10 hour intervals until administration times are between 7 and  9).    Assessment/Plan:    Patient admitted for Tikosyn initiation per cardiology. Will initiate Tikosyn 500 mcg PO every 12 hours.  Current drug-drug interactions include none at present. Will continue to monitor.  Monitor electrolytes and drug-drug interactions.  Pharmacy will continue to follow.     Arpita Vale RPH  11/19/2024  15:08

## 2024-11-19 NOTE — H&P
Cardiology H&P     Stefan Pedraza  1940  [phone]  There is no work phone number on file.    11/19/24    DATE OF ADMISSION: 11/19/2024  [facility] 3E    Gamaliel Eaton MD  [address] [facility] [place] [address]  Referring Provider: Provider, No Known     CC: AFIB     Problem List:   Persistent Atrial fibrillation:   History of atrial fibrillation, initial diagnosis 2007.   Status-post successful external cardioversion, Dr. Dyer, 2007.  Initiation of amiodarone therapy with recent tapering dose, Dr. Lowe.   Echocardiogram, 02/02/2011, revealing normal LV function, diastolic dysfunction noted, mild MR, no pericardial effusion.   Holter monitor, 01/25/2011, revealing sinus rhythm with PAC/PVC/episodes of sinus bradycardia.   Chadsvasc=3.   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   Pulmonary vein isolation procedure, 10/05/2011.  BRISSA with left ventricular ejection fraction of 55%, mild TR and MR.   Event recorder with intermittent episodes of flutter but the majority of the time in sinus rhythm.   External cardioversion to normal sinus rhythm, 02/17/2012.  Atypical chest pain.  History of left heart catheterization, 1990s in [place], reported as normal coronaries.   Stress test, 08/23/2007, revealing no significant ischemia, EF estimated at 46%.  Echo 7/28/17 LV Estimated EF = 57%., mild concentric hypertrophy, Left atrial cavity size is severely dilated. Normal estimated pulmonary artery systolic pressure  External CV 7/13/17; NSR with recurrent AF three days later  Echocardiogram 7/28/17: EF 57%, mild LVH, LA severely dilated  ECV to NSR 8/31/17  Wavecrest device insertion 7/16/19  BRISSA 7/21/2020 LVEF NL, No significant leakage around device, Mild MR,TR  ECV for recurrent Afib 12/19/2023-Flecanide   Zio patch 10/3/2024 - 10/16/2024: 100% atrial fibrillation with average heart  rate of 82 bpm, 4.5% PVCs  Flecainide discontinued 11/2024  Diabetes mellitus.   Gastroesophageal reflux disease.  Dyslipidemia.  Osteoarthritis.  Prostate cancer - Ronda Mack MD   Remote surgical history:  Cholecystectomy.   Right orchiectomy.  Right shoulder labral repair.  Left knee arthroscopy for partial medial meniscectomy        History of Present Illness:   Stefan Pedraza is an 84-year-old male with above-stated past medical history who presents today for initiation of Tikosyn.  The patient was seen in the office on 10/3/2024 and was found to be back in atrial fibrillation.  He wore a monitor which showed 100% atrial fibrillation burden with average heart rate 84 bpm.  He feeling tired and low energy since being back in atrial fibrillation.  He has been off his flecainide now for a couple of weeks for planned Tikosyn admission.  He does have a [id number] 3 and is status post left atrial appendage closure as part of the wave Crest trial in 2019 however he has been back on Eliquis for the past 2 weeks in preparation for a potential need for cardioversion at the end of his Tikosyn admission.  He is not having any bleeding issues or strokelike symptoms.      Allergies   Allergen Reactions    Penicillins Hives and Rash       Prior to Admission Medications       Prescriptions Last Dose Informant Patient Reported? Taking?    apixaban (ELIQUIS) 5 MG tablet tablet   No No    Take 1 tablet by mouth 2 (Two) Times a Day. Patient stopped Flecainide.    aspirin 81 MG EC tablet   Yes No    Take 1 tablet by mouth Daily.    atorvastatin (LIPITOR) 10 MG tablet  Self Yes No    Take 1 tablet by mouth Every Night.    B Complex Vitamins (VITAMIN B COMPLEX) capsule capsule   Yes No    Take 1 capsule by mouth Daily.    Cholecalciferol (Vitamin D3) 25 MCG (1000 UT) capsule   Yes No    Take 1 capsule by mouth Daily.    cyclobenzaprine (FLEXERIL) 10 MG tablet  Self Yes No    Take 1 tablet by mouth As Needed.    famotidine (PEPCID) 20  MG tablet   Yes No    Take 1 tablet by mouth 2 (Two) Times a Day.    ferrous sulfate 324 (65 FE) MG tablet delayed-release EC tablet  Self Yes No    Take 1 tablet by mouth Daily With Breakfast.    leuprolide acetate 5 MG/ML kit   Yes No    Inject 1 mL under the skin into the appropriate area as directed Every 6 (Six) Months.    metFORMIN ER (GLUCOPHAGE-XR) 500 MG 24 hr tablet  Self Yes No    Take 2 tablets by mouth Every Night.    Multiple Vitamins-Minerals (OCUVITE ADULT 50+ PO)   Yes No    Take 1 tablet by mouth Daily.    Omega-3 Fatty Acids (FISH OIL) 1000 MG capsule capsule  Self Yes No    Take 1 capsule by mouth Daily With Breakfast.    omeprazole (priLOSEC) 40 MG capsule   Yes No    Take 1 capsule by mouth Daily.    triamcinolone (KENALOG) 0.1 % cream   Yes No    As Needed.              Current Facility-Administered Medications:     Calcium Replacement - Follow Nurse / BPA Driven Protocol, , Not Applicable, PRN, Trinidad Cano PA    Magnesium Cardiology Dose Replacement - Follow Nurse / BPA Driven Protocol, , Not Applicable, PRN, Trinidad Cano PA    nitroglycerin (NITROSTAT) SL tablet 0.4 mg, 0.4 mg, Sublingual, Q5 Min PRN, Trinidad Cano PA    Phosphorus Replacement - Follow Nurse / BPA Driven Protocol, , Not Applicable, PRNJerome Emily M, PA    Potassium Replacement - Follow Nurse / BPA Driven Protocol, , Not Applicable, PRN, Trinidad Cano PA    Social History     Socioeconomic History    Marital status:    Tobacco Use    Smoking status: Never     Passive exposure: Never    Smokeless tobacco: Never   Vaping Use    Vaping status: Never Used   Substance and Sexual Activity    Alcohol use: No    Drug use: No    Sexual activity: Yes     Birth control/protection: None     Comment:        Family History   Problem Relation Age of Onset    Arthritis Mother     Pneumonia Mother     Atrial fibrillation Father     Hypertension Father     Diabetes Father     Heart disease Father     No Known  "Problems Sister     No Known Problems Maternal Grandmother     Diabetes Maternal Grandfather     No Known Problems Paternal Grandmother     No Known Problems Paternal Grandfather        REVIEW OF SYSTEMS:   CONSTITUTIONAL:         No weight loss, fever, chills, weakness + fatigue.   HEENT:                            No visual loss, blurred vision, double vision, yellow sclerae.                                             No hearing loss, congestion, sore throat.   SKIN:                                No rashes, urticaria, ulcers, sores.     RESPIRATORY:               No shortness of breath, hemoptysis, cough, sputum.   GI:                                     No anorexia, nausea, vomiting, diarrhea. No abdominal pain, melena.   :                                   No burning on urination, hematuria or increased frequency.  ENDOCRINE:                   No diaphoresis, cold or heat intolerance. No polyuria or polydipsia.   NEURO:                            No headache, dizziness, syncope, paralysis, ataxia, or parasthesias.                                            No change in bowel or bladder control. No history of CVA/TIA  MUSCULOSKELETAL:    No muscle, back pain, joint pain or stiffness.   HEMATOLOGY:               No anemia, bleeding, bruising. No history of DVT/PE.  PSYCH:                            No history of depression, anxiety    Vitals:    11/19/24 1146   BP: 136/79   BP Location: Right arm   Patient Position: Lying   Pulse: 86   Resp: 18   Temp: 98.6 °F (37 °C)   TempSrc: Oral   SpO2: 95%   Weight: 98.5 kg (217 lb 1.6 oz)   Height: 185.4 cm (73\")         Vital Sign Min/Max for last 24 hours  Temp  Min: 98.6 °F (37 °C)  Max: 98.6 °F (37 °C)   BP  Min: 136/79  Max: 136/79   Pulse  Min: 86  Max: 86   Resp  Min: 18  Max: 18   SpO2  Min: 95 %  Max: 95 %   No data recorded    No intake or output data in the 24 hours ending 11/19/24 1250          Physical Exam:  GEN: Well nourished, Well- developed  No acute " distress  HEENT: Normocephalic, Atraumatic, PERRLA, moist mucous membranes  NECK: supple, NO JVD, no thyromegaly, no lymphadenopathy  CARDIAC: S1S2, irr, irr,  no murmur, gallop, rub  LUNGS: Clear to ausculation, normal respiratory effort  ABDOMEN: Soft, nontender, normal bowel sounds  EXTREMITIES:No gross deformities,  No clubbing, cyanosis, or edema  SKIN: Warm, dry  NEURO: No focal deficits  PSYCHIATRIC: Normal affect and mood      I personally viewed and interpreted the patient's EKG/Telemetry/lab data    Data:       Results from last 7 days   Lab Units 11/19/24  1217   SODIUM mmol/L 140   POTASSIUM mmol/L 4.4   CHLORIDE mmol/L 103   CO2 mmol/L 27.0   BUN mg/dL 14   CREATININE mg/dL 0.84   GLUCOSE mg/dL 106*                  Results from last 7 days   Lab Units 11/19/24  1216   PROTIME Seconds 15.3*   INR  1.19*   APTT seconds 31.2                 No intake or output data in the 24 hours ending 11/19/24 1250          Telemetry: Atrial fibrillation  EKG: Atrial fibrillation 92 bpm, QTc 446 ms        Assessment and Plan:   Persistent atrial fibrillation:  -Previously controlled on flecainide however has had recurrent persistent atrial fibrillation documented on recent Zio patch monitor October 2024 with 100% burden.  The patient has been fatigued and has had lower energy in his day-to-day activities.  -He has been off flecainide for the past couple of weeks.  Will plan to initiate Tikosyn and follow QTc with serial EKGs.  Will dose Tikosyn based on his creatinine clearance.  EKG today shows QTc 446 ms  -Will plan for external cardioversion on Thursday after his fifth dose if he has not converted.  -CHADSVasc = 3 status post amulet device 2019, has restarted Eliquis anticipation for external cardioversion.  Has been on it for the past 2 weeks.    Electronically signed by SHIRA Gaona, 11/19/24, 12:50 PM Prabhakar DELEON MD, personally performed the services face to face as described and  documented by the above named individual. I have made any necessary edits and it is both accurate and complete 11/19/2024  17:43 EST

## 2024-11-20 LAB
QT INTERVAL: 438 MS
QT INTERVAL: 446 MS
QT INTERVAL: 496 MS
QTC INTERVAL: 491 MS
QTC INTERVAL: 492 MS
QTC INTERVAL: 590 MS

## 2024-11-20 PROCEDURE — 93005 ELECTROCARDIOGRAM TRACING: CPT | Performed by: PHYSICIAN ASSISTANT

## 2024-11-20 PROCEDURE — 99232 SBSQ HOSP IP/OBS MODERATE 35: CPT | Performed by: PHYSICIAN ASSISTANT

## 2024-11-20 PROCEDURE — 93010 ELECTROCARDIOGRAM REPORT: CPT | Performed by: INTERNAL MEDICINE

## 2024-11-20 RX ORDER — DOFETILIDE 0.25 MG/1
250 CAPSULE ORAL EVERY 12 HOURS SCHEDULED
Status: DISCONTINUED | OUTPATIENT
Start: 2024-11-20 | End: 2024-11-21

## 2024-11-20 RX ORDER — DOFETILIDE 0.25 MG/1
250 CAPSULE ORAL ONCE
Status: COMPLETED | OUTPATIENT
Start: 2024-11-20 | End: 2024-11-20

## 2024-11-20 RX ADMIN — ATORVASTATIN CALCIUM 10 MG: 10 TABLET, FILM COATED ORAL at 21:17

## 2024-11-20 RX ADMIN — DOFETILIDE 250 MCG: 0.25 CAPSULE ORAL at 21:16

## 2024-11-20 RX ADMIN — DOFETILIDE 500 MCG: 0.5 CAPSULE ORAL at 01:00

## 2024-11-20 RX ADMIN — FAMOTIDINE 20 MG: 20 TABLET, FILM COATED ORAL at 08:43

## 2024-11-20 RX ADMIN — FAMOTIDINE 20 MG: 20 TABLET, FILM COATED ORAL at 21:16

## 2024-11-20 RX ADMIN — PANTOPRAZOLE SODIUM 40 MG: 40 TABLET, DELAYED RELEASE ORAL at 05:50

## 2024-11-20 RX ADMIN — FERROUS SULFATE TAB 325 MG (65 MG ELEMENTAL FE) 325 MG: 325 (65 FE) TAB at 08:43

## 2024-11-20 RX ADMIN — APIXABAN 5 MG: 5 TABLET, FILM COATED ORAL at 08:43

## 2024-11-20 RX ADMIN — DOFETILIDE 250 MCG: 0.25 CAPSULE ORAL at 11:07

## 2024-11-20 RX ADMIN — APIXABAN 5 MG: 5 TABLET, FILM COATED ORAL at 21:17

## 2024-11-20 RX ADMIN — ASPIRIN 81 MG: 81 TABLET, COATED ORAL at 08:43

## 2024-11-20 RX ADMIN — METFORMIN HYDROCHLORIDE 1000 MG: 500 TABLET, EXTENDED RELEASE ORAL at 21:17

## 2024-11-20 NOTE — PROGRESS NOTES
[facility]  Progress Note     LOS: 1 day   Patient Care Team:  Gamaliel Eaton MD as PCP - General  Prabhakar Solis MD as Consulting Physician (Cardiac Electrophysiology)  Shon Bunn MD as Consulting Physician (Radiation Oncology)  Kayden Hansen MD as Consulting Physician (Urology)  Ronda Mack MD as Consulting Physician (Hematology and Oncology)  Eboni Avalos APRN as Nurse Practitioner (Oncology)    Chief Complaint:  Atrial Fibrillation    Subjective       Patient remains in atrial fibrillation today.  He has had 2 doses of Tikosyn and is tolerating well.  No new complaints today.        Review of Systems:   Pertinent positives in HPI, all others reviewed and negative.      Objective       Current Facility-Administered Medications:     apixaban (ELIQUIS) tablet 5 mg, 5 mg, Oral, BID, Prabhakar Solis MD, 5 mg at 11/19/24 2053    aspirin EC tablet 81 mg, 81 mg, Oral, Daily, Prabhakar Solis MD    atorvastatin (LIPITOR) tablet 10 mg, 10 mg, Oral, Nightly, Prabhakar Solis MD, 10 mg at 11/19/24 2053    Calcium Replacement - Follow Nurse / BPA Driven Protocol, , Not Applicable, PRN, Trinidad Cano PA    [COMPLETED] dofetilide (TIKOSYN) capsule 500 mcg, 500 mcg, Oral, Once, 500 mcg at 11/20/24 0100 **FOLLOWED BY** dofetilide (TIKOSYN) capsule 500 mcg, 500 mcg, Oral, Once **FOLLOWED BY** dofetilide (TIKOSYN) capsule 500 mcg, 500 mcg, Oral, Q12H, Arpita Vale, [facility]    famotidine (PEPCID) tablet 20 mg, 20 mg, Oral, BID, Prabhakar Solis MD, 20 mg at 11/19/24 2053    ferrous sulfate tablet 325 mg, 325 mg, Oral, Daily With Breakfast, Prabhakar Solis MD    Magnesium Cardiology Dose Replacement - Follow Nurse / BPA Driven Protocol, , Not Applicable, PRZULMA, Trinidad Cano PA    metFORMIN ER (GLUCOPHAGE-XR) 24 hr tablet 1,000 mg, 1,000 mg, Oral, Nightly, Prabhakar Solis MD, 1,000 mg at 11/19/24 2053    nitroglycerin (NITROSTAT) SL tablet 0.4 mg,  "0.4 mg, Sublingual, Q5 Min PRN, Trinidad Cano PA    pantoprazole (PROTONIX) EC tablet 40 mg, 40 mg, Oral, Q AM, Prabhakar Solis MD, 40 mg at 11/20/24 0550    Phosphorus Replacement - Follow Nurse / BPA Driven Protocol, , Not Applicable, PRN, Trinidad Cano PA    Potassium Replacement - Follow Nurse / BPA Driven Protocol, , Not Applicable, PRN, Trinidad Cano PA      Vital Sign Min/Max for last 24 hours  Temp  Min: 97.5 °F (36.4 °C)  Max: 98.8 °F (37.1 °C)   BP  Min: 102/78  Max: 138/91   Pulse  Min: 72  Max: 91   Resp  Min: 18  Max: 18   SpO2  Min: 91 %  Max: 95 %   No data recorded   Weight  Min: 98.5 kg (217 lb 1.6 oz)  Max: 98.5 kg (217 lb 1.6 oz)     Flowsheet Rows      Flowsheet Row First Filed Value   Admission Height 185.4 cm (73\") Documented at 11/19/2024 1146   Admission Weight 98.5 kg (217 lb 1.6 oz) Documented at 11/19/2024 1146            Physical Exam:     General Appearance:    Alert, cooperative, in no acute distress   Lungs:     Clear to auscultation,respirations regular, even and                  unlabored    Heart:  Irregularly irregular rate and rhythm, normal S1 and S2, no            murmur, no gallop, no rub, no click   Chest Wall:    No abnormalities observed   Abdomen:     Normal bowel sounds, no masses, no organomegaly, soft        non-tender, non-distended, no guarding, no rebound                tenderness   Extremities:   Moves all extremities well, no edema, no cyanosis, no             redness   Pulses:   Pulses palpable and equal bilaterally   Skin:   No bleeding, bruising or rash        Results Review:       Results from last 7 days   Lab Units 11/19/24  1217   SODIUM mmol/L 140   POTASSIUM mmol/L 4.4   CHLORIDE mmol/L 103   CO2 mmol/L 27.0   BUN mg/dL 14   CREATININE mg/dL 0.84   GLUCOSE mg/dL 106*          Results from last 7 days   Lab Units 11/19/24  1216   PROTIME Seconds 15.3*   INR  1.19*   APTT seconds 31.2         Magnesium: 2.0 on 11/19/2024        Intake/Output " Summary (Last 24 hours) at 11/20/2024 0824  Last data filed at 11/19/2024 1417  Gross per 24 hour   Intake 280 ml   Output --   Net 280 ml       I personally viewed and interpreted the patient's EKG/Telemetry data      EKG: Atrial fibrillation 85 bpm QTC: 430 ms    Telemetry: Atrial fibrillation 72 to 91 bpm      Present on Admission:   Atrial fibrillation    Assessment & Plan   1) persistent Atrial Fibrillation: Admitted for Tikosyn 11/19/2024.  Remains in atrial fibrillation with controlled ventricular rates.  QTC is borderline long, will decrease to 250 mcg BID.  Will Make pt NPO and plan for cardioversion if still in AF.  Scheduled for 1 PM tomorrow with Dr. Tam Hathaway   2) Anticoagulation: Eliquis 5 mg twice daily      Plan for disposition: Continue to monitor serial EKGs.  EKG in AM.  Plan for cardioversion tomorrow afternoon if still in atrial fibrillation.  Anticipate discharge home tomorrow afternoon.    Electronically signed by SHIRA Gaona, 11/20/24, 8:24 AM EST.

## 2024-11-20 NOTE — CASE MANAGEMENT/SOCIAL WORK
Discharge Planning Assessment  [facility]     Patient Name: Stefan Pedraza  MRN: [id number]  Today's Date: 11/20/2024    Admit Date: 11/19/2024    Plan: Home   Discharge Needs Assessment       Row Name 11/20/24 1056       Living Environment    People in Home spouse    Current Living Arrangements home    Potentially Unsafe Housing Conditions none    Primary Care Provided by self    Provides Primary Care For no one    Family Caregiver if Needed spouse    Quality of Family Relationships unable to assess    Able to Return to Prior Arrangements yes       Resource/Environmental Concerns    Resource/Environmental Concerns none    Transportation Concerns none       Transition Planning    Patient/Family Anticipates Transition to home with family    Patient/Family Anticipated Services at Transition     Transportation Anticipated family or friend will provide       Discharge Needs Assessment    Readmission Within the Last 30 Days no previous admission in last 30 days    Equipment Currently Used at Home cpap    Concerns to be Addressed discharge planning    Anticipated Changes Related to Illness none    Equipment Needed After Discharge none                   Discharge Plan       Row Name 11/20/24 1057       Plan    Plan Home    Patient/Family in Agreement with Plan yes    Plan Comments Spoke with patient in room to initiate discharge planning.  He [place] [facility].  He is independent with ADL's.  He has a CPAP at home and is not current with home health.  His PCP is Gamaliel Eaton.  He does not have an advanced directive.  Verified that he has [employer] Medicare Replacement.  Mr. Pedraza has RX coverage and has his scripts filled at [facility].  His plan is to return home at discharge.  PA for Dofetilide submitted through [email or web address] ([id number]).  Patient agreeable to get first 30 day supply at [facility].  No other needs noted at this time.  CM will continue to follow.    Final  Discharge Disposition Code 01 - home or self-care                  Continued Care and Services - Admitted Since 11/19/2024    No active coordination exists for this encounter.       Expected Discharge Date and Time       Expected Discharge Date Expected Discharge Time    Nov 21, 2024            Demographic Summary       Row Name 11/20/24 1056       General Information    Admission Type inpatient    Arrived From home    Referral Source admission list    Reason for Consult discharge planning    Preferred Language English                   Functional Status       Row Name 11/20/24 1056       Functional Status    Usual Activity Tolerance moderate    Current Activity Tolerance moderate       Functional Status, IADL    Medications independent    Meal Preparation independent    Housekeeping independent    Laundry independent    Shopping independent                   Psychosocial    No documentation.                  Abuse/Neglect    No documentation.                  Legal    No documentation.                  Substance Abuse    No documentation.                  Patient Forms    No documentation.                     Joleen Browning RN

## 2024-11-21 LAB
QT INTERVAL: 494 MS
QT INTERVAL: 494 MS
QT INTERVAL: 510 MS
QT INTERVAL: 558 MS
QT INTERVAL: 622 MS
QTC INTERVAL: 523 MS
QTC INTERVAL: 536 MS
QTC INTERVAL: 551 MS
QTC INTERVAL: 554 MS
QTC INTERVAL: 605 MS

## 2024-11-21 PROCEDURE — 93010 ELECTROCARDIOGRAM REPORT: CPT | Performed by: INTERNAL MEDICINE

## 2024-11-21 PROCEDURE — 93005 ELECTROCARDIOGRAM TRACING: CPT | Performed by: PHYSICIAN ASSISTANT

## 2024-11-21 PROCEDURE — 25010000002 MAGNESIUM SULFATE 2 GM/50ML SOLUTION

## 2024-11-21 PROCEDURE — 99231 SBSQ HOSP IP/OBS SF/LOW 25: CPT | Performed by: NURSE PRACTITIONER

## 2024-11-21 PROCEDURE — 25010000002 MAGNESIUM SULFATE 2 GM/50ML SOLUTION: Performed by: NURSE PRACTITIONER

## 2024-11-21 PROCEDURE — 93005 ELECTROCARDIOGRAM TRACING: CPT | Performed by: INTERNAL MEDICINE

## 2024-11-21 RX ORDER — MAGNESIUM SULFATE HEPTAHYDRATE 40 MG/ML
2 INJECTION, SOLUTION INTRAVENOUS ONCE
Status: COMPLETED | OUTPATIENT
Start: 2024-11-21 | End: 2024-11-21

## 2024-11-21 RX ORDER — DOFETILIDE 0.12 MG/1
125 CAPSULE ORAL EVERY 12 HOURS SCHEDULED
Status: DISCONTINUED | OUTPATIENT
Start: 2024-11-21 | End: 2024-11-21

## 2024-11-21 RX ADMIN — DOFETILIDE 125 MCG: 0.12 CAPSULE ORAL at 10:51

## 2024-11-21 RX ADMIN — ASPIRIN 81 MG: 81 TABLET, COATED ORAL at 08:42

## 2024-11-21 RX ADMIN — APIXABAN 5 MG: 5 TABLET, FILM COATED ORAL at 08:42

## 2024-11-21 RX ADMIN — PANTOPRAZOLE SODIUM 40 MG: 40 TABLET, DELAYED RELEASE ORAL at 05:54

## 2024-11-21 RX ADMIN — ATORVASTATIN CALCIUM 10 MG: 10 TABLET, FILM COATED ORAL at 22:24

## 2024-11-21 RX ADMIN — FAMOTIDINE 20 MG: 20 TABLET, FILM COATED ORAL at 22:24

## 2024-11-21 RX ADMIN — APIXABAN 5 MG: 5 TABLET, FILM COATED ORAL at 22:24

## 2024-11-21 RX ADMIN — METFORMIN HYDROCHLORIDE 1000 MG: 500 TABLET, EXTENDED RELEASE ORAL at 22:24

## 2024-11-21 RX ADMIN — FERROUS SULFATE TAB 325 MG (65 MG ELEMENTAL FE) 325 MG: 325 (65 FE) TAB at 08:42

## 2024-11-21 RX ADMIN — FAMOTIDINE 20 MG: 20 TABLET, FILM COATED ORAL at 08:42

## 2024-11-21 RX ADMIN — MAGNESIUM SULFATE IN WATER FOR 2 G: 40 INJECTION INTRAVENOUS at 08:41

## 2024-11-21 RX ADMIN — MAGNESIUM SULFATE IN WATER FOR 2 G: 40 INJECTION INTRAVENOUS at 15:45

## 2024-11-21 NOTE — PLAN OF CARE
Problem: Adult Inpatient Plan of Care  Goal: Plan of Care Review  Outcome: Progressing  Flowsheets  Taken 11/21/2024 0547 by Tam Zuñiga RN  Progress: improving  Taken 11/19/2024 2002 by Mariana Moya RN  Plan of Care Reviewed With: patient  Goal: Patient-Specific Goal (Individualized)  Outcome: Progressing  Goal: Absence of Hospital-Acquired Illness or Injury  Outcome: Progressing  Intervention: Identify and Manage Fall Risk  Recent Flowsheet Documentation  Taken 11/21/2024 0400 by Tam Zuñiga RN  Safety Promotion/Fall Prevention:   safety round/check completed   nonskid shoes/slippers when out of bed   clutter free environment maintained   assistive device/personal items within reach  Taken 11/21/2024 0200 by Tam Zuñiga RN  Safety Promotion/Fall Prevention:   safety round/check completed   nonskid shoes/slippers when out of bed   clutter free environment maintained   assistive device/personal items within reach  Taken 11/21/2024 0000 by Tam Zuñiga RN  Safety Promotion/Fall Prevention:   safety round/check completed   nonskid shoes/slippers when out of bed   clutter free environment maintained   assistive device/personal items within reach  Taken 11/20/2024 2200 by Tam Zuñiga RN  Safety Promotion/Fall Prevention:   safety round/check completed   nonskid shoes/slippers when out of bed   clutter free environment maintained   assistive device/personal items within reach  Taken 11/20/2024 2000 by Tam Zuñiga RN  Safety Promotion/Fall Prevention:   safety round/check completed   nonskid shoes/slippers when out of bed   clutter free environment maintained   assistive device/personal items within reach  Intervention: Prevent Skin Injury  Recent Flowsheet Documentation  Taken 11/21/2024 0400 by Tam Zuñiga RN  Body Position: position changed independently  Taken 11/21/2024 0200 by Tam Zuñiga RN  Body Position: position changed independently  Taken  11/21/2024 0000 by Tam Zuñiga RN  Body Position: position changed independently  Taken 11/20/2024 2200 by Tam Zuñiga RN  Body Position: position changed independently  Taken 11/20/2024 2000 by Tam Zuñiga RN  Body Position: position changed independently  Intervention: Prevent Infection  Recent Flowsheet Documentation  Taken 11/21/2024 0400 by Tam Zuñiga RN  Infection Prevention: environmental surveillance performed  Taken 11/21/2024 0200 by Tam Zuñiga RN  Infection Prevention: environmental surveillance performed  Taken 11/21/2024 0000 by Tam Zuñiga RN  Infection Prevention: environmental surveillance performed  Taken 11/20/2024 2200 by Tam Zuñiga RN  Infection Prevention: environmental surveillance performed  Taken 11/20/2024 2000 by Tam Zuñiga RN  Infection Prevention: environmental surveillance performed  Goal: Optimal Comfort and Wellbeing  Outcome: Progressing  Goal: Readiness for Transition of Care  Outcome: Progressing   Goal Outcome Evaluation:           Progress: improving

## 2024-11-21 NOTE — PROGRESS NOTES
Stefan Pedraza  [id number]  1940   LOS: 2 days   Patient Care Team:  Gamaliel Eaton MD as PCP - General  Prabhakar Solis MD as Consulting Physician (Cardiac Electrophysiology)  Shon Bunn MD as Consulting Physician (Radiation Oncology)  Kayden Hansen MD as Consulting Physician (Urology)  Ronda Mack MD as Consulting Physician (Hematology and Oncology)  Eboni Avalos APRN as Nurse Practitioner (Oncology)    Chief Complaint: Atrial fibrillation, Tikosyn admit    Subjective Patient converted to sinus bradycardia overnight.  Patient unaware of any palpitations and denies any chest pain, shortness of breath, or dizziness.  He has mild fatigue.  He is planning on exercising more to get better conditioned after discharge.    Objective     Vital Sign Min/Max for last 24 hours  Temp  Min: 97.5 °F (36.4 °C)  Max: 98.5 °F (36.9 °C)   BP  Min: 116/89  Max: 144/95   Pulse  Min: 75  Max: 89   Resp  Min: 18  Max: 18   SpO2  Min: 92 %  Max: 96 %   No data recorded   No data recorded         11/19/24  1146   Weight: 98.5 kg (217 lb 1.6 oz)         Intake/Output Summary (Last 24 hours) at 11/21/2024 0745  Last data filed at 11/20/2024 0800  Gross per 24 hour   Intake 500 ml   Output --   Net 500 ml       Physical Exam:     General Appearance:    Alert, cooperative, in no acute distress   Lungs:     Clear to auscultation,respirations regular, even and                unlabored    Heart:  Sinus bradycardia, normal S1 and S2, no            murmur, grade 1/6 gallop, no rub, no click   Abdomen:  Extremities:   Soft, nontender, bowel sounds audible x4    No edema, normal range of motion   Pulses:   Pulses palpable and equal bilaterally      Results Review:   Results from last 7 days   Lab Units 11/19/24  1217   SODIUM mmol/L 140   POTASSIUM mmol/L 4.4   CHLORIDE mmol/L 103   CO2 mmol/L 27.0   BUN mg/dL 14   CREATININE mg/dL 0.84   GLUCOSE mg/dL 106*   CALCIUM mg/dL 9.6               ECV 11/21/2024: Sinus  bradycardia with first-degree AV block, septal infarct, age undetermined, prolonged QT, abnormal ECG, 53 bpm,  ms, QRS 88 ms, QTc 523 ms        Medication Review: Reviewed    Assessment & Plan   Patient here for persistent atrial fibrillation /Tikosyn admit.  Patient converted to sinus bradycardia overnight.  Will cancel ECV and lower Tikosyn dose to 125 mcg twice daily.      Atrial fibrillation    Electronically signed by AYAAN Mcneil, 11/21/24, 8:00 AM EST.     11/21/24  07:45 EST

## 2024-11-22 ENCOUNTER — READMISSION MANAGEMENT (OUTPATIENT)
Dept: CALL CENTER | Facility: HOSPITAL | Age: 84
End: 2024-11-22
Payer: MEDICARE

## 2024-11-22 VITALS
SYSTOLIC BLOOD PRESSURE: 134 MMHG | WEIGHT: 217.1 LBS | BODY MASS INDEX: 28.77 KG/M2 | DIASTOLIC BLOOD PRESSURE: 74 MMHG | RESPIRATION RATE: 18 BRPM | TEMPERATURE: 98.2 F | HEIGHT: 73 IN | HEART RATE: 54 BPM | OXYGEN SATURATION: 94 %

## 2024-11-22 LAB
ANION GAP SERPL CALCULATED.3IONS-SCNC: 9 MMOL/L (ref 5–15)
BUN SERPL-MCNC: 14 MG/DL (ref 8–23)
BUN/CREAT SERPL: 16.5 (ref 7–25)
CALCIUM SPEC-SCNC: 9 MG/DL (ref 8.6–10.5)
CHLORIDE SERPL-SCNC: 104 MMOL/L (ref 98–107)
CO2 SERPL-SCNC: 26 MMOL/L (ref 22–29)
CREAT SERPL-MCNC: 0.85 MG/DL (ref 0.76–1.27)
EGFRCR SERPLBLD CKD-EPI 2021: 85.7 ML/MIN/1.73
GLUCOSE SERPL-MCNC: 131 MG/DL (ref 65–99)
MAGNESIUM SERPL-MCNC: 2.9 MG/DL (ref 1.6–2.4)
POTASSIUM SERPL-SCNC: 3.8 MMOL/L (ref 3.5–5.2)
POTASSIUM SERPL-SCNC: 4 MMOL/L (ref 3.5–5.2)
QT INTERVAL: 508 MS
QT INTERVAL: 524 MS
QT INTERVAL: 648 MS
QTC INTERVAL: 485 MS
QTC INTERVAL: 540 MS
QTC INTERVAL: 668 MS
SODIUM SERPL-SCNC: 139 MMOL/L (ref 136–145)

## 2024-11-22 PROCEDURE — 99239 HOSP IP/OBS DSCHRG MGMT >30: CPT

## 2024-11-22 PROCEDURE — 93005 ELECTROCARDIOGRAM TRACING: CPT | Performed by: PHYSICIAN ASSISTANT

## 2024-11-22 PROCEDURE — 25810000003 SODIUM CHLORIDE 0.9 % SOLUTION 250 ML FLEX CONT: Performed by: INTERNAL MEDICINE

## 2024-11-22 PROCEDURE — 93010 ELECTROCARDIOGRAM REPORT: CPT | Performed by: INTERNAL MEDICINE

## 2024-11-22 PROCEDURE — 84132 ASSAY OF SERUM POTASSIUM: CPT | Performed by: INTERNAL MEDICINE

## 2024-11-22 PROCEDURE — 80048 BASIC METABOLIC PNL TOTAL CA: CPT

## 2024-11-22 PROCEDURE — 93005 ELECTROCARDIOGRAM TRACING: CPT | Performed by: INTERNAL MEDICINE

## 2024-11-22 PROCEDURE — 99238 HOSP IP/OBS DSCHRG MGMT 30/<: CPT | Performed by: INTERNAL MEDICINE

## 2024-11-22 PROCEDURE — 83735 ASSAY OF MAGNESIUM: CPT

## 2024-11-22 RX ORDER — POTASSIUM CHLORIDE 1500 MG/1
20 TABLET, EXTENDED RELEASE ORAL ONCE
Status: COMPLETED | OUTPATIENT
Start: 2024-11-22 | End: 2024-11-22

## 2024-11-22 RX ADMIN — ASPIRIN 81 MG: 81 TABLET, COATED ORAL at 08:52

## 2024-11-22 RX ADMIN — APIXABAN 5 MG: 5 TABLET, FILM COATED ORAL at 08:52

## 2024-11-22 RX ADMIN — PANTOPRAZOLE SODIUM 40 MG: 40 TABLET, DELAYED RELEASE ORAL at 06:26

## 2024-11-22 RX ADMIN — FERROUS SULFATE TAB 325 MG (65 MG ELEMENTAL FE) 325 MG: 325 (65 FE) TAB at 08:52

## 2024-11-22 RX ADMIN — FAMOTIDINE 20 MG: 20 TABLET, FILM COATED ORAL at 08:52

## 2024-11-22 RX ADMIN — ISOPROTERENOL HYDROCHLORIDE 1 MCG/MIN: 0.2 INJECTION, SOLUTION INTRACARDIAC; INTRAMUSCULAR; INTRAVENOUS; SUBCUTANEOUS at 08:50

## 2024-11-22 RX ADMIN — POTASSIUM CHLORIDE 20 MEQ: 1500 TABLET, EXTENDED RELEASE ORAL at 10:23

## 2024-11-22 NOTE — PROGRESS NOTES
[facility] at [facility]  PROGRESS NOTE    Stefan Pedraza   [id number]   1940    LOS: 3 days .  Date of Admission: 11/19/2024  Date of Service: 11/22/24    Primary Care Physician: Gamaliel Eaton MD    Chief Complaint: f/u Atrial fibrillation    Problem List:   Atrial fibrillation    Subjective      Patient doing well this morning. Denies palpitations, LH, and dizziness. Tikosyn was stopped yesterday due to QT prolongation. He was given mag. Patient starting having PVCs. Isuprel 1 mcg/min with improvement in PVCs.  Feels fine at this time.  No complaints.  Unaware of any palpitations.    ROS  All systems have been reviewed and are negative with the exception of those mentioned in the HPI and problem list above.     Objective   Vital Sign Min/Max for last 24 hours  Temp  Min: 97.5 °F (36.4 °C)  Max: 98.3 °F (36.8 °C)   BP  Min: 119/69  Max: 144/80   Pulse  Min: 52  Max: 70   Resp  Min: 18  Max: 18   SpO2  Min: 93 %  Max: 96 %   No data recorded   No data recorded     Physical Exam:  GENERAL: Alert, cooperative, in no acute distress.   HEENT: Normocephalic, no jugular venous distention  HEART: Regular rhythm normal S1-S2 there is extrasystoles rest of examination benign.  LUNGS: Clear to auscultation bilaterally.  Otherwise clear.  NEUROLOGIC: Nonfocal alert x 3.  EXTREMITIES: No clubbing, cyanosis, or edema noted.     Results:      Results from last 7 days   Lab Units 11/22/24  0846 11/19/24  1217   SODIUM mmol/L 139 140   POTASSIUM mmol/L 3.8 4.4   CHLORIDE mmol/L 104 103   CO2 mmol/L 26.0 27.0   BUN mg/dL 14 14   CREATININE mg/dL 0.85 0.84   GLUCOSE mg/dL 131* 106*      Lab Results   Component Value Date    AST 15 09/19/2024    ALT 16 09/19/2024                     Results from last 7 days   Lab Units 11/19/24  1216   PROTIME Seconds 15.3*   INR  1.19*   APTT seconds 31.2             No intake or output data in the 24 hours ending 11/22/24 1016    EKG/TELE: SR with occasional  PVCs    Radiology Data:   No radiology results for the last day   Results for orders placed during the hospital encounter of 07/21/20    Adult Transesophageal Echo (BRISSA) W/ Cont if Necessary Per Protocol    Interpretation Summary  · Left ventricular systolic function is normal. Estimated EF = 60%.  · Left atrial cavity size is dilated.  · The left atrial appendage is status post occlusion with a Wavecrest device. The device is well-seated but does protrude somewhat into the left atrium. A small amount of flow is seen laterally around the device. The diameter less than 0.3 cm. These findings are unchanged when compared to the prior BRISSA performed on August 30, 2019  · Mild mitral valve regurgitation is present  · Mild pulmonic valve regurgitation is present.  · Mild to moderate tricuspid valve regurgitation is present.  · Estimated right ventricular systolic pressure from tricuspid regurgitation is mildly elevated (35-45 mmHg).  · There is mild plaque in the descending aorta present.     Current Medications:  apixaban, 5 mg, Oral, BID  aspirin, 81 mg, Oral, Daily  atorvastatin, 10 mg, Oral, Nightly  famotidine, 20 mg, Oral, BID  ferrous sulfate, 325 mg, Oral, Daily With Breakfast  metFORMIN ER, 1,000 mg, Oral, Nightly  pantoprazole, 40 mg, Oral, Q AM  potassium chloride ER, 20 mEq, Oral, Once      isoproterenol (ISUPREL) 1,000 mcg in sodium chloride 0.9 % 250 mL (4 mcg/mL) infusion, 1 mcg/min, Last Rate: 1 mcg/min (11/22/24 0850)      Assessment and Plan:   Persistent atrial fibrillation  Admitted for Tikosyn 11/19/2024  Tikosyn stopped on 11/21/2024 due to prolonged QT  Patient in normal sinus rhythm with frequent PVCs.  Isuprel 1 mcg/min was started with improvement in PVCs.  [id number] 3 (age, DM), patient is anticoagulated with Eliquis right now due to potential need for cardioversion.  Patient has a Wavecrest LAAO device implanted 7/2019    -Patient maintaining SR, but was having frequent monomorphic PVCs.  Isuprel 1 mcg/min which improved PVCs. EKG shows improvement in Qtc.   -Stop Isuprel now and get an EKG at 5pm.   -Replace electrolytes per protocol    Electronically signed by AYAAN Rabago, 11/22/24, 1:11 PM EST.      Please note that portions of this note were dictated utilizing Dragon dictation.      Twelve-lead EKG at 5:00 pm today demonstrates normal sinus rhythm with QTc around 480 ms.  Ventricular ectopy resolved.  Patient doing well.  Will discharge and follow-up as an outpatient.  He is to take the Eliquis for an additional 3 weeks then discontinue.      I, Prabhakar Solis MD, personally performed the services face to face as described and documented by the above named individual. I have made any necessary edits and it is both accurate and complete 11/22/2024  18:12 EST

## 2024-11-22 NOTE — CASE MANAGEMENT/SOCIAL WORK
Continued Stay Note  [facility]     Patient Name: Stefan Pedraza  MRN: [id number]  Today's Date: 11/22/2024    Admit Date: 11/19/2024    Plan: Home   Discharge Plan       Row Name 11/22/24 0911       Plan    Plan Home    Plan Comments Spoke with patient in room.  Dofetilide was discontinued yesterday.  His plan is still to return home at discharge.  No needs noted at this time.  CM will continue to follow.                   Discharge Codes    No documentation.                 Expected Discharge Date and Time       Expected Discharge Date Expected Discharge Time    Nov 22, 2024               Joleen Browning RN

## 2024-11-23 NOTE — OUTREACH NOTE
Prep Survey      Flowsheet Row Responses   [facility] discharged [facility]? [facility]   Is LACE score < 7 ? No   Eligibility Readm Mgmt   Discharge diagnosis Atrial fibrillation   Does the patient have one of the following disease processes/diagnoses(primary or secondary)? Other   Prep survey completed? Yes            Erika TORRES - Registered Nurse

## 2024-11-25 ENCOUNTER — TELEPHONE (OUTPATIENT)
Dept: CARDIOLOGY | Facility: CLINIC | Age: 84
End: 2024-11-25
Payer: MEDICARE

## 2024-11-25 NOTE — TELEPHONE ENCOUNTER
Caller: Stefan Pedraza    Relationship: Self    Best call back number: [phone]    What is the best time to reach you: ANYTIME    What was the call regarding: PT STATES HE WAS RECENTLY IN HOSPITAL FOR TYKOSIN TREATMENT AND THAT PLAN DIDN'T WORK OUT AS EXPECTED TO CONTROL HIS AFIB. PT IS NOW BACK HOME AND WOULD LIKE TO KNOW WHAT THE PLAN IS MOVING FORWARD. PLEASE CALL BACK TO ADVISE, THANK YOU.

## 2024-11-26 ENCOUNTER — LAB (OUTPATIENT)
Dept: LAB | Facility: HOSPITAL | Age: 84
End: 2024-11-26
Payer: MEDICARE

## 2024-11-26 ENCOUNTER — OFFICE VISIT (OUTPATIENT)
Dept: ONCOLOGY | Facility: CLINIC | Age: 84
End: 2024-11-26
Payer: MEDICARE

## 2024-11-26 VITALS
BODY MASS INDEX: 29.42 KG/M2 | SYSTOLIC BLOOD PRESSURE: 131 MMHG | HEIGHT: 73 IN | HEART RATE: 95 BPM | RESPIRATION RATE: 18 BRPM | TEMPERATURE: 97.9 F | WEIGHT: 222 LBS | DIASTOLIC BLOOD PRESSURE: 78 MMHG | OXYGEN SATURATION: 96 %

## 2024-11-26 DIAGNOSIS — C77.5 PROSTATE CANCER METASTATIC TO INTRAPELVIC LYMPH NODE: ICD-10-CM

## 2024-11-26 DIAGNOSIS — C61 PROSTATE CANCER METASTATIC TO INTRAPELVIC LYMPH NODE: ICD-10-CM

## 2024-11-26 DIAGNOSIS — C77.5 PROSTATE CANCER METASTATIC TO INTRAPELVIC LYMPH NODE: Primary | ICD-10-CM

## 2024-11-26 DIAGNOSIS — C61 PROSTATE CANCER METASTATIC TO INTRAPELVIC LYMPH NODE: Primary | ICD-10-CM

## 2024-11-26 LAB
ALBUMIN SERPL-MCNC: 4.1 G/DL (ref 3.5–5.2)
ALBUMIN/GLOB SERPL: 1.3 G/DL
ALP SERPL-CCNC: 74 U/L (ref 39–117)
ALT SERPL W P-5'-P-CCNC: 17 U/L (ref 1–41)
ANION GAP SERPL CALCULATED.3IONS-SCNC: 10 MMOL/L (ref 5–15)
AST SERPL-CCNC: 17 U/L (ref 1–40)
BASOPHILS # BLD AUTO: 0.02 10*3/MM3 (ref 0–0.2)
BASOPHILS NFR BLD AUTO: 0.4 % (ref 0–1.5)
BILIRUB SERPL-MCNC: 0.9 MG/DL (ref 0–1.2)
BUN SERPL-MCNC: 16 MG/DL (ref 8–23)
BUN/CREAT SERPL: 20.8 (ref 7–25)
CALCIUM SPEC-SCNC: 9.4 MG/DL (ref 8.6–10.5)
CHLORIDE SERPL-SCNC: 104 MMOL/L (ref 98–107)
CO2 SERPL-SCNC: 25 MMOL/L (ref 22–29)
CREAT SERPL-MCNC: 0.77 MG/DL (ref 0.76–1.27)
DEPRECATED RDW RBC AUTO: 44.1 FL (ref 37–54)
EGFRCR SERPLBLD CKD-EPI 2021: 88.3 ML/MIN/1.73
EOSINOPHIL # BLD AUTO: 0.22 10*3/MM3 (ref 0–0.4)
EOSINOPHIL NFR BLD AUTO: 4.8 % (ref 0.3–6.2)
ERYTHROCYTE [DISTWIDTH] IN BLOOD BY AUTOMATED COUNT: 12.6 % (ref 12.3–15.4)
GLOBULIN UR ELPH-MCNC: 3.1 GM/DL
GLUCOSE SERPL-MCNC: 109 MG/DL (ref 65–99)
HCT VFR BLD AUTO: 41.8 % (ref 37.5–51)
HGB BLD-MCNC: 14.1 G/DL (ref 13–17.7)
IMM GRANULOCYTES # BLD AUTO: 0.02 10*3/MM3 (ref 0–0.05)
IMM GRANULOCYTES NFR BLD AUTO: 0.4 % (ref 0–0.5)
LYMPHOCYTES # BLD AUTO: 1.71 10*3/MM3 (ref 0.7–3.1)
LYMPHOCYTES NFR BLD AUTO: 37.1 % (ref 19.6–45.3)
MCH RBC QN AUTO: 31.8 PG (ref 26.6–33)
MCHC RBC AUTO-ENTMCNC: 33.7 G/DL (ref 31.5–35.7)
MCV RBC AUTO: 94.1 FL (ref 79–97)
MONOCYTES # BLD AUTO: 0.47 10*3/MM3 (ref 0.1–0.9)
MONOCYTES NFR BLD AUTO: 10.2 % (ref 5–12)
NEUTROPHILS NFR BLD AUTO: 2.17 10*3/MM3 (ref 1.7–7)
NEUTROPHILS NFR BLD AUTO: 47.1 % (ref 42.7–76)
PLATELET # BLD AUTO: 195 10*3/MM3 (ref 140–450)
PMV BLD AUTO: 8.9 FL (ref 6–12)
POTASSIUM SERPL-SCNC: 4.1 MMOL/L (ref 3.5–5.2)
PROT SERPL-MCNC: 7.2 G/DL (ref 6–8.5)
PSA SERPL-MCNC: <0.014 NG/ML (ref 0–4)
RBC # BLD AUTO: 4.44 10*6/MM3 (ref 4.14–5.8)
SODIUM SERPL-SCNC: 139 MMOL/L (ref 136–145)
WBC NRBC COR # BLD AUTO: 4.61 10*3/MM3 (ref 3.4–10.8)

## 2024-11-26 PROCEDURE — 80053 COMPREHEN METABOLIC PANEL: CPT

## 2024-11-26 PROCEDURE — 36415 COLL VENOUS BLD VENIPUNCTURE: CPT

## 2024-11-26 PROCEDURE — 1160F RVW MEDS BY RX/DR IN RCRD: CPT | Performed by: NURSE PRACTITIONER

## 2024-11-26 PROCEDURE — 1126F AMNT PAIN NOTED NONE PRSNT: CPT | Performed by: NURSE PRACTITIONER

## 2024-11-26 PROCEDURE — 1159F MED LIST DOCD IN RCRD: CPT | Performed by: NURSE PRACTITIONER

## 2024-11-26 PROCEDURE — 85025 COMPLETE CBC W/AUTO DIFF WBC: CPT

## 2024-11-26 PROCEDURE — 99213 OFFICE O/P EST LOW 20 MIN: CPT | Performed by: NURSE PRACTITIONER

## 2024-11-26 PROCEDURE — 84153 ASSAY OF PSA TOTAL: CPT

## 2024-11-26 NOTE — TELEPHONE ENCOUNTER
I called the patient to discuss options for treatment of his atrial fibrillation.  He was discharged home on 11/22/2024, off of Tikosyn due to QT prolongation and ventricular ectopy.  He thinks he was in normal sinus rhythm until 11/24/2024.  He cannot tell if he felt much better being in normal rhythm b/c it was only for a short time.  He cannot tell if he is in atrial fibrillation except for checking his pulse, but does think that he feels slightly more tired and fatigued.  Discussed that we can see him back in the next month and see how he is feeling.  Options at that point going forward would be amiodarone versus possible pulmonary vein ablation however this is of higher risk due to his age.  Patient understands and is in agreement with this plan.    Temi, can we see if we can get him a follow up in 4-6 weeks please?

## 2024-11-26 NOTE — PROGRESS NOTES
"      PROBLEM LIST:  1. Metastatic prostate cancer  A) diagnosed 6/2018 with janis 4+3=7 prostate adenocarcinoma.  Stage at diagnosis [id number] (stage IIC).  Pretreatment PSA 5.3.  Treated with SBRT, completed 10/2018.    B) March 2020 found to have rising PSA (10.08 on 3/2/20).  Axumin PET/CT 3/31/20 showed abnormal activity in the left internal iliac, left pelvic sidewall nodes, periaortic adenopathy, bone marrow activity, left superficial inguinal nodes.  Started androgen ablation (eligard) with partial PSA response, but patient wished to stop ADT due to side effects/negative impact on quality of life.  Involved LN treated with radiotherapy  C) PSA increased to 0.3->1.9 in Fall 2022.  PSMA PET 11/30/22 showed PSMA uptake in the prostate gland c/w local recurrence, right retrocrural LN, left superior mediastinal LN. Eligard and apalutamide started January 2023.  Treatment break due to toxicity/quality of life concerns initiated Jan 2024.  2. Atrial fibrillation  3. Diabetes mellitus  4. GERD  5. GRADY on CPAP  6. hyperlipidemia    Subjective     CHIEF COMPLAINT: prostate cancer    HISTORY OF PRESENT ILLNESS:   Stefan Pedraza returns for follow-up.  He has been doing pretty well.  No new symptoms or complaints.  He has some ongoing fatigue that he thinks is related to his atrial fibrillation.  He does not feel like the fatigue is nearly as severe as it was when he was on his prostate medication.        Objective      /78   Pulse 95   Temp 97.9 °F (36.6 °C)   Resp 18   Ht 185.4 cm (73\")   Wt 101 kg (222 lb)   SpO2 96%   BMI 29.29 kg/m²    Vitals:    11/26/24 1104   PainSc: 0-No pain              ECOG score: 2      General: well appearing male in no acute distress  Neuro: alert and oriented  HEENT: sclera anicteric, oropharynx clear  Skin: no rashes, lesions, bruising, or petechiae  Psych: mood and affect appropriate        RECENT LABS:  Lab Results   Component Value Date    WBC 4.61 11/26/2024    HGB " 14.1 11/26/2024    HCT 41.8 11/26/2024    MCV 94.1 11/26/2024     11/26/2024       Lab Results   Component Value Date    GLUCOSE 131 (H) 11/22/2024    BUN 14 11/22/2024    CREATININE 0.85 11/22/2024    EGFRIFNONA 89 11/16/2019    BCR 16.5 11/22/2024    K 4.0 11/22/2024    CO2 26.0 11/22/2024    CALCIUM 9.0 11/22/2024    ALBUMIN 4.0 09/19/2024    AST 15 09/19/2024    ALT 16 09/19/2024               Lab Results   Component Value Date    PSA <0.014 09/19/2024    PSA <0.014 07/18/2024    PSA <0.014 05/16/2024         ASSESSMENT AND PLAN:     Stefan Pedraza is a 84 y.o. male androgen sensitive prostate cancer, here for follow-up.  He had a undetectable PSA on Eligard and apalutamide, but opted for a treatment holiday starting January 2024 due to severe fatigue.    He remains clinically stable.  We will call him with his PSA results from today. If it continues to be undetectable we will continue with a drug holiday. If he has had a rise in his PSA, we will consider imaging using PSMA PET for restaging.  CBC from today was reviewed and is normal.    We will see him back in 2 months with repeat labs.                 Eboni Avalos, [facility]    11/26/2024          CC:

## 2024-11-27 ENCOUNTER — READMISSION MANAGEMENT (OUTPATIENT)
Dept: CALL CENTER | Facility: HOSPITAL | Age: 84
End: 2024-11-27
Payer: MEDICARE

## 2024-11-27 LAB
QT INTERVAL: 408 MS
QT INTERVAL: 438 MS
QT INTERVAL: 446 MS
QT INTERVAL: 494 MS
QT INTERVAL: 494 MS
QT INTERVAL: 496 MS
QT INTERVAL: 508 MS
QT INTERVAL: 510 MS
QT INTERVAL: 524 MS
QT INTERVAL: 558 MS
QT INTERVAL: 622 MS
QT INTERVAL: 648 MS
QTC INTERVAL: 485 MS
QTC INTERVAL: 491 MS
QTC INTERVAL: 492 MS
QTC INTERVAL: 504 MS
QTC INTERVAL: 523 MS
QTC INTERVAL: 536 MS
QTC INTERVAL: 540 MS
QTC INTERVAL: 551 MS
QTC INTERVAL: 554 MS
QTC INTERVAL: 590 MS
QTC INTERVAL: 605 MS
QTC INTERVAL: 668 MS

## 2024-11-27 NOTE — DISCHARGE SUMMARY
"[facility] at [facility]  DISCHARGE SUMMARY      Date of Admit: 11/19/2024  Date of Discharge: 11/27/24     Primary Care Provider: Gamaliel Eaton MD    Discharge Diagnosis:  Atrial fibrillation      Consults:   Consults       No orders found from 10/21/2024 to 11/20/2024.             Hospital Course:   Patient is a 84 y.o. male presented with his [facility] on 11/19 for Tikosyn initiation.  He is in was initiated but patient had prolonged QT and it had to be discontinued.  Patient converted to sinus bradycardia.  Patient started having PVCs so Isuprel was started and magnesium was given.  QTc shortened to a normal range and ectopy resolved.  He was overall feeling well.  Patient was discharged home on 11/22.  He was instructed to take Eliquis for additional 3 weeks and then okay for patient to discontinue.  Keep follow-up with Dr. Solis.  No other medication changes.  Patient stable for discharge home.      Discharge Physical Exam:  /74   Pulse 54   Temp 98.2 °F (36.8 °C) (Oral)   Resp 18   Ht 185.4 cm (73\")   Wt 98.5 kg (217 lb 1.6 oz)   SpO2 94%   BMI 28.64 kg/m²     Constitutional:       Appearance: Healthy appearance.   Neck:      Vascular: JVD normal.   Pulmonary:      Effort: Pulmonary effort is normal.      Breath sounds: Normal breath sounds.   Cardiovascular:      Normal rate. Regular rhythm. Normal S1. Normal S2.       Murmurs: There is no murmur.      No gallop.  No rub.   Pulses:     Intact distal pulses.   Edema:     Peripheral edema absent.   Neurological:      General: No focal deficit present.           Labs:  Results from last 7 days   Lab Units 11/26/24  1105   WBC 10*3/mm3 4.61   HEMOGLOBIN g/dL 14.1   HEMATOCRIT % 41.8   PLATELETS 10*3/mm3 195     Results from last 7 days   Lab Units 11/26/24  1105   SODIUM mmol/L 139   POTASSIUM mmol/L 4.1   CHLORIDE mmol/L 104   CO2 mmol/L 25.0   BUN mg/dL 16   CREATININE mg/dL 0.77   CALCIUM mg/dL 9.4 "   BILIRUBIN mg/dL 0.9   ALK PHOS U/L 74   ALT (SGPT) U/L 17   AST (SGOT) U/L 17   GLUCOSE mg/dL 109*             Hemoglobin A1C   Date Value Ref Range Status   11/05/2019 5.40 4.80 - 5.60 % Final     Magnesium   Date Value Ref Range Status   11/22/2024 2.9 (H) 1.6 - 2.4 mg/dL Final       Echo:  Results for orders placed during the hospital encounter of 07/21/20    Adult Transesophageal Echo (BRISSA) W/ Cont if Necessary Per Protocol    Interpretation Summary  · Left ventricular systolic function is normal. Estimated EF = 60%.  · Left atrial cavity size is dilated.  · The left atrial appendage is status post occlusion with a Wavecrest device. The device is well-seated but does protrude somewhat into the left atrium. A small amount of flow is seen laterally around the device. The diameter less than 0.3 cm. These findings are unchanged when compared to the prior BRISSA performed on August 30, 2019  · Mild mitral valve regurgitation is present  · Mild pulmonic valve regurgitation is present.  · Mild to moderate tricuspid valve regurgitation is present.  · Estimated right ventricular systolic pressure from tricuspid regurgitation is mildly elevated (35-45 mmHg).  · There is mild plaque in the descending aorta present.       Procedures:           Discharge Medications     Discharge Medications        Changes to Medications        Instructions Start Date   apixaban 5 MG tablet tablet  Commonly known as: ELIQUIS  What changed: additional instructions   Take 1 tablet by mouth 2 (Two) Times a Day for 3 weeks then stop.             Continue These Medications        Instructions Start Date   aspirin 81 MG EC tablet   81 mg, Daily      atorvastatin 10 MG tablet  Commonly known as: LIPITOR   10 mg, Nightly      cyclobenzaprine 10 MG tablet  Commonly known as: FLEXERIL   10 mg, As Needed      famotidine 20 MG tablet  Commonly known as: PEPCID   20 mg, 2 Times Daily      ferrous sulfate 324 (65 Fe) MG tablet delayed-release EC tablet    324 mg, Daily With Breakfast      fish oil 1000 MG capsule capsule   1,000 mg, Daily With Breakfast      leuprolide acetate 5 MG/ML kit   Inject 1 mL under the skin into the appropriate area as directed Every 6 (Six) Months.      metFORMIN  MG 24 hr tablet  Commonly known as: GLUCOPHAGE-XR   1,000 mg, Nightly      OCUVITE ADULT 50+ PO   1 tablet, Daily      omeprazole 40 MG capsule  Commonly known as: priLOSEC   40 mg, Daily      triamcinolone 0.1 % cream  Commonly known as: KENALOG   As Needed      vitamin b complex capsule capsule   1 capsule, Daily      Vitamin D3 25 MCG (1000 UT) capsule   1,000 Units, Daily               Discharge Diet: House diet/cardiac    Activity at Discharge: As tolerated    Discharge disposition: home    Condition on Discharge: stable    Follow-up Appointments  Future Appointments   Date Time Provider Department Center   1/23/2025 11:45 AM Ronda Mack MD [facility]   4/16/2025  2:45 PM Prabhakar Solis MD [facility]   9/17/2025 10:30 AM Prabhakar Solis MD [facility]     Additional Instructions for the Follow-ups that You Need to Schedule       Discharge Follow-up with Specified Provider: Will; 3 Months   As directed      To: Will   Follow Up: 3 Months                Test Results Pending at Discharge       AYAAN Rabago  11/27/24  14:54 EST    35 min spent in reviewing records, discussion and examination of the patient and discussion with other members of the patient's medical team.

## 2024-11-27 NOTE — TELEPHONE ENCOUNTER
LVM OF APPT DATE, TIME, LOCATION & WHO HE WILL BE SEEING, ALSO MAILED OUT THE APPT REMINDER PAPER.

## 2024-11-27 NOTE — OUTREACH NOTE
Medical Week 1 Survey      Flowsheet Row Responses   [facility] patient discharged from? [place]   Does the patient have one of the following disease processes/diagnoses(primary or secondary)? Other   Week 1 attempt successful? Yes   Call start time 0936   Call end time 0954   Discharge diagnosis Atrial fibrillation   Meds reviewed with patient/caregiver? Yes   Is the patient having any side effects they believe may be caused by any medication additions or changes? No   Does the patient have all medications ordered at discharge? Yes   Is the patient taking all medications as directed (includes completed medication regime)? Yes   Does the patient have a primary care provider?  Yes   Does the patient have an appointment with their PCP within 7 days of discharge? No   Nursing Interventions Advised patient to make appointment   Has the patient kept scheduled appointments due by today? Yes   Has home health visited the patient within 72 hours of discharge? N/A   Psychosocial issues? No   What is the patient's perception of their health status since discharge? Same  [pt reports he is still in afib, checks pulse to know he's in afib, feels tired at times]   Is the patient/caregiver able to teach back the hierarchy of who to call/visit for symptoms/problems? PCP, Specialist, Home health nurse, Urgent Care, ED, 911 Yes   Week 1 call completed? Yes   Would this patient benefit from a Referral to Amb Social Work? No   Is the patient interested in additional calls from an ambulatory ? No   Call end time 0954            Parisa REDMOND - Registered Nurse

## 2024-12-05 ENCOUNTER — READMISSION MANAGEMENT (OUTPATIENT)
Dept: CALL CENTER | Facility: HOSPITAL | Age: 84
End: 2024-12-05
Payer: MEDICARE

## 2024-12-05 NOTE — OUTREACH NOTE
Medical Week 2 Survey      Flowsheet Row Responses   [facility] patient discharged from? [place]   Does the patient have one of the following disease processes/diagnoses(primary or secondary)? Other   Week 2 attempt successful? Yes   Call start time 1702   Discharge diagnosis Atrial fibrillation   Call end time 1705   Person spoke with today (if not patient) and relationship pt   Meds reviewed with patient/caregiver? Yes   Is the patient having any side effects they believe may be caused by any medication additions or changes? No   Does the patient have all medications ordered at discharge? Yes   Is the patient taking all medications as directed (includes completed medication regime)? Yes   Does the patient have a primary care provider?  Yes   Does the patient have an appointment with their PCP within 7 days of discharge? Yes   Has the patient kept scheduled appointments due by today? N/A   Psychosocial issues? No   Did the patient receive a copy of their discharge instructions? Yes   Nursing interventions Reviewed instructions with patient   What is the patient's perception of their health status since discharge? Improving   Is the patient/caregiver able to teach back signs and symptoms related to disease process for when to call PCP? Yes   Is the patient/caregiver able to teach back signs and symptoms related to disease process for when to call 911? Yes   Is the patient/caregiver able to teach back the hierarchy of who to call/visit for symptoms/problems? PCP, Specialist, Home health nurse, Urgent Care, ED, 911 Yes   If the patient is a current smoker, are they able to teach back resources for cessation? Not a smoker   Week 2 Call Completed? Yes   Is the patient interested in additional calls from an ambulatory ? No   Would this patient benefit from a Referral to Amb Social Work? No   Wrap up additional comments Pt shares he has ongoing Afib and advised to report to cardiologist. Pt does not  have an appt till  Jan. 2025 and advised to inquire if cardiologist wants to see pt sooner r/t ongoing afib.   Call end time 1705            Rebeca REDMOND - Registered Nurse

## 2024-12-19 ENCOUNTER — TELEPHONE (OUTPATIENT)
Dept: CARDIOLOGY | Facility: CLINIC | Age: 84
End: 2024-12-19
Payer: MEDICARE

## 2024-12-19 DIAGNOSIS — I48.19 PERSISTENT ATRIAL FIBRILLATION: Primary | ICD-10-CM

## 2024-12-19 RX ORDER — METOPROLOL TARTRATE 25 MG/1
12.5 TABLET, FILM COATED ORAL 2 TIMES DAILY
Qty: 90 TABLET | Refills: 3 | Status: SHIPPED | OUTPATIENT
Start: 2024-12-19

## 2024-12-19 NOTE — TELEPHONE ENCOUNTER
Caller: Stefan Pedraza    Relationship: Self    Best call back number: [phone]    What is the best time to reach you: ANYTIME    Who are you requesting to speak with (clinical staff, provider,  specific staff member): ANYONE    What was the call regarding: PATIENT WOULD LIKE A CALL BACK TO DISCUSS HIS AFIB.

## 2024-12-19 NOTE — TELEPHONE ENCOUNTER
PT called stating he is concerned about his A-FIB.  States his HR got as high as 157 this morning.  He is concerned because he is not taking anything that will help control his heart rate and does not feel like he can wait until 1/8/25 for his appt.    Please advise

## 2024-12-20 NOTE — TELEPHONE ENCOUNTER
Called pt with recommendations per SHIRA Reid    Prescription was sent to Pts preferred pharmacy.  He will call back next week if the medication does not help.    PT was agreeable to plan and verbalized understanding

## 2025-01-08 ENCOUNTER — TELEPHONE (OUTPATIENT)
Dept: CARDIOLOGY | Facility: CLINIC | Age: 85
End: 2025-01-08

## 2025-01-08 ENCOUNTER — OFFICE VISIT (OUTPATIENT)
Dept: CARDIOLOGY | Facility: CLINIC | Age: 85
End: 2025-01-08
Payer: MEDICARE

## 2025-01-08 VITALS
HEART RATE: 77 BPM | DIASTOLIC BLOOD PRESSURE: 70 MMHG | BODY MASS INDEX: 29.74 KG/M2 | SYSTOLIC BLOOD PRESSURE: 126 MMHG | OXYGEN SATURATION: 96 % | WEIGHT: 224.4 LBS | HEIGHT: 73 IN

## 2025-01-08 DIAGNOSIS — Z95.818 PRESENCE OF WATCHMAN LEFT ATRIAL APPENDAGE CLOSURE DEVICE: ICD-10-CM

## 2025-01-08 DIAGNOSIS — I48.19 PERSISTENT ATRIAL FIBRILLATION: Primary | ICD-10-CM

## 2025-01-08 PROCEDURE — 99214 OFFICE O/P EST MOD 30 MIN: CPT | Performed by: INTERNAL MEDICINE

## 2025-01-08 PROCEDURE — 93000 ELECTROCARDIOGRAM COMPLETE: CPT | Performed by: INTERNAL MEDICINE

## 2025-01-08 RX ORDER — FLUTICASONE PROPIONATE 50 MCG
2 SPRAY, SUSPENSION (ML) NASAL DAILY
COMMUNITY
Start: 2024-12-20

## 2025-01-08 NOTE — TELEPHONE ENCOUNTER
LVM FOR PT LETTING HIM KNOW HIS 4/16/25 & 9/17/25 APPT W DR. NAQVI HAS BEEN CANCELLED DUE TO SEEING DR. NAQVI TODAY 1/8/25 MAKING HIS 6 MO FU W PIERO TO BE ON 7/9/25. I MAILED OUT THE APPT REMINDER PAPER.

## 2025-01-08 NOTE — PROGRESS NOTES
Stefan Pedraza  1940  [phone]    01/08/2025    [facility] CARDIOLOGY     Referring Provider: No ref. provider found     Gamaliel Eaton MD  [address] [place] [address]    Chief Complaint   Patient presents with    Paroxysmal atrial fibrillation       Problem List:   Persistent Atrial fibrillation:   History of atrial fibrillation, initial diagnosis 2007.   Status-post successful external cardioversion, Dr. Dyer, 2007.  Initiation of amiodarone therapy with recent tapering dose, Dr. Lowe.   Echocardiogram, 02/02/2011, revealing normal LV function, diastolic dysfunction noted, mild MR, no pericardial effusion.   Holter monitor, 01/25/2011, revealing sinus rhythm with PAC/PVC/episodes of sinus bradycardia.   Chadsvasc=3.   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   Pulmonary vein isolation procedure, 10/05/2011.  BRISSA with left ventricular ejection fraction of 55%, mild TR and MR.   Event recorder with intermittent episodes of flutter but the majority of the time in sinus rhythm.   External cardioversion to normal sinus rhythm, 02/17/2012.  Atypical chest pain.  History of left heart catheterization, 1990s in [place], reported as normal coronaries.   Stress test, 08/23/2007, revealing no significant ischemia, EF estimated at 46%.  Echo 7/28/17 LV Estimated EF = 57%., mild concentric hypertrophy, Left atrial cavity size is severely dilated. Normal estimated pulmonary artery systolic pressure  External CV 7/13/17; NSR with recurrent AF three days later  Echocardiogram 7/28/17: EF 57%, mild LVH, LA severely dilated  ECV to NSR 8/31/17  Wavecrest device insertion 7/16/19  BRISSA 7/21/2020 LVEF NL, No significant leakage around device, Mild MR,TR  ECV for recurrent Afib 12/19/2023-Flecanide   Zio patch 10/3/2024 - 10/16/2024: 100% atrial fibrillation with average heart rate  of 82 bpm, 4.5% PVCs  Flecainide discontinued 11/2024  QT prolongation on tikosyn 11/2024  Diabetes mellitus.   Gastroesophageal reflux disease.  Dyslipidemia.  Osteoarthritis.  Prostate cancer - Ronda Mack MD   Remote surgical history:  Cholecystectomy.   Right orchiectomy.  Right shoulder labral repair.  Left knee arthroscopy for partial medial meniscectomy    Allergies  Allergies   Allergen Reactions    Penicillins Hives and Rash       Current Medications    Current Outpatient Medications:     apixaban (ELIQUIS) 5 MG tablet tablet, Take 1 tablet by mouth 2 (Two) Times a Day for 3 weeks then stop., Disp: 60 tablet, Rfl: 6    aspirin 81 MG EC tablet, Take 1 tablet by mouth Daily., Disp: , Rfl:     atorvastatin (LIPITOR) 10 MG tablet, Take 1 tablet by mouth Every Night., Disp: , Rfl:     B Complex Vitamins (VITAMIN B COMPLEX) capsule capsule, Take 1 capsule by mouth Daily., Disp: , Rfl:     Cholecalciferol (Vitamin D3) 25 MCG (1000 UT) capsule, Take 1 capsule by mouth Daily., Disp: , Rfl:     cyclobenzaprine (FLEXERIL) 10 MG tablet, Take 1 tablet by mouth As Needed., Disp: , Rfl:     famotidine (PEPCID) 20 MG tablet, Take 1 tablet by mouth 2 (Two) Times a Day., Disp: , Rfl:     ferrous sulfate 324 (65 FE) MG tablet delayed-release EC tablet, Take 1 tablet by mouth Daily With Breakfast., Disp: , Rfl:     fluticasone (FLONASE) 50 MCG/ACT nasal spray, 2 sprays Daily., Disp: , Rfl:     metFORMIN ER (GLUCOPHAGE-XR) 500 MG 24 hr tablet, Take 2 tablets by mouth Every Night., Disp: , Rfl:     metoprolol tartrate (LOPRESSOR) 25 MG tablet, Take 0.5 tablets by mouth 2 (Two) Times a Day., Disp: 90 tablet, Rfl: 3    Multiple Vitamins-Minerals (OCUVITE ADULT 50+ PO), Take 1 tablet by mouth Daily., Disp: , Rfl:     Omega-3 Fatty Acids (FISH OIL) 1000 MG capsule capsule, Take 1 capsule by mouth Daily With Breakfast., Disp: , Rfl:     omeprazole (priLOSEC) 40 MG capsule, Take 1 capsule by mouth Daily., Disp: , Rfl:     triamcinolone  "(KENALOG) 0.1 % cream, As Needed., Disp: , Rfl:     History of Present Illness     Pt presents for follow up of AF. Since we last saw the pt, pt underwent hospitalization for Tikosyn.  He did undergo spontaneous conversion normal sinus rhythm however he has substantial prolongation of his QT interval which required discontinuation of the medication.  Since that time, he remains in atrial fibrillation as he checks his pulse daily.  He feels that he is tired and fatigued but otherwise no other cardiovascular complaints.  He is now off his Eliquis and back on his aspirin alone.  He notes that his Eliquis is very expensive.  Heart rates have been well-controlled.   Denies any hospitalizations, ER visits, bleeding, or TIA/CVA symptoms. Overall feels well.      Vitals:    01/08/25 0921   BP: 126/70   BP Location: Left arm   Patient Position: Sitting   Cuff Size: Adult   Pulse: 77   SpO2: 96%   Weight: 102 kg (224 lb 6.4 oz)   Height: 185.4 cm (73\")     Body mass index is 29.61 kg/m².  PE:  General: NAD  Neck: no JVD, no carotid bruits, no TM  Heart irregular regular rate rhythm normal S1-S2.  No new murmurs appreciated.  Lungs: CTA, no wheezes, rhonchi, or rales  Abd: soft, non-tender, NL BS  Ext: No musculoskeletal deformities, no edema, cyanosis, or clubbing  Psych: normal mood and affect    Diagnostic Data:        ECG 12 Lead    Date/Time: 1/8/2025 9:33 AM  Performed by: Prabhakar Solis MD    Authorized by: Prabhakar Solis MD  Comparison: compared with previous ECG from 11/22/2024  Comparison to previous ECG: Now in AF  Rhythm: atrial fibrillation  BPM: 72               1. Persistent atrial fibrillation    2. Presence of Watchman left atrial appendage closure device          Plan:    1) Persistent Atrial Fibrillation: Admitted for Tikosyn 11/19/2024. Tikosyn stopped do to QT prolongation. Now in AF.  Needs echocardiogram to assess LVEF and left atrial size as well as valvular heart disease.  Options at this time " include rate control and leave on aspirin versus short-term Xarelto with amiodarone and external cardioversion versus redo PVA.  Will discuss with the patient after get the echocardiogram results.  2) Anticoagulation post left atrial appendage occlusion with Wavecrest device.  On aspirin.    F/up in 12 months

## 2025-01-13 ENCOUNTER — HOSPITAL ENCOUNTER (OUTPATIENT)
Dept: CARDIOLOGY | Facility: HOSPITAL | Age: 85
Discharge: HOME | End: 2025-01-13
Admitting: INTERNAL MEDICINE
Payer: MEDICARE

## 2025-01-13 VITALS
HEIGHT: 73 IN | DIASTOLIC BLOOD PRESSURE: 100 MMHG | WEIGHT: 224 LBS | SYSTOLIC BLOOD PRESSURE: 137 MMHG | BODY MASS INDEX: 29.69 KG/M2

## 2025-01-13 DIAGNOSIS — I48.19 PERSISTENT ATRIAL FIBRILLATION: ICD-10-CM

## 2025-01-13 LAB
ASCENDING AORTA: 3.9 CM
AV MEAN PRESS GRAD SYS DOP V1V2: 2.2 MMHG
AV VMAX SYS DOP: 97.4 CM/SEC
BH CV ECHO MEAS - AI P1/2T: 530.9 MSEC
BH CV ECHO MEAS - AO MAX PG: 3.8 MMHG
BH CV ECHO MEAS - AO ROOT DIAM: 3.6 CM
BH CV ECHO MEAS - AO V2 VTI: 20.1 CM
BH CV ECHO MEAS - AVA(I,D): 2.8 CM2
BH CV ECHO MEAS - EDV(CUBED): 79.5 ML
BH CV ECHO MEAS - EDV(MOD-SP2): 98.5 ML
BH CV ECHO MEAS - EDV(MOD-SP4): 126 ML
BH CV ECHO MEAS - EF(MOD-SP2): 53.6 %
BH CV ECHO MEAS - EF(MOD-SP4): 58.6 %
BH CV ECHO MEAS - ESV(CUBED): 29.8 ML
BH CV ECHO MEAS - ESV(MOD-SP2): 45.7 ML
BH CV ECHO MEAS - ESV(MOD-SP4): 52.2 ML
BH CV ECHO MEAS - FS: 27.9 %
BH CV ECHO MEAS - IVS/LVPW: 1 CM
BH CV ECHO MEAS - IVSD: 1.1 CM
BH CV ECHO MEAS - LA DIMENSION: 5.3 CM
BH CV ECHO MEAS - LAT PEAK E' VEL: 9.5 CM/SEC
BH CV ECHO MEAS - LV DIASTOLIC VOL/BSA (35-75): 55.8 CM2
BH CV ECHO MEAS - LV MASS(C)D: 162.9 GRAMS
BH CV ECHO MEAS - LV MAX PG: 3.7 MMHG
BH CV ECHO MEAS - LV MEAN PG: 2.25 MMHG
BH CV ECHO MEAS - LV SYSTOLIC VOL/BSA (12-30): 23.1 CM2
BH CV ECHO MEAS - LV V1 MAX: 96 CM/SEC
BH CV ECHO MEAS - LV V1 VTI: 18.7 CM
BH CV ECHO MEAS - LVIDD: 4.3 CM
BH CV ECHO MEAS - LVIDS: 3.1 CM
BH CV ECHO MEAS - LVOT AREA: 3 CM2
BH CV ECHO MEAS - LVOT DIAM: 1.95 CM
BH CV ECHO MEAS - LVPWD: 1.1 CM
BH CV ECHO MEAS - MED PEAK E' VEL: 6.9 CM/SEC
BH CV ECHO MEAS - MV DEC SLOPE: 557 CM/SEC2
BH CV ECHO MEAS - MV DEC TIME: 0.25 SEC
BH CV ECHO MEAS - MV E MAX VEL: 135 CM/SEC
BH CV ECHO MEAS - MV MAX PG: 6.9 MMHG
BH CV ECHO MEAS - MV MEAN PG: 3 MMHG
BH CV ECHO MEAS - MV P1/2T: 71.5 MSEC
BH CV ECHO MEAS - MV V2 VTI: 26.3 CM
BH CV ECHO MEAS - MVA(P1/2T): 3.1 CM2
BH CV ECHO MEAS - MVA(VTI): 2.13 CM2
BH CV ECHO MEAS - PA ACC TIME: 0.11 SEC
BH CV ECHO MEAS - PA V2 MAX: 108.5 CM/SEC
BH CV ECHO MEAS - PI END-D VEL: 97.1 CM/SEC
BH CV ECHO MEAS - RAP SYSTOLE: 3 MMHG
BH CV ECHO MEAS - RVSP: 24 MMHG
BH CV ECHO MEAS - SV(LVOT): 55.9 ML
BH CV ECHO MEAS - SV(MOD-SP2): 52.8 ML
BH CV ECHO MEAS - SV(MOD-SP4): 73.8 ML
BH CV ECHO MEAS - SVI(LVOT): 24.8 ML/M2
BH CV ECHO MEAS - SVI(MOD-SP2): 23.4 ML/M2
BH CV ECHO MEAS - SVI(MOD-SP4): 32.7 ML/M2
BH CV ECHO MEAS - TAPSE (>1.6): 2.29 CM
BH CV ECHO MEAS - TR MAX PG: 21.3 MMHG
BH CV ECHO MEAS - TR MAX VEL: 231 CM/SEC
BH CV ECHO MEASUREMENTS AVERAGE E/E' RATIO: 16.46
BH CV VAS BP LEFT ARM: NORMAL MMHG
BH CV XLRA - RV BASE: 4 CM
BH CV XLRA - RV LENGTH: 7.2 CM
BH CV XLRA - RV MID: 3 CM
BH CV XLRA - TDI S': 11.6 CM/SEC
LEFT ATRIUM VOLUME INDEX: 36.2 ML/M2
LV EF BIPLANE MOD: 56.3 %

## 2025-01-13 PROCEDURE — 93306 TTE W/DOPPLER COMPLETE: CPT

## 2025-01-21 ENCOUNTER — TELEPHONE (OUTPATIENT)
Dept: CARDIOLOGY | Facility: CLINIC | Age: 85
End: 2025-01-21
Payer: MEDICARE

## 2025-01-21 DIAGNOSIS — I48.19 PERSISTENT ATRIAL FIBRILLATION: Primary | ICD-10-CM

## 2025-01-21 NOTE — TELEPHONE ENCOUNTER
Caller: Stefan Pedraza    Relationship: Self    Best call back number: [phone]    What test was performed: ECHO    When was the test performed: 01/13/25    Where was the test performed: [facility]    Additional notes: PATIENT WOULD LIKE SOMEONE TO CALL HIM AND DISCUSS NEXT STEPS AND RESULTS.

## 2025-01-23 ENCOUNTER — LAB (OUTPATIENT)
Dept: LAB | Facility: HOSPITAL | Age: 85
End: 2025-01-23
Payer: MEDICARE

## 2025-01-23 ENCOUNTER — OFFICE VISIT (OUTPATIENT)
Dept: ONCOLOGY | Facility: CLINIC | Age: 85
End: 2025-01-23
Payer: MEDICARE

## 2025-01-23 VITALS
RESPIRATION RATE: 16 BRPM | TEMPERATURE: 97.3 F | OXYGEN SATURATION: 98 % | WEIGHT: 224.2 LBS | DIASTOLIC BLOOD PRESSURE: 88 MMHG | SYSTOLIC BLOOD PRESSURE: 138 MMHG | HEIGHT: 73 IN | BODY MASS INDEX: 29.72 KG/M2 | HEART RATE: 74 BPM

## 2025-01-23 DIAGNOSIS — C77.5 PROSTATE CANCER METASTATIC TO INTRAPELVIC LYMPH NODE: Primary | ICD-10-CM

## 2025-01-23 DIAGNOSIS — C61 PROSTATE CANCER METASTATIC TO INTRAPELVIC LYMPH NODE: Primary | ICD-10-CM

## 2025-01-23 DIAGNOSIS — C77.5 PROSTATE CANCER METASTATIC TO INTRAPELVIC LYMPH NODE: ICD-10-CM

## 2025-01-23 DIAGNOSIS — C61 PROSTATE CANCER METASTATIC TO INTRAPELVIC LYMPH NODE: ICD-10-CM

## 2025-01-23 LAB
ALBUMIN SERPL-MCNC: 4.2 G/DL (ref 3.5–5.2)
ALBUMIN/GLOB SERPL: 1.4 G/DL
ALP SERPL-CCNC: 70 U/L (ref 39–117)
ALT SERPL W P-5'-P-CCNC: 18 U/L (ref 1–41)
ANION GAP SERPL CALCULATED.3IONS-SCNC: 7 MMOL/L (ref 5–15)
AST SERPL-CCNC: 15 U/L (ref 1–40)
BASOPHILS # BLD AUTO: 0.02 10*3/MM3 (ref 0–0.2)
BASOPHILS NFR BLD AUTO: 0.3 % (ref 0–1.5)
BILIRUB SERPL-MCNC: 0.7 MG/DL (ref 0–1.2)
BUN SERPL-MCNC: 25 MG/DL (ref 8–23)
BUN/CREAT SERPL: 31.3 (ref 7–25)
CALCIUM SPEC-SCNC: 9.5 MG/DL (ref 8.6–10.5)
CHLORIDE SERPL-SCNC: 103 MMOL/L (ref 98–107)
CO2 SERPL-SCNC: 29 MMOL/L (ref 22–29)
CREAT SERPL-MCNC: 0.8 MG/DL (ref 0.76–1.27)
DEPRECATED RDW RBC AUTO: 44.4 FL (ref 37–54)
EGFRCR SERPLBLD CKD-EPI 2021: 87.3 ML/MIN/1.73
EOSINOPHIL # BLD AUTO: 0.22 10*3/MM3 (ref 0–0.4)
EOSINOPHIL NFR BLD AUTO: 3.7 % (ref 0.3–6.2)
ERYTHROCYTE [DISTWIDTH] IN BLOOD BY AUTOMATED COUNT: 12.7 % (ref 12.3–15.4)
GLOBULIN UR ELPH-MCNC: 3 GM/DL
GLUCOSE SERPL-MCNC: 107 MG/DL (ref 65–99)
HCT VFR BLD AUTO: 40.7 % (ref 37.5–51)
HGB BLD-MCNC: 13.8 G/DL (ref 13–17.7)
IMM GRANULOCYTES # BLD AUTO: 0.01 10*3/MM3 (ref 0–0.05)
IMM GRANULOCYTES NFR BLD AUTO: 0.2 % (ref 0–0.5)
LYMPHOCYTES # BLD AUTO: 2.06 10*3/MM3 (ref 0.7–3.1)
LYMPHOCYTES NFR BLD AUTO: 34.5 % (ref 19.6–45.3)
MCH RBC QN AUTO: 32.2 PG (ref 26.6–33)
MCHC RBC AUTO-ENTMCNC: 33.9 G/DL (ref 31.5–35.7)
MCV RBC AUTO: 94.9 FL (ref 79–97)
MONOCYTES # BLD AUTO: 0.6 10*3/MM3 (ref 0.1–0.9)
MONOCYTES NFR BLD AUTO: 10.1 % (ref 5–12)
NEUTROPHILS NFR BLD AUTO: 3.06 10*3/MM3 (ref 1.7–7)
NEUTROPHILS NFR BLD AUTO: 51.2 % (ref 42.7–76)
PLATELET # BLD AUTO: 189 10*3/MM3 (ref 140–450)
PMV BLD AUTO: 9 FL (ref 6–12)
POTASSIUM SERPL-SCNC: 4.4 MMOL/L (ref 3.5–5.2)
PROT SERPL-MCNC: 7.2 G/DL (ref 6–8.5)
PSA SERPL-MCNC: <0.014 NG/ML (ref 0–4)
RBC # BLD AUTO: 4.29 10*6/MM3 (ref 4.14–5.8)
SODIUM SERPL-SCNC: 139 MMOL/L (ref 136–145)
WBC NRBC COR # BLD AUTO: 5.97 10*3/MM3 (ref 3.4–10.8)

## 2025-01-23 PROCEDURE — 1126F AMNT PAIN NOTED NONE PRSNT: CPT | Performed by: INTERNAL MEDICINE

## 2025-01-23 PROCEDURE — 80053 COMPREHEN METABOLIC PANEL: CPT

## 2025-01-23 PROCEDURE — 36415 COLL VENOUS BLD VENIPUNCTURE: CPT

## 2025-01-23 PROCEDURE — 85025 COMPLETE CBC W/AUTO DIFF WBC: CPT

## 2025-01-23 PROCEDURE — 99213 OFFICE O/P EST LOW 20 MIN: CPT | Performed by: INTERNAL MEDICINE

## 2025-01-23 PROCEDURE — 84153 ASSAY OF PSA TOTAL: CPT

## 2025-01-23 NOTE — PROGRESS NOTES
"      PROBLEM LIST:  1. Metastatic prostate cancer  A) diagnosed 6/2018 with janis 4+3=7 prostate adenocarcinoma.  Stage at diagnosis [id number] (stage IIC).  Pretreatment PSA 5.3.  Treated with SBRT, completed 10/2018.    B) March 2020 found to have rising PSA (10.08 on 3/2/20).  Axumin PET/CT 3/31/20 showed abnormal activity in the left internal iliac, left pelvic sidewall nodes, periaortic adenopathy, bone marrow activity, left superficial inguinal nodes.  Started androgen ablation (eligard) with partial PSA response, but patient wished to stop ADT due to side effects/negative impact on quality of life.  Involved LN treated with radiotherapy  C) PSA increased to 0.3->1.9 in Fall 2022.  PSMA PET 11/30/22 showed PSMA uptake in the prostate gland c/w local recurrence, right retrocrural LN, left superior mediastinal LN. Eligard and apalutamide started January 2023.  Treatment break due to toxicity/quality of life concerns initiated Jan 2024.  2. Atrial fibrillation  3. Diabetes mellitus  4. GERD  5. GRADY on CPAP  6. hyperlipidemia    Subjective     CHIEF COMPLAINT: prostate cancer    HISTORY OF PRESENT ILLNESS:   Stefan Pedraza returns for follow-up.  He is feeling pretty well.  No new symptoms or concerns.        Objective      /88   Pulse 74   Temp 97.3 °F (36.3 °C) (Temporal)   Resp 16   Ht 185.4 cm (72.99\")   Wt 102 kg (224 lb 3.2 oz)   SpO2 98%   BMI 29.59 kg/m²    Vitals:    01/23/25 1139   PainSc: 0-No pain              ECOG score: 0      General: well appearing male in no acute distress  Neuro: alert and oriented  HEENT: sclera anicteric, oropharynx clear  Skin: no rashes, lesions, bruising, or petechiae  Psych: mood and affect appropriate        RECENT LABS:  Lab Results   Component Value Date    WBC 5.97 01/23/2025    HGB 13.8 01/23/2025    HCT 40.7 01/23/2025    MCV 94.9 01/23/2025     01/23/2025       Lab Results   Component Value Date    GLUCOSE 109 (H) 11/26/2024    BUN 16 " 11/26/2024    CREATININE 0.77 11/26/2024    EGFRIFNONA 89 11/16/2019    BCR 20.8 11/26/2024    K 4.1 11/26/2024    CO2 25.0 11/26/2024    CALCIUM 9.4 11/26/2024    ALBUMIN 4.1 11/26/2024    AST 17 11/26/2024    ALT 17 11/26/2024               Lab Results   Component Value Date    PSA <0.014 11/26/2024    PSA <0.014 09/19/2024    PSA <0.014 07/18/2024         ASSESSMENT AND PLAN:     Stefan Pedraza is a 84 y.o. male androgen sensitive prostate cancer, here for follow-up.  He had a undetectable PSA on Eligard and apalutamide, but opted for a treatment holiday starting January 2024 due to severe fatigue.    He remains clinically stable.  We will call him with his PSA results from today. If it continues to be undetectable we will continue with a drug holiday. If he has had a rise in his PSA, we will consider imaging using PSMA PET for restaging.  CBC from today was reviewed and is normal.    We will see him back in 2 months with repeat labs.                 Ronda Mack MD  [facility]ology [facility] On[facility]    1/23/2025          CC:

## 2025-01-24 ENCOUNTER — TELEPHONE (OUTPATIENT)
Dept: ONCOLOGY | Facility: CLINIC | Age: 85
End: 2025-01-24
Payer: MEDICARE

## 2025-01-24 NOTE — TELEPHONE ENCOUNTER
Caller: Stefan Pedraza    Relationship: Self    Best call back number: [phone]     Caller requesting test results: YES    What test was performed: LABS    When was the test performed: 1/23    Where was the test performed: [facility]    Additional notes: PLEASE CALL PT TO ADVISE ON LAB RESULTS FROM YESTERDAY, PT IS PARTICULARLY INTERESTED IN THE PSA, HE USUALLY HEARS BACK SAME DAY BUT HAS NOT HEARD BACK YET.

## 2025-01-24 NOTE — TELEPHONE ENCOUNTER
I called patient back and told him the psa was normal and same as it has been over several months.  He verbalized understanding.

## 2025-01-27 NOTE — TELEPHONE ENCOUNTER
Patient called back again today and would like to know what his plan of care will be since he had his ECHO?

## 2025-01-29 RX ORDER — AMIODARONE HYDROCHLORIDE 200 MG/1
200 TABLET ORAL DAILY
Qty: 90 TABLET | Refills: 2 | Status: SHIPPED | OUTPATIENT
Start: 2025-01-29

## 2025-01-29 RX ORDER — AMIODARONE HYDROCHLORIDE 200 MG/1
200 TABLET ORAL 3 TIMES DAILY
Qty: 30 TABLET | Refills: 0 | Status: SHIPPED | OUTPATIENT
Start: 2025-01-29

## 2025-01-29 NOTE — TELEPHONE ENCOUNTER
Patient notified and aware of Dr. Solis's instructions and plan.    RX for Amiodarone sent to [facility]. Order placed for ECV.

## 2025-02-28 ENCOUNTER — PREP FOR SURGERY (OUTPATIENT)
Dept: OTHER | Facility: HOSPITAL | Age: 85
End: 2025-02-28
Payer: MEDICARE

## 2025-02-28 DIAGNOSIS — I48.19 PERSISTENT ATRIAL FIBRILLATION: Primary | ICD-10-CM

## 2025-02-28 RX ORDER — NITROGLYCERIN 0.4 MG/1
0.4 TABLET SUBLINGUAL
OUTPATIENT
Start: 2025-02-28

## 2025-02-28 RX ORDER — SODIUM CHLORIDE 0.9 % (FLUSH) 0.9 %
10 SYRINGE (ML) INJECTION AS NEEDED
OUTPATIENT
Start: 2025-02-28

## 2025-02-28 RX ORDER — SODIUM CHLORIDE 9 MG/ML
40 INJECTION, SOLUTION INTRAVENOUS AS NEEDED
OUTPATIENT
Start: 2025-02-28

## 2025-02-28 RX ORDER — SODIUM CHLORIDE 0.9 % (FLUSH) 0.9 %
3 SYRINGE (ML) INJECTION EVERY 12 HOURS SCHEDULED
OUTPATIENT
Start: 2025-02-28

## 2025-02-28 RX ORDER — ACETAMINOPHEN 325 MG/1
650 TABLET ORAL EVERY 4 HOURS PRN
OUTPATIENT
Start: 2025-02-28

## 2025-03-11 ENCOUNTER — HOSPITAL ENCOUNTER (OUTPATIENT)
Dept: CARDIOLOGY | Facility: HOSPITAL | Age: 85
Discharge: HOME | End: 2025-03-11
Attending: INTERNAL MEDICINE | Admitting: INTERNAL MEDICINE
Payer: MEDICARE

## 2025-03-11 VITALS
OXYGEN SATURATION: 98 % | RESPIRATION RATE: 16 BRPM | HEIGHT: 73 IN | DIASTOLIC BLOOD PRESSURE: 75 MMHG | TEMPERATURE: 97.4 F | HEART RATE: 51 BPM | SYSTOLIC BLOOD PRESSURE: 123 MMHG | WEIGHT: 220.46 LBS | BODY MASS INDEX: 29.22 KG/M2

## 2025-03-11 DIAGNOSIS — I48.19 PERSISTENT ATRIAL FIBRILLATION: ICD-10-CM

## 2025-03-11 LAB
ANION GAP SERPL CALCULATED.3IONS-SCNC: 13 MMOL/L (ref 5–15)
BUN SERPL-MCNC: 18 MG/DL (ref 8–23)
BUN/CREAT SERPL: 22.5 (ref 7–25)
CALCIUM SPEC-SCNC: 9.3 MG/DL (ref 8.6–10.5)
CHLORIDE SERPL-SCNC: 102 MMOL/L (ref 98–107)
CO2 SERPL-SCNC: 24 MMOL/L (ref 22–29)
CREAT SERPL-MCNC: 0.8 MG/DL (ref 0.76–1.27)
DEPRECATED RDW RBC AUTO: 45.5 FL (ref 37–54)
EGFRCR SERPLBLD CKD-EPI 2021: 87.3 ML/MIN/1.73
ERYTHROCYTE [DISTWIDTH] IN BLOOD BY AUTOMATED COUNT: 13 % (ref 12.3–15.4)
GLUCOSE SERPL-MCNC: 112 MG/DL (ref 65–99)
HBA1C MFR BLD: 6 % (ref 4.8–5.6)
HCT VFR BLD AUTO: 39.4 % (ref 37.5–51)
HGB BLD-MCNC: 13.3 G/DL (ref 13–17.7)
MCH RBC QN AUTO: 32.2 PG (ref 26.6–33)
MCHC RBC AUTO-ENTMCNC: 33.8 G/DL (ref 31.5–35.7)
MCV RBC AUTO: 95.4 FL (ref 79–97)
PLATELET # BLD AUTO: 186 10*3/MM3 (ref 140–450)
PMV BLD AUTO: 9.5 FL (ref 6–12)
POTASSIUM SERPL-SCNC: 4.2 MMOL/L (ref 3.5–5.2)
QT INTERVAL: 562 MS
QTC INTERVAL: 517 MS
RBC # BLD AUTO: 4.13 10*6/MM3 (ref 4.14–5.8)
SODIUM SERPL-SCNC: 139 MMOL/L (ref 136–145)
WBC NRBC COR # BLD AUTO: 4.41 10*3/MM3 (ref 3.4–10.8)

## 2025-03-11 PROCEDURE — 93005 ELECTROCARDIOGRAM TRACING: CPT | Performed by: INTERNAL MEDICINE

## 2025-03-11 PROCEDURE — 36415 COLL VENOUS BLD VENIPUNCTURE: CPT

## 2025-03-11 PROCEDURE — 80048 BASIC METABOLIC PNL TOTAL CA: CPT | Performed by: PHYSICIAN ASSISTANT

## 2025-03-11 PROCEDURE — A9270 NON-COVERED ITEM OR SERVICE: HCPCS | Performed by: PHYSICIAN ASSISTANT

## 2025-03-11 PROCEDURE — 83036 HEMOGLOBIN GLYCOSYLATED A1C: CPT | Performed by: PHYSICIAN ASSISTANT

## 2025-03-11 PROCEDURE — 85027 COMPLETE CBC AUTOMATED: CPT | Performed by: PHYSICIAN ASSISTANT

## 2025-03-11 PROCEDURE — 92960 CARDIOVERSION ELECTRIC EXT: CPT

## 2025-03-11 PROCEDURE — 25010000002 MIDAZOLAM PER 1 MG: Performed by: INTERNAL MEDICINE

## 2025-03-11 PROCEDURE — 63710000001 APIXABAN 5 MG TABLET: Performed by: PHYSICIAN ASSISTANT

## 2025-03-11 RX ORDER — MIDAZOLAM HYDROCHLORIDE 1 MG/ML
INJECTION, SOLUTION INTRAMUSCULAR; INTRAVENOUS
Status: COMPLETED | OUTPATIENT
Start: 2025-03-11 | End: 2025-03-11

## 2025-03-11 RX ORDER — FLUMAZENIL 0.1 MG/ML
0.5 INJECTION INTRAVENOUS ONCE AS NEEDED
Status: DISCONTINUED | OUTPATIENT
Start: 2025-03-11 | End: 2025-03-11 | Stop reason: HOSPADM

## 2025-03-11 RX ORDER — ETOMIDATE 2 MG/ML
0.05 INJECTION INTRAVENOUS ONCE
Status: DISCONTINUED | OUTPATIENT
Start: 2025-03-11 | End: 2025-03-11 | Stop reason: HOSPADM

## 2025-03-11 RX ORDER — ETOMIDATE 2 MG/ML
0.1 INJECTION INTRAVENOUS ONCE
Status: DISCONTINUED | OUTPATIENT
Start: 2025-03-11 | End: 2025-03-11 | Stop reason: HOSPADM

## 2025-03-11 RX ORDER — NALOXONE HCL 0.4 MG/ML
0.4 VIAL (ML) INJECTION ONCE AS NEEDED
Status: DISCONTINUED | OUTPATIENT
Start: 2025-03-11 | End: 2025-03-11 | Stop reason: HOSPADM

## 2025-03-11 RX ORDER — ETOMIDATE 2 MG/ML
INJECTION INTRAVENOUS
Status: COMPLETED | OUTPATIENT
Start: 2025-03-11 | End: 2025-03-11

## 2025-03-11 RX ORDER — MIDAZOLAM HYDROCHLORIDE 1 MG/ML
2-12 INJECTION, SOLUTION INTRAMUSCULAR; INTRAVENOUS ONCE AS NEEDED
Status: DISCONTINUED | OUTPATIENT
Start: 2025-03-11 | End: 2025-03-11 | Stop reason: HOSPADM

## 2025-03-11 RX ORDER — FENTANYL CITRATE 50 UG/ML
50-100 INJECTION, SOLUTION INTRAMUSCULAR; INTRAVENOUS ONCE AS NEEDED
Refills: 0 | Status: DISCONTINUED | OUTPATIENT
Start: 2025-03-11 | End: 2025-03-11 | Stop reason: HOSPADM

## 2025-03-11 RX ADMIN — ETOMIDATE 5 MG: 40 INJECTION, SOLUTION INTRAVENOUS at 12:17

## 2025-03-11 RX ADMIN — MIDAZOLAM HYDROCHLORIDE 1 MG: 1 INJECTION, SOLUTION INTRAMUSCULAR; INTRAVENOUS at 12:17

## 2025-03-11 RX ADMIN — APIXABAN 5 MG: 5 TABLET, FILM COATED ORAL at 10:49

## 2025-03-11 NOTE — H&P
Cardiology H&P     Stefan Pedraza  1940  [phone]  There is no work phone number on file.    03/11/25    DATE OF ADMISSION: 3/11/2025  [facility] Gamaliel Wheat MD  [address]  / [place] [address]  Referring Provider: Prabhakar Solis MD     CC: AFIB     Problem List:   Persistent Atrial fibrillation:   History of atrial fibrillation, initial diagnosis 2007.   Status-post successful external cardioversion, Dr. Dyer, 2007.  Initiation of amiodarone therapy with recent tapering dose, Dr. Lowe.   Echocardiogram, 02/02/2011, revealing normal LV function, diastolic dysfunction noted, mild MR, no pericardial effusion.   Holter monitor, 01/25/2011, revealing sinus rhythm with PAC/PVC/episodes of sinus bradycardia.   Chadsvasc=3.   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   Pulmonary vein isolation procedure, 10/05/2011.  BRISSA with left ventricular ejection fraction of 55%, mild TR and MR.   Event recorder with intermittent episodes of flutter but the majority of the time in sinus rhythm.   External cardioversion to normal sinus rhythm, 02/17/2012.  Atypical chest pain.  History of left heart catheterization, 1990s in [place], reported as normal coronaries.   Stress test, 08/23/2007, revealing no significant ischemia, EF estimated at 46%.  Echo 7/28/17 LV Estimated EF = 57%., mild concentric hypertrophy, Left atrial cavity size is severely dilated. Normal estimated pulmonary artery systolic pressure  External CV 7/13/17; NSR with recurrent AF three days later  Echocardiogram 7/28/17: EF 57%, mild LVH, LA severely dilated  ECV to NSR 8/31/17  Wavecrest device insertion 7/16/19  BRISSA 7/21/2020 LVEF NL, No significant leakage around device, Mild MR,TR  ECV for recurrent Afib 12/19/2023-Flecanide   Zio patch 10/3/2024 - 10/16/2024: 100% atrial fibrillation with average heart  rate of 82 bpm, 4.5% PVCs  Flecainide discontinued 11/2024  QT prolongation on tikosyn 11/2024  Echocardiogram 1/13/2025: EF 56.3% left atrial size cavity dilated, 5.3 cm, aortic root measures 3.6 cm  Initiation of amiodarone February 2025  Diabetes mellitus.   Gastroesophageal reflux disease.  Dyslipidemia.  Osteoarthritis.  Prostate cancer - Ronda Mack MD   Remote surgical history:  Cholecystectomy.   Right orchiectomy.  Right shoulder labral repair.  Left knee arthroscopy for partial medial meniscectomy        History of Present Illness:   Stefan Pedraza is an 84-year-old male with above-stated past medical history presents today for cardioversion after initiation of amiodarone.  Patient has had failure of multiple antiarrhythmics, most recently Tikosyn with QTc prolongation.  He underwent echocardiogram on 1/13/2025 showing an EF of 56.3%.  Since he has been back in atrial fibrillation he has had exercise intolerance and mild shortness of breath with exertional activities.  He denies any chest pain, syncope, strokelike symptoms.  He is status post left atrial appendage occlusive device several years ago and has been on aspirin.  He is not currently taking Eliquis.  He denies any strokelike symptoms.    Allergies   Allergen Reactions    Penicillins Hives and Rash       Prior to Admission Medications       Prescriptions Last Dose Informant Patient Reported? Taking?    amiodarone (PACERONE) 200 MG tablet   No No    Take 1 tablet by mouth 3 times a day.    amiodarone (PACERONE) 200 MG tablet   No No    Take 1 tablet by mouth Daily.    apixaban (ELIQUIS) 5 MG tablet tablet   No No    Take 1 tablet by mouth 2 (Two) Times a Day for 3 weeks then stop.    Patient not taking:  Reported on 1/23/2025    aspirin 81 MG EC tablet   Yes No    Take 1 tablet by mouth Daily.    atorvastatin (LIPITOR) 10 MG tablet  Self Yes No    Take 1 tablet by mouth Every Night.    B Complex Vitamins (VITAMIN B COMPLEX) capsule capsule   Yes No     Take 1 capsule by mouth Daily.    Cholecalciferol (Vitamin D3) 25 MCG (1000 UT) capsule   Yes No    Take 1 capsule by mouth Daily.    cyclobenzaprine (FLEXERIL) 10 MG tablet  Self Yes No    Take 1 tablet by mouth As Needed.    famotidine (PEPCID) 20 MG tablet   Yes No    Take 1 tablet by mouth 2 (Two) Times a Day.    ferrous sulfate 324 (65 FE) MG tablet delayed-release EC tablet  Self Yes No    Take 1 tablet by mouth Daily With Breakfast.    fluticasone (FLONASE) 50 MCG/ACT nasal spray   Yes No    2 sprays Daily.    metFORMIN ER (GLUCOPHAGE-XR) 500 MG 24 hr tablet  Self Yes No    Take 2 tablets by mouth Every Night. Pt reports takes 3 tablets    metoprolol tartrate (LOPRESSOR) 25 MG tablet   No No    Take 0.5 tablets by mouth 2 (Two) Times a Day.    Multiple Vitamins-Minerals (OCUVITE ADULT 50+ PO)   Yes No    Take 1 tablet by mouth Daily.    Omega-3 Fatty Acids (FISH OIL) 1000 MG capsule capsule  Self Yes No    Take 1 capsule by mouth Daily With Breakfast.    omeprazole (priLOSEC) 40 MG capsule   Yes No    Take 1 capsule by mouth Daily.    triamcinolone (KENALOG) 0.1 % cream   Yes No    As Needed.              Current Facility-Administered Medications:     apixaban (ELIQUIS) tablet 5 mg, 5 mg, Oral, Q12H, Trinidad Cano PA, 5 mg at 03/11/25 1049    etomidate (AMIDATE) injection 10 mg, 0.1 mg/kg, Intravenous, Once, Prabhakar Solis MD    etomidate (AMIDATE) injection 5 mg, 0.05 mg/kg, Intravenous, Once, Prabhakar Solis MD    fentaNYL citrate (PF) (SUBLIMAZE) injection  mcg,  mcg, Intravenous, Once PRN, Prabhakar Solis MD    flumazenil (ROMAZICON) injection 0.5 mg, 0.5 mg, Intravenous, Once PRN, Prabhakar Solis MD    midazolam (VERSED) injection 2-12 mg, 2-12 mg, Intravenous, Once PRN, Prabhakar Solsi MD    naloxone (NARCAN) injection 0.4 mg, 0.4 mg, Intravenous, Once PRN, Prabhakar Solis MD    Social History     Socioeconomic History    Marital status:    Tobacco Use    Smoking  status: Never     Passive exposure: Never    Smokeless tobacco: Never   Vaping Use    Vaping status: Never Used   Substance and Sexual Activity    Alcohol use: No    Drug use: Never    Sexual activity: Yes     Partners: Female     Birth control/protection: Post-menopausal     Comment:        Family History   Problem Relation Age of Onset    Arthritis Mother     Pneumonia Mother     Atrial fibrillation Father     Hypertension Father     Diabetes Father     Heart disease Father     No Known Problems Sister     No Known Problems Maternal Grandmother     Diabetes Maternal Grandfather     No Known Problems Paternal Grandmother     No Known Problems Paternal Grandfather        REVIEW OF SYSTEMS:   CONSTITUTIONAL:         No weight loss, fever, chills, weakness +  fatigue.   HEENT:                            No visual loss, blurred vision, double vision, yellow sclerae.                                             No hearing loss, congestion, sore throat.   SKIN:                                No rashes, urticaria, ulcers, sores.     RESPIRATORY:               + shortness of breath, - hemoptysis, cough, sputum.   GI:                                     No anorexia, nausea, vomiting, diarrhea. No abdominal pain, melena.   :                                   No burning on urination, hematuria or increased frequency.  ENDOCRINE:                   No diaphoresis, cold or heat intolerance. No polyuria or polydipsia.   NEURO:                            No headache, dizziness, syncope, paralysis, ataxia, or parasthesias.                                            No change in bowel or bladder control. No history of CVA/TIA  MUSCULOSKELETAL:    No muscle, back pain, joint pain or stiffness.   HEMATOLOGY:               No anemia, bleeding, bruising. No history of DVT/PE.  PSYCH:                            No history of depression, anxiety    Vitals:    03/11/25 1013 03/11/25 1017 03/11/25 1019   BP:  139/91 113/75   BP  "Location:  Right arm Left arm   Patient Position:  Lying Lying   Pulse:  61 61   Resp:  16    Temp:  97.4 °F (36.3 °C)    TempSrc:  Tympanic    SpO2:  98% 98%   Weight: 100 kg (220 lb 7.4 oz)     Height: 185.4 cm (73\")           Vital Sign Min/Max for last 24 hours  Temp  Min: 97.4 °F (36.3 °C)  Max: 97.4 °F (36.3 °C)   BP  Min: 113/75  Max: 139/91   Pulse  Min: 61  Max: 61   Resp  Min: 16  Max: 16   SpO2  Min: 98 %  Max: 98 %   No data recorded    No intake or output data in the 24 hours ending 03/11/25 1159          Physical Exam:  GEN: Well nourished, Well- developed  No acute distress  HEENT: Normocephalic, Atraumatic, PERRLA, moist mucous membranes  NECK: supple, NO JVD, no thyromegaly, no lymphadenopathy  CARDIAC: S1S2, irr, irr,  no murmur, gallop, rub  LUNGS: Clear to ausculation, normal respiratory effort  ABDOMEN: Soft, nontender, normal bowel sounds  EXTREMITIES:No gross deformities,  No clubbing, cyanosis, or edema  SKIN: Warm, dry  NEURO: No focal deficits  PSYCHIATRIC: Normal affect and mood      I personally viewed and interpreted the patient's EKG/Telemetry/lab data    Data:   Results from last 7 days   Lab Units 03/11/25  1040   WBC 10*3/mm3 4.41   HEMOGLOBIN g/dL 13.3   HEMATOCRIT % 39.4   PLATELETS 10*3/mm3 186     Results from last 7 days   Lab Units 03/11/25  1040   SODIUM mmol/L 139   POTASSIUM mmol/L 4.2   CHLORIDE mmol/L 102   CO2 mmol/L 24.0   BUN mg/dL 18   CREATININE mg/dL 0.80   GLUCOSE mg/dL 112*      Results from last 7 days   Lab Units 03/11/25  1040   HEMOGLOBIN A1C % 6.00*                             No intake or output data in the 24 hours ending 03/11/25 1159          Telemetry: atrial fibrillation           Assessment and Plan:   Persistent atrial fibrillation:  -Failed Tikosyn November 2024 due to QT prolongation.  He has been in persistent atrial fibrillation for the last 3 months.  Recent echocardiogram demonstrates normal EF but does have a enlarged left atrial size.  Options " were discussed with the patient to include rate control versus amiodarone versus redo PVA.  Was elected to start amiodarone which she started 3 weeks ago 200 mg 3 times daily for 10 days and is now on 200 mg daily.  In regards to anticoagulation he is status post left atrial appendage occlusion with wave Crest device on aspirin.  -Discussed with Dr. Solis today will start Eliquis 5 mg first dose to be given now and will continue for the next 2 to 3 weeks after his cardioversion.  -He will undergo external cardioversion today with Dr. Solis. The risks, benefits, and alternatives of the procedure have been reviewed and the patient wishes to proceed.       Electronically signed by SHIRA Gaona, 03/11/25, 10:36 AM EDT.    I have read the above note and agree

## 2025-03-11 NOTE — Clinical Note
Detail Level: Zone The physician has confirmed that the patient has been reassessed and is appropriate for moderate sedation Other (Free Text): Treatment #: 1. The treated area was degreased with pre-peel cleanser, and vaseline was applied for protection of mucous membranes. Prior to the procedure, written consent was obtained and risks were reviewed, including but not limited to: redness, peeling, blistering, pigmentary change, scarring, infection, and pain. Patient is aware multiple treatments may be necessary to achieve the desired outcome. A TCA CROSS scar revision with 100% TCA was performed on the face. Scar frosting was 0. Post-peel erythema was mild. After the procedure, the treatment areas were washed with soap and water, and a post-peel cream was applied. Sun protection and post-care instructions were reviewed with the patient. Price (Use Numbers Only, No Special Characters Or $): 75

## 2025-03-18 LAB
QT INTERVAL: 562 MS
QTC INTERVAL: 517 MS

## 2025-03-27 ENCOUNTER — OFFICE VISIT (OUTPATIENT)
Dept: ONCOLOGY | Facility: CLINIC | Age: 85
End: 2025-03-27
Payer: MEDICARE

## 2025-03-27 ENCOUNTER — LAB (OUTPATIENT)
Dept: LAB | Facility: HOSPITAL | Age: 85
End: 2025-03-27
Payer: MEDICARE

## 2025-03-27 VITALS
DIASTOLIC BLOOD PRESSURE: 70 MMHG | HEIGHT: 73 IN | SYSTOLIC BLOOD PRESSURE: 146 MMHG | OXYGEN SATURATION: 96 % | TEMPERATURE: 97.9 F | BODY MASS INDEX: 29.69 KG/M2 | HEART RATE: 51 BPM | WEIGHT: 224 LBS | RESPIRATION RATE: 18 BRPM

## 2025-03-27 DIAGNOSIS — C61 PROSTATE CANCER: Primary | ICD-10-CM

## 2025-03-27 DIAGNOSIS — C77.5 PROSTATE CANCER METASTATIC TO INTRAPELVIC LYMPH NODE: ICD-10-CM

## 2025-03-27 DIAGNOSIS — C61 PROSTATE CANCER METASTATIC TO INTRAPELVIC LYMPH NODE: ICD-10-CM

## 2025-03-27 LAB
ALBUMIN SERPL-MCNC: 4.2 G/DL (ref 3.5–5.2)
ALBUMIN/GLOB SERPL: 1.4 G/DL
ALP SERPL-CCNC: 61 U/L (ref 39–117)
ALT SERPL W P-5'-P-CCNC: 39 U/L (ref 1–41)
ANION GAP SERPL CALCULATED.3IONS-SCNC: 12 MMOL/L (ref 5–15)
AST SERPL-CCNC: 25 U/L (ref 1–40)
BASOPHILS # BLD AUTO: 0.04 10*3/MM3 (ref 0–0.2)
BASOPHILS NFR BLD AUTO: 0.8 % (ref 0–1.5)
BILIRUB SERPL-MCNC: 0.9 MG/DL (ref 0–1.2)
BUN SERPL-MCNC: 16 MG/DL (ref 8–23)
BUN/CREAT SERPL: 30.8 (ref 7–25)
CALCIUM SPEC-SCNC: 9.5 MG/DL (ref 8.6–10.5)
CHLORIDE SERPL-SCNC: 101 MMOL/L (ref 98–107)
CO2 SERPL-SCNC: 27 MMOL/L (ref 22–29)
CREAT SERPL-MCNC: 0.52 MG/DL (ref 0.76–1.27)
DEPRECATED RDW RBC AUTO: 46.7 FL (ref 37–54)
EGFRCR SERPLBLD CKD-EPI 2021: 99.4 ML/MIN/1.73
EOSINOPHIL # BLD AUTO: 0.23 10*3/MM3 (ref 0–0.4)
EOSINOPHIL NFR BLD AUTO: 4.7 % (ref 0.3–6.2)
ERYTHROCYTE [DISTWIDTH] IN BLOOD BY AUTOMATED COUNT: 13.1 % (ref 12.3–15.4)
GLOBULIN UR ELPH-MCNC: 3.1 GM/DL
GLUCOSE SERPL-MCNC: 105 MG/DL (ref 65–99)
HCT VFR BLD AUTO: 39.3 % (ref 37.5–51)
HGB BLD-MCNC: 13.5 G/DL (ref 13–17.7)
IMM GRANULOCYTES # BLD AUTO: 0.02 10*3/MM3 (ref 0–0.05)
IMM GRANULOCYTES NFR BLD AUTO: 0.4 % (ref 0–0.5)
LYMPHOCYTES # BLD AUTO: 1.66 10*3/MM3 (ref 0.7–3.1)
LYMPHOCYTES NFR BLD AUTO: 34.1 % (ref 19.6–45.3)
MCH RBC QN AUTO: 32.7 PG (ref 26.6–33)
MCHC RBC AUTO-ENTMCNC: 34.4 G/DL (ref 31.5–35.7)
MCV RBC AUTO: 95.2 FL (ref 79–97)
MONOCYTES # BLD AUTO: 0.48 10*3/MM3 (ref 0.1–0.9)
MONOCYTES NFR BLD AUTO: 9.9 % (ref 5–12)
NEUTROPHILS NFR BLD AUTO: 2.44 10*3/MM3 (ref 1.7–7)
NEUTROPHILS NFR BLD AUTO: 50.1 % (ref 42.7–76)
PLATELET # BLD AUTO: 176 10*3/MM3 (ref 140–450)
PMV BLD AUTO: 9.2 FL (ref 6–12)
POTASSIUM SERPL-SCNC: 4.2 MMOL/L (ref 3.5–5.2)
PROT SERPL-MCNC: 7.3 G/DL (ref 6–8.5)
PSA SERPL-MCNC: <0.014 NG/ML (ref 0–4)
RBC # BLD AUTO: 4.13 10*6/MM3 (ref 4.14–5.8)
SODIUM SERPL-SCNC: 140 MMOL/L (ref 136–145)
WBC NRBC COR # BLD AUTO: 4.87 10*3/MM3 (ref 3.4–10.8)

## 2025-03-27 PROCEDURE — 80053 COMPREHEN METABOLIC PANEL: CPT

## 2025-03-27 PROCEDURE — 1126F AMNT PAIN NOTED NONE PRSNT: CPT | Performed by: NURSE PRACTITIONER

## 2025-03-27 PROCEDURE — 1160F RVW MEDS BY RX/DR IN RCRD: CPT | Performed by: NURSE PRACTITIONER

## 2025-03-27 PROCEDURE — 84153 ASSAY OF PSA TOTAL: CPT

## 2025-03-27 PROCEDURE — 1159F MED LIST DOCD IN RCRD: CPT | Performed by: NURSE PRACTITIONER

## 2025-03-27 PROCEDURE — 36415 COLL VENOUS BLD VENIPUNCTURE: CPT

## 2025-03-27 PROCEDURE — 99213 OFFICE O/P EST LOW 20 MIN: CPT | Performed by: NURSE PRACTITIONER

## 2025-03-27 PROCEDURE — 85025 COMPLETE CBC W/AUTO DIFF WBC: CPT

## 2025-03-27 NOTE — PROGRESS NOTES
"      PROBLEM LIST:  1. Metastatic prostate cancer  A) diagnosed 6/2018 with janis 4+3=7 prostate adenocarcinoma.  Stage at diagnosis [id number] (stage IIC).  Pretreatment PSA 5.3.  Treated with SBRT, completed 10/2018.    B) March 2020 found to have rising PSA (10.08 on 3/2/20).  Axumin PET/CT 3/31/20 showed abnormal activity in the left internal iliac, left pelvic sidewall nodes, periaortic adenopathy, bone marrow activity, left superficial inguinal nodes.  Started androgen ablation (eligard) with partial PSA response, but patient wished to stop ADT due to side effects/negative impact on quality of life.  Involved LN treated with radiotherapy  C) PSA increased to 0.3->1.9 in Fall 2022.  PSMA PET 11/30/22 showed PSMA uptake in the prostate gland c/w local recurrence, right retrocrural LN, left superior mediastinal LN. Eligard and apalutamide started January 2023.  Treatment break due to toxicity/quality of life concerns initiated Jan 2024.  2. Atrial fibrillation  3. Diabetes mellitus  4. GERD  5. GRADY on CPAP  6. hyperlipidemia    Subjective     CHIEF COMPLAINT: prostate cancer    HISTORY OF PRESENT ILLNESS:   Stefan Pedraza returns for follow-up.  He is feeling pretty well.  He has had some increased mucus in the mornings recently. He is taking Mucinex which is helping.  He has bilateral knee pain and some chronic low back pain. He had a cardioversion 3/11/2025 for atrial fibrillation. He is now on amiodarone.         Objective      /70   Pulse 51   Temp 97.9 °F (36.6 °C)   Resp 18   Ht 185.4 cm (73\")   Wt 102 kg (224 lb)   SpO2 96%   BMI 29.55 kg/m²    Vitals:    03/27/25 1034   PainSc: 0-No pain              ECOG score: 2      General: well appearing male in no acute distress  Neuro: alert and oriented  HEENT: sclera anicteric, oropharynx clear  Skin: no rashes, lesions, bruising, or petechiae  Psych: mood and affect appropriate        RECENT LABS:  Lab Results   Component Value Date    WBC 4.87 " 03/27/2025    HGB 13.5 03/27/2025    HCT 39.3 03/27/2025    MCV 95.2 03/27/2025     03/27/2025       Lab Results   Component Value Date    GLUCOSE 112 (H) 03/11/2025    BUN 18 03/11/2025    CREATININE 0.80 03/11/2025    EGFRIFNONA 89 11/16/2019    BCR 22.5 03/11/2025    K 4.2 03/11/2025    CO2 24.0 03/11/2025    CALCIUM 9.3 03/11/2025    ALBUMIN 4.2 01/23/2025    AST 15 01/23/2025    ALT 18 01/23/2025               Lab Results   Component Value Date    PSA <0.014 01/23/2025    PSA <0.014 11/26/2024    PSA <0.014 09/19/2024         ASSESSMENT AND PLAN:     Stefan Pedraza is a 84 y.o. male androgen sensitive prostate cancer, here for follow-up.  He had a undetectable PSA on Eligard and apalutamide, but opted for a treatment holiday starting January 2024 due to severe fatigue.    He remains clinically stable.  We will call him with his PSA results from today. If it continues to be undetectable we will continue with a drug holiday. If he has had a rise in his PSA, we will consider imaging using PSMA PET for restaging.  CBC from today was reviewed and is normal.    We will see him back in 2 months with repeat labs.                 Eboni Avalos APRN  [facility] Hematology and Oncology    3/27/2025          CC:

## 2025-04-23 ENCOUNTER — OFFICE VISIT (OUTPATIENT)
Dept: ORTHOPEDIC SURGERY | Facility: CLINIC | Age: 85
End: 2025-04-23
Payer: MEDICARE

## 2025-04-23 VITALS
SYSTOLIC BLOOD PRESSURE: 118 MMHG | BODY MASS INDEX: 30.48 KG/M2 | WEIGHT: 230 LBS | HEIGHT: 73 IN | DIASTOLIC BLOOD PRESSURE: 74 MMHG

## 2025-04-23 DIAGNOSIS — Z96.652 STATUS POST TOTAL LEFT KNEE REPLACEMENT: ICD-10-CM

## 2025-04-23 DIAGNOSIS — M17.11 PRIMARY OSTEOARTHRITIS OF RIGHT KNEE: Primary | ICD-10-CM

## 2025-04-23 PROCEDURE — 99204 OFFICE O/P NEW MOD 45 MIN: CPT | Performed by: ORTHOPAEDIC SURGERY

## 2025-04-23 NOTE — PROGRESS NOTES
[facility] [place]    Subjective     Chief Complaint   Patient presents with    Left Knee - Pain, Initial Evaluation     TKA 11/15/2019    Right Knee - Pain, Initial Evaluation        HPI    Setfan Pedraza is a 84 y.o. male who presents with new problem of: bilateral knee pain.  Onset: atraumatic and gradual in nature. The issue has been ongoing for 3 month(s). Pain is a 6/10 on the pain scale. Pain is described as dull. Associated symptoms include pain, grinding, and stiffness. The pain is worse with walking, climbing stairs, and rising from seated position; resting and pain medication and/or NSAID improve the pain. Previous treatments have included: NSAIDS.  His left total knee arthroplasty is 80 to 90% improved compared to his preoperative symptoms.  Pain in his right knee is intermittent.    I have reviewed the following portions of the patient's history and agree with: History of Present Illness and Review of Systems    Patient Active Problem List   Diagnosis    Atrial fibrillation    History of cardioversion    H/O echocardiogram    History of Holter monitoring    H/O transesophageal echocardiography (BRISSA) for monitoring    History of cardioversion    H/O cardiovascular stress test    Prediabetes    GERD (gastroesophageal reflux disease)    Dyslipidemia    DJD (degenerative joint disease)    Prostate cancer    Persistent atrial fibrillation    Primary osteoarthritis of left knee    DM (diabetes mellitus)    Status post total left knee replacement    Acute postoperative pain    Leukocytosis, mild, likely reactive    Prostate cancer metastatic to intrapelvic lymph node    Presence of Watchman left atrial appendage closure device    Long term current use of antiarrhythmic medical therapy     Past Medical History:   Diagnosis Date    Abnormal ECG 6-7-8 years ago    Arrhythmia     Arthritis     Atrial fibrillation     Cataract     both- surgery done     Diabetes mellitus     prediabetic-  checks sugar every other day     Dyslipidemia     GERD (gastroesophageal reflux disease)     H/O cardiovascular stress test 08/23/2007    Revealing no significant ischemia, EF estimated at 46%    H/O echocardiogram 02/02/2011    Revealing normal LV function, diastolic dysfunction noted, mild MR, nopericardial effusion.    H/O transesophageal echocardiography (BRISSA) for monitoring     left ventricular ejection fraction of 55%, mild TR and MR    History of cardioversion 2007    STATUS-POST SUCCESSFUL EXTERNAL CARDIOVERSION    History of cardioversion 02/17/2012    external cardioversion to normal sinus rhythm    History of Holter monitoring 01/25/2011    Revealing sinus rhythm with PAC/PVC/episodes of sinus bradycardia.    History of radiation therapy 07/09/2020    salvage irradiation to pelvis/lower periaortic nodes    History of shingles     8-10 years ago     [place] (hard of hearing)     has hearing aides    Hyperlipidemia     Hypertension     Osteoarthritis     PONV (postoperative nausea and vomiting)     only with ether    Pre-diabetes     Prostate cancer     Sleep apnea with use of continuous positive airway pressure (CPAP)     Sleep apnea with use of continuous positive airway pressure (CPAP)     compliant with machine     Status post cystoscopy 08/28/2018    Wears glasses     Wears glasses       Past Surgical History:   Procedure Laterality Date    ABLATION OF DYSRHYTHMIC FOCUS  approx 6 yrs ago    ATRIAL APPENDAGE EXCLUSION LEFT WITH TRANSESOPHAGEAL ECHOCARDIOGRAM N/A 07/16/2019    Procedure: Atrial Appendage Occlusion ([place]), anticoagulation will be determined after pre-BRISSA;  Surgeon: Prabhakar Solis MD;  Location: [facility] INVASIVE LOCATION;  Service: Cardiology    BELPHAROPTOSIS REPAIR      bilat     CARDIAC ABLATION  2011    CARDIAC CATHETERIZATION      CATARACT EXTRACTION      bilat     CHOLECYSTECTOMY      COLONOSCOPY      2018    CYST REMOVAL      on back and back of neck    KNEE ARTHROSCOPY W/  PARTIAL MEDIAL MENISCECTOMY Left     ORCHIECTOMY Right     SHOULDER ARTHROSCOPY W/ LABRAL REPAIR Right     TONSILLECTOMY AND ADENOIDECTOMY      TOTAL KNEE ARTHROPLASTY Left 11/15/2019    Procedure: TOTAL KNEE ARTHROPLASTY LEFT;  Surgeon: Gamaliel Polanco MD;  Location: [facility];  Service: Orthopedics    WISDOM TOOTH EXTRACTION      all 4 removed       Family History   Problem Relation Age of Onset    Arthritis Mother     Pneumonia Mother     Atrial fibrillation Father     Hypertension Father     Diabetes Father     Heart disease Father     No Known Problems Sister     No Known Problems Maternal Grandmother     Diabetes Maternal Grandfather     No Known Problems Paternal Grandmother     No Known Problems Paternal Grandfather      Social History     Socioeconomic History    Marital status:    Tobacco Use    Smoking status: Never     Passive exposure: Never    Smokeless tobacco: Never   Vaping Use    Vaping status: Never Used   Substance and Sexual Activity    Alcohol use: No    Drug use: Never    Sexual activity: Yes     Partners: Female     Birth control/protection: Post-menopausal     Comment:       Current Outpatient Medications on File Prior to Visit   Medication Sig Dispense Refill    amiodarone (PACERONE) 200 MG tablet Take 1 tablet by mouth Daily. 90 tablet 2    apixaban (ELIQUIS) 5 MG tablet tablet Take 1 tablet by mouth 2 (Two) Times a Day for 3 weeks then stop. 60 tablet 6    aspirin 81 MG EC tablet Take 1 tablet by mouth Daily.      atorvastatin (LIPITOR) 10 MG tablet Take 1 tablet by mouth Every Night.      B Complex Vitamins (VITAMIN B COMPLEX) capsule capsule Take 1 capsule by mouth Daily.      Cholecalciferol (Vitamin D3) 25 MCG (1000 UT) capsule Take 1 capsule by mouth Daily.      cyclobenzaprine (FLEXERIL) 10 MG tablet Take 1 tablet by mouth As Needed.      famotidine (PEPCID) 20 MG tablet Take 1 tablet by mouth 2 (Two) Times a Day.      ferrous sulfate 324 (65 FE) MG tablet  delayed-release EC tablet Take 1 tablet by mouth Daily With Breakfast.      fluticasone (FLONASE) 50 MCG/ACT nasal spray 2 sprays Daily.      metFORMIN ER (GLUCOPHAGE-XR) 500 MG 24 hr tablet Take 2 tablets by mouth Every Night. Pt reports takes 3 tablets      Multiple Vitamins-Minerals (OCUVITE ADULT 50+ PO) Take 1 tablet by mouth Daily.      Omega-3 Fatty Acids (FISH OIL) 1000 MG capsule capsule Take 1 capsule by mouth Daily With Breakfast.      omeprazole (priLOSEC) 40 MG capsule Take 1 capsule by mouth Daily.      triamcinolone (KENALOG) 0.1 % cream As Needed.       No current facility-administered medications on file prior to visit.      Allergies   Allergen Reactions    Penicillins Hives and Rash        Review of Systems   Constitutional:  Negative for activity change, appetite change, chills, diaphoresis, fatigue, fever and unexpected weight change.   HENT:  Negative for congestion, dental problem, drooling, ear discharge, ear pain, facial swelling, hearing loss, mouth sores, nosebleeds, postnasal drip, rhinorrhea, sinus pressure, sneezing, sore throat, tinnitus, trouble swallowing and voice change.    Eyes:  Negative for photophobia, pain, discharge, redness, itching and visual disturbance.   Respiratory:  Negative for apnea, cough, choking, chest tightness, shortness of breath, wheezing and stridor.    Cardiovascular:  Negative for chest pain, palpitations and leg swelling.   Gastrointestinal:  Negative for abdominal distention, abdominal pain, anal bleeding, blood in stool, constipation, diarrhea, nausea, rectal pain and vomiting.   Endocrine: Negative for cold intolerance, heat intolerance, polydipsia, polyphagia and polyuria.   Genitourinary:  Negative for decreased urine volume, difficulty urinating, dysuria, enuresis, flank pain, frequency, genital sores, hematuria and urgency.   Musculoskeletal:  Positive for arthralgias. Negative for back pain, gait problem, joint swelling, myalgias, neck pain and  "neck stiffness.   Skin:  Negative for color change, pallor, rash and wound.   Allergic/Immunologic: Negative for environmental allergies, food allergies and immunocompromised state.   Neurological:  Negative for dizziness, tremors, seizures, syncope, facial asymmetry, speech difficulty, weakness, light-headedness, numbness and headaches.   Hematological:  Negative for adenopathy. Does not bruise/bleed easily.   Psychiatric/Behavioral:  Negative for agitation, behavioral problems, confusion, decreased concentration, dysphoric mood, hallucinations, self-injury, sleep disturbance and suicidal ideas. The patient is not nervous/anxious and is not hyperactive.         Objective      Physical Exam  /74   Ht 185.4 cm (72.99\")   Wt 104 kg (230 lb)   BMI 30.35 kg/m²     Body mass index is 30.35 kg/m².  BMI is >= 30 and <35. (Class 1 Obesity). The following options were offered after discussion;: referral to primary care        General:   Mental Status:  Alert   Appearance: Cooperative, in no acute distress   Build and Nutrition: Well-nourished well-developed male   Orientation: Alert and oriented to person, place and time   Posture: Normal   Gait: Nonantalgic    Integument:   Right knee: no skin lesions, no rash, no ecchymosis   Left knee: Incision well-healed    Lower Extremities:   Right Knee:    Tenderness:  None    Effusion:  None    Swelling:  None    Crepitus:  Soft    Atrophy:  None    Range of motion:  Extension: 0°       Flexion: 130°  Instability:  No varus laxity, no valgus laxity, negative anterior drawer  Deformities:  None   Left Knee:    Tenderness:  None    Effusion:  Trace    Swelling:  None    Crepitus: None    Atrophy:  None    Range of motion:  Extension: 0°       Flexion: 130°  Instability:  No varus laxity, no valgus laxity, negative anterior drawer  Deformities:  None      Imaging/Studies      Imaging Results (Last 24 Hours)       Procedure Component Value Units Date/Time    XR Knee 3+ View " With Short Pump Left [[id number]] Resulted: 04/23/25 0837     Updated: 04/23/25 0837    Narrative:      Left Knee Radiographs  Indication: status-post left total knee arthroplasty  Views: AP, lateral, and sunrise views of the left knee    Comparison: no change compared to prior study, 11/23/2020    Findings:   The components are well aligned, with no signs of loosening or failure.       XR Knee 4+ View Right [[id number]] Resulted: 04/23/25 0836     Updated: 04/23/25 0836    Narrative:      Right Knee Radiographs  Indication: right knee pain  Views: Standing AP's and skiers of both knees, with lateral and sunrise   views of the right knee    Comparison: 10/14/2019    Findings:    Medial joint space narrowing, patellofemoral spurring, no acute bony   abnormalities.  No unusual bony features.  Knee arthritis.  Mild worsening   compared to the previous imaging.              Assessment and Plan     Diagnoses and all orders for this visit:    1. Primary osteoarthritis of right knee (Primary)  -     XR Knee 4+ View Right    2. Status post total left knee replacement  -     XR Knee 3+ View With Short Pump Left        1. Primary osteoarthritis of right knee    2. Status post total left knee replacement          I reviewed my findings with the patient.  His left total knee arthroplasty is functioning well.  No unusual features.  Routine follow-up at 5-year increments recommended.    Regarding his right knee, he does have arthritis and I offered him an injection today, but he declined.  He will contact me if and when he would like to do an injection in the future.  No surgical indications at this time.    Return if symptoms worsen or fail to improve.      Gamaliel Polanco MD  04/23/25  08:54 EDT      Dictated Utilizing Dragon Dictation

## 2025-05-22 ENCOUNTER — LAB (OUTPATIENT)
Dept: LAB | Facility: HOSPITAL | Age: 85
End: 2025-05-22
Payer: MEDICARE

## 2025-05-22 ENCOUNTER — OFFICE VISIT (OUTPATIENT)
Dept: ONCOLOGY | Facility: CLINIC | Age: 85
End: 2025-05-22
Payer: MEDICARE

## 2025-05-22 VITALS
BODY MASS INDEX: 29.95 KG/M2 | HEART RATE: 50 BPM | HEIGHT: 73 IN | DIASTOLIC BLOOD PRESSURE: 77 MMHG | RESPIRATION RATE: 18 BRPM | WEIGHT: 226 LBS | TEMPERATURE: 96.9 F | SYSTOLIC BLOOD PRESSURE: 141 MMHG | OXYGEN SATURATION: 98 %

## 2025-05-22 DIAGNOSIS — C61 PROSTATE CANCER METASTATIC TO INTRAPELVIC LYMPH NODE: ICD-10-CM

## 2025-05-22 DIAGNOSIS — C61 PROSTATE CANCER METASTATIC TO INTRAPELVIC LYMPH NODE: Primary | ICD-10-CM

## 2025-05-22 DIAGNOSIS — C61 PROSTATE CANCER: ICD-10-CM

## 2025-05-22 DIAGNOSIS — C77.5 PROSTATE CANCER METASTATIC TO INTRAPELVIC LYMPH NODE: ICD-10-CM

## 2025-05-22 DIAGNOSIS — C77.5 PROSTATE CANCER METASTATIC TO INTRAPELVIC LYMPH NODE: Primary | ICD-10-CM

## 2025-05-22 LAB
ALBUMIN SERPL-MCNC: 4.3 G/DL (ref 3.5–5.2)
ALBUMIN/GLOB SERPL: 1.4 G/DL
ALP SERPL-CCNC: 63 U/L (ref 39–117)
ALT SERPL W P-5'-P-CCNC: 16 U/L (ref 1–41)
ANION GAP SERPL CALCULATED.3IONS-SCNC: 11 MMOL/L (ref 5–15)
AST SERPL-CCNC: 18 U/L (ref 1–40)
BASOPHILS # BLD AUTO: 0.04 10*3/MM3 (ref 0–0.2)
BASOPHILS NFR BLD AUTO: 0.8 % (ref 0–1.5)
BILIRUB SERPL-MCNC: 0.8 MG/DL (ref 0–1.2)
BUN SERPL-MCNC: 16 MG/DL (ref 8–23)
BUN/CREAT SERPL: 19 (ref 7–25)
CALCIUM SPEC-SCNC: 9.6 MG/DL (ref 8.6–10.5)
CHLORIDE SERPL-SCNC: 102 MMOL/L (ref 98–107)
CO2 SERPL-SCNC: 27 MMOL/L (ref 22–29)
CREAT SERPL-MCNC: 0.84 MG/DL (ref 0.76–1.27)
DEPRECATED RDW RBC AUTO: 46.5 FL (ref 37–54)
EGFRCR SERPLBLD CKD-EPI 2021: 86 ML/MIN/1.73
EOSINOPHIL # BLD AUTO: 0.25 10*3/MM3 (ref 0–0.4)
EOSINOPHIL NFR BLD AUTO: 5 % (ref 0.3–6.2)
ERYTHROCYTE [DISTWIDTH] IN BLOOD BY AUTOMATED COUNT: 13 % (ref 12.3–15.4)
GLOBULIN UR ELPH-MCNC: 3 GM/DL
GLUCOSE SERPL-MCNC: 114 MG/DL (ref 65–99)
HCT VFR BLD AUTO: 38.8 % (ref 37.5–51)
HGB BLD-MCNC: 13.4 G/DL (ref 13–17.7)
IMM GRANULOCYTES # BLD AUTO: 0.03 10*3/MM3 (ref 0–0.05)
IMM GRANULOCYTES NFR BLD AUTO: 0.6 % (ref 0–0.5)
LYMPHOCYTES # BLD AUTO: 1.57 10*3/MM3 (ref 0.7–3.1)
LYMPHOCYTES NFR BLD AUTO: 31.2 % (ref 19.6–45.3)
MCH RBC QN AUTO: 33.3 PG (ref 26.6–33)
MCHC RBC AUTO-ENTMCNC: 34.5 G/DL (ref 31.5–35.7)
MCV RBC AUTO: 96.3 FL (ref 79–97)
MONOCYTES # BLD AUTO: 0.49 10*3/MM3 (ref 0.1–0.9)
MONOCYTES NFR BLD AUTO: 9.7 % (ref 5–12)
NEUTROPHILS NFR BLD AUTO: 2.66 10*3/MM3 (ref 1.7–7)
NEUTROPHILS NFR BLD AUTO: 52.7 % (ref 42.7–76)
PLATELET # BLD AUTO: 186 10*3/MM3 (ref 140–450)
PMV BLD AUTO: 9.5 FL (ref 6–12)
POTASSIUM SERPL-SCNC: 4.7 MMOL/L (ref 3.5–5.2)
PROT SERPL-MCNC: 7.3 G/DL (ref 6–8.5)
PSA SERPL-MCNC: <0.014 NG/ML (ref 0–4)
RBC # BLD AUTO: 4.03 10*6/MM3 (ref 4.14–5.8)
SODIUM SERPL-SCNC: 140 MMOL/L (ref 136–145)
WBC NRBC COR # BLD AUTO: 5.04 10*3/MM3 (ref 3.4–10.8)

## 2025-05-22 PROCEDURE — 36415 COLL VENOUS BLD VENIPUNCTURE: CPT

## 2025-05-22 PROCEDURE — 84153 ASSAY OF PSA TOTAL: CPT

## 2025-05-22 PROCEDURE — 80053 COMPREHEN METABOLIC PANEL: CPT

## 2025-05-22 PROCEDURE — 85025 COMPLETE CBC W/AUTO DIFF WBC: CPT

## 2025-05-22 NOTE — PROGRESS NOTES
"      PROBLEM LIST:  1. Metastatic prostate cancer  A) diagnosed 6/2018 with janis 4+3=7 prostate adenocarcinoma.  Stage at diagnosis [id number] (stage IIC).  Pretreatment PSA 5.3.  Treated with SBRT, completed 10/2018.    B) March 2020 found to have rising PSA (10.08 on 3/2/20).  Axumin PET/CT 3/31/20 showed abnormal activity in the left internal iliac, left pelvic sidewall nodes, periaortic adenopathy, bone marrow activity, left superficial inguinal nodes.  Started androgen ablation (eligard) with partial PSA response, but patient wished to stop ADT due to side effects/negative impact on quality of life.  Involved LN treated with radiotherapy  C) PSA increased to 0.3->1.9 in Fall 2022.  PSMA PET 11/30/22 showed PSMA uptake in the prostate gland c/w local recurrence, right retrocrural LN, left superior mediastinal LN. Eligard and apalutamide started January 2023.  Treatment break due to toxicity/quality of life concerns initiated Jan 2024.  2. Atrial fibrillation  3. Diabetes mellitus  4. GERD  5. GRADY on CPAP  6. hyperlipidemia    Subjective     CHIEF COMPLAINT: prostate cancer    HISTORY OF PRESENT ILLNESS:   Stefan Pedraza returns for follow-up.  He has been feeling okay.  No new symptoms or complaint.  Energy level is still low.  As far as he knows he has remained in sinus rhythm since his cardioversion.  His heart rate tends to run around 50.        Objective      /77   Pulse 50   Temp 96.9 °F (36.1 °C) (Infrared)   Resp 18   Ht 185.4 cm (72.99\")   Wt 103 kg (226 lb)   SpO2 98%   BMI 29.82 kg/m²    Vitals:    05/22/25 1102   PainSc: 0-No pain              ECOG score: 0      General: well appearing male in no acute distress  Neuro: alert and oriented  Cardiovascular: Regular rate and rhythm  Lungs: Clear to auscultation bilaterally  HEENT: sclera anicteric, oropharynx clear  Skin: no rashes, lesions, bruising, or petechiae  Psych: mood and affect appropriate        RECENT LABS:  Lab Results "   Component Value Date    WBC 5.04 05/22/2025    HGB 13.4 05/22/2025    HCT 38.8 05/22/2025    MCV 96.3 05/22/2025     05/22/2025       Lab Results   Component Value Date    GLUCOSE 105 (H) 03/27/2025    BUN 16 03/27/2025    CREATININE 0.52 (L) 03/27/2025    EGFRIFNONA 89 11/16/2019    BCR 30.8 (H) 03/27/2025    K 4.2 03/27/2025    CO2 27.0 03/27/2025    CALCIUM 9.5 03/27/2025    ALBUMIN 4.2 03/27/2025    AST 25 03/27/2025    ALT 39 03/27/2025               Lab Results   Component Value Date    PSA <0.014 03/27/2025    PSA <0.014 01/23/2025    PSA <0.014 11/26/2024         ASSESSMENT AND PLAN:     Stefan Pedraza is a 84 y.o. male androgen sensitive prostate cancer, here for follow-up.  He had a undetectable PSA on Eligard and apalutamide, but opted for a treatment holiday starting January 2024 due to severe fatigue.    He remains clinically stable.  We will call him with his PSA results from today. If it continues to be undetectable we will continue with a drug holiday. If he has had a rise in his PSA, we will consider imaging using PSMA PET for restaging.  Labs today are unremarkable.    We will see him back in 2 months with repeat labs.                 Ronda Mack MD  [facility] Hematology and Oncology    5/22/2025          CC:

## 2025-05-23 ENCOUNTER — RESULTS FOLLOW-UP (OUTPATIENT)
Dept: LAB | Facility: HOSPITAL | Age: 85
End: 2025-05-23
Payer: MEDICARE

## 2025-05-23 NOTE — PROGRESS NOTES
Can you call Mr Pedraza this morning to let him know his PSA remains undetectable.  He likes to know as soon as possible.

## 2025-05-23 NOTE — TELEPHONE ENCOUNTER
Called patient, informed him that Mari reviewed his labs and his PSA remains undetectable. Patient stated understanding and had no further questions.

## 2025-06-11 ENCOUNTER — OFFICE VISIT (OUTPATIENT)
Dept: CARDIOLOGY | Facility: CLINIC | Age: 85
End: 2025-06-11
Payer: MEDICARE

## 2025-06-11 VITALS
DIASTOLIC BLOOD PRESSURE: 68 MMHG | WEIGHT: 230 LBS | HEART RATE: 50 BPM | SYSTOLIC BLOOD PRESSURE: 122 MMHG | OXYGEN SATURATION: 96 % | HEIGHT: 66 IN | BODY MASS INDEX: 36.96 KG/M2

## 2025-06-11 DIAGNOSIS — Z95.818 PRESENCE OF WATCHMAN LEFT ATRIAL APPENDAGE CLOSURE DEVICE: ICD-10-CM

## 2025-06-11 DIAGNOSIS — I48.0 PAROXYSMAL ATRIAL FIBRILLATION: Primary | ICD-10-CM

## 2025-06-11 NOTE — PROGRESS NOTES
Physical Therapy Daily Progress Note  VISIT: 6      Stefan Pedraza reports: Pt saw MD and he is progressing well, less than 4 weeks post-L TKA.  Pt climbed 3 flights of stairs earlier today.    Subjective     Treatment  Pre-treatment pain:  1  Post-treatment pain:  1  Exercise 1  Exercise Name 1: Bicycle   Equipment/Resistance 1: L1  Sets/Reps 1: 5'  Exercise 2  Exercise Name 2: sidestepping  Exercise 3  Exercise Name 3: star lunges, 4 directions  Exercise 4  Exercise Name 4: braiding in parallel bars  Exercise 5  Exercise Name 5: Total Gym squats  Exercise 6  Exercise Name 6: high march  Exercise 7  Exercise Name 7: Elliptical  Time 7: 2'             Objective     Assessment & Plan     Assessment  Assessment details: Pt did well with progression of gait/balance activities today.      Plan  Plan details: Continue to progress strengthening and balance activities to allow pt to resume recreational interests.               Timed:  Manual Therapy:         mins  85179;  Therapeutic Exercise:    15     mins  36385;     Neuromuscular James:    15    mins  13110;    Therapeutic Activity:          mins  38598;     Gait Training:           mins  97284;     Ultrasound:          mins  85555;    Electrical Stimulation:         mins  25378 ( );    Untimed:  Electrical Stimulation:         mins  03959 ( );  Mechanical Traction:         mins  17719;     Timed Treatment:   30   mins   Total Treatment:     30   mins      Cynthia German, PT  Physical Therapist                     sip of water w/ am meds

## 2025-06-11 NOTE — PROGRESS NOTES
Stefan Pedraza  1940  [phone]        [facility] CARDIOLOGY         Faisal Hernandez MD  [address] [place] [address] [address] [place] [address]    Chief Complaint   Patient presents with    Paroxysmal atrial fibrillation       Problem List:   Persistent Atrial fibrillation:   History of atrial fibrillation, initial diagnosis 2007.   Status-post successful external cardioversion, Dr. Dyer, 2007.  Initiation of amiodarone therapy with recent tapering dose, Dr. Lowe.   Echocardiogram, 02/02/2011, revealing normal LV function, diastolic dysfunction noted, mild MR, no pericardial effusion.   Holter monitor, 01/25/2011, revealing sinus rhythm with PAC/PVC/episodes of sinus bradycardia.   Chadsvasc=3.   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   Pulmonary vein isolation procedure, 10/05/2011.  BRISSA with left ventricular ejection fraction of 55%, mild TR and MR.   Event recorder with intermittent episodes of flutter but the majority of the time in sinus rhythm.   External cardioversion to normal sinus rhythm, 02/17/2012.  Atypical chest pain.  History of left heart catheterization, 1990s in [place], reported as normal coronaries.   Stress test, 08/23/2007, revealing no significant ischemia, EF estimated at 46%.  Echo 7/28/17 LV Estimated EF = 57%., mild concentric hypertrophy, Left atrial cavity size is severely dilated. Normal estimated pulmonary artery systolic pressure  External CV 7/13/17; NSR with recurrent AF three days later  Echocardiogram 7/28/17: EF 57%, mild LVH, LA severely dilated  ECV to NSR 8/31/17  Wavecrest device insertion 7/16/19  BRISSA 7/21/2020 LVEF NL, No significant leakage around device, Mild MR,TR  ECV for recurrent Afib 12/19/2023-Flecanide   Zio patch 10/3/2024 - 10/16/2024: 100% atrial fibrillation with average heart rate of 82 bpm, 4.5% PVCs  Flecainide discontinued 11/2024  QT  prolongation on tikosyn 11/2024  ECV/amiodarone therapy March 11, 2025.  Short-term Eliquis, patient has previous way of crest device.  Diabetes mellitus.   Gastroesophageal reflux disease.  Dyslipidemia.  Osteoarthritis.  Prostate cancer - Ronda Mack MD   Remote surgical history:  Cholecystectomy.   Right orchiectomy.  Right shoulder labral repair.  Left knee arthroscopy for partial medial meniscectomy    Allergies  Allergies   Allergen Reactions    Penicillins Hives and Rash       Current Medications    Current Outpatient Medications:     amiodarone (PACERONE) 200 MG tablet, Take 1 tablet by mouth Daily., Disp: 90 tablet, Rfl: 2    apixaban (ELIQUIS) 5 MG tablet tablet, Take 1 tablet by mouth 2 (Two) Times a Day for 3 weeks then stop., Disp: 60 tablet, Rfl: 6    aspirin 81 MG EC tablet, Take 1 tablet by mouth Daily., Disp: , Rfl:     atorvastatin (LIPITOR) 10 MG tablet, Take 1 tablet by mouth Every Night., Disp: , Rfl:     B Complex Vitamins (VITAMIN B COMPLEX) capsule capsule, Take 1 capsule by mouth Daily., Disp: , Rfl:     Cholecalciferol (Vitamin D3) 25 MCG (1000 UT) capsule, Take 1 capsule by mouth Daily., Disp: , Rfl:     cyclobenzaprine (FLEXERIL) 10 MG tablet, Take 1 tablet by mouth As Needed., Disp: , Rfl:     famotidine (PEPCID) 20 MG tablet, Take 1 tablet by mouth 2 (Two) Times a Day., Disp: , Rfl:     ferrous sulfate 324 (65 FE) MG tablet delayed-release EC tablet, Take 1 tablet by mouth Daily With Breakfast., Disp: , Rfl:     fluticasone (FLONASE) 50 MCG/ACT nasal spray, 2 sprays Daily., Disp: , Rfl:     metFORMIN ER (GLUCOPHAGE-XR) 500 MG 24 hr tablet, Take 2 tablets by mouth Every Night. Pt reports takes 3 tablets, Disp: , Rfl:     Multiple Vitamins-Minerals (OCUVITE ADULT 50+ PO), Take 1 tablet by mouth Daily., Disp: , Rfl:     Omega-3 Fatty Acids (FISH OIL) 1000 MG capsule capsule, Take 1 capsule by mouth Daily With Breakfast., Disp: , Rfl:     omeprazole (priLOSEC) 40 MG capsule, Take 1 capsule  "by mouth Daily., Disp: , Rfl:     triamcinolone (KENALOG) 0.1 % cream, As Needed., Disp: , Rfl:     History of Present Illness     Pt presents for follow up of AF. He was unable to tolerate Tikosyn due to prolonged QT.  When last seen by Dr. Solis on the office he was back in A-fib therefore he was set up for a cardioversion procedure with amiodarone.  He is maintaining sinus rhythm.  He states he is unaware of any symptoms currently.  He has not had any dizziness near syncope.  He is unaware that his heart rate sometimes is low.    Vitals:    06/11/25 1017   BP: 122/68   BP Location: Right arm   Patient Position: Sitting   Pulse: 50   SpO2: 96%   Weight: 104 kg (230 lb)   Height: 167.6 cm (66\")     Body mass index is 37.12 kg/m².  PE:  General: NAD  Neck: no JVD, no carotid bruits, no TM  Heart irregular regular rate rhythm normal S1-S2.  No new murmurs appreciated.  Lungs: CTA, no wheezes, rhonchi, or rales  Abd: soft, non-tender, NL BS  Ext: No musculoskeletal deformities, no edema, cyanosis, or clubbing  Psych: normal mood and affect    Diagnostic Data:  Echocardiogram 1/2025    Left ventricular systolic function is normal. Calculated left ventricular EF = 56.3% Left ventricular ejection fraction appears to be 56 - 60%.    Left ventricular wall thickness is consistent with mild concentric hypertrophy.    The right ventricular cavity is borderline dilated.    The left atrial cavity is dilated.    Left atrial volume is mildly increased.    Estimated right ventricular systolic pressure from tricuspid regurgitation is normal (<35 mmHg). Calculated right ventricular systolic pressure from tricuspid regurgitation is 24 mmHg.    The aortic root measures 3.6 cm.       Procedures         1. Paroxysmal atrial fibrillation    2. Presence of Watchman left atrial appendage closure device            Plan:    1) Persistent Atrial Fibrillation: Admitted for Tikosyn 11/19/2024. Tikosyn stopped do to QT prolongation. ECV " 3/18/2025. Amiodarone, Eliquis for short term.  He would like to continue current medical therapy.    2) Anticoagulation post left atrial appendage occlusion with Wavecrest device.  On aspirin. Eliquis for short term after eCV.     3.  Asymptomatic sinus bradycardia    Return for follow-up tonsil in 6 months.  Electronically signed by SHIRA Castillo, 06/11/25, 10:46 AM EDT.

## 2025-07-03 ENCOUNTER — OFFICE VISIT (OUTPATIENT)
Dept: SLEEP MEDICINE | Age: 85
End: 2025-07-03
Payer: MEDICARE

## 2025-07-03 VITALS
SYSTOLIC BLOOD PRESSURE: 122 MMHG | TEMPERATURE: 98.2 F | BODY MASS INDEX: 30.75 KG/M2 | OXYGEN SATURATION: 98 % | DIASTOLIC BLOOD PRESSURE: 70 MMHG | WEIGHT: 227 LBS | HEART RATE: 54 BPM | HEIGHT: 72 IN

## 2025-07-03 DIAGNOSIS — G47.33 OSA (OBSTRUCTIVE SLEEP APNEA): Primary | ICD-10-CM

## 2025-07-03 DIAGNOSIS — G47.19 EXCESSIVE DAYTIME SLEEPINESS: ICD-10-CM

## 2025-07-03 DIAGNOSIS — E66.9 OBESITY (BMI 30-39.9): ICD-10-CM

## 2025-07-03 NOTE — PROGRESS NOTES
Stefan Pedraza is a 85 y.o. male.   Chief Complaint   Patient presents with    Sleep Apnea    Witnessed Apnea    Snoring    Daytime Sleepiness       HPI     85 y.o. male seen in consultation at the request of Tisha Mccall PA for evaluation of the above.     He has a longstanding history of obstructive sleep apnea.  He has been seen primarily at the [facility].  He was initially seen by Dr. Abdalla and subsequently by Tisha Mccall.  He thinks his last sleep study was over 20 years ago.    He brought his CPAP with him today.  It is a relatively new device within the last year.  Settings are auto CPAP with a range of 6-16 cm H2O.  His AHI is elevated at 14.7 with these events being generally apneas and the majority of these apneas being central.  Mask fit is good.  He uses a fullface mask.  Compliance is excellent and he averages over 8 hours of usage per night and has used it on every night in the last 3 months.    He does admit to some daytime somnolence despite 8 hours of sleep.  He does take a nap in the afternoon.    His past medical history is significant for persistent atrial fibrillation, diabetes mellitus, dyslipidemia, GERD, and prostate cancer among others.    [place] Scale is: 5/24    The patient's relevant past medical, surgical, family, and social history reviewed and updated in Epic as appropriate.    Current medications are:   Current Outpatient Medications:     amiodarone (PACERONE) 200 MG tablet, Take 1 tablet by mouth Daily., Disp: 90 tablet, Rfl: 2    apixaban (ELIQUIS) 5 MG tablet tablet, Take 1 tablet by mouth 2 (Two) Times a Day for 3 weeks then stop., Disp: 60 tablet, Rfl: 6    aspirin 81 MG EC tablet, Take 1 tablet by mouth Daily., Disp: , Rfl:     atorvastatin (LIPITOR) 10 MG tablet, Take 1 tablet by mouth Every Night., Disp: , Rfl:     B Complex Vitamins (VITAMIN B COMPLEX) capsule capsule, Take 1 capsule by mouth Daily., Disp: , Rfl:     Cholecalciferol (Vitamin D3) 25 MCG (1000  "UT) capsule, Take 1 capsule by mouth Daily., Disp: , Rfl:     cyclobenzaprine (FLEXERIL) 10 MG tablet, Take 1 tablet by mouth As Needed., Disp: , Rfl:     famotidine (PEPCID) 20 MG tablet, Take 1 tablet by mouth 2 (Two) Times a Day., Disp: , Rfl:     ferrous sulfate 324 (65 FE) MG tablet delayed-release EC tablet, Take 1 tablet by mouth Daily With Breakfast., Disp: , Rfl:     fluticasone (FLONASE) 50 MCG/ACT nasal spray, 2 sprays Daily., Disp: , Rfl:     metFORMIN ER (GLUCOPHAGE-XR) 500 MG 24 hr tablet, Take 2 tablets by mouth Every Night. Pt reports takes 3 tablets, Disp: , Rfl:     Multiple Vitamins-Minerals (OCUVITE ADULT 50+ PO), Take 1 tablet by mouth Daily., Disp: , Rfl:     Omega-3 Fatty Acids (FISH OIL) 1000 MG capsule capsule, Take 1 capsule by mouth Daily With Breakfast., Disp: , Rfl:     omeprazole (priLOSEC) 40 MG capsule, Take 1 capsule by mouth Daily., Disp: , Rfl:     triamcinolone (KENALOG) 0.1 % cream, As Needed., Disp: , Rfl: .    Review of Systems    Review of Systems  ROS documented in patient questionnaire ×14 systems.  Otherwise negative except as noted in HPI.    Physical Exam    Blood pressure 122/70, pulse 54, temperature 98.2 °F (36.8 °C), temperature source Infrared, height 181.6 cm (71.5\"), weight 103 kg (227 lb), SpO2 98%. Body mass index is 31.22 kg/m².    Physical Exam  Vitals and nursing note reviewed.   Constitutional:       Appearance: Normal appearance. He is well-developed.   HENT:      Head: Normocephalic and atraumatic.      Nose: Nose normal.      Mouth/Throat:      Mouth: Mucous membranes are moist.      Pharynx: Oropharynx is clear. No oropharyngeal exudate.      Comments: Class III airway  Eyes:      General: No scleral icterus.     Conjunctiva/sclera: Conjunctivae normal.   Neck:      Thyroid: No thyromegaly.      Trachea: No tracheal deviation.   Cardiovascular:      Rate and Rhythm: Normal rate and regular rhythm.      Heart sounds: No murmur heard.     No friction rub. " No gallop.   Pulmonary:      Effort: Pulmonary effort is normal. No respiratory distress.      Breath sounds: No wheezing or rales.   Musculoskeletal:         General: No deformity. Normal range of motion.   Skin:     General: Skin is warm and dry.      Findings: No rash.   Neurological:      Mental Status: He is alert and oriented to person, place, and time.   Psychiatric:         Behavior: Behavior normal.         Thought Content: Thought content normal.         DATA:    Reviewed download from his current CPAP machine as referenced above    Reviewed most recent note from SHIRA Walton dated 2/6/2025    Reviewed bed partner questionnaire    ASSESSMENT:    Problem List Items Addressed This Visit          Pulmonary Problems    GRADY (obstructive sleep apnea) - Primary    Relevant Orders    PAP Therapy     Other Visit Diagnoses         Excessive daytime sleepiness        Relevant Orders    PAP Therapy      Obesity (BMI 30-39.9)                85-year-old male with longstanding obstructive sleep apnea.  He was referred from the [facility] sleep center/SHIRA Walton.  The reason for the referral appears to be transfer of care though this was never clearly documented in the patient said he was never told though I did find a note from Ms. Mccall noting that she was retiring in July 1.    In any event, he appears generally appropriately treated though he does have some continued elevation of his AHI at 14.7 with mostly central apneas though some obstructive apneas are present.  He does note some residual daytime somnolence.  We talked about a full night titration but he asked me if there is any way I could adjust his machine on a trial basis and given that his maximum pressure is 15.5 I will increase the range to 8-20 cm H2O from 6-16 cm H2O    PLAN:    CPAP adjustments as above  Continue current fullface mask  He can return in 3-6 months and his download will be reassessed and if he has ongoing issues or an  elevation of the AHI to greater than 10 then a full night titration could be considered if he was agreeable.  Actually, a split night might be appropriate at that point as we do not have any original diagnostic studies to refer to for insurance purposes    I have reviewed the results of my evaluation and impression and discussed my recommendations in detail with the patient.    Signed by  Ryan Patel MD    July 3, 2025      CC: Faisal Hernandez MD Erwin, Amber, PA

## 2025-07-24 ENCOUNTER — LAB (OUTPATIENT)
Dept: LAB | Facility: HOSPITAL | Age: 85
End: 2025-07-24
Payer: MEDICARE

## 2025-07-24 ENCOUNTER — OFFICE VISIT (OUTPATIENT)
Dept: ONCOLOGY | Facility: CLINIC | Age: 85
End: 2025-07-24
Payer: MEDICARE

## 2025-07-24 ENCOUNTER — RESULTS FOLLOW-UP (OUTPATIENT)
Dept: LAB | Facility: HOSPITAL | Age: 85
End: 2025-07-24
Payer: MEDICARE

## 2025-07-24 VITALS
HEIGHT: 73 IN | DIASTOLIC BLOOD PRESSURE: 74 MMHG | OXYGEN SATURATION: 97 % | TEMPERATURE: 97.8 F | BODY MASS INDEX: 29.95 KG/M2 | WEIGHT: 226 LBS | HEART RATE: 55 BPM | SYSTOLIC BLOOD PRESSURE: 137 MMHG | RESPIRATION RATE: 18 BRPM

## 2025-07-24 DIAGNOSIS — C61 PROSTATE CANCER METASTATIC TO INTRAPELVIC LYMPH NODE: Primary | ICD-10-CM

## 2025-07-24 DIAGNOSIS — C77.5 PROSTATE CANCER METASTATIC TO INTRAPELVIC LYMPH NODE: Primary | ICD-10-CM

## 2025-07-24 DIAGNOSIS — C61 PROSTATE CANCER METASTATIC TO INTRAPELVIC LYMPH NODE: ICD-10-CM

## 2025-07-24 DIAGNOSIS — C77.5 PROSTATE CANCER METASTATIC TO INTRAPELVIC LYMPH NODE: ICD-10-CM

## 2025-07-24 LAB
ALBUMIN SERPL-MCNC: 4.1 G/DL (ref 3.5–5.2)
ALBUMIN/GLOB SERPL: 1.4 G/DL
ALP SERPL-CCNC: 53 U/L (ref 39–117)
ALT SERPL W P-5'-P-CCNC: 17 U/L (ref 1–41)
ANION GAP SERPL CALCULATED.3IONS-SCNC: 10.5 MMOL/L (ref 5–15)
AST SERPL-CCNC: 18 U/L (ref 1–40)
BASOPHILS # BLD AUTO: 0.06 10*3/MM3 (ref 0–0.2)
BASOPHILS NFR BLD AUTO: 1.2 % (ref 0–1.5)
BILIRUB SERPL-MCNC: 0.5 MG/DL (ref 0–1.2)
BUN SERPL-MCNC: 16.6 MG/DL (ref 8–23)
BUN/CREAT SERPL: 16.6 (ref 7–25)
CALCIUM SPEC-SCNC: 9.3 MG/DL (ref 8.6–10.5)
CHLORIDE SERPL-SCNC: 109 MMOL/L (ref 98–107)
CO2 SERPL-SCNC: 25.5 MMOL/L (ref 22–29)
CREAT SERPL-MCNC: 1 MG/DL (ref 0.76–1.27)
DEPRECATED RDW RBC AUTO: 45.4 FL (ref 37–54)
EGFRCR SERPLBLD CKD-EPI 2021: 73.8 ML/MIN/1.73
EOSINOPHIL # BLD AUTO: 0.19 10*3/MM3 (ref 0–0.4)
EOSINOPHIL NFR BLD AUTO: 3.8 % (ref 0.3–6.2)
ERYTHROCYTE [DISTWIDTH] IN BLOOD BY AUTOMATED COUNT: 12.8 % (ref 12.3–15.4)
GLOBULIN UR ELPH-MCNC: 2.9 GM/DL
GLUCOSE SERPL-MCNC: 105 MG/DL (ref 65–99)
HCT VFR BLD AUTO: 40.8 % (ref 37.5–51)
HGB BLD-MCNC: 13.8 G/DL (ref 13–17.7)
IMM GRANULOCYTES # BLD AUTO: 0.01 10*3/MM3 (ref 0–0.05)
IMM GRANULOCYTES NFR BLD AUTO: 0.2 % (ref 0–0.5)
LYMPHOCYTES # BLD AUTO: 1.62 10*3/MM3 (ref 0.7–3.1)
LYMPHOCYTES NFR BLD AUTO: 32.1 % (ref 19.6–45.3)
MCH RBC QN AUTO: 32.8 PG (ref 26.6–33)
MCHC RBC AUTO-ENTMCNC: 33.8 G/DL (ref 31.5–35.7)
MCV RBC AUTO: 96.9 FL (ref 79–97)
MONOCYTES # BLD AUTO: 0.52 10*3/MM3 (ref 0.1–0.9)
MONOCYTES NFR BLD AUTO: 10.3 % (ref 5–12)
NEUTROPHILS NFR BLD AUTO: 2.65 10*3/MM3 (ref 1.7–7)
NEUTROPHILS NFR BLD AUTO: 52.4 % (ref 42.7–76)
PLATELET # BLD AUTO: 178 10*3/MM3 (ref 140–450)
PMV BLD AUTO: 9.7 FL (ref 6–12)
POTASSIUM SERPL-SCNC: 4.8 MMOL/L (ref 3.5–5.2)
PROT SERPL-MCNC: 7 G/DL (ref 6–8.5)
PSA SERPL-MCNC: <0.014 NG/ML (ref 0–4)
RBC # BLD AUTO: 4.21 10*6/MM3 (ref 4.14–5.8)
SODIUM SERPL-SCNC: 145 MMOL/L (ref 136–145)
WBC NRBC COR # BLD AUTO: 5.05 10*3/MM3 (ref 3.4–10.8)

## 2025-07-24 PROCEDURE — 36415 COLL VENOUS BLD VENIPUNCTURE: CPT

## 2025-07-24 PROCEDURE — 99213 OFFICE O/P EST LOW 20 MIN: CPT | Performed by: NURSE PRACTITIONER

## 2025-07-24 PROCEDURE — 1126F AMNT PAIN NOTED NONE PRSNT: CPT | Performed by: NURSE PRACTITIONER

## 2025-07-24 PROCEDURE — 1160F RVW MEDS BY RX/DR IN RCRD: CPT | Performed by: NURSE PRACTITIONER

## 2025-07-24 PROCEDURE — 1159F MED LIST DOCD IN RCRD: CPT | Performed by: NURSE PRACTITIONER

## 2025-07-24 PROCEDURE — 85025 COMPLETE CBC W/AUTO DIFF WBC: CPT

## 2025-07-24 PROCEDURE — 84153 ASSAY OF PSA TOTAL: CPT

## 2025-07-24 PROCEDURE — 80053 COMPREHEN METABOLIC PANEL: CPT

## 2025-07-24 NOTE — TELEPHONE ENCOUNTER
----- Message from Ronda Mack sent at 7/24/2025  4:33 PM EDT -----  Please let him know PSA remains undetectable.

## 2025-07-24 NOTE — PROGRESS NOTES
"      PROBLEM LIST:  1. Metastatic prostate cancer  A) diagnosed 6/2018 with janis 4+3=7 prostate adenocarcinoma.  Stage at diagnosis [id number] (stage IIC).  Pretreatment PSA 5.3.  Treated with SBRT, completed 10/2018.    B) March 2020 found to have rising PSA (10.08 on 3/2/20).  Axumin PET/CT 3/31/20 showed abnormal activity in the left internal iliac, left pelvic sidewall nodes, periaortic adenopathy, bone marrow activity, left superficial inguinal nodes.  Started androgen ablation (eligard) with partial PSA response, but patient wished to stop ADT due to side effects/negative impact on quality of life.  Involved LN treated with radiotherapy  C) PSA increased to 0.3->1.9 in Fall 2022.  PSMA PET 11/30/22 showed PSMA uptake in the prostate gland c/w local recurrence, right retrocrural LN, left superior mediastinal LN. Eligard and apalutamide started January 2023.  Treatment break due to toxicity/quality of life concerns initiated Jan 2024.  2. Atrial fibrillation  3. Diabetes mellitus  4. GERD  5. GRADY on CPAP  6. hyperlipidemia    Subjective     CHIEF COMPLAINT: prostate cancer    HISTORY OF PRESENT ILLNESS:   Stefan Pedraza returns for follow-up.  He has been doing well since his last visit. He has some generalized joint pain that is unchanged and occasional hot flashes. He denies any new medical concerns today.         Objective      /74   Pulse 55   Temp 97.8 °F (36.6 °C)   Resp 18   Ht 185.4 cm (73\")   Wt 103 kg (226 lb)   SpO2 97%   BMI 29.82 kg/m²    Vitals:    07/24/25 0949   PainSc: 0-No pain              ECOG score: 2      General: well appearing male in no acute distress  Neuro: alert and oriented  Cardiovascular: Regular rate and rhythm  Lungs: Clear to auscultation bilaterally  HEENT: sclera anicteric, oropharynx clear  Skin: no rashes, lesions, bruising, or petechiae  Psych: mood and affect appropriate        RECENT LABS:  Lab Results   Component Value Date    WBC 5.05 07/24/2025    " HGB 13.8 07/24/2025    HCT 40.8 07/24/2025    MCV 96.9 07/24/2025     07/24/2025       Lab Results   Component Value Date    GLUCOSE 105 (H) 07/24/2025    BUN 16.6 07/24/2025    CREATININE 1.00 07/24/2025    EGFRIFNONA 89 11/16/2019    BCR 16.6 07/24/2025    K 4.8 07/24/2025    CO2 25.5 07/24/2025    CALCIUM 9.3 07/24/2025    ALBUMIN 4.1 07/24/2025    AST 18 07/24/2025    ALT 17 07/24/2025               Lab Results   Component Value Date    PSA <0.014 05/22/2025    PSA <0.014 03/27/2025    PSA <0.014 01/23/2025         ASSESSMENT AND PLAN:     Stefan Pedraza is a 85 y.o. male androgen sensitive prostate cancer, here for follow-up.  He had a undetectable PSA on Eligard and apalutamide, but opted for a treatment holiday starting January 2024 due to severe fatigue.    He remains clinically stable.  CBC and CMP from today were reviewed and are stable. We will contact him with PSA results from today when available.  If it continues to be undetectable we will continue with a drug holiday. If he has had a rise in his PSA, we will consider imaging using PSMA PET for restaging.      We will see him back in 2 months with repeat labs.                 Eboni Avalos APRN  [facility]    7/24/2025          CC:

## 2025-07-24 NOTE — TELEPHONE ENCOUNTER
Called patient. Informed him Dr Ewing reviewed his labs and his PSA remains undetectable. Patient stated understanding and had no further questions.

## (undated) DEVICE — Device

## (undated) DEVICE — ST INF PRI SMRTSTE 20DRP 2VLV 24ML 117

## (undated) DEVICE — INTRO SHEATH ENGAGE W/50 GW .038 9F12

## (undated) DEVICE — CONTRL CONTRST CHMBRD W/TBG72IN REUS

## (undated) DEVICE — STERILE (15.2 TAPERED TO 7.6 X 183CM) POLYETHYLENE ACCORDION-FOLDED COVER FOR USE WITH SIEMENS ACUNAV ULTRASOUND CATHETER FAMILY CONNECTOR: Brand: SWIFTLINK TRANSDUCER COVER

## (undated) DEVICE — STRYKER PERFORMANCE SERIES SAGITTAL BLADE: Brand: STRYKER PERFORMANCE SERIES

## (undated) DEVICE — ANTIBACTERIAL UNDYED BRAIDED (POLYGLACTIN 910), SYNTHETIC ABSORBABLE SUTURE: Brand: COATED VICRYL

## (undated) DEVICE — NDL HYPO ECLPS SFTY 18G 1 1/2IN

## (undated) DEVICE — LEX ELECTRO PHYSIOLOGY: Brand: MEDLINE INDUSTRIES, INC.

## (undated) DEVICE — CATH DIAG EXPO .052 PIG 6F 110CM

## (undated) DEVICE — ST EXT IV LG BORE NDLESS FLTR LL 17IN

## (undated) DEVICE — BNDG ELAS W/CLIP 6IN 10YD LF STRL

## (undated) DEVICE — Device: Brand: MEDEX

## (undated) DEVICE — CATH ULTRASND ECHO ACUNAV FOR ACUSON 8F 90CM

## (undated) DEVICE — DECANT BG O JET

## (undated) DEVICE — GLV SURG PREMIERPRO MIC LTX PF SZ8.5 BRN

## (undated) DEVICE — PUMP PAIN AUTOFUSER AUTO SELCT NOBOLUS 1TO14ML/HR 550ML DISP

## (undated) DEVICE — SPNG GZ STRL 2S 4X4 12PLY

## (undated) DEVICE — LIMB HOLDER, WRIST/ANKLE: Brand: DEROYAL

## (undated) DEVICE — DOME MONITORING W BONDED STPCK BIOTRANS2

## (undated) DEVICE — CANN NASL CO2 DIVIDED A/

## (undated) DEVICE — SYS SKIN CLS DERMABOND PRINEO W/22CM MESH TP

## (undated) DEVICE — SYR LUERLOK 20CC BX/50

## (undated) DEVICE — PK KN TOTL 10

## (undated) DEVICE — UNDERCAST PADDING: Brand: DEROYAL

## (undated) DEVICE — ST EXT IV SMARTSITE 2VLV SP M LL 5ML IV1

## (undated) DEVICE — DRSNG PAD ABD 8X10IN STRL

## (undated) DEVICE — GLV SURG SENSICARE W/ALOE PF LF 9 STRL

## (undated) DEVICE — INTRO SHEATH ENGAGE W/50 GW .038 7F12

## (undated) DEVICE — DRSNG SURESITE123 4X4.8IN

## (undated) DEVICE — SYS TRNSEP ACC BRK ACROSS A/ 71CM

## (undated) DEVICE — BNDG ESMARK 6INX9FT STRL

## (undated) DEVICE — INTRO SHEATH 8F60CM

## (undated) DEVICE — CVR HNDL LT SURG ACCSSRY BLU STRL

## (undated) DEVICE — GW DIAG .032

## (undated) DEVICE — SET PRIMARY GRVTY 10DP MALE LL 104IN

## (undated) DEVICE — CEMENT MIXING SYSTEM WITH MIS FEMORAL BREAKAWAY NOZZLE: Brand: REVOLUTION

## (undated) DEVICE — KT MANIFLD EP

## (undated) DEVICE — SOL NACL 0.9PCT 1000ML